# Patient Record
Sex: MALE | Race: WHITE | NOT HISPANIC OR LATINO | Employment: OTHER | URBAN - METROPOLITAN AREA
[De-identification: names, ages, dates, MRNs, and addresses within clinical notes are randomized per-mention and may not be internally consistent; named-entity substitution may affect disease eponyms.]

---

## 2017-01-08 ENCOUNTER — HOSPITAL ENCOUNTER (OUTPATIENT)
Dept: CARDIOLOGY | Facility: HOSPITAL | Age: 72
Discharge: HOME | End: 2017-01-08

## 2017-01-08 DIAGNOSIS — Z13.9 SCREENING: ICD-10-CM

## 2017-01-09 LAB
BH CV XLRA MEAS - PAD LEFT ARM: 145 MMHG
BH CV XLRA MEAS - PAD LEFT LEG DP: 1.12 MMHG
BH CV XLRA MEAS - PAD LEFT LEG PT: 163 MMHG
BH CV XLRA MEAS - PAD RIGHT ARM: 144 MMHG
BH CV XLRA MEAS - PAD RIGHT LEG DP: 1.13 MMHG
BH CV XLRA MEAS - PAD RIGHT LEG PT: 164 MMHG
BH CV XLRA MEAS - PROX AO DIAM: 2.7 CM
BH CV XLRA MEAS LEFT ICA/CCA RATIO: 1.2
BH CV XLRA MEAS LEFT MID CCA PSV: NORMAL CM/SEC
BH CV XLRA MEAS LEFT MID ICA PSV: NORMAL CM/SEC
BH CV XLRA MEAS LEFT PROX ECA PSV: 101 CM/SEC
BH CV XLRA MEAS RIGHT ICA/CCA RATIO: 0.7
BH CV XLRA MEAS RIGHT MID CCA PSV: NORMAL CM/SEC
BH CV XLRA MEAS RIGHT MID ICA PSV: NORMAL CM/SEC
BH CV XLRA MEAS RIGHT PROX ECA PSV: 111 CM/SEC

## 2017-01-10 ENCOUNTER — TELEPHONE (OUTPATIENT)
Dept: CARDIOLOGY | Facility: CLINIC | Age: 72
End: 2017-01-10

## 2017-01-10 DIAGNOSIS — M54.50 CHRONIC MIDLINE LOW BACK PAIN WITHOUT SCIATICA: ICD-10-CM

## 2017-01-10 DIAGNOSIS — I71.40 ABDOMINAL AORTIC ANEURYSM (AAA) WITHOUT RUPTURE (HCC): Primary | ICD-10-CM

## 2017-01-10 DIAGNOSIS — G89.29 CHRONIC MIDLINE LOW BACK PAIN WITHOUT SCIATICA: ICD-10-CM

## 2017-01-11 NOTE — TELEPHONE ENCOUNTER
I talked to patient regarding vascular screening results and findings abdominal aortic aneurysm.  He has had chronic back pain.  I doubt this is playing a significant role in his back pain.  However asked him to see a vascular surgery in regards to this.  A consult has been placed.      Cheryl, Please see if he can get in in the next one to 2 weeks. [email or web address]

## 2017-02-23 ENCOUNTER — APPOINTMENT (OUTPATIENT)
Dept: CT IMAGING | Facility: HOSPITAL | Age: 72
End: 2017-02-23

## 2017-02-23 ENCOUNTER — HOSPITAL ENCOUNTER (INPATIENT)
Facility: HOSPITAL | Age: 72
LOS: 3 days | Discharge: HOME | End: 2017-02-26
Attending: EMERGENCY MEDICINE | Admitting: UROLOGY

## 2017-02-23 DIAGNOSIS — N20.0 NEPHROLITHIASIS: ICD-10-CM

## 2017-02-23 DIAGNOSIS — N20.1 URETEROLITHIASIS: Primary | ICD-10-CM

## 2017-02-23 LAB
ALBUMIN SERPL-MCNC: 4.4 G/DL (ref 3.5–5.2)
ALBUMIN/GLOB SERPL: 1.6 G/DL
ALP SERPL-CCNC: 50 U/L (ref 39–117)
ALT SERPL W P-5'-P-CCNC: 17 U/L (ref 1–41)
ANION GAP SERPL CALCULATED.3IONS-SCNC: 15.7 MMOL/L
AST SERPL-CCNC: 22 U/L (ref 1–40)
BACTERIA UR QL AUTO: ABNORMAL /HPF
BASOPHILS # BLD AUTO: 0.01 10*3/MM3 (ref 0–0.2)
BASOPHILS NFR BLD AUTO: 0.1 % (ref 0–1.5)
BILIRUB SERPL-MCNC: 0.8 MG/DL (ref 0.1–1.2)
BILIRUB UR QL STRIP: NEGATIVE
BUN BLD-MCNC: 21 MG/DL (ref 8–23)
BUN/CREAT SERPL: 12.5 (ref 7–25)
CALCIUM SPEC-SCNC: 9.9 MG/DL (ref 8.6–10.5)
CHLORIDE SERPL-SCNC: 101 MMOL/L (ref 98–107)
CLARITY UR: ABNORMAL
CO2 SERPL-SCNC: 25.3 MMOL/L (ref 22–29)
COLOR UR: ABNORMAL
CREAT BLD-MCNC: 1.68 MG/DL (ref 0.76–1.27)
DEPRECATED RDW RBC AUTO: 43 FL (ref 37–54)
EOSINOPHIL # BLD AUTO: 0.09 10*3/MM3 (ref 0–0.7)
EOSINOPHIL NFR BLD AUTO: 0.7 % (ref 0.3–6.2)
ERYTHROCYTE [DISTWIDTH] IN BLOOD BY AUTOMATED COUNT: 12.5 % (ref 11.5–14.5)
GFR SERPL CREATININE-BSD FRML MDRD: 40 ML/MIN/1.73
GLOBULIN UR ELPH-MCNC: 2.7 GM/DL
GLUCOSE BLD-MCNC: 86 MG/DL (ref 65–99)
GLUCOSE UR STRIP-MCNC: NEGATIVE MG/DL
HCT VFR BLD AUTO: 40.8 % (ref 40.4–52.2)
HGB BLD-MCNC: 13.6 G/DL (ref 13.7–17.6)
HGB UR QL STRIP.AUTO: ABNORMAL
HOLD SPECIMEN: NORMAL
HYALINE CASTS UR QL AUTO: ABNORMAL /LPF
IMM GRANULOCYTES # BLD: 0.04 10*3/MM3 (ref 0–0.03)
IMM GRANULOCYTES NFR BLD: 0.3 % (ref 0–0.5)
KETONES UR QL STRIP: ABNORMAL
LEUKOCYTE ESTERASE UR QL STRIP.AUTO: ABNORMAL
LIPASE SERPL-CCNC: 14 U/L (ref 13–60)
LYMPHOCYTES # BLD AUTO: 1.04 10*3/MM3 (ref 0.9–4.8)
LYMPHOCYTES NFR BLD AUTO: 8.1 % (ref 19.6–45.3)
MCH RBC QN AUTO: 31.5 PG (ref 27–32.7)
MCHC RBC AUTO-ENTMCNC: 33.3 G/DL (ref 32.6–36.4)
MCV RBC AUTO: 94.4 FL (ref 79.8–96.2)
MONOCYTES # BLD AUTO: 0.96 10*3/MM3 (ref 0.2–1.2)
MONOCYTES NFR BLD AUTO: 7.5 % (ref 5–12)
NEUTROPHILS # BLD AUTO: 10.69 10*3/MM3 (ref 1.9–8.1)
NEUTROPHILS NFR BLD AUTO: 83.3 % (ref 42.7–76)
NITRITE UR QL STRIP: NEGATIVE
PH UR STRIP.AUTO: 6 [PH] (ref 5–8)
PLATELET # BLD AUTO: 225 10*3/MM3 (ref 140–500)
PMV BLD AUTO: 11 FL (ref 6–12)
POTASSIUM BLD-SCNC: 4.1 MMOL/L (ref 3.5–5.2)
PROT SERPL-MCNC: 7.1 G/DL (ref 6–8.5)
PROT UR QL STRIP: ABNORMAL
RBC # BLD AUTO: 4.32 10*6/MM3 (ref 4.6–6)
RBC # UR: ABNORMAL /HPF
REF LAB TEST METHOD: ABNORMAL
SODIUM BLD-SCNC: 142 MMOL/L (ref 136–145)
SP GR UR STRIP: 1.02 (ref 1–1.03)
SQUAMOUS #/AREA URNS HPF: ABNORMAL /HPF
UROBILINOGEN UR QL STRIP: ABNORMAL
WBC NRBC COR # BLD: 12.83 10*3/MM3 (ref 4.5–10.7)
WBC UR QL AUTO: ABNORMAL /HPF
WHOLE BLOOD HOLD SPECIMEN: NORMAL

## 2017-02-23 PROCEDURE — 36415 COLL VENOUS BLD VENIPUNCTURE: CPT | Performed by: EMERGENCY MEDICINE

## 2017-02-23 PROCEDURE — 83690 ASSAY OF LIPASE: CPT | Performed by: EMERGENCY MEDICINE

## 2017-02-23 PROCEDURE — 99284 EMERGENCY DEPT VISIT MOD MDM: CPT

## 2017-02-23 PROCEDURE — 25010000003 CEFTRIAXONE PER 250 MG: Performed by: EMERGENCY MEDICINE

## 2017-02-23 PROCEDURE — 25010000002 MORPHINE PER 10 MG: Performed by: EMERGENCY MEDICINE

## 2017-02-23 PROCEDURE — 74176 CT ABD & PELVIS W/O CONTRAST: CPT

## 2017-02-23 PROCEDURE — 25010000002 ONDANSETRON PER 1 MG: Performed by: EMERGENCY MEDICINE

## 2017-02-23 PROCEDURE — 81001 URINALYSIS AUTO W/SCOPE: CPT | Performed by: EMERGENCY MEDICINE

## 2017-02-23 PROCEDURE — 80053 COMPREHEN METABOLIC PANEL: CPT | Performed by: EMERGENCY MEDICINE

## 2017-02-23 PROCEDURE — 87086 URINE CULTURE/COLONY COUNT: CPT | Performed by: EMERGENCY MEDICINE

## 2017-02-23 PROCEDURE — 25010000002 HYDROMORPHONE PER 4 MG: Performed by: EMERGENCY MEDICINE

## 2017-02-23 PROCEDURE — 85025 COMPLETE CBC W/AUTO DIFF WBC: CPT | Performed by: EMERGENCY MEDICINE

## 2017-02-23 RX ORDER — ONDANSETRON 2 MG/ML
4 INJECTION INTRAMUSCULAR; INTRAVENOUS ONCE
Status: COMPLETED | OUTPATIENT
Start: 2017-02-23 | End: 2017-02-23

## 2017-02-23 RX ORDER — HYDROMORPHONE HYDROCHLORIDE 1 MG/ML
0.5 INJECTION, SOLUTION INTRAMUSCULAR; INTRAVENOUS; SUBCUTANEOUS ONCE
Status: COMPLETED | OUTPATIENT
Start: 2017-02-23 | End: 2017-02-23

## 2017-02-23 RX ORDER — SODIUM CHLORIDE 0.9 % (FLUSH) 0.9 %
10 SYRINGE (ML) INJECTION AS NEEDED
Status: DISCONTINUED | OUTPATIENT
Start: 2017-02-23 | End: 2017-02-24

## 2017-02-23 RX ORDER — MORPHINE SULFATE 2 MG/ML
2 INJECTION, SOLUTION INTRAMUSCULAR; INTRAVENOUS ONCE
Status: COMPLETED | OUTPATIENT
Start: 2017-02-23 | End: 2017-02-23

## 2017-02-23 RX ORDER — SODIUM CHLORIDE 9 MG/ML
125 INJECTION, SOLUTION INTRAVENOUS CONTINUOUS
Status: DISCONTINUED | OUTPATIENT
Start: 2017-02-23 | End: 2017-02-23

## 2017-02-23 RX ORDER — CEFTRIAXONE SODIUM 2 G/50ML
2 INJECTION, SOLUTION INTRAVENOUS ONCE
Status: COMPLETED | OUTPATIENT
Start: 2017-02-23 | End: 2017-02-24

## 2017-02-23 RX ORDER — SODIUM CHLORIDE 9 MG/ML
125 INJECTION, SOLUTION INTRAVENOUS CONTINUOUS
Status: DISCONTINUED | OUTPATIENT
Start: 2017-02-23 | End: 2017-02-24

## 2017-02-23 RX ADMIN — SODIUM CHLORIDE 1000 ML: 9 INJECTION, SOLUTION INTRAVENOUS at 20:50

## 2017-02-23 RX ADMIN — HYDROMORPHONE HYDROCHLORIDE 0.5 MG: 1 INJECTION, SOLUTION INTRAMUSCULAR; INTRAVENOUS; SUBCUTANEOUS at 23:27

## 2017-02-23 RX ADMIN — SODIUM CHLORIDE 125 ML/HR: 9 INJECTION, SOLUTION INTRAVENOUS at 23:43

## 2017-02-23 RX ADMIN — CEFTRIAXONE SODIUM 2 G: 2 INJECTION, SOLUTION INTRAVENOUS at 23:43

## 2017-02-23 RX ADMIN — SODIUM CHLORIDE 1000 ML: 9 INJECTION, SOLUTION INTRAVENOUS at 21:11

## 2017-02-23 RX ADMIN — MORPHINE SULFATE 2 MG: 2 INJECTION, SOLUTION INTRAMUSCULAR; INTRAVENOUS at 21:13

## 2017-02-23 RX ADMIN — HYDROMORPHONE HYDROCHLORIDE 0.5 MG: 1 INJECTION, SOLUTION INTRAMUSCULAR; INTRAVENOUS; SUBCUTANEOUS at 23:07

## 2017-02-23 RX ADMIN — ONDANSETRON 4 MG: 2 INJECTION INTRAMUSCULAR; INTRAVENOUS at 21:12

## 2017-02-24 ENCOUNTER — ANESTHESIA EVENT (OUTPATIENT)
Dept: PERIOP | Facility: HOSPITAL | Age: 72
End: 2017-02-24

## 2017-02-24 ENCOUNTER — ANESTHESIA (OUTPATIENT)
Dept: PERIOP | Facility: HOSPITAL | Age: 72
End: 2017-02-24

## 2017-02-24 PROCEDURE — 0T768DZ DILATION OF RIGHT URETER WITH INTRALUMINAL DEVICE, VIA NATURAL OR ARTIFICIAL OPENING ENDOSCOPIC: ICD-10-PCS | Performed by: UROLOGY

## 2017-02-24 PROCEDURE — C1769 GUIDE WIRE: HCPCS | Performed by: UROLOGY

## 2017-02-24 PROCEDURE — 25010000002 PROPOFOL 10 MG/ML EMULSION: Performed by: ANESTHESIOLOGY

## 2017-02-24 PROCEDURE — 0 IOTHALAMATE 60 % SOLUTION: Performed by: UROLOGY

## 2017-02-24 PROCEDURE — C1758 CATHETER, URETERAL: HCPCS | Performed by: UROLOGY

## 2017-02-24 PROCEDURE — BT1D1ZZ FLUOROSCOPY OF RIGHT KIDNEY, URETER AND BLADDER USING LOW OSMOLAR CONTRAST: ICD-10-PCS | Performed by: UROLOGY

## 2017-02-24 PROCEDURE — 25010000002 HYDROMORPHONE PER 4 MG: Performed by: UROLOGY

## 2017-02-24 PROCEDURE — C2617 STENT, NON-COR, TEM W/O DEL: HCPCS | Performed by: UROLOGY

## 2017-02-24 PROCEDURE — 25010000002 FENTANYL CITRATE (PF) 100 MCG/2ML SOLUTION: Performed by: ANESTHESIOLOGY

## 2017-02-24 PROCEDURE — 25010000002 DEXAMETHASONE PER 1 MG: Performed by: ANESTHESIOLOGY

## 2017-02-24 PROCEDURE — 25010000002 PROMETHAZINE PER 50 MG: Performed by: UROLOGY

## 2017-02-24 DEVICE — URETERAL STENT
Type: IMPLANTABLE DEVICE | Site: URETER | Status: FUNCTIONAL
Brand: CONTOUR™

## 2017-02-24 RX ORDER — FAMOTIDINE 10 MG/ML
20 INJECTION, SOLUTION INTRAVENOUS ONCE
Status: COMPLETED | OUTPATIENT
Start: 2017-02-24 | End: 2017-02-24

## 2017-02-24 RX ORDER — CIPROFLOXACIN 500 MG/1
500 TABLET, FILM COATED ORAL 2 TIMES DAILY
Qty: 14 TABLET | Refills: 0 | Status: SHIPPED | OUTPATIENT
Start: 2017-02-24 | End: 2017-02-26 | Stop reason: HOSPADM

## 2017-02-24 RX ORDER — LEVOTHYROXINE SODIUM 0.05 MG/1
50 TABLET ORAL
Status: DISCONTINUED | OUTPATIENT
Start: 2017-02-24 | End: 2017-02-25 | Stop reason: SDUPTHER

## 2017-02-24 RX ORDER — LIDOCAINE HYDROCHLORIDE 20 MG/ML
INJECTION, SOLUTION INFILTRATION; PERINEURAL AS NEEDED
Status: DISCONTINUED | OUTPATIENT
Start: 2017-02-24 | End: 2017-02-24 | Stop reason: SURG

## 2017-02-24 RX ORDER — PROMETHAZINE HYDROCHLORIDE 25 MG/ML
12.5 INJECTION, SOLUTION INTRAMUSCULAR; INTRAVENOUS EVERY 6 HOURS PRN
Status: DISCONTINUED | OUTPATIENT
Start: 2017-02-24 | End: 2017-02-24

## 2017-02-24 RX ORDER — ASPIRIN 325 MG
162 TABLET ORAL DAILY
Status: DISCONTINUED | OUTPATIENT
Start: 2017-02-24 | End: 2017-02-26 | Stop reason: HOSPADM

## 2017-02-24 RX ORDER — TAMSULOSIN HYDROCHLORIDE 0.4 MG/1
0.4 CAPSULE ORAL ONCE
Status: DISCONTINUED | OUTPATIENT
Start: 2017-02-24 | End: 2017-02-24

## 2017-02-24 RX ORDER — FENTANYL 100 UG/H
1 PATCH TRANSDERMAL
Status: DISCONTINUED | OUTPATIENT
Start: 2017-02-24 | End: 2017-02-26 | Stop reason: HOSPADM

## 2017-02-24 RX ORDER — PROMETHAZINE HYDROCHLORIDE 25 MG/ML
12.5 INJECTION, SOLUTION INTRAMUSCULAR; INTRAVENOUS
Status: DISCONTINUED | OUTPATIENT
Start: 2017-02-24 | End: 2017-02-24

## 2017-02-24 RX ORDER — MIDAZOLAM HYDROCHLORIDE 1 MG/ML
1 INJECTION INTRAMUSCULAR; INTRAVENOUS
Status: DISCONTINUED | OUTPATIENT
Start: 2017-02-24 | End: 2017-02-24 | Stop reason: HOSPADM

## 2017-02-24 RX ORDER — PROPOFOL 10 MG/ML
VIAL (ML) INTRAVENOUS AS NEEDED
Status: DISCONTINUED | OUTPATIENT
Start: 2017-02-24 | End: 2017-02-24 | Stop reason: SURG

## 2017-02-24 RX ORDER — SODIUM CHLORIDE 0.9 % (FLUSH) 0.9 %
1-10 SYRINGE (ML) INJECTION AS NEEDED
Status: DISCONTINUED | OUTPATIENT
Start: 2017-02-24 | End: 2017-02-24 | Stop reason: HOSPADM

## 2017-02-24 RX ORDER — HYDROCODONE BITARTRATE AND ACETAMINOPHEN 7.5; 325 MG/1; MG/1
1 TABLET ORAL EVERY 6 HOURS PRN
Qty: 30 TABLET | Refills: 0 | Status: SHIPPED | OUTPATIENT
Start: 2017-02-24 | End: 2017-04-27

## 2017-02-24 RX ORDER — MIDAZOLAM HYDROCHLORIDE 1 MG/ML
2 INJECTION INTRAMUSCULAR; INTRAVENOUS
Status: DISCONTINUED | OUTPATIENT
Start: 2017-02-24 | End: 2017-02-24 | Stop reason: HOSPADM

## 2017-02-24 RX ORDER — FENTANYL CITRATE 50 UG/ML
100 INJECTION, SOLUTION INTRAMUSCULAR; INTRAVENOUS
Status: DISCONTINUED | OUTPATIENT
Start: 2017-02-24 | End: 2017-02-24 | Stop reason: HOSPADM

## 2017-02-24 RX ORDER — FENTANYL CITRATE 50 UG/ML
INJECTION, SOLUTION INTRAMUSCULAR; INTRAVENOUS AS NEEDED
Status: DISCONTINUED | OUTPATIENT
Start: 2017-02-24 | End: 2017-02-24 | Stop reason: SURG

## 2017-02-24 RX ORDER — DEXAMETHASONE SODIUM PHOSPHATE 10 MG/ML
INJECTION INTRAMUSCULAR; INTRAVENOUS AS NEEDED
Status: DISCONTINUED | OUTPATIENT
Start: 2017-02-24 | End: 2017-02-24 | Stop reason: SURG

## 2017-02-24 RX ORDER — TAMSULOSIN HYDROCHLORIDE 0.4 MG/1
0.4 CAPSULE ORAL DAILY
Status: DISCONTINUED | OUTPATIENT
Start: 2017-02-24 | End: 2017-02-26 | Stop reason: HOSPADM

## 2017-02-24 RX ORDER — DEXTROSE, SODIUM CHLORIDE, AND POTASSIUM CHLORIDE 5; .45; .15 G/100ML; G/100ML; G/100ML
125 INJECTION INTRAVENOUS
Status: DISCONTINUED | OUTPATIENT
Start: 2017-02-24 | End: 2017-02-26 | Stop reason: HOSPADM

## 2017-02-24 RX ORDER — PROMETHAZINE HYDROCHLORIDE 25 MG/ML
12.5 INJECTION, SOLUTION INTRAMUSCULAR; INTRAVENOUS ONCE
Status: DISCONTINUED | OUTPATIENT
Start: 2017-02-24 | End: 2017-02-24

## 2017-02-24 RX ORDER — SODIUM CHLORIDE, SODIUM LACTATE, POTASSIUM CHLORIDE, CALCIUM CHLORIDE 600; 310; 30; 20 MG/100ML; MG/100ML; MG/100ML; MG/100ML
9 INJECTION, SOLUTION INTRAVENOUS CONTINUOUS
Status: DISCONTINUED | OUTPATIENT
Start: 2017-02-24 | End: 2017-02-24

## 2017-02-24 RX ORDER — LIDOCAINE HYDROCHLORIDE 10 MG/ML
5 INJECTION, SOLUTION EPIDURAL; INFILTRATION; INTRACAUDAL; PERINEURAL ONCE AS NEEDED
Status: DISCONTINUED | OUTPATIENT
Start: 2017-02-24 | End: 2017-02-24 | Stop reason: HOSPADM

## 2017-02-24 RX ORDER — DOCUSATE SODIUM 100 MG/1
100 CAPSULE, LIQUID FILLED ORAL 2 TIMES DAILY
Status: DISCONTINUED | OUTPATIENT
Start: 2017-02-24 | End: 2017-02-26 | Stop reason: HOSPADM

## 2017-02-24 RX ORDER — HYDROMORPHONE HYDROCHLORIDE 1 MG/ML
0.5 INJECTION, SOLUTION INTRAMUSCULAR; INTRAVENOUS; SUBCUTANEOUS
Status: DISCONTINUED | OUTPATIENT
Start: 2017-02-24 | End: 2017-02-26 | Stop reason: HOSPADM

## 2017-02-24 RX ADMIN — FENTANYL CITRATE 50 MCG: 50 INJECTION INTRAMUSCULAR; INTRAVENOUS at 15:22

## 2017-02-24 RX ADMIN — TAMSULOSIN HYDROCHLORIDE 0.4 MG: 0.4 CAPSULE ORAL at 08:03

## 2017-02-24 RX ADMIN — HYDROMORPHONE HYDROCHLORIDE 0.5 MG: 1 INJECTION, SOLUTION INTRAMUSCULAR; INTRAVENOUS; SUBCUTANEOUS at 12:50

## 2017-02-24 RX ADMIN — HYDROMORPHONE HYDROCHLORIDE 0.5 MG: 1 INJECTION, SOLUTION INTRAMUSCULAR; INTRAVENOUS; SUBCUTANEOUS at 09:22

## 2017-02-24 RX ADMIN — FENTANYL CITRATE 50 MCG: 50 INJECTION INTRAMUSCULAR; INTRAVENOUS at 15:28

## 2017-02-24 RX ADMIN — POTASSIUM CHLORIDE, DEXTROSE MONOHYDRATE AND SODIUM CHLORIDE 125 ML/HR: 150; 5; 450 INJECTION, SOLUTION INTRAVENOUS at 01:58

## 2017-02-24 RX ADMIN — HYDROMORPHONE HYDROCHLORIDE 0.5 MG: 1 INJECTION, SOLUTION INTRAMUSCULAR; INTRAVENOUS; SUBCUTANEOUS at 02:00

## 2017-02-24 RX ADMIN — FAMOTIDINE 20 MG: 10 INJECTION, SOLUTION INTRAVENOUS at 14:30

## 2017-02-24 RX ADMIN — SODIUM CHLORIDE, POTASSIUM CHLORIDE, SODIUM LACTATE AND CALCIUM CHLORIDE 9 ML/HR: 600; 310; 30; 20 INJECTION, SOLUTION INTRAVENOUS at 14:24

## 2017-02-24 RX ADMIN — SODIUM CHLORIDE 1000 ML: 9 INJECTION, SOLUTION INTRAVENOUS at 00:24

## 2017-02-24 RX ADMIN — PROMETHAZINE HYDROCHLORIDE 12.5 MG: 25 INJECTION INTRAMUSCULAR; INTRAVENOUS at 07:48

## 2017-02-24 RX ADMIN — ASPIRIN 162 MG: 325 TABLET ORAL at 18:43

## 2017-02-24 RX ADMIN — POTASSIUM CHLORIDE, DEXTROSE MONOHYDRATE AND SODIUM CHLORIDE 125 ML/HR: 150; 5; 450 INJECTION, SOLUTION INTRAVENOUS at 09:22

## 2017-02-24 RX ADMIN — PROMETHAZINE HYDROCHLORIDE 12.5 MG: 25 INJECTION INTRAMUSCULAR; INTRAVENOUS at 01:38

## 2017-02-24 RX ADMIN — SODIUM CHLORIDE, POTASSIUM CHLORIDE, SODIUM LACTATE AND CALCIUM CHLORIDE: 600; 310; 30; 20 INJECTION, SOLUTION INTRAVENOUS at 15:28

## 2017-02-24 RX ADMIN — PROPOFOL 150 MG: 10 INJECTION, EMULSION INTRAVENOUS at 15:22

## 2017-02-24 RX ADMIN — DEXAMETHASONE SODIUM PHOSPHATE 4 MG: 10 INJECTION INTRAMUSCULAR; INTRAVENOUS at 15:28

## 2017-02-24 RX ADMIN — LEVOTHYROXINE SODIUM 50 MCG: 50 TABLET ORAL at 18:44

## 2017-02-24 RX ADMIN — LIDOCAINE HYDROCHLORIDE 40 MG: 20 INJECTION, SOLUTION INFILTRATION; PERINEURAL at 15:28

## 2017-02-24 RX ADMIN — POTASSIUM CHLORIDE, DEXTROSE MONOHYDRATE AND SODIUM CHLORIDE 125 ML/HR: 150; 5; 450 INJECTION, SOLUTION INTRAVENOUS at 21:40

## 2017-02-24 RX ADMIN — DOCUSATE SODIUM 100 MG: 100 CAPSULE, LIQUID FILLED ORAL at 18:44

## 2017-02-24 NOTE — ANESTHESIA PREPROCEDURE EVALUATION
Anesthesia Evaluation     Patient summary reviewed and Nursing notes reviewed   NPO Status: > 8 hours   Airway   Mallampati: II  TM distance: >3 FB  Neck ROM: full  no difficulty expected  Dental - normal exam     Pulmonary - negative pulmonary ROS and normal exam   Cardiovascular - normal exam    (+) CAD,     ROS comment: No cp    Neuro/Psych- negative ROS  GI/Hepatic/Renal/Endo - negative ROS     Musculoskeletal (-) negative ROS    Abdominal  - normal exam   Substance History - negative use     OB/GYN negative ob/gyn ROS         Other                                    Anesthesia Plan    ASA 3     general     intravenous induction   Anesthetic plan and risks discussed with patient.    Plan discussed with CRNA.

## 2017-02-24 NOTE — ANESTHESIA PROCEDURE NOTES
Airway  Urgency: emergent    Date/Time: 2/24/2017 3:24 PM  End Time:2/24/2017 3:26 PM  Airway not difficult    General Information and Staff    Patient location during procedure: OR  Anesthesiologist: LYNDA PETTIT    Consent for Airway (if performed for an anesthetic, see related documentation for consents)  Patient identity confirmed: verbally with patient  Consent: Verbal consent obtained.  Consent given by: patient      Indications and Patient Condition  Indications for airway management: airway protection    Preoxygenated: yes  MILS maintained throughout  Mask difficulty assessment: 0 - not attempted    Final Airway Details  Final airway type: supraglottic airway      Successful airway: classic  Size 4    Number of attempts at approach: 1

## 2017-02-25 LAB
ANION GAP SERPL CALCULATED.3IONS-SCNC: 12.4 MMOL/L
BACTERIA SPEC AEROBE CULT: NO GROWTH
BASOPHILS # BLD AUTO: 0 10*3/MM3 (ref 0–0.2)
BASOPHILS NFR BLD AUTO: 0 % (ref 0–1.5)
BUN BLD-MCNC: 14 MG/DL (ref 8–23)
BUN/CREAT SERPL: 10.8 (ref 7–25)
CALCIUM SPEC-SCNC: 8.9 MG/DL (ref 8.6–10.5)
CHLORIDE SERPL-SCNC: 103 MMOL/L (ref 98–107)
CO2 SERPL-SCNC: 22.6 MMOL/L (ref 22–29)
CREAT BLD-MCNC: 1.3 MG/DL (ref 0.76–1.27)
DEPRECATED RDW RBC AUTO: 43 FL (ref 37–54)
EOSINOPHIL # BLD AUTO: 0 10*3/MM3 (ref 0–0.7)
EOSINOPHIL NFR BLD AUTO: 0 % (ref 0.3–6.2)
ERYTHROCYTE [DISTWIDTH] IN BLOOD BY AUTOMATED COUNT: 12.5 % (ref 11.5–14.5)
GFR SERPL CREATININE-BSD FRML MDRD: 54 ML/MIN/1.73
GLUCOSE BLD-MCNC: 182 MG/DL (ref 65–99)
HCT VFR BLD AUTO: 38 % (ref 40.4–52.2)
HGB BLD-MCNC: 12.8 G/DL (ref 13.7–17.6)
IMM GRANULOCYTES # BLD: 0.03 10*3/MM3 (ref 0–0.03)
IMM GRANULOCYTES NFR BLD: 0.3 % (ref 0–0.5)
LYMPHOCYTES # BLD AUTO: 0.74 10*3/MM3 (ref 0.9–4.8)
LYMPHOCYTES NFR BLD AUTO: 6.6 % (ref 19.6–45.3)
MCH RBC QN AUTO: 32.1 PG (ref 27–32.7)
MCHC RBC AUTO-ENTMCNC: 33.7 G/DL (ref 32.6–36.4)
MCV RBC AUTO: 95.2 FL (ref 79.8–96.2)
MONOCYTES # BLD AUTO: 0.48 10*3/MM3 (ref 0.2–1.2)
MONOCYTES NFR BLD AUTO: 4.3 % (ref 5–12)
NEUTROPHILS # BLD AUTO: 10.04 10*3/MM3 (ref 1.9–8.1)
NEUTROPHILS NFR BLD AUTO: 88.8 % (ref 42.7–76)
PLATELET # BLD AUTO: 207 10*3/MM3 (ref 140–500)
PMV BLD AUTO: 10.8 FL (ref 6–12)
POTASSIUM BLD-SCNC: 4.4 MMOL/L (ref 3.5–5.2)
RBC # BLD AUTO: 3.99 10*6/MM3 (ref 4.6–6)
SODIUM BLD-SCNC: 138 MMOL/L (ref 136–145)
WBC NRBC COR # BLD: 11.29 10*3/MM3 (ref 4.5–10.7)

## 2017-02-25 PROCEDURE — 25010000002 ONDANSETRON PER 1 MG: Performed by: UROLOGY

## 2017-02-25 PROCEDURE — 80048 BASIC METABOLIC PNL TOTAL CA: CPT

## 2017-02-25 PROCEDURE — 85025 COMPLETE CBC W/AUTO DIFF WBC: CPT

## 2017-02-25 PROCEDURE — 25010000002 HYDROMORPHONE PER 4 MG: Performed by: UROLOGY

## 2017-02-25 RX ORDER — LEVOTHYROXINE SODIUM 0.05 MG/1
50 TABLET ORAL NIGHTLY
Status: DISCONTINUED | OUTPATIENT
Start: 2017-02-25 | End: 2017-02-26 | Stop reason: HOSPADM

## 2017-02-25 RX ORDER — ONDANSETRON 2 MG/ML
4 INJECTION INTRAMUSCULAR; INTRAVENOUS EVERY 6 HOURS PRN
Status: DISCONTINUED | OUTPATIENT
Start: 2017-02-25 | End: 2017-02-26 | Stop reason: HOSPADM

## 2017-02-25 RX ADMIN — TAMSULOSIN HYDROCHLORIDE 0.4 MG: 0.4 CAPSULE ORAL at 08:48

## 2017-02-25 RX ADMIN — ASPIRIN 162 MG: 325 TABLET ORAL at 08:48

## 2017-02-25 RX ADMIN — POTASSIUM CHLORIDE, DEXTROSE MONOHYDRATE AND SODIUM CHLORIDE 125 ML/HR: 150; 5; 450 INJECTION, SOLUTION INTRAVENOUS at 22:31

## 2017-02-25 RX ADMIN — ONDANSETRON 4 MG: 2 INJECTION INTRAMUSCULAR; INTRAVENOUS at 12:37

## 2017-02-25 RX ADMIN — POTASSIUM CHLORIDE, DEXTROSE MONOHYDRATE AND SODIUM CHLORIDE 125 ML/HR: 150; 5; 450 INJECTION, SOLUTION INTRAVENOUS at 13:49

## 2017-02-25 RX ADMIN — DOCUSATE SODIUM 100 MG: 100 CAPSULE, LIQUID FILLED ORAL at 18:33

## 2017-02-25 RX ADMIN — POTASSIUM CHLORIDE, DEXTROSE MONOHYDRATE AND SODIUM CHLORIDE 125 ML/HR: 150; 5; 450 INJECTION, SOLUTION INTRAVENOUS at 05:46

## 2017-02-25 RX ADMIN — HYDROMORPHONE HYDROCHLORIDE 0.5 MG: 1 INJECTION, SOLUTION INTRAMUSCULAR; INTRAVENOUS; SUBCUTANEOUS at 00:08

## 2017-02-25 RX ADMIN — DOCUSATE SODIUM 100 MG: 100 CAPSULE, LIQUID FILLED ORAL at 08:48

## 2017-02-25 RX ADMIN — HYDROMORPHONE HYDROCHLORIDE 0.5 MG: 1 INJECTION, SOLUTION INTRAMUSCULAR; INTRAVENOUS; SUBCUTANEOUS at 16:01

## 2017-02-25 RX ADMIN — LEVOTHYROXINE SODIUM 50 MCG: 50 TABLET ORAL at 20:32

## 2017-02-26 VITALS
OXYGEN SATURATION: 96 % | TEMPERATURE: 97.7 F | DIASTOLIC BLOOD PRESSURE: 60 MMHG | BODY MASS INDEX: 20.6 KG/M2 | HEIGHT: 61 IN | HEART RATE: 83 BPM | WEIGHT: 109.13 LBS | RESPIRATION RATE: 16 BRPM | SYSTOLIC BLOOD PRESSURE: 109 MMHG

## 2017-02-26 LAB
ANION GAP SERPL CALCULATED.3IONS-SCNC: 10.6 MMOL/L
BUN BLD-MCNC: 13 MG/DL (ref 8–23)
BUN/CREAT SERPL: 9.6 (ref 7–25)
CALCIUM SPEC-SCNC: 8.9 MG/DL (ref 8.6–10.5)
CHLORIDE SERPL-SCNC: 104 MMOL/L (ref 98–107)
CO2 SERPL-SCNC: 25.4 MMOL/L (ref 22–29)
CREAT BLD-MCNC: 1.36 MG/DL (ref 0.76–1.27)
GFR SERPL CREATININE-BSD FRML MDRD: 52 ML/MIN/1.73
GLUCOSE BLD-MCNC: 103 MG/DL (ref 65–99)
POTASSIUM BLD-SCNC: 4.3 MMOL/L (ref 3.5–5.2)
SODIUM BLD-SCNC: 140 MMOL/L (ref 136–145)

## 2017-02-26 PROCEDURE — 80048 BASIC METABOLIC PNL TOTAL CA: CPT | Performed by: NURSE PRACTITIONER

## 2017-02-26 PROCEDURE — 25010000002 HYDROMORPHONE PER 4 MG: Performed by: UROLOGY

## 2017-02-26 RX ORDER — ONDANSETRON 4 MG/1
4 TABLET, FILM COATED ORAL EVERY 6 HOURS PRN
Refills: 0
Start: 2017-02-26 | End: 2017-04-27

## 2017-02-26 RX ORDER — PHENAZOPYRIDINE HYDROCHLORIDE 200 MG/1
200 TABLET, FILM COATED ORAL 3 TIMES DAILY PRN
Refills: 0
Start: 2017-02-26 | End: 2017-09-25

## 2017-02-26 RX ORDER — POLYETHYLENE GLYCOL 3350 17 G/17G
17 POWDER, FOR SOLUTION ORAL DAILY
Start: 2017-02-26 | End: 2017-09-25

## 2017-02-26 RX ORDER — SULFAMETHOXAZOLE AND TRIMETHOPRIM 800; 160 MG/1; MG/1
1 TABLET ORAL 2 TIMES DAILY
Refills: 0
Start: 2017-02-26 | End: 2017-04-27

## 2017-02-26 RX ADMIN — HYDROMORPHONE HYDROCHLORIDE 0.5 MG: 1 INJECTION, SOLUTION INTRAMUSCULAR; INTRAVENOUS; SUBCUTANEOUS at 07:07

## 2017-02-26 RX ADMIN — HYDROMORPHONE HYDROCHLORIDE 0.5 MG: 1 INJECTION, SOLUTION INTRAMUSCULAR; INTRAVENOUS; SUBCUTANEOUS at 14:36

## 2017-02-26 RX ADMIN — ASPIRIN 162 MG: 325 TABLET ORAL at 08:24

## 2017-02-26 RX ADMIN — TAMSULOSIN HYDROCHLORIDE 0.4 MG: 0.4 CAPSULE ORAL at 08:24

## 2017-02-26 RX ADMIN — DOCUSATE SODIUM 100 MG: 100 CAPSULE, LIQUID FILLED ORAL at 08:24

## 2017-02-26 RX ADMIN — POTASSIUM CHLORIDE, DEXTROSE MONOHYDRATE AND SODIUM CHLORIDE 125 ML/HR: 150; 5; 450 INJECTION, SOLUTION INTRAVENOUS at 07:07

## 2017-04-27 ENCOUNTER — TELEPHONE (OUTPATIENT)
Dept: CARDIOLOGY | Facility: CLINIC | Age: 72
End: 2017-04-27

## 2017-04-27 ENCOUNTER — OFFICE VISIT (OUTPATIENT)
Dept: CARDIOLOGY | Facility: CLINIC | Age: 72
End: 2017-04-27

## 2017-04-27 VITALS
HEART RATE: 70 BPM | SYSTOLIC BLOOD PRESSURE: 120 MMHG | BODY MASS INDEX: 21.52 KG/M2 | HEIGHT: 68 IN | DIASTOLIC BLOOD PRESSURE: 70 MMHG | RESPIRATION RATE: 16 BRPM | WEIGHT: 142 LBS

## 2017-04-27 DIAGNOSIS — G89.29 CHRONIC LEFT-SIDED LOW BACK PAIN WITH LEFT-SIDED SCIATICA: Primary | ICD-10-CM

## 2017-04-27 DIAGNOSIS — M54.42 CHRONIC LEFT-SIDED LOW BACK PAIN WITH LEFT-SIDED SCIATICA: Primary | ICD-10-CM

## 2017-04-27 DIAGNOSIS — G89.29 CHRONIC MIDLINE LOW BACK PAIN WITHOUT SCIATICA: ICD-10-CM

## 2017-04-27 DIAGNOSIS — I25.10 CORONARY ARTERY DISEASE DUE TO CALCIFIED CORONARY LESION: ICD-10-CM

## 2017-04-27 DIAGNOSIS — I25.84 CORONARY ARTERY DISEASE DUE TO CALCIFIED CORONARY LESION: ICD-10-CM

## 2017-04-27 DIAGNOSIS — M54.50 CHRONIC MIDLINE LOW BACK PAIN WITHOUT SCIATICA: ICD-10-CM

## 2017-04-27 PROCEDURE — 99214 OFFICE O/P EST MOD 30 MIN: CPT | Performed by: INTERNAL MEDICINE

## 2017-04-27 PROCEDURE — 93000 ELECTROCARDIOGRAM COMPLETE: CPT | Performed by: INTERNAL MEDICINE

## 2017-04-27 RX ORDER — ASPIRIN 81 MG/1
81 TABLET, CHEWABLE ORAL DAILY
COMMUNITY

## 2017-04-27 RX ORDER — CLOPIDOGREL BISULFATE 75 MG
75 TABLET ORAL DAILY
COMMUNITY
Start: 2017-04-26 | End: 2019-07-11 | Stop reason: SDUPTHER

## 2017-04-27 RX ORDER — HYDROMORPHONE HYDROCHLORIDE 8 MG/1
TABLET ORAL
Refills: 0 | COMMUNITY
Start: 2017-04-11 | End: 2017-09-25

## 2017-04-28 ENCOUNTER — TELEPHONE (OUTPATIENT)
Dept: CARDIOLOGY | Facility: CLINIC | Age: 72
End: 2017-04-28

## 2017-05-01 PROBLEM — M54.50 CHRONIC MIDLINE LOW BACK PAIN WITHOUT SCIATICA: Status: ACTIVE | Noted: 2017-05-01

## 2017-05-01 PROBLEM — M54.42 CHRONIC LEFT-SIDED LOW BACK PAIN WITH LEFT-SIDED SCIATICA: Status: ACTIVE | Noted: 2017-05-01

## 2017-05-01 PROBLEM — G89.29 CHRONIC MIDLINE LOW BACK PAIN WITHOUT SCIATICA: Status: ACTIVE | Noted: 2017-05-01

## 2017-05-01 PROBLEM — G89.29 CHRONIC LEFT-SIDED LOW BACK PAIN WITH LEFT-SIDED SCIATICA: Status: ACTIVE | Noted: 2017-05-01

## 2017-05-02 DIAGNOSIS — M54.40 ACUTE MIDLINE LOW BACK PAIN WITH SCIATICA, SCIATICA LATERALITY UNSPECIFIED: Primary | ICD-10-CM

## 2017-09-25 ENCOUNTER — OFFICE VISIT (OUTPATIENT)
Dept: NEUROSURGERY | Facility: CLINIC | Age: 72
End: 2017-09-25

## 2017-09-25 VITALS — HEIGHT: 68 IN | BODY MASS INDEX: 22.67 KG/M2 | WEIGHT: 149.6 LBS

## 2017-09-25 DIAGNOSIS — M51.36 DDD (DEGENERATIVE DISC DISEASE), LUMBAR: Primary | ICD-10-CM

## 2017-09-25 DIAGNOSIS — G89.29 CHRONIC MIDLINE LOW BACK PAIN WITHOUT SCIATICA: ICD-10-CM

## 2017-09-25 DIAGNOSIS — M54.50 CHRONIC MIDLINE LOW BACK PAIN WITHOUT SCIATICA: ICD-10-CM

## 2017-09-25 DIAGNOSIS — R29.898 LEFT LEG WEAKNESS: ICD-10-CM

## 2017-09-25 PROBLEM — M51.369 DDD (DEGENERATIVE DISC DISEASE), LUMBAR: Status: ACTIVE | Noted: 2017-09-25

## 2017-09-25 PROCEDURE — 99203 OFFICE O/P NEW LOW 30 MIN: CPT | Performed by: NEUROLOGICAL SURGERY

## 2017-09-25 RX ORDER — HYDROMORPHONE HYDROCHLORIDE 2 MG/1
TABLET ORAL
Refills: 0 | COMMUNITY
Start: 2017-08-25 | End: 2018-01-27 | Stop reason: HOSPADM

## 2017-09-25 NOTE — PROGRESS NOTES
Subjective   Patient ID: Mannie Fajardo is a 72 y.o. male is being seen for consultation today at the request of Marlene Giordano MD for back pain. He complains of weakness in the left leg and trouble when walking.    Back Pain   This is a chronic problem. The current episode started more than 1 year ago. The problem occurs constantly. The problem has been gradually worsening since onset. The pain is present in the lumbar spine. The pain is at a severity of 7/10. The pain is moderate. Associated symptoms include leg pain and weakness. Pertinent negatives include no bladder incontinence, bowel incontinence, numbness or tingling.       The following portions of the patient's history were reviewed and updated as appropriate: allergies, current medications, past family history, past medical history, past social history, past surgical history and problem list.    Review of Systems   Gastrointestinal: Negative for bowel incontinence.   Genitourinary: Negative for bladder incontinence.   Musculoskeletal: Positive for back pain.   Neurological: Positive for weakness. Negative for tingling and numbness.   All other systems reviewed and are negative.    The patient is with 2 friends, a retired  and a retired orthopedic surgeon.  He has had a 20 year history of low back pain that has become worse and he describes as excruciating in the last 5 years.  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  Sitting, standing and walking all make it worse.  Activity makes it worse.  He has had epidural blocks and facet blocks, neither of which have helped.  He is currently on Dilaudid orally from his primary care physician.  He had been on a fentanyl patch in the past.  His Cardiologist, Dr. Giordano, referred him here for further evaluation.  I reviewed his MRIs and myelograms.  While there is some  structural disease at L4-L5 it is really on the mild side and it is hard for me to attribute the pain that he has, particularly in the midline, and the character of the pain to his disks.  He does not complain of diffuse pain in other parts of his body, but it is possibly he has some underlying rheumatologic problem that has yet been disclosed.  I was honest with him and his friends.  There really is not much I have to offer him.  He certainly does not need surgery.  I would recommend some kind of physical activity, such as aquatic therapy, just to keep him as mobile as possible and we can refer him to an aquatic therapy center, but I suggest that he speak to his primary care physician, Dr. Patel, about a referral to a rheumatologist.  I gave him some names, but I would defer to Dr. Patel about who to refer him to in the first place.  We will keep it open-ended.          Objective   Physical Exam   Constitutional: He is oriented to person, place, and time. He appears well-developed and well-nourished.   HENT:   Head: Normocephalic and atraumatic.   Eyes: Conjunctivae and EOM are normal. Pupils are equal, round, and reactive to light.   Fundoscopic exam:       The right eye shows no papilledema. The right eye shows venous pulsations.        The left eye shows no papilledema. The left eye shows venous pulsations.   Neck: Carotid bruit is not present.   Neurological: He is oriented to person, place, and time. He has a normal Finger-Nose-Finger Test and a normal Heel to Shin Test. Gait normal.   Reflex Scores:       Tricep reflexes are 2+ on the right side and 2+ on the left side.       Bicep reflexes are 2+ on the right side and 2+ on the left side.       Brachioradialis reflexes are 2+ on the right side and 2+ on the left side.       Patellar reflexes are 2+ on the right side and 2+ on the left side.       Achilles reflexes are 2+ on the right side and 2+ on the left side.  Psychiatric: His speech is normal.      Neurologic Exam     Mental Status   Oriented to person, place, and time.   Registration of memory: Good recent and remote memory.   Attention: normal. Concentration: normal.   Speech: speech is normal   Level of consciousness: alert  Knowledge: consistent with education.     Cranial Nerves     CN II   Visual fields full to confrontation.   Visual acuity: normal    CN III, IV, VI   Pupils are equal, round, and reactive to light.  Extraocular motions are normal.     CN V   Facial sensation intact.   Right corneal reflex: normal  Left corneal reflex: normal    CN VII   Facial expression full, symmetric.   Right facial weakness: none  Left facial weakness: none    CN VIII   Hearing: intact    CN IX, X   Palate: symmetric    CN XI   Right sternocleidomastoid strength: normal  Left sternocleidomastoid strength: normal    CN XII   Tongue: not atrophic  Tongue deviation: none    Motor Exam   Muscle bulk: normal  Right arm tone: normal  Left arm tone: normal  Right leg tone: normal  Left leg tone: normal    Strength   Strength 5/5 except as noted.     Sensory Exam   Light touch normal.     Gait, Coordination, and Reflexes     Gait  Gait: normal    Coordination   Finger to nose coordination: normal  Heel to shin coordination: normal    Reflexes   Right brachioradialis: 2+  Left brachioradialis: 2+  Right biceps: 2+  Left biceps: 2+  Right triceps: 2+  Left triceps: 2+  Right patellar: 2+  Left patellar: 2+  Right achilles: 2+  Left achilles: 2+  Right : 2+  Left : 2+      Assessment/Plan   Independent Review of Radiographic Studies:    I have reviewed his myelogram done 12/09/2016 as well as a lumbar MRI done in 2016.  I looked at MRIs done in 2012 and 2013.  They all show the same thing, which is very mild degenerative disk disease and a disk bulge at L4-L5, but no significant nerve root compression.  No spinal stenosis.  No significant lateral recess stenosis.  I agree with the report.        Medical Decision  Making:    I was aroldo with the patient that I am not sure that the structural disease that we see in the spine can explain the severity of his symptoms. Plus, the location of his pain is right in the center of his spine, a very unusual place with degenerative disk disease. He certainly does not need any surgery. I suggested that he speak with his primary care physician about seeing a rheumatologist. That is probably the best suggestion that I can make at this time other than sending him to aquatic therapy which may help his overall mobility, which is quite poor at this time. We will make the referral to aquatic therapy and will keep it open-ended here as I have nothing significant to add at this point.      Mannie was seen today for back pain.    Diagnoses and all orders for this visit:    DDD (degenerative disc disease), lumbar  -     Ambulatory Referral to Physical Therapy Aquatics, Evaluate and treat    Chronic midline low back pain without sciatica  -     Ambulatory Referral to Physical Therapy Aquatics, Evaluate and treat    Left leg weakness  -     Ambulatory Referral to Physical Therapy Aquatics, Evaluate and treat    Return if symptoms worsen or fail to improve.

## 2017-11-14 ENCOUNTER — OFFICE VISIT (OUTPATIENT)
Dept: CARDIOLOGY | Facility: CLINIC | Age: 72
End: 2017-11-14

## 2017-11-14 VITALS
DIASTOLIC BLOOD PRESSURE: 73 MMHG | HEART RATE: 77 BPM | SYSTOLIC BLOOD PRESSURE: 118 MMHG | HEIGHT: 68 IN | BODY MASS INDEX: 22.43 KG/M2 | WEIGHT: 148 LBS

## 2017-11-14 DIAGNOSIS — I25.10 CORONARY ARTERY DISEASE DUE TO CALCIFIED CORONARY LESION: Primary | ICD-10-CM

## 2017-11-14 DIAGNOSIS — I71.40 ABDOMINAL AORTIC ANEURYSM (AAA) WITHOUT RUPTURE (HCC): ICD-10-CM

## 2017-11-14 DIAGNOSIS — M79.602 PAIN OF LEFT UPPER EXTREMITY: ICD-10-CM

## 2017-11-14 DIAGNOSIS — I25.84 CORONARY ARTERY DISEASE DUE TO CALCIFIED CORONARY LESION: Primary | ICD-10-CM

## 2017-11-14 PROCEDURE — 99213 OFFICE O/P EST LOW 20 MIN: CPT | Performed by: INTERNAL MEDICINE

## 2017-11-14 PROCEDURE — 93000 ELECTROCARDIOGRAM COMPLETE: CPT | Performed by: INTERNAL MEDICINE

## 2017-11-14 NOTE — PROGRESS NOTES
Date of Office Visit: 2017  Encounter Provider: Marlene Giordano MD  Place of Service: [facility] CARDIOLOGY  Patient Name: Mannie Fajardo  :1945    Chief complaint  Followup of CAD    History of Present Illness  The patient is a delightful, 72-year-old gentleman with history of hyperlipidemia, hyperhomocysteinemia, and coronary artery disease.  In , after an abnormal stress test, he underwent cardiac catheterization that revealed severe disease of the circumflex artery for which he received a drug-coated stent.  He has not had any intervening events since then, though he has maintained aspirin and Plavix therapy.  In 2016 with complaints of chest pain Lexiscan perfusion study was negative for ischemia.    Since last visit he has been troubled by back pain.  He denies any chest pain, shortness of breath, palpitations, syncope near-syncope.  He does complain of left arm pain when he first awakens in the morning that lasted 5 minutes resolves as he ambulates.  His blood pressures been as it is today on occasion systolic pressures of been in the 140s.  He is to get blood work drawn with his new primary care physician next month    Past Medical History:   Diagnosis Date   • Back pain    • Backache    • CAD (coronary atherosclerotic disease)    • Disease of thyroid gland    • Dyslipidemia    • Edema    • Hyperhomocystinemia    • Hyperlipidemia      Past Surgical History:   Procedure Laterality Date   • APPENDECTOMY     • CORONARY ANGIOPLASTY WITH STENT PLACEMENT     • EXTRACORPOREAL SHOCKWAVE LITHOTRIPSY (ESWL), STENT INSERTION/REMOVAL Right 2017    Procedure: CYSTO RETROGRADE WITH STENT PLACEMENT;  Surgeon: Janes López MD;  Location: [facility] OR [facility];  Service:      Outpatient Medications Prior to Visit   Medication Sig Dispense Refill   • aspirin 81 MG chewable tablet Chew 81 mg Daily.     • folic acid (FOLVITE) 400 MCG tablet Take 400 mcg by mouth daily.      • HYDROmorphone (DILAUDID) 2 MG tablet TAKE 2 TABS 3XDAILY X1WK, 2TABS 2XDAILY X2WK, 2TAB DAILY X2WK, 1TAB DAILY X1 WK THEN STOP  0   • levothyroxine (SYNTHROID, LEVOTHROID) 50 MCG tablet Take 50 mcg by mouth daily.     • LOVAZA 1 G capsule TAKE 1 CAPSULE TWICE DAILY 180 capsule 0   • PLAVIX 75 MG tablet Take 75 mg by mouth Daily.     • ZETIA 10 MG tablet TAKE 1 TABLET DAILY. 90 tablet 0     No facility-administered medications prior to visit.      Allergies as of 11/14/2017 - Art as Reviewed 11/14/2017   Allergen Reaction Noted   • Adhesive tape  04/14/2016   • Statins  04/14/2016     Social History     Social History   • Marital status: Single     Spouse name: N/A   • Number of children: 0   • Years of education: COLLEGE     Occupational History   • RETIRED      Social History Main Topics   • Smoking status: Never Smoker   • Smokeless tobacco: Never Used   • Alcohol use No      Comment: caffeine use   • Drug use: No   • Sexual activity: Defer     Other Topics Concern   • Not on file     Social History Narrative     Family History   Problem Relation Age of Onset   • Heart disease Mother    • Stroke Father      Review of Systems   Constitution: Positive for malaise/fatigue. Negative for fever, weight gain and weight loss.   HENT: Negative for ear pain, hearing loss, nosebleeds and sore throat.    Eyes: Negative for double vision, pain, vision loss in left eye and vision loss in right eye.   Cardiovascular:        See history of present illness.   Respiratory: Negative for cough, shortness of breath, sleep disturbances due to breathing, snoring and wheezing.    Endocrine: Negative for cold intolerance, heat intolerance and polyuria.   Skin: Negative for itching, poor wound healing and rash.   Musculoskeletal: Positive for joint pain. Negative for joint swelling and myalgias.   Gastrointestinal: Negative for abdominal pain, diarrhea, hematochezia, nausea and vomiting.   Genitourinary: Negative for hematuria and  "hesitancy.   Neurological: Negative for numbness, paresthesias and seizures.   Psychiatric/Behavioral: Negative for depression. The patient is not nervous/anxious.      Objective:     Vitals:    11/14/17 1226   BP: 118/73   Pulse: 77   Weight: 148 lb (67.1 kg)   Height: 68\" (172.7 cm)     Body mass index is 22.5 kg/(m^2).    Physical Exam   Constitutional: He is oriented to person, place, and time. He appears well-developed and well-nourished.   HENT:   Head: Normocephalic.   Nose: Nose normal.   Mouth/Throat: Oropharynx is clear and moist.   Eyes: Conjunctivae and EOM are normal. Pupils are equal, round, and reactive to light. Right eye exhibits no discharge. No scleral icterus.   Neck: Normal range of motion. Neck supple. No JVD present. No thyromegaly present.   Cardiovascular: Normal rate, regular rhythm, normal heart sounds and intact distal pulses.  Exam reveals no gallop and no friction rub.    No murmur heard.  Pulses:       Carotid pulses are 2+ on the right side, and 2+ on the left side.       Radial pulses are 2+ on the right side, and 2+ on the left side.        Femoral pulses are 2+ on the right side, and 2+ on the left side.       Popliteal pulses are 2+ on the right side, and 2+ on the left side.        Dorsalis pedis pulses are 2+ on the right side, and 2+ on the left side.        Posterior tibial pulses are 2+ on the right side, and 2+ on the left side.   Pulmonary/Chest: Effort normal and breath sounds normal. No respiratory distress. He has no wheezes. He has no rales.   Abdominal: Soft. Bowel sounds are normal. He exhibits no distension. There is no hepatosplenomegaly. There is no tenderness. There is no rebound.   Musculoskeletal: Normal range of motion. He exhibits no edema or tenderness.   Neurological: He is alert and oriented to person, place, and time.   Skin: Skin is warm and dry. No rash noted. No erythema.   Psychiatric: He has a normal mood and affect. His behavior is normal. Judgment " and thought content normal.   Vitals reviewed.    Lab Review:     ECG 12 Lead  Date/Time: 11/14/2017 10:33 PM  Performed by: DEREK JARRELL  Authorized by: DEREK JARRELL   Comparison: compared with previous ECG   Similar to previous ECG  Rhythm: sinus rhythm  Clinical impression: normal ECG          Assessment:       Diagnosis Plan   1. Coronary artery disease due to calcified coronary lesion     2. Abdominal aortic aneurysm (AAA) without rupture     3. Pain of left upper extremity       Plan:       1.  Coronary artery disease with history of prior drug-coated stent placement.  No anginal symptoms.  Continue lifestyle changes and follow-up and 6 months  2.  Back pain.  Unfortunately no treatment options at this time per neurosurgery or neurology or Dr. Olson had suggested rheumatology evaluation the patient has deferred   3.  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  History of renal insufficiency labs appear to be stable  5.  Abdominal aortic aneurysm followed by vascular surgery  6.  Hyperlipidemia.  Intolerant of statins    Coronary Artery Disease  Assessment  • The patient has no angina    Plan  • Lifestyle modifications discussed include adhering to a heart healthy diet, regular exercise and regular monitoring of cholesterol and blood pressure    Subjective - Objective  • There has been a previous stent procedure using MINH  • Current antiplatelet therapy includes aspirin 81 mg and clopidogrel 75 mg       Mannie Fajardo   [facility] Medication Instructions BLACK:    Printed on:11/18/17 8501   Medication Information                      aspirin 81 MG chewable tablet  Chew 81 mg Daily.             folic acid (FOLVITE) 400 MCG tablet  Take 400 mcg by mouth daily.             HYDROmorphone (DILAUDID) 2 MG tablet  TAKE 2 TABS 3XDAILY X1WK, 2TABS 2XDAILY X2WK, 2TAB DAILY X2WK, 1TAB DAILY X1 WK THEN STOP             levothyroxine (SYNTHROID, LEVOTHROID) 50 MCG tablet  Take 50 mcg by mouth daily.             LOVAZA 1 G  capsule  TAKE 1 CAPSULE TWICE DAILY             PLAVIX 75 MG tablet  Take 75 mg by mouth Daily.             ZETIA 10 MG tablet  TAKE 1 TABLET DAILY.               Dictated utilizing Dragon dictation

## 2018-01-25 ENCOUNTER — HOSPITAL ENCOUNTER (OUTPATIENT)
Facility: HOSPITAL | Age: 73
Setting detail: OBSERVATION
Discharge: HOME | End: 2018-01-27
Attending: EMERGENCY MEDICINE | Admitting: INTERNAL MEDICINE

## 2018-01-25 ENCOUNTER — APPOINTMENT (OUTPATIENT)
Dept: GENERAL RADIOLOGY | Facility: HOSPITAL | Age: 73
End: 2018-01-25

## 2018-01-25 ENCOUNTER — APPOINTMENT (OUTPATIENT)
Dept: CT IMAGING | Facility: HOSPITAL | Age: 73
End: 2018-01-25

## 2018-01-25 DIAGNOSIS — R07.9 CHEST PAIN, UNSPECIFIED TYPE: Primary | ICD-10-CM

## 2018-01-25 DIAGNOSIS — G89.29 CHRONIC LOW BACK PAIN, UNSPECIFIED BACK PAIN LATERALITY, WITH SCIATICA PRESENCE UNSPECIFIED: ICD-10-CM

## 2018-01-25 DIAGNOSIS — M54.5 CHRONIC LOW BACK PAIN, UNSPECIFIED BACK PAIN LATERALITY, WITH SCIATICA PRESENCE UNSPECIFIED: ICD-10-CM

## 2018-01-25 DIAGNOSIS — M62.81 MUSCLE WEAKNESS (GENERALIZED): ICD-10-CM

## 2018-01-25 LAB
ANION GAP SERPL CALCULATED.3IONS-SCNC: 10.7 MMOL/L
BACTERIA UR QL AUTO: ABNORMAL /HPF
BASOPHILS # BLD AUTO: 0.04 10*3/MM3 (ref 0–0.2)
BASOPHILS NFR BLD AUTO: 0.4 % (ref 0–1.5)
BILIRUB UR QL STRIP: NEGATIVE
BUN BLD-MCNC: 22 MG/DL (ref 8–23)
BUN/CREAT SERPL: 15.6 (ref 7–25)
CALCIUM SPEC-SCNC: 9.8 MG/DL (ref 8.6–10.5)
CHLORIDE SERPL-SCNC: 100 MMOL/L (ref 98–107)
CLARITY UR: CLEAR
CO2 SERPL-SCNC: 29.3 MMOL/L (ref 22–29)
COLOR UR: YELLOW
CREAT BLD-MCNC: 1.41 MG/DL (ref 0.76–1.27)
DEPRECATED RDW RBC AUTO: 44.8 FL (ref 37–54)
EOSINOPHIL # BLD AUTO: 0.19 10*3/MM3 (ref 0–0.7)
EOSINOPHIL NFR BLD AUTO: 2 % (ref 0.3–6.2)
ERYTHROCYTE [DISTWIDTH] IN BLOOD BY AUTOMATED COUNT: 12.5 % (ref 11.5–14.5)
GFR SERPL CREATININE-BSD FRML MDRD: 49 ML/MIN/1.73
GLUCOSE BLD-MCNC: 89 MG/DL (ref 65–99)
GLUCOSE UR STRIP-MCNC: NEGATIVE MG/DL
HCT VFR BLD AUTO: 42.8 % (ref 40.4–52.2)
HGB BLD-MCNC: 13.8 G/DL (ref 13.7–17.6)
HGB UR QL STRIP.AUTO: NEGATIVE
HYALINE CASTS UR QL AUTO: ABNORMAL /LPF
IMM GRANULOCYTES # BLD: 0.02 10*3/MM3 (ref 0–0.03)
IMM GRANULOCYTES NFR BLD: 0.2 % (ref 0–0.5)
KETONES UR QL STRIP: NEGATIVE
LEUKOCYTE ESTERASE UR QL STRIP.AUTO: ABNORMAL
LIPASE SERPL-CCNC: 13 U/L (ref 13–60)
LYMPHOCYTES # BLD AUTO: 1.59 10*3/MM3 (ref 0.9–4.8)
LYMPHOCYTES NFR BLD AUTO: 17.2 % (ref 19.6–45.3)
MAGNESIUM SERPL-MCNC: 2.2 MG/DL (ref 1.6–2.4)
MCH RBC QN AUTO: 31.6 PG (ref 27–32.7)
MCHC RBC AUTO-ENTMCNC: 32.2 G/DL (ref 32.6–36.4)
MCV RBC AUTO: 97.9 FL (ref 79.8–96.2)
MONOCYTES # BLD AUTO: 0.49 10*3/MM3 (ref 0.2–1.2)
MONOCYTES NFR BLD AUTO: 5.3 % (ref 5–12)
NEUTROPHILS # BLD AUTO: 6.94 10*3/MM3 (ref 1.9–8.1)
NEUTROPHILS NFR BLD AUTO: 74.9 % (ref 42.7–76)
NITRITE UR QL STRIP: NEGATIVE
NT-PROBNP SERPL-MCNC: 124.5 PG/ML (ref 5–900)
PH UR STRIP.AUTO: 6 [PH] (ref 5–8)
PLATELET # BLD AUTO: 191 10*3/MM3 (ref 140–500)
PMV BLD AUTO: 10.9 FL (ref 6–12)
POTASSIUM BLD-SCNC: 4.1 MMOL/L (ref 3.5–5.2)
PROT UR QL STRIP: NEGATIVE
RBC # BLD AUTO: 4.37 10*6/MM3 (ref 4.6–6)
RBC # UR: ABNORMAL /HPF
REF LAB TEST METHOD: ABNORMAL
SODIUM BLD-SCNC: 140 MMOL/L (ref 136–145)
SP GR UR STRIP: 1.02 (ref 1–1.03)
SQUAMOUS #/AREA URNS HPF: ABNORMAL /HPF
TROPONIN T SERPL-MCNC: <0.01 NG/ML (ref 0–0.03)
TROPONIN T SERPL-MCNC: <0.01 NG/ML (ref 0–0.03)
UROBILINOGEN UR QL STRIP: ABNORMAL
WBC NRBC COR # BLD: 9.27 10*3/MM3 (ref 4.5–10.7)
WBC UR QL AUTO: ABNORMAL /HPF

## 2018-01-25 PROCEDURE — 93010 ELECTROCARDIOGRAM REPORT: CPT | Performed by: INTERNAL MEDICINE

## 2018-01-25 PROCEDURE — 85025 COMPLETE CBC W/AUTO DIFF WBC: CPT | Performed by: EMERGENCY MEDICINE

## 2018-01-25 PROCEDURE — 83880 ASSAY OF NATRIURETIC PEPTIDE: CPT | Performed by: EMERGENCY MEDICINE

## 2018-01-25 PROCEDURE — 81001 URINALYSIS AUTO W/SCOPE: CPT | Performed by: EMERGENCY MEDICINE

## 2018-01-25 PROCEDURE — 84484 ASSAY OF TROPONIN QUANT: CPT | Performed by: EMERGENCY MEDICINE

## 2018-01-25 PROCEDURE — 74176 CT ABD & PELVIS W/O CONTRAST: CPT

## 2018-01-25 PROCEDURE — 74018 RADEX ABDOMEN 1 VIEW: CPT

## 2018-01-25 PROCEDURE — G0378 HOSPITAL OBSERVATION PER HR: HCPCS

## 2018-01-25 PROCEDURE — 96374 THER/PROPH/DIAG INJ IV PUSH: CPT

## 2018-01-25 PROCEDURE — 80048 BASIC METABOLIC PNL TOTAL CA: CPT | Performed by: EMERGENCY MEDICINE

## 2018-01-25 PROCEDURE — 99284 EMERGENCY DEPT VISIT MOD MDM: CPT

## 2018-01-25 PROCEDURE — 83735 ASSAY OF MAGNESIUM: CPT | Performed by: EMERGENCY MEDICINE

## 2018-01-25 PROCEDURE — 83690 ASSAY OF LIPASE: CPT | Performed by: EMERGENCY MEDICINE

## 2018-01-25 PROCEDURE — 93005 ELECTROCARDIOGRAM TRACING: CPT | Performed by: EMERGENCY MEDICINE

## 2018-01-25 PROCEDURE — 71045 X-RAY EXAM CHEST 1 VIEW: CPT

## 2018-01-25 RX ORDER — HYDROMORPHONE HYDROCHLORIDE 8 MG/1
8 TABLET ORAL 4 TIMES DAILY PRN
Refills: 0 | COMMUNITY
Start: 2017-10-20 | End: 2018-09-25

## 2018-01-25 RX ORDER — HYDROMORPHONE HYDROCHLORIDE 1 MG/ML
0.5 INJECTION, SOLUTION INTRAMUSCULAR; INTRAVENOUS; SUBCUTANEOUS ONCE
Status: COMPLETED | OUTPATIENT
Start: 2018-01-25 | End: 2018-01-25

## 2018-01-25 RX ORDER — NITROGLYCERIN 0.4 MG/1
0.4 TABLET SUBLINGUAL
Status: DISCONTINUED | OUTPATIENT
Start: 2018-01-25 | End: 2018-01-26

## 2018-01-25 RX ORDER — ASPIRIN 325 MG
325 TABLET ORAL ONCE
Status: COMPLETED | OUTPATIENT
Start: 2018-01-25 | End: 2018-01-25

## 2018-01-25 RX ADMIN — NITROGLYCERIN 0.4 MG: 0.4 TABLET SUBLINGUAL at 21:35

## 2018-01-25 RX ADMIN — NITROGLYCERIN 0.4 MG: 0.4 TABLET SUBLINGUAL at 20:48

## 2018-01-25 RX ADMIN — ASPIRIN 325 MG: 325 TABLET ORAL at 20:41

## 2018-01-25 RX ADMIN — HYDROMORPHONE HYDROCHLORIDE 0.5 MG: 10 INJECTION INTRAMUSCULAR; INTRAVENOUS; SUBCUTANEOUS at 22:38

## 2018-01-25 NOTE — ED PROVIDER NOTES
EMERGENCY DEPARTMENT ENCOUNTER    CHIEF COMPLAINT  Chief Complaint: Constipation  History given by: Pt  History limited by: Nothing  Room Number: 19/19  PMD: Hugo Egan MD      HPI:  Pt is a 72 y.o. male who presents complaining of constipation that was diagnosed today.  Pt states that he has been using Miralax, which initially produced a lot of bowel movements.   Pt saw his doctor today where he underwent a XR which revealed constipation.  PT states that his doctor sent him to the ED due to constipation. Pt states that he was unaware that he was constipated. Pt states that has been taking his dilaudid for chronic back pain.  Pt denies vomiting, changes in food or liquid intake.       Duration:  One day  Onset: Gradual  Timing: Episodic  Intensity/Severity: Moderate  Progression: Unchanged  Associated Symptoms: Chronic back pain  Aggravating Factors: None  Alleviating Factors: None  Previous Episodes: Pt was treated for constipation at the beginning of January.  Treatment before arrival: Miralax     PAST MEDICAL HISTORY  Active Ambulatory Problems     Diagnosis Date Noted   • AAA (abdominal aortic aneurysm) 04/18/2016   • Hyperlipidemia 04/18/2016   • Coronary artery disease due to calcified coronary lesion 11/22/2016   • Precordial pain 11/22/2016   • Ureterolithiasis 02/23/2017   • Chronic left-sided low back pain with left-sided sciatica 05/01/2017   • Chronic midline low back pain without sciatica 05/01/2017   • DDD (degenerative disc disease), lumbar 09/25/2017   • Left leg weakness 09/25/2017   • Pain of left upper extremity 11/14/2017     Resolved Ambulatory Problems     Diagnosis Date Noted   • No Resolved Ambulatory Problems     Past Medical History:   Diagnosis Date   • Back pain    • Backache    • CAD (coronary atherosclerotic disease)    • Disease of thyroid gland    • Dyslipidemia    • Edema    • Hyperhomocystinemia    • Hyperlipidemia        PAST SURGICAL HISTORY  Past Surgical History:    Procedure Laterality Date   • APPENDECTOMY     • CORONARY ANGIOPLASTY WITH STENT PLACEMENT     • EXTRACORPOREAL SHOCKWAVE LITHOTRIPSY (ESWL), STENT INSERTION/REMOVAL Right 2/24/2017    Procedure: CYSTO RETROGRADE WITH STENT PLACEMENT;  Surgeon: Janes López MD;  [place] [facility] [place] [facility] [place]  Service:        FAMILY HISTORY  Family History   Problem Relation Age of Onset   • Heart disease Mother    • Stroke Father        SOCIAL HISTORY  Social History     Social History   • Marital status: Single     Spouse name: N/A   • Number of children: 0   • Years of education: COLLEGE     Occupational History   • RETIRED      Social History Main Topics   • Smoking status: Never Smoker   • Smokeless tobacco: Never Used   • Alcohol use No      Comment: caffeine use   • Drug use: No   • Sexual activity: Defer     Other Topics Concern   • Not on file     Social History Narrative       ALLERGIES  Adhesive tape and Statins    REVIEW OF SYSTEMS  Review of Systems   Constitutional: Negative for activity change, appetite change and fever.   HENT: Negative for congestion and sore throat.    Eyes: Negative.    Respiratory: Negative for cough and shortness of breath.    Cardiovascular: Negative for chest pain and leg swelling.   Gastrointestinal: Positive for constipation. Negative for abdominal pain, diarrhea and vomiting.   Endocrine: Negative.    Genitourinary: Negative for decreased urine volume and dysuria.   Musculoskeletal: Positive for back pain (chronic back pain). Negative for neck pain.   Skin: Negative for rash and wound.   Allergic/Immunologic: Negative.    Neurological: Negative for weakness, numbness and headaches.   Hematological: Negative.    Psychiatric/Behavioral: Negative.    All other systems reviewed and are negative.      PHYSICAL EXAM  ED Triage Vitals   Temp Heart Rate Resp BP SpO2   01/25/18 1506 01/25/18 1506 01/25/18 1506 01/25/18 1512 01/25/18 1506   98.3 °F (36.8 °C) 80 18 139/82 98 %      Temp src  Heart Rate Source Patient Position BP Location FiO2 (%)   -- -- -- -- --              Physical Exam   Constitutional: He is oriented to person, place, and time and well-developed, well-nourished, and in no distress.   HENT:   Head: Normocephalic and atraumatic.   Eyes: EOM are normal. Pupils are equal, round, and reactive to light.   Neck: Normal range of motion. Neck supple.   Cardiovascular: Normal rate, regular rhythm and normal heart sounds.    Pulmonary/Chest: Effort normal and breath sounds normal. No respiratory distress.   Abdominal: Soft. There is no tenderness. There is no rebound and no guarding.   Genitourinary:   Genitourinary Comments: Normal saddle sensation.  Normal rectal tone.  No fecal impaction.  Brown stool.  No gross blood. Guaiac negative.     Musculoskeletal: Normal range of motion. He exhibits no edema.   Diffuse pain in lumbar spine.  More prominent in the L2 area.     Neurological: He is alert and oriented to person, place, and time. He has normal sensation and normal strength.   Skin: Skin is warm and dry.   Psychiatric: Mood and affect normal.   Nursing note and vitals reviewed.      LAB RESULTS  Lab Results (last 24 hours)     Procedure Component Value Units Date/Time    CBC & Differential [[id number]] Collected:  01/25/18 1840    Specimen:  Blood Updated:  01/25/18 1851    Narrative:       The following orders were created for panel order CBC & Differential.  Procedure                               Abnormality         Status                     ---------                               -----------         ------                     CBC Auto Differential[[id number]]        Abnormal            Final result                 Please view results for these tests on the individual orders.    Basic Metabolic Panel [[id number]]  (Abnormal) Collected:  01/25/18 1840    Specimen:  Blood Updated:  01/25/18 1909     Glucose 89 mg/dL      BUN 22 mg/dL      Creatinine 1.41 (H) mg/dL      Sodium 140 mmol/L       Potassium 4.1 mmol/L      Chloride 100 mmol/L      CO2 29.3 (H) mmol/L      Calcium 9.8 mg/dL      eGFR Non African Amer 49 (L) mL/min/1.73      BUN/Creatinine Ratio 15.6     Anion Gap 10.7 mmol/L     Narrative:       The MDRD GFR formula is only valid for adults with stable renal function between ages 18 and 70.    CBC Auto Differential [[id number]]  (Abnormal) Collected:  01/25/18 1840    Specimen:  Blood Updated:  01/25/18 1851     WBC 9.27 10*3/mm3      RBC 4.37 (L) 10*6/mm3      Hemoglobin 13.8 g/dL      Hematocrit 42.8 %      MCV 97.9 (H) fL      MCH 31.6 pg      MCHC 32.2 (L) g/dL      RDW 12.5 %      RDW-SD 44.8 fl      MPV 10.9 fL      Platelets 191 10*3/mm3      Neutrophil % 74.9 %      Lymphocyte % 17.2 (L) %      Monocyte % 5.3 %      Eosinophil % 2.0 %      Basophil % 0.4 %      Immature Grans % 0.2 %      Neutrophils, Absolute 6.94 10*3/mm3      Lymphocytes, Absolute 1.59 10*3/mm3      Monocytes, Absolute 0.49 10*3/mm3      Eosinophils, Absolute 0.19 10*3/mm3      Basophils, Absolute 0.04 10*3/mm3      Immature Grans, Absolute 0.02 10*3/mm3     BNP [[id number]]  (Normal) Collected:  01/25/18 1840    Specimen:  Blood Updated:  01/25/18 2054     proBNP 124.5 pg/mL     Narrative:       Among patients with dyspnea, NT-proBNP is highly sensitive for the detection of acute congestive heart failure. In addition NT-proBNP of <300 pg/ml effectively rules out acute congestive heart failure with 99% negative predictive value.    Troponin [[id number]]  (Normal) Collected:  01/25/18 1840    Specimen:  Blood Updated:  01/25/18 2054     Troponin T <0.010 ng/mL     Narrative:       Troponin T Reference Ranges:  Less than 0.03 ng/mL:    Negative for AMI  0.03 to 0.09 ng/mL:      Indeterminant for AMI  Greater than 0.09 ng/mL: Positive for AMI    Lipase [[id number]]  (Normal) Collected:  01/25/18 1840    Specimen:  Blood Updated:  01/25/18 2050     Lipase 13 U/L     Magnesium [[id number]]  (Normal) Collected:  01/25/18  1840    Specimen:  Blood Updated:  01/25/18 2147     Magnesium 2.2 mg/dL     Urinalysis With / Culture If Indicated - Urine, Clean Catch [[id number]]  (Abnormal) Collected:  01/25/18 1953    Specimen:  Urine from Urine, Clean Catch Updated:  01/25/18 2014     Color, UA Yellow     Appearance, UA Clear     pH, UA 6.0     Specific Gravity, UA 1.019     Glucose, UA Negative     Ketones, UA Negative     Bilirubin, UA Negative     Blood, UA Negative     Protein, UA Negative     Leuk Esterase, UA Trace (A)     Nitrite, UA Negative     Urobilinogen, UA 0.2 E.U./dL    Urinalysis, Microscopic Only - Urine, Clean Catch [[id number]]  (Abnormal) Collected:  01/25/18 1953    Specimen:  Urine from Urine, Clean Catch Updated:  01/25/18 2014     RBC, UA 0-2 /HPF      WBC, UA 3-5 (A) /HPF      Bacteria, UA None Seen /HPF      Squamous Epithelial Cells, UA 0-2 /HPF      Hyaline Casts, UA 0-2 /LPF      Methodology Automated Microscopy    Troponin [[id number]]  (Normal) Collected:  01/25/18 2234    Specimen:  Blood Updated:  01/25/18 2304     Troponin T <0.010 ng/mL     Narrative:       Troponin T Reference Ranges:  Less than 0.03 ng/mL:    Negative for AMI  0.03 to 0.09 ng/mL:      Indeterminant for AMI  Greater than 0.09 ng/mL: Positive for AMI          I ordered the above labs and reviewed the results    RADIOLOGY  XR Chest 1 View   Final Result   No acute findings.           This report was finalized on 1/25/2018 10:36 PM by Dr. Kishore Galindo MD.          XR Abdomen KUB   Final Result   There is some stool along the ascending and transverse colon   but the radiographic findings are not compelling for constipation nor is   there evidence of obstruction. No renal or ureteral calculus is   identified.       This report was finalized on 1/25/2018 9:19 PM by Dr. Mannie Faria MD.          CT Abdomen Pelvis Without Contrast   Final Result   Two very tiny nonobstructing upper pole left renal calculi   that are unchanged since the  "preceding CT dated 2/20/2017. No other   radiopaque calculi are present within either collecting system and there   is no hydronephrosis or hydroureter. There is mild sigmoid   diverticulosis without evidence of diverticulitis. Fusiform infrarenal   abdominal aortic aneurysm measuring 2.9 cm that appears intact.   Moderately enlarged prostate gland. Otherwise unremarkable CT scan of   the abdomen or pelvis.       This report was finalized on 1/25/2018 7:43 PM by Dr. Eladio Buenrostro MD.               I ordered the above noted radiological studies. Interpreted by radiologist.  Reviewed by me in PACS.       PROCEDURES  Procedures  EKG           EKG time: 2040  Rhythm/Rate: Sinus, 79  P waves and MN: Normal  QRS, axis: Normal   ST and T waves: Diffuse non specific ST and T wave changes      Interpreted Contemporaneously by me, independently viewed  similar compared to prior 11/14/17    EKG           EKG time: 2242  Rhythm/Rate: Sinus, 62  P waves and MN: Normal   QRS, axis: Normal   ST and T waves: Diffuse non-specific ST and T wave changes     Interpreted Contemporaneously by me, independently viewed  Unchanged compared to prior done at 2040      PROGRESS AND CONSULTS  ED Course   Value Comment By Time   Creatinine: (!) 1.41 Chronic renal insufficiency no significant change Art Gould MD 01/25 2026    Both EKGs and repeat troponins were negative.  I'm uncertain of the exact etiology of the symptoms.  It is possible that it is opiate withdrawal he had no change with the Dilaudid 0.5 mg.  Is extremely vague and just says \"I feel sick\".  He cannot describe any other symptoms.  He denies chest pain at this time but he says that he has his chronic low back pain. Art Gould MD 01/26 0057     Medications   nitroglycerin (NITROSTAT) SL tablet 0.4 mg (0.4 mg Sublingual Given 1/25/18 2135)   aspirin tablet 325 mg (325 mg Oral Given 1/25/18 2041)   HYDROmorphone (DILAUDID) injection 0.5 mg (0.5 mg Intravenous Given 1/25/18 " "2238)       1947  Spoke with Dr. Buenrostro, who who states that the CT showed average amount of stool in color.  There is no obstruction. No constipation.  There are stones that were there one year ago. There is a stable small aneurism. No disruption.      2027  Discussed pertinent lab and imaging results with Pt. Rechecked the patient and he is now reporting chest pain that feels like a pressure in the center of his chest that began one hour ago. Pt reports associated diaphoresis. PT denies SOB. He states \" I just feel sick\". He is very vague and doesn't provide me with any more symptoms. On repeating asking he states he just feels sick.   Discussed the plan to evaluate further with EKG.   Pt states that he had a stent placed in 2007 but denies MI.  Dr. Giordano is cardiologist.     2130  Rechecked Pt.  He was resting but states that the nitrostat did not relieve his chest discomfort.  Pt states that he \"just does not feel good\".    2228  Rechecked Pt who reports that the chest pain has subsided but that he is experiencing back pain.  Pt states that he still does not feel well and that he cannot describe it. Pt states that his last dilaudid dose was 1:30 and that he normally takes it 3-4 times per day.  I will order dilaudid. BP- 107/55 HR- 73 Temp- 97.8 °F (36.6 °C) O2 sat- 95%    2253  Spoke with Dr. Aguilar who will not admit Pt.       MEDICAL DECISION MAKING  Results were reviewed/discussed with the patient and they were also made aware of online access. Pt also made aware that some labs, such as cultures, will not be resulted during ER visit and follow up with PMD is necessary.     MDM  Number of Diagnoses or Management Options  Chest pain, unspecified type:   Chronic low back pain:      Amount and/or Complexity of Data Reviewed  Clinical lab tests: reviewed (Labs show nothing acute)  Tests in the radiology section of CPT®: reviewed and ordered (CT shows nothing acute.  XR show nothing acute. )  Tests in the " medicine section of CPT®: reviewed  Decide to obtain previous medical records or to obtain history from someone other than the patient: yes  Review and summarize past medical records: yes (Pt underwent a stress myocardial perfusion on 12/2016, which was unremarkable. 2/2017 Pt needed surgery for Kidney stone removal.  Pt also saw Dr. Olson in 9/2017 for chronic lumbar pain.  Pt has some leg pain with his chronic back pain.  Pt has a history of epidural blocks.  He is has been on dilaudid and fentanyl.     9/25/17  Nitin Olson MD   Neurosurgery  DDD (degenerative disc disease), lumbar +2 more   Dx Back Pain; Referred by Marlene Giordano MD   Subjective   Patient ID: Mannie Fajardo is a 72 y.o. male is being seen for consultation today at the request of Marlene Giordano MD for back pain. He complains of weakness in the left leg and trouble when walking.     Back Pain   This is a chronic problem. The current episode started more than 1 year ago. The problem occurs constantly. The problem has been gradually worsening since onset. The pain is present in the lumbar spine. The pain is at a severity of 7/10. The pain is moderate. Associated symptoms include leg pain and weakness. Pertinent negatives include no bladder incontinence, bowel incontinence, numbness or tingling.         The following portions of the patient's history were reviewed and updated as appropriate: allergies, current medications, past family history, past medical history, past social history, past surgical history and problem list.     Review of Systems   Gastrointestinal: Negative for bowel incontinence.   Genitourinary: Negative for bladder incontinence.   Musculoskeletal: Positive for back pain.   Neurological: Positive for weakness. Negative for tingling and numbness.   All other systems reviewed and are negative.     The patient is with 2 friends, a retired  and a retired orthopedic surgeon.  He has had a 20 year history of low back pain that  has become worse and he describes as excruciating in the last 5 years.  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  Sitting, standing and walking all make it worse.  Activity makes it worse.  He has had epidural blocks and facet blocks, neither of which have helped.  He is currently on Dilaudid orally from his primary care physician.  He had been on a fentanyl patch in the past.  His Cardiologist, Dr. Giordano, referred him here for further evaluation.  I reviewed his MRIs and myelograms.  While there is some structural disease at L4-L5 it is really on the mild side and it is hard for me to attribute the pain that he has, particularly in the midline, and the character of the pain to his disks.  He does not complain of diffuse pain in other parts of his body, but it is possibly he has some underlying rheumatologic problem that has yet been disclosed.  I was honest with him and his friends.  There really is not much I have to offer him.  He certainly does not need surgery.  I would recommend some kind of physical activity, such as aquatic therapy, just to keep him as mobile as possible and we can refer him to an aquatic therapy center, but I suggest that he speak to his primary care physician, Dr. Patel, about a referral to a rheumatologist.  I gave him some names, but I would defer to Dr. Patel about who to refer him to in the first place.  We will keep it open-ended.              Chronic midline low back pain without sciatica  -     Ambulatory Referral to Physical Therapy Aquatics, Evaluate and treat )    Patient Progress  Patient progress: stable         DIAGNOSIS  Final diagnoses:   Chest pain, unspecified type   Chronic low back pain       DISPOSITION  ADMISSION    Discussed treatment plan and reason for admission with pt/family and admitting physician.  Pt/family voiced  understanding of the plan for admission for further testing/treatment as needed.           Latest Documented Vital Signs:  As of 12:59 AM  BP- 110/66 HR- 61 Temp- 97.8 °F (36.6 °C) (Oral) O2 sat- 95%    --  Documentation assistance provided by caroline Vargas for Dr. Gould.  Information recorded by the scribe was done at my direction and has been verified and validated by me.       Lili Vargas  01/25/18 4538       Art Gould MD  01/26/18 7953

## 2018-01-25 NOTE — ED TRIAGE NOTES
Pt was sent in by md for lower back pain and constipation. Pt reports back pain with unknown etiology for several years that progressively becomes worse. Pt last bm today. Xray from md shows lots of stool in intestines.

## 2018-01-26 PROBLEM — N18.30 STAGE 3 CHRONIC KIDNEY DISEASE (HCC): Status: ACTIVE | Noted: 2018-01-26

## 2018-01-26 PROBLEM — R07.9 CHEST PAIN: Status: RESOLVED | Noted: 2018-01-25 | Resolved: 2018-01-26

## 2018-01-26 LAB
ANION GAP SERPL CALCULATED.3IONS-SCNC: 12.1 MMOL/L
BUN BLD-MCNC: 24 MG/DL (ref 8–23)
BUN/CREAT SERPL: 18.3 (ref 7–25)
CALCIUM SPEC-SCNC: 9.1 MG/DL (ref 8.6–10.5)
CHLORIDE SERPL-SCNC: 104 MMOL/L (ref 98–107)
CO2 SERPL-SCNC: 26.9 MMOL/L (ref 22–29)
CREAT BLD-MCNC: 1.31 MG/DL (ref 0.76–1.27)
DEPRECATED RDW RBC AUTO: 45.9 FL (ref 37–54)
ERYTHROCYTE [DISTWIDTH] IN BLOOD BY AUTOMATED COUNT: 12.7 % (ref 11.5–14.5)
GFR SERPL CREATININE-BSD FRML MDRD: 54 ML/MIN/1.73
GLUCOSE BLD-MCNC: 120 MG/DL (ref 65–99)
HCT VFR BLD AUTO: 38.6 % (ref 40.4–52.2)
HGB BLD-MCNC: 12.2 G/DL (ref 13.7–17.6)
MAGNESIUM SERPL-MCNC: 2.2 MG/DL (ref 1.6–2.4)
MCH RBC QN AUTO: 31.1 PG (ref 27–32.7)
MCHC RBC AUTO-ENTMCNC: 31.6 G/DL (ref 32.6–36.4)
MCV RBC AUTO: 98.5 FL (ref 79.8–96.2)
PHOSPHATE SERPL-MCNC: 3.8 MG/DL (ref 2.5–4.5)
PLATELET # BLD AUTO: 170 10*3/MM3 (ref 140–500)
PMV BLD AUTO: 10.8 FL (ref 6–12)
POTASSIUM BLD-SCNC: 4.5 MMOL/L (ref 3.5–5.2)
PSA SERPL-MCNC: 2.01 NG/ML (ref 0–4)
RBC # BLD AUTO: 3.92 10*6/MM3 (ref 4.6–6)
SODIUM BLD-SCNC: 143 MMOL/L (ref 136–145)
TROPONIN T SERPL-MCNC: <0.01 NG/ML (ref 0–0.03)
WBC NRBC COR # BLD: 7.68 10*3/MM3 (ref 4.5–10.7)

## 2018-01-26 PROCEDURE — 93010 ELECTROCARDIOGRAM REPORT: CPT | Performed by: INTERNAL MEDICINE

## 2018-01-26 PROCEDURE — 85027 COMPLETE CBC AUTOMATED: CPT | Performed by: HOSPITALIST

## 2018-01-26 PROCEDURE — 99214 OFFICE O/P EST MOD 30 MIN: CPT | Performed by: INTERNAL MEDICINE

## 2018-01-26 PROCEDURE — G0378 HOSPITAL OBSERVATION PER HR: HCPCS

## 2018-01-26 PROCEDURE — 93005 ELECTROCARDIOGRAM TRACING: CPT | Performed by: HOSPITALIST

## 2018-01-26 PROCEDURE — 80048 BASIC METABOLIC PNL TOTAL CA: CPT | Performed by: HOSPITALIST

## 2018-01-26 PROCEDURE — 84484 ASSAY OF TROPONIN QUANT: CPT | Performed by: HOSPITALIST

## 2018-01-26 PROCEDURE — 84153 ASSAY OF PSA TOTAL: CPT | Performed by: INTERNAL MEDICINE

## 2018-01-26 PROCEDURE — 84100 ASSAY OF PHOSPHORUS: CPT | Performed by: HOSPITALIST

## 2018-01-26 PROCEDURE — 83735 ASSAY OF MAGNESIUM: CPT | Performed by: HOSPITALIST

## 2018-01-26 PROCEDURE — 36415 COLL VENOUS BLD VENIPUNCTURE: CPT | Performed by: HOSPITALIST

## 2018-01-26 PROCEDURE — G8979 MOBILITY GOAL STATUS: HCPCS

## 2018-01-26 PROCEDURE — 97161 PT EVAL LOW COMPLEX 20 MIN: CPT

## 2018-01-26 PROCEDURE — G8978 MOBILITY CURRENT STATUS: HCPCS

## 2018-01-26 RX ORDER — ACETAMINOPHEN 325 MG/1
650 TABLET ORAL EVERY 4 HOURS PRN
Status: DISCONTINUED | OUTPATIENT
Start: 2018-01-26 | End: 2018-01-27 | Stop reason: HOSPADM

## 2018-01-26 RX ORDER — BISACODYL 5 MG/1
5 TABLET, DELAYED RELEASE ORAL DAILY PRN
Status: DISCONTINUED | OUTPATIENT
Start: 2018-01-26 | End: 2018-01-27 | Stop reason: HOSPADM

## 2018-01-26 RX ORDER — ONDANSETRON 4 MG/1
4 TABLET, ORALLY DISINTEGRATING ORAL EVERY 6 HOURS PRN
Status: DISCONTINUED | OUTPATIENT
Start: 2018-01-26 | End: 2018-01-27 | Stop reason: HOSPADM

## 2018-01-26 RX ORDER — NITROGLYCERIN 0.4 MG/1
0.4 TABLET SUBLINGUAL
Status: DISCONTINUED | OUTPATIENT
Start: 2018-01-26 | End: 2018-01-27 | Stop reason: HOSPADM

## 2018-01-26 RX ORDER — HYDROMORPHONE HYDROCHLORIDE 2 MG/1
8 TABLET ORAL 4 TIMES DAILY PRN
Status: DISCONTINUED | OUTPATIENT
Start: 2018-01-26 | End: 2018-01-27 | Stop reason: HOSPADM

## 2018-01-26 RX ORDER — SODIUM CHLORIDE 0.9 % (FLUSH) 0.9 %
1-10 SYRINGE (ML) INJECTION AS NEEDED
Status: DISCONTINUED | OUTPATIENT
Start: 2018-01-26 | End: 2018-01-27 | Stop reason: HOSPADM

## 2018-01-26 RX ORDER — SODIUM CHLORIDE 9 MG/ML
100 INJECTION, SOLUTION INTRAVENOUS CONTINUOUS
Status: ACTIVE | OUTPATIENT
Start: 2018-01-26 | End: 2018-01-26

## 2018-01-26 RX ORDER — ONDANSETRON 2 MG/ML
4 INJECTION INTRAMUSCULAR; INTRAVENOUS EVERY 6 HOURS PRN
Status: DISCONTINUED | OUTPATIENT
Start: 2018-01-26 | End: 2018-01-27 | Stop reason: HOSPADM

## 2018-01-26 RX ORDER — ONDANSETRON 4 MG/1
4 TABLET, FILM COATED ORAL EVERY 6 HOURS PRN
Status: DISCONTINUED | OUTPATIENT
Start: 2018-01-26 | End: 2018-01-27 | Stop reason: HOSPADM

## 2018-01-26 RX ORDER — BISACODYL 10 MG
10 SUPPOSITORY, RECTAL RECTAL DAILY PRN
Status: DISCONTINUED | OUTPATIENT
Start: 2018-01-26 | End: 2018-01-27 | Stop reason: HOSPADM

## 2018-01-26 RX ORDER — ASPIRIN 81 MG/1
81 TABLET, CHEWABLE ORAL DAILY
Status: DISCONTINUED | OUTPATIENT
Start: 2018-01-26 | End: 2018-01-27 | Stop reason: HOSPADM

## 2018-01-26 RX ORDER — LANOLIN ALCOHOL/MO/W.PET/CERES
400 CREAM (GRAM) TOPICAL DAILY
Status: DISCONTINUED | OUTPATIENT
Start: 2018-01-26 | End: 2018-01-27 | Stop reason: HOSPADM

## 2018-01-26 RX ORDER — LEVOTHYROXINE SODIUM 0.05 MG/1
50 TABLET ORAL
Status: DISCONTINUED | OUTPATIENT
Start: 2018-01-26 | End: 2018-01-27 | Stop reason: HOSPADM

## 2018-01-26 RX ORDER — CLOPIDOGREL BISULFATE 75 MG/1
75 TABLET ORAL DAILY
Status: DISCONTINUED | OUTPATIENT
Start: 2018-01-26 | End: 2018-01-27 | Stop reason: HOSPADM

## 2018-01-26 RX ADMIN — HYDROMORPHONE HYDROCHLORIDE 8 MG: 2 TABLET ORAL at 11:37

## 2018-01-26 RX ADMIN — HYDROMORPHONE HYDROCHLORIDE 6 MG: 2 TABLET ORAL at 02:09

## 2018-01-26 RX ADMIN — ACETAMINOPHEN 400 MCG: 400 TABLET ORAL at 09:31

## 2018-01-26 RX ADMIN — HYDROMORPHONE HYDROCHLORIDE 8 MG: 2 TABLET ORAL at 16:40

## 2018-01-26 RX ADMIN — LEVOTHYROXINE SODIUM 50 MCG: 50 TABLET ORAL at 09:34

## 2018-01-26 RX ADMIN — CLOPIDOGREL 75 MG: 75 TABLET, FILM COATED ORAL at 09:31

## 2018-01-26 RX ADMIN — ASPIRIN 81 MG: 81 TABLET, CHEWABLE ORAL at 09:31

## 2018-01-26 RX ADMIN — SODIUM CHLORIDE 100 ML/HR: 9 INJECTION, SOLUTION INTRAVENOUS at 06:36

## 2018-01-26 NOTE — PLAN OF CARE
Problem: Patient Care Overview (Adult)  Goal: Plan of Care Review  Outcome: Ongoing (interventions implemented as appropriate)   01/26/18 1522   Coping/Psychosocial Response Interventions   Plan Of Care Reviewed With patient   Patient Care Overview   Progress improving   Outcome Evaluation   Outcome Summary/Follow up Plan No chest pain, no abdominal pain. continues with chronic back pain. Await PT eval and treat. Continue to monitor.     Goal: Adult Individualization and Mutuality  Outcome: Ongoing (interventions implemented as appropriate)    Goal: Discharge Needs Assessment  Outcome: Ongoing (interventions implemented as appropriate)      Problem: Fall Risk (Adult)  Goal: Absence of Falls  Outcome: Ongoing (interventions implemented as appropriate)

## 2018-01-26 NOTE — THERAPY EVALUATION
Acute Care - Physical Therapy Initial Evaluation  [facility]     Patient Name: Mannie Fajardo  : 1945  MRN: [id number]  Today's Date: 2018   Onset of Illness/Injury or Date of Surgery Date: 18  Date of Referral to PT: 18  Referring Physician: Eladio Lugo      Admit Date: 2018     Visit Dx:    ICD-10-CM ICD-9-CM   1. Chest pain, unspecified type R07.9 786.50   2. Chronic low back pain M54.5 724.2    G89.29 338.29   3. Muscle weakness (generalized) M62.81 728.87     Patient Active Problem List   Diagnosis   • AAA (abdominal aortic aneurysm)   • Hyperlipidemia   • Coronary artery disease due to calcified coronary lesion   • Precordial pain   • Ureterolithiasis   • Chronic left-sided low back pain with left-sided sciatica   • Chronic midline low back pain without sciatica   • DDD (degenerative disc disease), lumbar   • Left leg weakness   • Pain of left upper extremity   • Stage 3 chronic kidney disease     Past Medical History:   Diagnosis Date   • Arthritis     back   • Back pain    • Backache    • CAD (coronary atherosclerotic disease)    • Disease of thyroid gland    • Dyslipidemia    • Edema    • History of transfusion    • Hyperhomocystinemia    • Hyperlipidemia      Past Surgical History:   Procedure Laterality Date   • APPENDECTOMY     • CARDIAC SURGERY     • CORONARY ANGIOPLASTY WITH STENT PLACEMENT     • EXTRACORPOREAL SHOCKWAVE LITHOTRIPSY (ESWL), STENT INSERTION/REMOVAL Right 2017    Procedure: CYSTO RETROGRADE WITH STENT PLACEMENT;  Surgeon: Janes López MD;  [place] [facility] [place] [facility] [place]:           PT ASSESSMENT (last 72 hours)      PT Evaluation       18 1642 18 1200    Rehab Evaluation    Document Type evaluation  -MS     Subjective Information agree to therapy;complains of;weakness;pain  -MS     Patient Effort, Rehab Treatment good  -MS     Symptoms Noted Comment Pt. reports increased pain in his Lower back with general mobility.  Pt. also reports NO numbness/tingling in his BLE's; Pt. reports he is frustrated with the MD's for not fixing his back pain.  -MS     General Information    Onset of Illness/Injury or Date of Surgery Date 01/25/18  -MS     Referring Physician Eladio Lugo  -MS     Pertinent History Of Current Problem Pt. admitted with constipation; Chronic Left Lower back pain  -MS     Precautions/Limitations fall precautions   Exit alarm  -MS     Prior Level of Function independent:  -MS     Equipment Currently Used at Home none  -MS none  -JE    Plans/Goals Discussed With patient  -MS     Risks Reviewed patient:  -MS     Benefits Reviewed patient:  -MS     Barriers to Rehab none identified  -MS     Living Environment    Lives With  alone  -JE    Living Arrangements  house  -    Home Accessibility  no concerns;bed and bath on same level   no concerns; stair to upstairs/basement   -JE    Stair Railings at Home  inside, present at both sides  -JE    Type of Financial/Environmental Concern  none  -JE    Transportation Available  car  -    Clinical Impression    Date of Referral to PT 01/26/18  -MS     Criteria for Skilled Therapeutic Interventions Met treatment indicated  -MS     Rehab Potential good, to achieve stated therapy goals  -MS     Pain Assessment    Pain Assessment 0-10  -MS     Pain Score --   Does not rate but reports the pain as extreme  -MS     Pain Location Back  -MS     Pain Orientation Lower;Mid  -MS     Pain Intervention(s) Repositioned;Rest  -MS     Cognitive Assessment/Intervention    Current Cognitive/Communication Assessment functional  -MS     Orientation Status oriented x 4  -MS     Follows Commands/Answers Questions 100% of the time  -MS     Personal Safety WNL/WFL  -MS     Personal Safety Interventions fall prevention program maintained;gait belt;nonskid shoes/slippers when out of bed;supervised activity  -MS     ROM (Range of Motion)    General ROM no range of motion deficits identified  -MS      MMT (Manual Muscle Testing)    General MMT Assessment --   BUE/RLE (4-/5); LLE (3+/5)  -MS     General MMT Assessment Detail --   Pt. reports h/o Left L.E. muscle atrophy  -MS     Bed Mobility, Assessment/Treatment    Bed Mobility, Assistive Device bed rails  -MS     Bed Mob, Supine to Sit, [place] contact guard assist  -MS     Bed Mob, Sit to Supine, [place] contact guard assist  -MS     Transfer Assessment/Treatment    Transfers, Sit-Stand [place] contact guard assist  -MS     Transfers, Stand-Sit [place] contact guard assist  -MS     Gait Assessment/Treatment    Gait, [place] Level contact guard assist  -MS     Gait, Distance (Feet) 50  -MS     Gait, Gait Deviations antalgic;alok decreased;forward flexed posture;narrow base  -MS     Gait, Safety Issues balance decreased during turns;step length decreased  -MS     Gait, Comment x 1 standing rest break. Pt. limited due to pain and fatigue in his back.  Unsteady at times but declines use of A.D.  -MS     Balance Skills Training    Sitting-Level of Assistance Independent  -MS     Sitting-Balance Support Feet supported;Right upper extremity supported;Left upper extremity supported  -MS     Standing-Level of Assistance Close supervision   Pt. stood to use urinal at bedside  -MS     Sensory Assessment/Intervention    Light Touch LLE;RLE  -MS     LLE Light Touch WNL  -MS     RLE Light Touch WNL  -MS     Positioning and Restraints    Pre-Treatment Position in bed  -MS     Post Treatment Position bed  -MS     In Bed notified nsg;side lying left;call light within reach;encouraged to call for assist;exit alarm on   All lines intact.V.S.S.Nurse notified of how pt. did with PT  -MS       01/26/18 1133 01/26/18 0139    Living Environment    Lives With alone  -JE alone  -KG    Living Arrangements house  -JE     Home Accessibility no concerns;stairs within home   steps to  basement and upstairs   -JE stairs within home  -KG    Stair Railings at Home  inside, present at both sides  -     Transportation Available  car  -KG      User Key  (r) = Recorded By, (t) = Taken By, (c) = Cosigned By    Initials Name Provider Type    KG Shalini Grant, RN Registered Nurse    MS Kt Frazier, PT Physical Therapist    RUTH Leggett, CSW           Physical Therapy Education     Title: PT OT SLP Therapies (Done)     Topic: Physical Therapy (Done)     Point: Mobility training (Done)    Learning Progress Summary    Learner Readiness Method Response Comment Documented by Status   Patient Acceptance E,D NR,VU  MS 01/26/18 1650 Done               Point: Home exercise program (Done)    Learning Progress Summary    Learner Readiness Method Response Comment Documented by Status   Patient Acceptance E,D NR,VU  MS 01/26/18 1650 Done               Point: Body mechanics (Done)    Learning Progress Summary    Learner Readiness Method Response Comment Documented by Status   Patient Acceptance E,D NR,VU  MS 01/26/18 1650 Done               Point: Precautions (Done)    Learning Progress Summary    Learner Readiness Method Response Comment Documented by Status   Patient Acceptance E,D NR,VU  MS 01/26/18 1650 Done                      User Key     Initials Effective Dates Name Provider Type Discipline    MS 12/01/15 -  Kt Frazier, PT Physical Therapist PT                PT Recommendation and Plan  Anticipated Discharge Disposition: home, home with outpatient services  Planned Therapy Interventions: balance training, bed mobility training, gait training, home exercise program, patient/family education, postural re-education, strengthening, transfer training  PT Frequency: daily  Plan of Care Review  Plan Of Care Reviewed With: patient  Outcome Summary/Follow up Plan: Pt. will benefit from skilled inpt. P.T. to address his functional deficits and to assist pt. in regaining his independence with functional mobility.  Pt. will likely require outpatient P.T. services if  he is discharged as he refuses HHPT. Nursing was notified of this (Marlyn).          IP PT Goals       01/26/18 1650          Bed Mobility PT LTG    Bed Mobility PT LTG, Date Established 01/26/18  -MS      Bed Mobility PT LTG, Time to Achieve 3 days  -MS      Bed Mobility PT LTG, Activity Type all bed mobility  -MS      Bed Mobility PT LTG, [place] Level independent  -MS      Transfer Training PT LTG    Transfer Training PT LTG, Date Established 01/26/18  -MS      Transfer Training PT LTG, Time to Achieve 3 days  -MS      Transfer Training PT LTG, Activity Type all transfers  -MS      Transfer Training PT LTG, [place] Level independent  -MS      Gait Training PT LTG    Gait Training Goal PT LTG, Date Established 01/26/18  -MS      Gait Training Goal PT LTG, Time to Achieve 3 days  -MS      Gait Training Goal PT LTG, [place] Level independent  -MS      Gait Training Goal PT LTG, Distance to Achieve 300 feet  -MS        User Key  (r) = Recorded By, (t) = Taken By, (c) = Cosigned By    Initials Name Provider Type    MS Kt Frazier, PT Physical Therapist                Outcome Measures       01/26/18 1600          How much help from another person do you currently need...    Turning from your back to your side while in flat bed without using bedrails? 4  -MS      Moving from lying on back to sitting on the side of a flat bed without bedrails? 3  -MS      Moving to and from a bed to a chair (including a wheelchair)? 3  -MS      Standing up from a chair using your arms (e.g., wheelchair, bedside chair)? 3  -MS      Climbing 3-5 steps with a railing? 3  -MS      To walk in hospital room? 3  -MS      AM-PAC 6 Clicks Score 19  -MS      Functional Assessment    Outcome Measure Options AM-PAC 6 Clicks Basic Mobility (PT)  -MS        User Key  (r) = Recorded By, (t) = Taken By, (c) = Cosigned By    Initials Name Provider Type    MS Kt Frazier, PT Physical Therapist           Time Calculation:         PT  Charges       01/26/18 1652          Time Calculation    Start Time 1551  -MS      Stop Time 1606  -MS      Time Calculation (min) 15 min  -MS      PT Received On 01/26/18  -MS      PT - Next Appointment 01/27/18  -MS      PT Goal Re-Cert Due Date 01/30/18  -MS        User Key  (r) = Recorded By, (t) = Taken By, (c) = Cosigned By    Initials Name Provider Type    MS Kt Frazier, PT Physical Therapist          Therapy Charges for Today     Code Description Service Date Service Provider Modifiers Qty    60047717914 HC PT MOBILITY CURRENT 1/26/2018 Kt Frazier, PT GP, CI 1    79537914428 HC PT MOBILITY PROJECTED 1/26/2018 Kt Frazier, PT GP, CH 1    77391338458 HC PT EVAL LOW COMPLEXITY 1 1/26/2018 Kt Frazier, PT GP 1          PT G-Codes  PT Professional Judgement Used?: Yes  Outcome Measure Options: AM-PAC 6 Clicks Basic Mobility (PT)  Functional Limitation: Mobility: Walking and moving around  Mobility: Walking and Moving Around Current Status (): At least 1 percent but less than 20 percent impaired, limited or restricted  Mobility: Walking and Moving Around Goal Status (): 0 percent impaired, limited or restricted      Kt Frazier, PT  1/26/2018

## 2018-01-26 NOTE — PLAN OF CARE
Problem: Patient Care Overview (Adult)  Goal: Plan of Care Review  Outcome: Ongoing (interventions implemented as appropriate)   01/26/18 0603   Coping/Psychosocial Response Interventions   Plan Of Care Reviewed With patient   Patient Care Overview   Progress no change   Outcome Evaluation   Outcome Summary/Follow up Plan PT CONTINUES TO HAVE BACK PAIN WHICH IS HIS NORMAL AT HOME, NO OTHER DISTRESS NOTED, VSS CONTINUE PLAN OF CARE     Goal: Adult Individualization and Mutuality  Outcome: Ongoing (interventions implemented as appropriate)    Goal: Discharge Needs Assessment  Outcome: Ongoing (interventions implemented as appropriate)      Problem: Pain, Acute (Adult)  Goal: Identify Related Risk Factors and Signs and Symptoms  Outcome: Outcome(s) achieved Date Met: 01/26/18    Goal: Acceptable Pain Control/Comfort Level  Outcome: Ongoing (interventions implemented as appropriate)      Problem: Fall Risk (Adult)  Goal: Identify Related Risk Factors and Signs and Symptoms  Outcome: Outcome(s) achieved Date Met: 01/26/18    Goal: Absence of Falls  Outcome: Ongoing (interventions implemented as appropriate)

## 2018-01-26 NOTE — H&P
Name: Mannie Fajardo ADMIT: 2018   : 1945  PCP: Hugo Egan MD    MRN: [id number] LOS: 0 days   AGE/SEX: 72 y.o. male  ROOM: 442/     Chief Complaint   Patient presents with   • Back Pain   • Constipation       Subjective     History of Present Illness    The patient is 72-year-old with history of chronic kidney disease stage III, coronary artery disease status post cardiac stent in the past who came to the hospital for chronic low back pain which has progressively worsened over the last 1-2 months.  His primary care provider's been uptitrating his oral Dilaudid and he reports having constipation.  He came to the emergency room for these reasons and had workup including KUB and CT of his abdomen which did not reveal any acute problems.  They're going to discharge him home in the knee became diaphoretic and stated he felt bad.  The ER physician did not feel comfortable with discharging him so he was admitted to our service.  He denies any chest pain at all.  Intermittently he has a feeling of pills getting stuck in his throat but denies any dysphagia to liquids or solids.  He denies any abdominal pain complaints at all.  His only complaint is that he wants his back pain to be completely gone.  He is taking 8 mg Dilaudid every 4 hours she's been prescribed by his primary care provider.  He's seen pain management in the past for epidurals but has not returned.  He is also seen Dr. Olson here most recently 2017 he has mild degenerative disc disease but no canal stenosis and no structural lesions that could explain his pain.  He has been referred to physical therapy aquatics and recommended to see a rheumatologist but does not appear that this has been done.  He wants his pain to be better controlled but at the same time wants us to decrease his pain medicine.  He states gabapentin and Lyrica have not worked and have missed with his mind.  He is already been seen by  cardiology.  And he is not having any cardiac complaints at all.  Past Medical History:   Diagnosis Date   • Arthritis     back   • Back pain    • Backache    • CAD (coronary atherosclerotic disease)    • Disease of thyroid gland    • Dyslipidemia    • Edema    • History of transfusion    • Hyperhomocystinemia    • Hyperlipidemia      Past Surgical History:   Procedure Laterality Date   • APPENDECTOMY     • CARDIAC SURGERY     • CORONARY ANGIOPLASTY WITH STENT PLACEMENT     • EXTRACORPOREAL SHOCKWAVE LITHOTRIPSY (ESWL), STENT INSERTION/REMOVAL Right 2/24/2017    Procedure: CYSTO RETROGRADE WITH STENT PLACEMENT;  Surgeon: Janes López MD;  Location: [facility] OR [facility];  Service:      Family History   Problem Relation Age of Onset   • Heart disease Mother    • Stroke Father      Social History   Substance Use Topics   • Smoking status: Never Smoker   • Smokeless tobacco: Never Used   • Alcohol use No      Comment: caffeine use     Prescriptions Prior to Admission   Medication Sig Dispense Refill Last Dose   • aspirin 81 MG chewable tablet Chew 81 mg Daily.   Past Week at Unknown time   • folic acid (FOLVITE) 400 MCG tablet Take 400 mcg by mouth daily.   Past Week at Unknown time   • HYDROmorphone (DILAUDID) 8 MG tablet Take 8 mg by mouth 4 (Four) Times a Day As Needed.  0 1/25/2018 at Unknown time   • levothyroxine (SYNTHROID, LEVOTHROID) 50 MCG tablet Take 50 mcg by mouth daily.   1/25/2018 at Unknown time   • LOVAZA 1 G capsule TAKE 1 CAPSULE TWICE DAILY 180 capsule 0 1/25/2018 at Unknown time   • PLAVIX 75 MG tablet Take 75 mg by mouth Daily.   Past Week at Unknown time   • ZETIA 10 MG tablet TAKE 1 TABLET DAILY. 90 tablet 0 Past Week at Unknown time   • HYDROmorphone (DILAUDID) 2 MG tablet TAKE 2 TABS 3XDAILY X1WK, 2TABS 2XDAILY X2WK, 2TAB DAILY X2WK, 1TAB DAILY X1 WK THEN STOP  0 Taking     Allergies:    Allergies   Allergen Reactions   • Adhesive Tape    • Statins        Review of Systems   Constitutional:  Negative for fatigue and fever.   HENT: Negative for nosebleeds, sore throat and trouble swallowing.    Eyes: Negative for pain and visual disturbance.   Respiratory: Negative for cough, chest tightness and shortness of breath.    Cardiovascular: Negative for chest pain, palpitations and leg swelling.   Gastrointestinal: Positive for constipation. Negative for abdominal pain, diarrhea, nausea and vomiting.   Endocrine:        Negative for Diabetes or thyroid disease   Genitourinary: Negative for hematuria.   Musculoskeletal: Positive for back pain and gait problem. Negative for neck pain and neck stiffness.   Skin: Negative for rash and wound.   Neurological: Positive for weakness (left leg). Negative for dizziness, syncope, light-headedness and headaches.   Hematological: Negative for adenopathy. Does not bruise/bleed easily.   Psychiatric/Behavioral: Negative for agitation, behavioral problems and confusion.        Objective    Vital Signs  Temp:  [97.7 °F (36.5 °C)-98.3 °F (36.8 °C)] 98 °F (36.7 °C)  Heart Rate:  [61-81] 63  Resp:  [16-22] 18  BP: (107-154)/() 115/76  SpO2:  [94 %-100 %] 95 %  on   ;   O2 Device: room air  Body mass index is 21.59 kg/(m^2).    Physical Exam   Constitutional: He is oriented to person, place, and time. He appears well-developed and well-nourished.   HENT:   Head: Normocephalic and atraumatic.   Mouth/Throat: Oropharynx is clear and moist. No oropharyngeal exudate.   Eyes: Conjunctivae and EOM are normal. Pupils are equal, round, and reactive to light. No scleral icterus.   Neck: Normal range of motion. Neck supple. No JVD present. No thyromegaly present.   Cardiovascular: Normal rate, regular rhythm and normal heart sounds.  Exam reveals no gallop and no friction rub.    No murmur heard.  Pulmonary/Chest: Effort normal and breath sounds normal. No stridor. No respiratory distress.   Abdominal: Soft. Bowel sounds are normal. He exhibits no distension. There is no tenderness.  There is no rebound and no guarding.   Musculoskeletal: He exhibits no edema or tenderness.   Atrophy of the musculature of left thigh compared to the right   Lymphadenopathy:     He has no cervical adenopathy.   Neurological: He is alert and oriented to person, place, and time. No cranial nerve deficit.   Decreased strength in the left lower extremity upon flexion but 5 out of 5 strength on extension   Skin: Skin is warm and dry.   Psychiatric: He has a normal mood and affect. His behavior is normal.       Results Review:   I reviewed the patient's new clinical results.    Lab Results (last 24 hours)     Procedure Component Value Units Date/Time    CBC & Differential [[id number]] Collected:  01/25/18 1840    Specimen:  Blood Updated:  01/25/18 1851    Narrative:       The following orders were created for panel order CBC & Differential.  Procedure                               Abnormality         Status                     ---------                               -----------         ------                     CBC Auto Differential[[id number]]        Abnormal            Final result                 Please view results for these tests on the individual orders.    Basic Metabolic Panel [[id number]]  (Abnormal) Collected:  01/25/18 1840    Specimen:  Blood Updated:  01/25/18 1909     Glucose 89 mg/dL      BUN 22 mg/dL      Creatinine 1.41 (H) mg/dL      Sodium 140 mmol/L      Potassium 4.1 mmol/L      Chloride 100 mmol/L      CO2 29.3 (H) mmol/L      Calcium 9.8 mg/dL      eGFR Non African Amer 49 (L) mL/min/1.73      BUN/Creatinine Ratio 15.6     Anion Gap 10.7 mmol/L     Narrative:       The MDRD GFR formula is only valid for adults with stable renal function between ages 18 and 70.    CBC Auto Differential [[id number]]  (Abnormal) Collected:  01/25/18 1840    Specimen:  Blood Updated:  01/25/18 1851     WBC 9.27 10*3/mm3      RBC 4.37 (L) 10*6/mm3      Hemoglobin 13.8 g/dL      Hematocrit 42.8 %      MCV 97.9 (H) fL       MCH 31.6 pg      MCHC 32.2 (L) g/dL      RDW 12.5 %      RDW-SD 44.8 fl      MPV 10.9 fL      Platelets 191 10*3/mm3      Neutrophil % 74.9 %      Lymphocyte % 17.2 (L) %      Monocyte % 5.3 %      Eosinophil % 2.0 %      Basophil % 0.4 %      Immature Grans % 0.2 %      Neutrophils, Absolute 6.94 10*3/mm3      Lymphocytes, Absolute 1.59 10*3/mm3      Monocytes, Absolute 0.49 10*3/mm3      Eosinophils, Absolute 0.19 10*3/mm3      Basophils, Absolute 0.04 10*3/mm3      Immature Grans, Absolute 0.02 10*3/mm3     BNP [[id number]]  (Normal) Collected:  01/25/18 1840    Specimen:  Blood Updated:  01/25/18 2054     proBNP 124.5 pg/mL     Narrative:       Among patients with dyspnea, NT-proBNP is highly sensitive for the detection of acute congestive heart failure. In addition NT-proBNP of <300 pg/ml effectively rules out acute congestive heart failure with 99% negative predictive value.    Troponin [[id number]]  (Normal) Collected:  01/25/18 1840    Specimen:  Blood Updated:  01/25/18 2054     Troponin T <0.010 ng/mL     Narrative:       Troponin T Reference Ranges:  Less than 0.03 ng/mL:    Negative for AMI  0.03 to 0.09 ng/mL:      Indeterminant for AMI  Greater than 0.09 ng/mL: Positive for AMI    Lipase [[id number]]  (Normal) Collected:  01/25/18 1840    Specimen:  Blood Updated:  01/25/18 2050     Lipase 13 U/L     Magnesium [[id number]]  (Normal) Collected:  01/25/18 1840    Specimen:  Blood Updated:  01/25/18 2147     Magnesium 2.2 mg/dL     Urinalysis With / Culture If Indicated - Urine, Clean Catch [[id number]]  (Abnormal) Collected:  01/25/18 1953    Specimen:  Urine from Urine, Clean Catch Updated:  01/25/18 2014     Color, UA Yellow     Appearance, UA Clear     pH, UA 6.0     Specific Gravity, UA 1.019     Glucose, UA Negative     Ketones, UA Negative     Bilirubin, UA Negative     Blood, UA Negative     Protein, UA Negative     Leuk Esterase, UA Trace (A)     Nitrite, UA Negative     Urobilinogen, UA 0.2 E.U./dL     Urinalysis, Microscopic Only - Urine, Clean Catch [[id number]]  (Abnormal) Collected:  01/25/18 1953    Specimen:  Urine from Urine, Clean Catch Updated:  01/25/18 2014     RBC, UA 0-2 /HPF      WBC, UA 3-5 (A) /HPF      Bacteria, UA None Seen /HPF      Squamous Epithelial Cells, UA 0-2 /HPF      Hyaline Casts, UA 0-2 /LPF      Methodology Automated Microscopy    Troponin [[id number]]  (Normal) Collected:  01/25/18 2234    Specimen:  Blood Updated:  01/25/18 2304     Troponin T <0.010 ng/mL     Narrative:       Troponin T Reference Ranges:  Less than 0.03 ng/mL:    Negative for AMI  0.03 to 0.09 ng/mL:      Indeterminant for AMI  Greater than 0.09 ng/mL: Positive for AMI    Troponin [[id number]]  (Normal) Collected:  01/26/18 0406    Specimen:  Blood Updated:  01/26/18 0514     Troponin T <0.010 ng/mL     Narrative:       Troponin T Reference Ranges:  Less than 0.03 ng/mL:    Negative for AMI  0.03 to 0.09 ng/mL:      Indeterminant for AMI  Greater than 0.09 ng/mL: Positive for AMI    Basic Metabolic Panel [[id number]]  (Abnormal) Collected:  01/26/18 0406    Specimen:  Blood Updated:  01/26/18 0514     Glucose 120 (H) mg/dL      BUN 24 (H) mg/dL      Creatinine 1.31 (H) mg/dL      Sodium 143 mmol/L      Potassium 4.5 mmol/L      Chloride 104 mmol/L      CO2 26.9 mmol/L      Calcium 9.1 mg/dL      eGFR Non African Amer 54 (L) mL/min/1.73      BUN/Creatinine Ratio 18.3     Anion Gap 12.1 mmol/L     Narrative:       The MDRD GFR formula is only valid for adults with stable renal function between ages 18 and 70.    CBC (No Diff) [[id number]]  (Abnormal) Collected:  01/26/18 0406    Specimen:  Blood Updated:  01/26/18 0450     WBC 7.68 10*3/mm3      RBC 3.92 (L) 10*6/mm3      Hemoglobin 12.2 (L) g/dL      Hematocrit 38.6 (L) %      MCV 98.5 (H) fL      MCH 31.1 pg      MCHC 31.6 (L) g/dL      RDW 12.7 %      RDW-SD 45.9 fl      MPV 10.8 fL      Platelets 170 10*3/mm3     Magnesium [[id number]]  (Normal) Collected:   01/26/18 0406    Specimen:  Blood Updated:  01/26/18 0513     Magnesium 2.2 mg/dL     Phosphorus [[id number]]  (Normal) Collected:  01/26/18 0406    Specimen:  Blood Updated:  01/26/18 0514     Phosphorus 3.8 mg/dL     PSA DIAGNOSTIC [[id number]]  (Normal) Collected:  01/26/18 0406    Specimen:  Blood Updated:  01/26/18 0831     PSA 2.010 ng/mL           XR Chest 1 View   Final Result   No acute findings.           This report was finalized on 1/25/2018 10:36 PM by Dr. Kishore Galindo MD.          XR Abdomen KUB   Final Result   There is some stool along the ascending and transverse colon   but the radiographic findings are not compelling for constipation nor is   there evidence of obstruction. No renal or ureteral calculus is   identified.       This report was finalized on 1/25/2018 9:19 PM by Dr. Mannie Faria MD.          CT Abdomen Pelvis Without Contrast   Final Result   Two very tiny nonobstructing upper pole left renal calculi   that are unchanged since the preceding CT dated 2/20/2017. No other   radiopaque calculi are present within either collecting system and there   is no hydronephrosis or hydroureter. There is mild sigmoid   diverticulosis without evidence of diverticulitis. Fusiform infrarenal   abdominal aortic aneurysm measuring 2.9 cm that appears intact.   Moderately enlarged prostate gland. Otherwise unremarkable CT scan of   the abdomen or pelvis.       This report was finalized on 1/25/2018 7:43 PM by Dr. Eladio Buenrostro MD.            Assessment/Plan   Active Hospital Problems (** Indicates Principal Problem)    Diagnosis Date Noted   • **Chronic left-sided low back pain with left-sided sciatica [M54.42, G89.29] 05/01/2017   • Left leg weakness [R29.898] 09/25/2017   • Coronary artery disease due to calcified coronary lesion [I25.10, I25.84] 11/22/2016      Resolved Hospital Problems    Diagnosis Date Noted Date Resolved   • Chest pain [R07.9] 01/25/2018 01/26/2018       · I am not sure how  "this pleasant gentleman got admitted to the hospital.  He denies any chest pain at all.  He has been cleared by cardiology.  He is going to be evaluated by GI for feeling of \"pills getting stuck in my throat\".  He denies any odynophagia, dysphagia, reflux abdominal pain.    · He came to the emergency room for chronic back pain and constipation.  He's had complete workup including KUB and CT scan in the emergency room which were negative for any acute disease.  Regards to his back pain he is very frustrated and has been evaluated previously by his primary care provider, pain management and Dr. Olson most recently September 2017.  He has mild degenerative disc L4 L5 disc bulge but no canal stenosis and has been referred to physical therapy aquatics.  Also been suggested he see a rheumatologist.  There is no change in his left leg weakness but do not believe repeating imaging is necessary at this time.  Also I do not believe consultation to Dr. Olson as necessary right now.  I did recommend him to see his primary care provider, pain management again and Dr. Olson.  He is difficult to understand as he wants his pain management regimen to be increased but at the same time to come off his opioid therapy.  I've asked him he's tried gabapentin and Lyrica and has tried both in the past and \"have missed with his mind\".  He does not want to take either one of these.     · I will consult physical therapy as he may need home health.  But at this point unless any procedures are considered by GI I think we can discharge the patient home today with or without home health depending on physical therapy recommendations.      I discussed the patients findings and my recommendations with patient and nursing staff.      Eladio Knapp MD  [facility]  01/26/18  9:26 AM    "

## 2018-01-26 NOTE — PROGRESS NOTES
Discharge Planning Assessment  [facility]     Patient Name: Mannie Fajardo  MRN: [id number]  Today's Date: 1/26/2018    Admit Date: 1/25/2018          Discharge Needs Assessment       01/26/18 1200    Living Environment    Lives With alone    Living Arrangements house    Home Accessibility no concerns;bed and bath on same level   no concerns; stair to upstairs/basement     Stair Railings at Home inside, present at both sides    Type of Financial/Environmental Concern none    Transportation Available car    Discharge Needs Assessment    Concerns To Be Addressed no discharge needs identified;denies needs/concerns at this time    Anticipated Changes Related to Illness none    Equipment Currently Used at Home none    Equipment Needed After Discharge none      01/26/18 1133    Living Environment    Lives With alone    Living Arrangements house    Home Accessibility no concerns;stairs within home   steps to  basement and upstairs     Stair Railings at Home inside, present at both sides            Discharge Plan       01/26/18 1200    Case Management/Social Work Plan    Plan Follow for PT evals     Patient/Family In Agreement With Plan yes    Additional Comments CCP met with patient at bedside. CCP role explained, discharge planning discussed and face sheet verified. Patient lives alone, in a house and has no safety concerns. Patient has steps to the upstairs and downstairs with handrails present. Patient's bedroom and bathroom are on the same level. Patient does not use any medical equipment and is independent with his ADLs. Patient uses [facility] on [address]; patient denies having trouble affording his medications and denies having trouble remembering to take his medications. Patient disclosed he is frustrated with his pain levels and wants to find the source of his pain; patient has been to pain management in the past at [facility]. CCP discussed working with PT to assist with mobility. CCP provided patient with outpatient  clinics for pain management and encouraged patient to follow up with his PCP. Per patient, his PCP prescribes his pain medication. Patient denies any history of depression and anxiety. Patient denies feeling depressed at present. CCP will follow for ANATOLY Leggett CSW         Discharge Placement     No information found                Demographic Summary       01/26/18 1158    Referral Information    Admission Type observation    Arrived From admitted as an inpatient    Referral Source admission list    Reason For Consult discharge planning    Record Reviewed history and physical;patient profile    Primary Care Physician Information    Name Hugo Egan      01/26/18 1132    Referral Information    Admission Type observation    Referral Source admission list    Reason For Consult discharge planning    Record Reviewed history and physical;patient profile    Primary Care Physician Information    Name Hugo Egan             Functional Status       01/26/18 1159    Functional Status Current    Ambulation 0-->independent    Transferring 0-->independent    Toileting 0-->independent    Bathing 0-->independent    Dressing 0-->independent    Eating 0-->independent    Communication 0-->understands/communicates without difficulty    Swallowing (if score 2 or more for any item, consult Rehab Services) 0-->swallows foods/liquids without difficulty    Functional Status Prior    Ambulation 0-->independent    Transferring 0-->independent    Toileting 0-->independent    Bathing 0-->independent    Dressing 0-->independent    Eating 0-->independent    Communication 0-->understands/communicates without difficulty    Swallowing 0-->swallows foods/liquids without difficulty    IADL    Medications independent    Meal Preparation independent    Housekeeping independent    Laundry independent    Shopping independent    Oral Care independent    Activity Tolerance    Current Activity Limitations none     Cognitive/Perceptual/Developmental    Current Mental Status/Cognitive Functioning no deficits noted    Recent Changes in Mental Status/Cognitive Functioning no changes    Employment/Financial    Financial Concerns none      01/26/18 1132    Functional Status Current    Ambulation 0-->independent    Transferring 0-->independent    Toileting 0-->independent    Bathing 0-->independent    Dressing 0-->independent    Eating 0-->independent    Communication 0-->understands/communicates without difficulty    Functional Status Prior    Ambulation 0-->independent    Transferring 0-->independent    Toileting 0-->independent    Bathing 0-->independent    Dressing 0-->independent    Eating 0-->independent    Communication 0-->understands/communicates without difficulty    IADL    Medications independent    Meal Preparation independent    Housekeeping independent    Laundry independent    Shopping independent    Oral Care independent    Activity Tolerance    Current Activity Limitations none    Cognitive/Perceptual/Developmental    Current Mental Status/Cognitive Functioning no deficits noted    Employment/Financial    Financial Concerns none            Psychosocial     None            Abuse/Neglect     None            Legal     None            Substance Abuse     None            Patient Forms     None          TRACEY Rivera

## 2018-01-26 NOTE — CONSULTS
[facility]  Consult Note                                                                              1/26/2018  Bo Aguilar MD    Patient Identification:  Mannie Fajardo:   72 y.o.  male  1945     Date of Admission:1/25/2018    CC: constipation    Consulted for: chest pain     History of Present Illness: Mr. Fajardo is a very intelligent nice 73 y/o patient of mine with a history of chronic back pain for which he takes dilaudid, small AAA 2.9 cm-followed by vascular, CAD s/p MINH to LCX in 2007 (on asa and plavix), hyperlipidemia, hypothyroidism, hyperhomocystinemia and hyperlipidemia.  In 2016 he was admitted due to chest pain and had a negative stress test. I last saw him in office on 11/14/17 for CAD. At that time he had no c/o chest pain/pressure, but did state that he has left arm pain when he first wakes each morning which resolves when he gets up and starts moving around. His EKG was normal. No medication changes were made.    He presented to the ED yesterday with c/o constipation. On arrival his bp was 139/82, HR 80-NSR with diffuse non specific ST and T wave changes. Labs showed a bun/creat of 22/1.4 (baseline), essentially normal CBC, proBNP 124.5, neg trop x2, lipase 13, and trace leukocytes in urine. CXR was negative. CT abd/pelvis showed mild sigmoid diverticulosis.    While in the ED he began having chest discomfort with diaphoresis. He states that for the past month he has had sharp mid sternal chest pain, but feels like it happens after taking this pills or eating like something is stuck there.  He has not been eating solids foods as his appetite has decreased and has mostly been consuming soups and other soft foods and liquids.  He currently has the same feeling. He denies any nausea, soa or diaphoresis with it. We have been asked to see for this reason. He was given 1 NTG without relief initially, but then it subsided on its own. He was given dilaudid for his back pain-which  he normally takes 3-4 times per day. This morning his 3rd troponin is negative. Repeat EKG is normal. Labs are stable.     Cardiac Testing:  Stress test 12/2016  · Compared to the prior study from 11/24/2014 the current study reveals no changes.  · Left ventricular ejection fraction is normal (Calculated EF = 64%).  · Myocardial perfusion imaging indicates a normal myocardial perfusion study with no evidence of ischemia.  · Impressions are consistent with a low risk study.    Past Medical History:  Past Medical History:   Diagnosis Date   • Arthritis     back   • Back pain    • Backache    • CAD (coronary atherosclerotic disease)    • Disease of thyroid gland    • Dyslipidemia    • Edema    • History of transfusion    • Hyperhomocystinemia    • Hyperlipidemia        Past Surgical History:  Past Surgical History:   Procedure Laterality Date   • APPENDECTOMY     • CARDIAC SURGERY     • CORONARY ANGIOPLASTY WITH STENT PLACEMENT     • EXTRACORPOREAL SHOCKWAVE LITHOTRIPSY (ESWL), STENT INSERTION/REMOVAL Right 2/24/2017    Procedure: CYSTO RETROGRADE WITH STENT PLACEMENT;  Surgeon: Janes López MD;  Location: [facility] OR [facility] [place];  Service:        Allergies:  Allergies   Allergen Reactions   • Adhesive Tape    • Statins        Home Meds:  Prescriptions Prior to Admission   Medication Sig Dispense Refill Last Dose   • aspirin 81 MG chewable tablet Chew 81 mg Daily.   Past Week at Unknown time   • folic acid (FOLVITE) 400 MCG tablet Take 400 mcg by mouth daily.   Past Week at Unknown time   • HYDROmorphone (DILAUDID) 8 MG tablet Take 8 mg by mouth 4 (Four) Times a Day As Needed.  0 1/25/2018 at Unknown time   • levothyroxine (SYNTHROID, LEVOTHROID) 50 MCG tablet Take 50 mcg by mouth daily.   1/25/2018 at Unknown time   • LOVAZA 1 G capsule TAKE 1 CAPSULE TWICE DAILY 180 capsule 0 1/25/2018 at Unknown time   • PLAVIX 75 MG tablet Take 75 mg by mouth Daily.   Past Week at Unknown time   • ZETIA 10 MG tablet TAKE 1 TABLET  DAILY. 90 tablet 0 Past Week at Unknown time   • HYDROmorphone (DILAUDID) 2 MG tablet TAKE 2 TABS 3XDAILY X1WK, 2TABS 2XDAILY X2WK, 2TAB DAILY X2WK, 1TAB DAILY X1 WK THEN STOP  0 Taking       Current Meds  Scheduled Meds:  aspirin 81 mg Oral Daily   clopidogrel 75 mg Oral Daily   folic acid 400 mcg Oral Daily   levothyroxine 50 mcg Oral Q AM     Continuous Infusions:  sodium chloride 100 mL/hr     Social History:   Social History     Social History   • Marital status: Single     Spouse name: N/A   • Number of children: 0   • Years of education: COLLEGE     Occupational History   • RETIRED      Social History Main Topics   • Smoking status: Never Smoker   • Smokeless tobacco: Never Used   • Alcohol use No      Comment: caffeine use   • Drug use: No   • Sexual activity: Defer     Other Topics Concern   • Not on file     Social History Narrative       Family History:  Family History   Problem Relation Age of Onset   • Heart disease Mother    • Stroke Father        REVIEW OF SYSTEMS:   CONSTITUTIONAL: No weight loss, fever, chills, weakness or fatigue.   HEENT: Eyes: No visual loss, blurred vision, double vision or yellow sclerae. Ears, Nose, Throat: No hearing loss, sneezing, congestion, runny nose or sore throat.   SKIN: No rash or itching.     RESPIRATORY: No hemoptysis, cough or sputum.   GASTROINTESTINAL: No anorexia, nausea, vomiting or diarrhea. No bright red blood per rectum or melena.  GENITOURINARY: No burning on urination, hematuria or increased frequency.  NEUROLOGICAL: No headache, dizziness, syncope, paralysis, ataxia, numbness or tingling in the extremities. No change in bowel or bladder control.   MUSCULOSKELETAL: No muscle pain  HEMATOLOGIC: No anemia, bleeding or bruising.   LYMPHATICS: No enlarged nodes. No history of splenectomy.   PSYCHIATRIC: No history of depression, anxiety, hallucinations.   ENDOCRINOLOGIC: No reports of sweating, cold or heat intolerance. No polyuria or polydipsia.     Physical  "Exam    /78 (BP Location: Right arm, Patient Position: Lying)  Pulse 69  Temp 97.7 °F (36.5 °C) (Oral)   Resp 18  Ht 172.7 cm (68\")  Wt 64.4 kg (142 lb)  SpO2 97%  BMI 21.59 kg/m2    General Appearance Well developed, cooperative and well nourished and no acute distress   Head Normocephalic, without abnormality, atraumatic   Ears Ears appear intact with no abnormalities noted   Throat No oral lesions, no thrush, oral mucosa moist   Neck No adenopathy, supple, trachea midline, no thyromegaly, no carotid bruit, no JVD   Back No skin lesions, erythema or scars, no tenderness to percussion or palptaion,range of motion is normal   Lungs Clear to auscultation,respirations regular, even and unlabored   Heart Regular rhythm and normal rate, normal S1 and S2, no murmur, no gallop, no rub, no click   Chest wall No abnormalities observed   Abdomen Normal bowel sounds, no masses, no hepatomegaly,    Extremities Moves all extremities well, no edema, no cyanosis, no redness   Pulses Pulses palpable and equal bilaterally. Normal radial, carotid, femoral, dorsalis pedis and posterior tibial pulses bilaterally. Normal abdominal aorta   Skin No bleeding, bruising or rash   Psyhiatric Alert and oriented x 3, normal mood and affect      Results from last 7 days  Lab Units 01/26/18  0406   SODIUM mmol/L 143   POTASSIUM mmol/L 4.5   CHLORIDE mmol/L 104   CO2 mmol/L 26.9   BUN mg/dL 24*   CREATININE mg/dL 1.31*   CALCIUM mg/dL 9.1   GLUCOSE mg/dL 120*       Results from last 7 days  Lab Units 01/26/18  0406 01/25/18  2234 01/25/18  1840   TROPONIN T ng/mL <0.010 <0.010 <0.010     )  Results from last 7 days  Lab Units 01/26/18  0406 01/25/18  1840   WBC 10*3/mm3 7.68 9.27   HEMOGLOBIN g/dL 12.2* 13.8   HEMATOCRIT % 38.6* 42.8   PLATELETS 10*3/mm3 170 191           Results from last 7 days  Lab Units 01/26/18  0406   MAGNESIUM mg/dL 2.2             I personally viewed and interpreted the patient's EKG/Telemetry " data      Assessment and Plan    1.  Chest discomfort.  This sounds more GI in nature and would pursue GI evaluation of probably EGD.  Does not sound cardiac at all.  Would not pursue coronary testing at this point  2.  Coronary artery disease, , as above negative stress test in 2016 with distant history of drug-coated stent placement.  3.  Chronic back pain.  He has undergone neurologic testing and is on opioids which he prefers to not be on and would like to get off.  Etiology of his pain has not been found with no clear severe stenosis on prior MRI..  With his enlarged prostate without a PSA.  Wonder if a bone scan would be of use?.  4.  Abdominal aortic aneurysm  5.  Hyper homocystinemia  6.  Hypothyroidism  7.  Hyperlipidemia  8.  Renal insufficiency  9.  Anemia    Mini Giordano  1/26/2018  6:22 AM    50min spent in reviewing records, discussion and examination of the patient and discussion with other members of the patient's medical team.     Dictated utilizing Dragon dictation

## 2018-01-26 NOTE — PLAN OF CARE
Problem: Patient Care Overview (Adult)  Goal: Plan of Care Review   01/26/18 1650   Coping/Psychosocial Response Interventions   Plan Of Care Reviewed With patient   Outcome Evaluation   Outcome Summary/Follow up Plan Pt. will benefit from skilled inpt. P.T. to address his functional deficits and to assist pt. in regaining his independence with functional mobility. Pt. will likely require outpatient P.T. services if he is discharged as he refuses HHPT. Nursing was notified of this (Marlyn).       Problem: Inpatient Physical Therapy  Goal: Bed Mobility Goal LTG- PT   01/26/18 1650   Bed Mobility PT LTG   Bed Mobility PT LTG, Date Established 01/26/18   Bed Mobility PT LTG, Time to Achieve 3 days   Bed Mobility PT LTG, Activity Type all bed mobility   Bed Mobility PT LTG, [place] Level independent     Goal: Transfer Training Goal 1 LTG- PT   01/26/18 1650   Transfer Training PT LTG   Transfer Training PT LTG, Date Established 01/26/18   Transfer Training PT LTG, Time to Achieve 3 days   Transfer Training PT LTG, Activity Type all transfers   Transfer Training PT LTG, [place] Level independent     Goal: Gait Training Goal LTG- PT   01/26/18 1650   Gait Training PT LTG   Gait Training Goal PT LTG, Date Established 01/26/18   Gait Training Goal PT LTG, Time to Achieve 3 days   Gait Training Goal PT LTG, [place] Level independent   Gait Training Goal PT LTG, Distance to Achieve 300 feet

## 2018-01-27 ENCOUNTER — APPOINTMENT (OUTPATIENT)
Dept: GENERAL RADIOLOGY | Facility: HOSPITAL | Age: 73
End: 2018-01-27

## 2018-01-27 VITALS
HEART RATE: 70 BPM | BODY MASS INDEX: 21.75 KG/M2 | RESPIRATION RATE: 16 BRPM | DIASTOLIC BLOOD PRESSURE: 79 MMHG | HEIGHT: 68 IN | TEMPERATURE: 97.7 F | SYSTOLIC BLOOD PRESSURE: 136 MMHG | OXYGEN SATURATION: 94 % | WEIGHT: 143.5 LBS

## 2018-01-27 PROCEDURE — 97110 THERAPEUTIC EXERCISES: CPT

## 2018-01-27 PROCEDURE — G0378 HOSPITAL OBSERVATION PER HR: HCPCS

## 2018-01-27 PROCEDURE — 72114 X-RAY EXAM L-S SPINE BENDING: CPT

## 2018-01-27 PROCEDURE — 99214 OFFICE O/P EST MOD 30 MIN: CPT | Performed by: NEUROLOGICAL SURGERY

## 2018-01-27 RX ORDER — LIDOCAINE 50 MG/G
1 PATCH TOPICAL EVERY 24 HOURS
Qty: 30 PATCH | Refills: 0 | Status: SHIPPED | OUTPATIENT
Start: 2018-01-27 | End: 2018-04-09 | Stop reason: ALTCHOICE

## 2018-01-27 RX ADMIN — HYDROMORPHONE HYDROCHLORIDE 8 MG: 2 TABLET ORAL at 09:12

## 2018-01-27 RX ADMIN — HYDROMORPHONE HYDROCHLORIDE 8 MG: 2 TABLET ORAL at 02:58

## 2018-01-27 RX ADMIN — HYDROMORPHONE HYDROCHLORIDE 8 MG: 2 TABLET ORAL at 16:21

## 2018-01-27 NOTE — CONSULTS
Referring Provider: [facility]  Reason for Consultation: back pain    Patient Care Team:  Hugo Egan MD as PCP - General (Family Medicine)  Lee Patel MD as PCP - Family Medicine    Chief complaint: chronic back pain.    Subjective .     History of present illness:  71 yo male previously evaluated by Dr. Olson in late 2017 fro chronic opiod depndant bacvk pain.  He denies b/b issues excepting constipation.  NO LE numbness. Chronic wasting of quadriceps (> 10 years).  He has has been on nacotics for years.  He is on plavix or cardiac issues.  He is anon smoker.  He lives alone.  He is from [place]  He is retired from sales.  No injury or trauma to back.  Has symptoms similar to his previous pain when he was imaged extensively in 2016.  Has some min or LLE complaints but these are not his issue for this admission.  These are controlled.    Review of Systems  Pertinent items are noted in HPI, all other systems reviewed and negative   14 point ROS is negative    History  Past Surgical History:   Procedure Laterality Date   • APPENDECTOMY     • CARDIAC SURGERY     • CORONARY ANGIOPLASTY WITH STENT PLACEMENT     • EXTRACORPOREAL SHOCKWAVE LITHOTRIPSY (ESWL), STENT INSERTION/REMOVAL Right 2/24/2017    Procedure: CYSTO RETROGRADE WITH STENT PLACEMENT;  Surgeon: Janes López MD;  [place] [facility] [place] [facility] [place]  Service:    , Family History   Problem Relation Age of Onset   • Heart disease Mother    • Stroke Father    , Social History   Substance Use Topics   • Smoking status: Never Smoker   • Smokeless tobacco: Never Used   • Alcohol use No      Comment: caffeine use   , Prescriptions Prior to Admission   Medication Sig Dispense Refill Last Dose   • aspirin 81 MG chewable tablet Chew 81 mg Daily.   Past Week at Unknown time   • folic acid (FOLVITE) 400 MCG tablet Take 400 mcg by mouth daily.   Past Week at Unknown time   • HYDROmorphone (DILAUDID) 8 MG tablet Take 8 mg by mouth 4 (Four)  Times a Day As Needed.  0 1/25/2018 at Unknown time   • levothyroxine (SYNTHROID, LEVOTHROID) 50 MCG tablet Take 50 mcg by mouth daily.   1/25/2018 at Unknown time   • LOVAZA 1 G capsule TAKE 1 CAPSULE TWICE DAILY 180 capsule 0 1/25/2018 at Unknown time   • PLAVIX 75 MG tablet Take 75 mg by mouth Daily.   Past Week at Unknown time   • ZETIA 10 MG tablet TAKE 1 TABLET DAILY. 90 tablet 0 Past Week at Unknown time   • HYDROmorphone (DILAUDID) 2 MG tablet TAKE 2 TABS 3XDAILY X1WK, 2TABS 2XDAILY X2WK, 2TAB DAILY X2WK, 1TAB DAILY X1 WK THEN STOP  0 Taking   , Scheduled Meds:    aspirin 81 mg Oral Daily   clopidogrel 75 mg Oral Daily   folic acid 400 mcg Oral Daily   levothyroxine 50 mcg Oral Q AM   , Continuous Infusions:   , PRN Meds:  •  acetaminophen  •  bisacodyl  •  bisacodyl  •  HYDROmorphone  •  nitroglycerin  •  ondansetron **OR** ondansetron ODT **OR** ondansetron  •  sodium chloride and Allergies:  Adhesive tape and Statins    Objective     Vital Signs   Temp:  [97.9 °F (36.6 °C)-98.4 °F (36.9 °C)] 97.9 °F (36.6 °C)  Heart Rate:  [60-71] 70  Resp:  [16-18] 16  BP: (121-128)/(62-94) 122/67    Physical Exam:     General Appearance:    Alert, cooperative, in no acute distress   Head:    Normocephalic, without obvious abnormality, atraumatic   Eyes:            Lids and lashes normal, conjunctivae and sclerae normal, no   icterus, no pallor, corneas clear, PERRLA   Ears:    Ears appear intact with no abnormalities noted   Throat:   No oral lesions, no thrush, oral mucosa moist   Neck:   No adenopathy, supple, trachea midline, no thyromegaly, no   carotid bruit, no JVD   Back:     No kyphosis present, no scoliosis present, no skin lesions,      erythema or scars, no tenderness to percussion or                   palpation,   non tender to palpation of bony midline, diminished painful ROM, point tenderness in Midline mid lumbar region   Lungs:     respirations regular, even and                  unlabored    Heart:     Regular rhythm and normal rate   Chest Wall:    No abnormalities observed   Abdomen:     No masses, no organomegaly, soft        non-tender, non-distended, no guarding, no rebound                tenderness   Rectal:     Deferred   Extremities:   Moves all extremities well, no edema, no cyanosis, no             redness   Pulses:   Pulses palpable and equal bilaterally   Skin:   No bleeding, bruising or rash   Lymph nodes:   No palpable adenopathy   Neurologic:   Mental status is awake and alert, oriented to person place and time    Follows 3 step commands  CN: pupils reactive, EOMI, VF full to confrontation, unable to visualize fundi, v1-3 intact, Face symmetric, Hearing intact to finger rub, Palate raises Sym, Gag deferred, Shrug intact, Tongue protrudes in midline  Str: 5/5 deltoid/biceps/triceps/wrist extensors/iliopsoas/ quadriceps/hamstrings/dorsiflexors/extensor hallucis longus/plantarflexors  Sensory: intact to light touch/pinprick/proprioception in all extremities  Gait: able to ambulatewith antalgic gait  Reflexes: 2+ throughout UE and LE, no bowie's or clonus           Results Review:   I reviewed the patient's new clinical results.  I reviewed the patient's new imaging results and agree with the interpretation.     CT yesterday reveals normal alignment and similar appearance  to lumbar spine as when extensively imaged in 2016  No large disc herniation     LABS:  Lab Results   Component Value Date    CALCIUM 9.1 01/26/2018    PHOS 3.8 01/26/2018     Results from last 7 days  Lab Units 01/26/18  0406 01/25/18  1840   MAGNESIUM mg/dL 2.2 2.2   SODIUM mmol/L 143 140   POTASSIUM mmol/L 4.5 4.1   CHLORIDE mmol/L 104 100   CO2 mmol/L 26.9 29.3*   BUN mg/dL 24* 22   CREATININE mg/dL 1.31* 1.41*   GLUCOSE mg/dL 120* 89   CALCIUM mg/dL 9.1 9.8   WBC 10*3/mm3 7.68 9.27   HEMOGLOBIN g/dL 12.2* 13.8   PLATELETS 10*3/mm3 170 191     Lab Results   Component Value Date    TROPONINT <0.010 01/26/2018     Estimated  Creatinine Clearance: 46.2 mL/min (by C-G formula based on Cr of 1.31).    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  He may follow up with Dr. Olson is any additional questions arise.  I do not think surgery is indicated  Out of an abundance of caution will obtain dynamic xrays to see if he has covert instability.      I discussed the patients findings and my recommendations with patient    Mannie Gilbert IV, MD  01/27/18  8:51 AM    Time: More than 50% of time spent in counseling and coordination of care:  Total face-to-face/floor time 40 min.  Time spent in counseling 25 min. Counseling included the following topics: imaging, dynamic xrays, lack of identified cause at this point.

## 2018-01-27 NOTE — THERAPY DISCHARGE NOTE
Acute Care - Physical Therapy Treatment Note/Discharge  [facility]     Patient Name: Mannie Fajardo  : 1945  MRN: [id number]  Today's Date: 2018  Onset of Illness/Injury or Date of Surgery Date: 18  Date of Referral to PT: 18  Referring Physician: Eladio Lugo    Admit Date: 2018    Visit Dx:    ICD-10-CM ICD-9-CM   1. Chest pain, unspecified type R07.9 786.50   2. Chronic low back pain M54.5 724.2    G89.29 338.29   3. Muscle weakness (generalized) M62.81 728.87     Patient Active Problem List   Diagnosis   • AAA (abdominal aortic aneurysm)   • Hyperlipidemia   • Coronary artery disease due to calcified coronary lesion   • Precordial pain   • Ureterolithiasis   • Chronic left-sided low back pain with left-sided sciatica   • Chronic midline low back pain without sciatica   • DDD (degenerative disc disease), lumbar   • Left leg weakness   • Pain of left upper extremity   • Stage 3 chronic kidney disease       Physical Therapy Education     Title: PT OT SLP Therapies (Resolved)     Topic: Physical Therapy (Resolved)     Point: Mobility training (Resolved)    Learning Progress Summary    Learner Readiness Method Response Comment Documented by Status   Patient Acceptance E [facility] 18 1449 Done    Acceptance E,D NR,  MS 18 1650 Done               Point: Home exercise program (Resolved)    Learning Progress Summary    Learner Readiness Method Response Comment Documented by Status   Patient Acceptance E [facility] 18 1449 Done    Acceptance E,D NR,  MS 18 1650 Done               Point: Body mechanics (Resolved)    Learning Progress Summary    Learner Readiness Method Response Comment Documented by Status   Patient Acceptance E   AA 18 1449 Done    Acceptance E,D NR,  MS 18 1650 Done               Point: Precautions (Resolved)    Learning Progress Summary    Learner Readiness Method Response Comment Documented by Status   Patient Acceptance E    AA 01/27/18 1449 Done    Acceptance E,D NR,VU  MS 01/26/18 1650 Done                      User Key     Initials Effective Dates Name Provider Type Discipline    MS 12/01/15 -  Kt KAYY Karin, PT Physical Therapist PT    AA 09/05/17 -  Yanique Linares, PT Physical Therapist PT                    IP PT Goals       01/26/18 1650          Bed Mobility PT LTG    Bed Mobility PT LTG, Date Established 01/26/18  -MS      Bed Mobility PT LTG, Time to Achieve 3 days  -MS      Bed Mobility PT LTG, Activity Type all bed mobility  -MS      Bed Mobility PT LTG, [place] Level independent  -MS      Transfer Training PT LTG    Transfer Training PT LTG, Date Established 01/26/18  -MS      Transfer Training PT LTG, Time to Achieve 3 days  -MS      Transfer Training PT LTG, Activity Type all transfers  -MS      Transfer Training PT LTG, [place] Level independent  -MS      Gait Training PT LTG    Gait Training Goal PT LTG, Date Established 01/26/18  -MS      Gait Training Goal PT LTG, Time to Achieve 3 days  -MS      Gait Training Goal PT LTG, [place] Level independent  -MS      Gait Training Goal PT LTG, Distance to Achieve 300 feet  -MS        User Key  (r) = Recorded By, (t) = Taken By, (c) = Cosigned By    Initials Name Provider Type    MS Kt Frazier, PT Physical Therapist              Adult Rehabilitation Note       01/27/18 1425          Rehab Assessment/Intervention    Discipline physical therapist  -AA      Document Type therapy note (daily note);discharge summary  -AA      Subjective Information agree to therapy;complains of;pain  -AA      Patient Effort, Rehab Treatment good  -AA      Symptoms Noted During/After Treatment increased pain  -AA      Symptoms Noted Comment lower back pain   -AA      Precautions/Limitations fall precautions  -AA      Specific Treatment Considerations Pt found moving around room indep - RN notified and aware of pt mobility independently in-room  -AA      Patient Response  to Treatment tolerated  -AA      Recorded by [AA] Yanique Linares PT      Pain Assessment    Pain Assessment 0-10  -AA      Pain Score 9  -AA      Pain Type Chronic pain  -AA      Pain Location Back  -AA      Pain Orientation Lower  -AA      Pain Intervention(s) Ambulation/increased activity  -AA      Response to Interventions tolerated   -AA      Recorded by [AA] Yanique Linares PT      Cognitive Assessment/Intervention    Current Cognitive/Communication Assessment functional  -AA      Orientation Status oriented x 4  -AA      Follows Commands/Answers Questions 100% of the time  -AA      Personal Safety WNL/WFL  -AA      Personal Safety Interventions fall prevention program maintained;gait belt;nonskid shoes/slippers when out of bed  -AA      Recorded by [AA] Yanique Linares PT      Bed Mobility, Assessment/Treatment    Bed Mob, Supine to Sit, [place] supervision required  -AA      Bed Mob, Sit to Supine, [place] supervision required  -AA      Recorded by [AA] Yanique Linares PT      Transfer Assessment/Treatment    Transfers, Sit-Stand [place] supervision required  -AA      Transfers, Stand-Sit [place] supervision required  -AA      Recorded by [AA] Yanique Linares PT      Gait Assessment/Treatment    Gait, [place] Level supervision required  -AA      Gait, Distance (Feet) 400  -AA      Gait, Gait Deviations antalgic   secondary to pain  -AA      Gait, Comment Pt ambulating with close SPV with no AD - no safety concerns noted other than pt report of high pain levels  -AA      Recorded by [AA] Yanique Linares PT      Positioning and Restraints    Pre-Treatment Position in bed  -AA      Post Treatment Position bed  -AA      In Bed supine;call light within reach;encouraged to call for assist;notified nsg   RN notified of PT sign off as pt SPV with mobility  -AA      Recorded by [AA] Yanique Linares PT        User Key  (r) = Recorded By, (t) = Taken By, (c) =  Cosigned By    Initials Name Effective Dates    AA Yanique Linares, PT 09/05/17 -           PT Recommendation and Plan  Anticipated Discharge Disposition: home  Planned Therapy Interventions: balance training, bed mobility training, gait training, home exercise program, patient/family education, postural re-education, strengthening, transfer training  PT Frequency: daily  Plan of Care Review  Plan Of Care Reviewed With: patient  Progress: improving  Outcome Summary/Follow up Plan: Pt able to ambulate 400+ft without AD with close SPV and no LOB or safety concerns noted. Pt able to perform all bed mobility and transfers independently with no safety cues required. PT to sign off on pt as pt SPV/indep with all mobility. PT recommends d/c home with outpatient services for back pain with sciatica.          Outcome Measures       01/27/18 1400 01/26/18 1600       How much help from another person do you currently need...    Turning from your back to your side while in flat bed without using bedrails? 4  -AA 4  -MS     Moving from lying on back to sitting on the side of a flat bed without bedrails? 4  -AA 3  -MS     Moving to and from a bed to a chair (including a wheelchair)? 4  -AA 3  -MS     Standing up from a chair using your arms (e.g., wheelchair, bedside chair)? 4  -AA 3  -MS     Climbing 3-5 steps with a railing? 4  -AA 3  -MS     To walk in hospital room? 4  -AA 3  -MS     AM-PAC 6 Clicks Score 24  -AA 19  -MS     Functional Assessment    Outcome Measure Options AM-PAC 6 Clicks Basic Mobility (PT)  -AA AM-PAC 6 Clicks Basic Mobility (PT)  -MS       User Key  (r) = Recorded By, (t) = Taken By, (c) = Cosigned By    Initials Name Provider Type    MS Kt Frazier, PT Physical Therapist    INDIRA Linares, PT Physical Therapist           Time Calculation:         PT Charges       01/27/18 1451          Time Calculation    Start Time 1425  -AA      Stop Time 1436  -AA      Time Calculation (min) 11 min  -AA       PT Received On 01/27/18  -INDIRA        User Key  (r) = Recorded By, (t) = Taken By, (c) = Cosigned By    Initials Name Provider Type    INDIRA Linares PT Physical Therapist          Therapy Charges for Today     Code Description Service Date Service Provider Modifiers Qty    59385910070 HC PT THER PROC EA 15 MIN 1/27/2018 Yanique Linares, PT GP 1    68577967276 HC PT THER SUPP EA 15 MIN 1/27/2018 Yanique Linares PT GP 1          PT G-Codes  PT Professional Judgement Used?: Yes  Outcome Measure Options: AM-PAC 6 Clicks Basic Mobility (PT)  Functional Limitation: Mobility: Walking and moving around  Mobility: Walking and Moving Around Current Status (): At least 1 percent but less than 20 percent impaired, limited or restricted  Mobility: Walking and Moving Around Goal Status (): 0 percent impaired, limited or restricted    PT Discharge Summary  Anticipated Discharge Disposition: home  Outcomes Achieved: Able to achieve all goals within established timeline  Discharge Destination: Home with outpatient services    Yanique Linares PT  1/27/2018

## 2018-01-27 NOTE — PLAN OF CARE
Problem: Patient Care Overview (Adult)  Goal: Plan of Care Review   01/27/18 1449   Coping/Psychosocial Response Interventions   Plan Of Care Reviewed With patient   Patient Care Overview   Progress improving   Outcome Evaluation   Outcome Summary/Follow up Plan Pt able to ambulate 400+ft without AD with close SPV and no LOB or safety concerns noted. Pt able to perform all bed mobility and transfers independently with no safety cues required. PT to sign off on pt as pt SPV/indep with all mobility. PT recommends d/c home with outpatient services for back pain with sciatica.

## 2018-01-27 NOTE — PLAN OF CARE
Problem: Patient Care Overview (Adult)  Goal: Plan of Care Review  Outcome: Ongoing (interventions implemented as appropriate)      Problem: Pain, Acute (Adult)  Goal: Acceptable Pain Control/Comfort Level  Outcome: Ongoing (interventions implemented as appropriate)      Problem: Fall Risk (Adult)  Goal: Absence of Falls  Outcome: Ongoing (interventions implemented as appropriate)

## 2018-01-27 NOTE — DISCHARGE SUMMARY
Name: Mannie Fajardo  Age: 72 y.o.  Sex: male  :  1945  MRN: [id number]         Primary Care Physician: Hugo Egan MD      Date of Admission:  2018  Date of Discharge:  2018      CHIEF COMPLAINT  Back Pain and Constipation      DISCHARGE DIAGNOSIS  Active Hospital Problems (** Indicates Principal Problem)    Diagnosis Date Noted   • **Chronic left-sided low back pain with left-sided sciatica [M54.42, G89.29] 2017   • Stage 3 chronic kidney disease [N18.3] 2018   • Left leg weakness [R29.898] 2017   • Coronary artery disease due to calcified coronary lesion [I25.10, I25.84] 2016      Resolved Hospital Problems    Diagnosis Date Noted Date Resolved   • Chest pain [R07.9] 2018       SECONDARY DIAGNOSES  Past Medical History:   Diagnosis Date   • Arthritis     back   • Back pain    • Backache    • CAD (coronary atherosclerotic disease)    • Disease of thyroid gland    • Dyslipidemia    • Edema    • History of transfusion    • Hyperhomocystinemia    • Hyperlipidemia        CONSULTS     Consult Orders (all)     Start     Ordered    18 0623  Inpatient Consult to Neurosurgery  Once     Specialty:  Neurosurgery  Provider:  Ty Gilbert MD    18 0622    18 0803  Inpatient Consult to Gastroenterology  Once,   Status:  Canceled     Specialty:  Gastroenterology  Provider:  Lady Roberts MD    18 0803    18 0142  Inpatient Consult to Cardiology  Once     Specialty:  Cardiology  Provider:  Marlene Giordano MD    18 0141    18 2232  [facility] (on-call MD unless specified)  Once     Specialty:  Internal Medicine  Provider:  (Not yet assigned)    18 2233          PROCEDURES PERFORMED    LUMBAR SPINE COMPLETE SERIES PLUS FLEXION AND EXTENSION LATERAL VIEWS  2018      HISTORY: Back pain.      FINDINGS: There is normal alignment. There is minimal L4-5 and L5-S1  disc space narrowing. No subluxation is seen on  the neutral lateral view  or on flexion and extension lateral views.      No fractures are seen. The facet joints appear well-maintained.      IMPRESSION:  1. Mild lower lumbar disc space narrowing.  2. No acute process identified.  CT ABDOMEN PELVIS WO CONTRAST-      CLINICAL HISTORY: Back pain. Constipation. Abdominal pain.      TECHNIQUE: Spiral CT images were acquired through the abdomen and pelvis  with no oral or IV contrast and were reconstructed in 3 mm thick slices.      Radiation dose reduction techniques were utilized, including automated  exposure control and exposure modulation based on body size.      COMPARISON: CT scan of the abdomen and pelvis dated 2/23/2017.      FINDINGS: The liver and spleen and pancreas and adrenal glands are  unremarkable. There are couple very tiny 1 mm diameter nonobstructing  upper pole left renal calculi. No other renal calculi are identified.  There is no hydronephrosis or hydroureter. No renal masses are  identified on these noncontrast images. The stomach and small and large  bowel are unremarkable except for a few diverticuli in the sigmoid  colon. There is no CT evidence of diverticulitis.. There is no bowel  distention. The prostate gland is moderately enlarged. It measures 5.9 x  4.5 cm. There are no hernias. There is an intact fusiform infrarenal  abdominal aortic aneurysm measuring 2.9 cm in diameter that is  unchanged.      IMPRESSION:  Two very tiny nonobstructing upper pole left renal calculi  that are unchanged since the preceding CT dated 2/20/2017. No other  radiopaque calculi are present within either collecting system and there  is no hydronephrosis or hydroureter. There is mild sigmoid  diverticulosis without evidence of diverticulitis. Fusiform infrarenal  abdominal aortic aneurysm measuring 2.9 cm that appears intact.  Moderately enlarged prostate gland. Otherwise unremarkable CT scan of  the abdomen or pelvis.    HOSPITAL COURSE    The patient is  "72-year-old male with a previous history for chronic low back pain, sciatica who comes to the emergency room for chronic low back pain and constipation.  He was admitted to our service for reported chest pain.  When I evaluated the patient after he had already gotten to the floor he denied any chest pain at all.  He had some dysphagia to pills which was intermittent.  He was already evaluated by cardiology and felt like symptoms were noncardiac in origin and may have had dysphagia.  GI was consulted.  I talked to them on the telephone and they recommend outpatient esophagram in further referral to GI if necessary after that.  The patient has chronic low back pain and has been evaluated previously by his primary care provider, pain management and has had epidurals in the past.  He is also seen Dr. Olson here and has mild degenerative disc disease and a mild disc bulge.  Given his symptoms I consulted physical therapy.  Per the patient's request I also consulted and neurosurgery.  He had plain films which were normal and he did not require any intervention.  Referral back to pain management for consideration of a pain pump or SCS trial is possible.  He may follow up with Dr. Olson if necessary.  The patient did quite well with physical therapy and did not need any assistance.  I did prescribe both lidocaine patch and Voltaren gel to see if these help the patient.  He has tried both Lyrica and gabapentin in the past which have \"messed with his mind\".  The patient as he is stable for discharge today and I've asked her to follow-up with his primary care provider in one week and with Dr. Olson as needed.  Again, he will need outpatient esophagram to evaluate for dysphagia to pills.    PHYSICAL EXAM  Temp:  [97.7 °F (36.5 °C)-98.4 °F (36.9 °C)] 97.7 °F (36.5 °C)  Heart Rate:  [60-70] 70  Resp:  [16] 16  BP: (121-136)/(62-94) 136/79  Body mass index is 21.82 kg/(m^2).  Physical Exam  Constitutional: He is " oriented to person, place, and time. He appears well-developed and well-nourished.   HENT:   Head: Normocephalic and atraumatic.   Mouth/Throat: Oropharynx is clear and moist. No oropharyngeal exudate.   Eyes: Conjunctivae and EOM are normal. Pupils are equal, round, and reactive to light. No scleral icterus.   Neck: Normal range of motion. Neck supple. No JVD present. No thyromegaly present.   Cardiovascular: Normal rate, regular rhythm and normal heart sounds.  Exam reveals no gallop and no friction rub.    No murmur heard.  Pulmonary/Chest: Effort normal and breath sounds normal. No stridor. No respiratory distress.   Abdominal: Soft. Bowel sounds are normal. He exhibits no distension. There is no tenderness. There is no rebound and no guarding.   Musculoskeletal: He exhibits no edema or tenderness.   Atrophy of the musculature of left thigh compared to the right   Lymphadenopathy:     He has no cervical adenopathy.   Neurological: He is alert and oriented to person, place, and time. No cranial nerve deficit.   Decreased strength in the left lower extremity upon flexion but 5 out of 5 strength on extension   Skin: Skin is warm and dry.   Psychiatric: He has a normal mood and affect. His behavior is normal.     CONDITION ON DISCHARGE  Stable.      DISCHARGE DISPOSITION   Home      ALLERGIES  Allergies   Allergen Reactions   • Adhesive Tape    • Statins          DISCHARGE MEDICATIONS     Your medication list      START taking these medications       Instructions Last Dose Given Next Dose Due    diclofenac 1 % gel gel   Commonly known as:  VOLTAREN        Apply 4 g topically 4 (Four) Times a Day As Needed (lumbar back pain).         lidocaine 5 %   Commonly known as:  LIDODERM        Place 1 patch on the skin Daily. Remove & Discard patch within 12 hours or as directed by MD           ASK your doctor about these medications       Instructions Last Dose Given Next Dose Due    aspirin 81 MG chewable tablet               folic acid 400 MCG tablet   Commonly known as:  FOLVITE              HYDROmorphone 2 MG tablet   Commonly known as:  DILAUDID              HYDROmorphone 8 MG tablet   Commonly known as:  DILAUDID              levothyroxine 50 MCG tablet   Commonly known as:  SYNTHROID, LEVOTHROID              LOVAZA 1 g capsule   Generic drug:  omega-3 acid ethyl esters        TAKE 1 CAPSULE TWICE DAILY         PLAVIX 75 MG tablet   Generic drug:  clopidogrel              ZETIA 10 MG tablet   Generic drug:  ezetimibe        TAKE 1 TABLET DAILY.              Where to Get Your Medications      These medications were sent to [facility]/pharmacy #[id number] - [place], [place] - [address] [place] [address] RD[address] AT [facility] - [phone]  - [phone]   [address] [place] [address] [address] [place] [address]     Phone:  [phone]    • diclofenac 1 % gel gel   • lidocaine 5 %            Future Appointments  Date Time Provider Department Center   4/9/2018 11:40 AM Marlene Giordano MD [facility] None     Follow-up Information     Follow up with Hugo Egan MD Follow up in 1 week(s).    Specialty:  Family Medicine    Contact information:    [address] [facility] [address]  [place] [address]  [phone]          Follow up with Lee Patel MD .    Specialty:  Family Medicine    Contact information:    [address] [facility]  [address]  [place] [address]  [phone]          Follow up with Nitin Olson MD .    Specialty:  Neurosurgery    Why:  worsening pain    Contact information:    [address] [facility] [address]  [place] [address]  [phone]            TEST  RESULTS PENDING AT DISCHARGE  None     CODE STATUS  Full Code        Eladio Knapp MD  [facility]  01/27/18  2:56 PM      Time: greater than 30 minutes.

## 2018-03-02 ENCOUNTER — TELEPHONE (OUTPATIENT)
Dept: CARDIOLOGY | Facility: CLINIC | Age: 73
End: 2018-03-02

## 2018-03-02 NOTE — TELEPHONE ENCOUNTER
Pt stopped by the office and filled out a patient question form stating that he needed a PA for Plavix through [employer]    Compeleted PA form through cover my meds and sent to [employer]

## 2018-03-28 ENCOUNTER — TRANSCRIBE ORDERS (OUTPATIENT)
Dept: ADMINISTRATIVE | Facility: HOSPITAL | Age: 73
End: 2018-03-28

## 2018-03-28 DIAGNOSIS — M51.36 DEGENERATION OF LUMBAR INTERVERTEBRAL DISC: Primary | ICD-10-CM

## 2018-03-28 DIAGNOSIS — E03.9 HYPOTHYROIDISM (ACQUIRED): Primary | ICD-10-CM

## 2018-04-05 ENCOUNTER — HOSPITAL ENCOUNTER (OUTPATIENT)
Dept: MRI IMAGING | Facility: HOSPITAL | Age: 73
Discharge: HOME | End: 2018-04-05
Admitting: FAMILY MEDICINE

## 2018-04-05 ENCOUNTER — HOSPITAL ENCOUNTER (OUTPATIENT)
Dept: BONE DENSITY | Facility: HOSPITAL | Age: 73
End: 2018-04-05

## 2018-04-05 DIAGNOSIS — M51.36 DEGENERATION OF LUMBAR INTERVERTEBRAL DISC: ICD-10-CM

## 2018-04-05 LAB — CREAT BLDA-MCNC: 1.2 MG/DL (ref 0.6–1.3)

## 2018-04-05 PROCEDURE — 0 GADOBENATE DIMEGLUMINE 529 MG/ML SOLUTION: Performed by: FAMILY MEDICINE

## 2018-04-05 PROCEDURE — A9577 INJ MULTIHANCE: HCPCS | Performed by: FAMILY MEDICINE

## 2018-04-05 PROCEDURE — 82565 ASSAY OF CREATININE: CPT

## 2018-04-05 PROCEDURE — 72158 MRI LUMBAR SPINE W/O & W/DYE: CPT

## 2018-04-05 RX ADMIN — GADOBENATE DIMEGLUMINE 13 ML: 529 INJECTION, SOLUTION INTRAVENOUS at 14:54

## 2018-04-09 ENCOUNTER — OFFICE VISIT (OUTPATIENT)
Dept: CARDIOLOGY | Facility: CLINIC | Age: 73
End: 2018-04-09

## 2018-04-09 VITALS
BODY MASS INDEX: 22.28 KG/M2 | HEART RATE: 63 BPM | HEIGHT: 68 IN | DIASTOLIC BLOOD PRESSURE: 68 MMHG | WEIGHT: 147 LBS | SYSTOLIC BLOOD PRESSURE: 110 MMHG

## 2018-04-09 DIAGNOSIS — I25.10 CORONARY ARTERY DISEASE INVOLVING NATIVE CORONARY ARTERY OF NATIVE HEART WITHOUT ANGINA PECTORIS: Primary | ICD-10-CM

## 2018-04-09 DIAGNOSIS — I25.10 CORONARY ARTERY DISEASE DUE TO CALCIFIED CORONARY LESION: ICD-10-CM

## 2018-04-09 DIAGNOSIS — I25.84 CORONARY ARTERY DISEASE DUE TO CALCIFIED CORONARY LESION: ICD-10-CM

## 2018-04-09 PROCEDURE — 93000 ELECTROCARDIOGRAM COMPLETE: CPT | Performed by: INTERNAL MEDICINE

## 2018-04-09 PROCEDURE — 99213 OFFICE O/P EST LOW 20 MIN: CPT | Performed by: INTERNAL MEDICINE

## 2018-04-09 NOTE — PROGRESS NOTES
Date of Office Visit: 2018  Encounter Provider: Marlene Giordano MD  Place of Service: [facility] CARDIOLOGY  Patient Name: Mannie Fajardo  :1945    Chief complaint  Followup of CAD    History of Present Illness  The patient is a delightful, 72-year-old gentleman with history of hyperlipidemia, hyperhomocysteinemia, and coronary artery disease.  In , after an abnormal stress test, he underwent cardiac catheterization that revealed severe disease of the circumflex artery for which he received a drug-coated stent.  He has not had any intervening events since then, though he has maintained aspirin and Plavix therapy.  In 2016 with complaints of chest pain Lexiscan perfusion study was negative for ischemia.    Last visit he was recently admitted to [facility] with constipation and back pain.  He had no significant cardiac complaints or findings.  He denies any chest pain palpitations syncope near syncope.  He plans on admission to the chemical dependence unit for rehabilitation.    Past Medical History:   Diagnosis Date   • Arthritis     back   • Back pain    • Backache    • CAD (coronary atherosclerotic disease)    • Disease of thyroid gland    • Dyslipidemia    • Edema    • History of transfusion    • Hyperhomocystinemia    • Hyperlipidemia      Past Surgical History:   Procedure Laterality Date   • APPENDECTOMY     • CARDIAC SURGERY     • CORONARY ANGIOPLASTY WITH STENT PLACEMENT     • EXTRACORPOREAL SHOCKWAVE LITHOTRIPSY (ESWL), STENT INSERTION/REMOVAL Right 2017    Procedure: CYSTO RETROGRADE WITH STENT PLACEMENT;  Surgeon: Janes López MD;  [place] [facility] [place] [facility] [place]  Service:        Current Outpatient Prescriptions:   •  aspirin 81 MG chewable tablet, Chew 81 mg Daily., Disp: , Rfl:   •  folic acid (FOLVITE) 400 MCG tablet, Take 400 mcg by mouth daily., Disp: , Rfl:   •  HYDROmorphone (DILAUDID) 8 MG tablet, Take 8 mg by mouth 4 (Four) Times a Day  "As Needed., Disp: , Rfl: 0  •  levothyroxine (SYNTHROID, LEVOTHROID) 50 MCG tablet, Take 50 mcg by mouth daily., Disp: , Rfl:   •  LOVAZA 1 G capsule, TAKE 1 CAPSULE TWICE DAILY, Disp: 180 capsule, Rfl: 0  •  PLAVIX 75 MG tablet, Take 75 mg by mouth Daily., Disp: , Rfl:   •  ZETIA 10 MG tablet, TAKE 1 TABLET DAILY., Disp: 90 tablet, Rfl: 0      Allergies as of 04/09/2018 - Reviewed 04/09/2018   Allergen Reaction Noted   • Adhesive tape  04/14/2016   • Statins  04/14/2016     Social History     Social History   • Marital status: Single     Spouse name: N/A   • Number of children: 0   • Years of education: COLLEGE     Occupational History   • RETIRED      Social History Main Topics   • Smoking status: Never Smoker   • Smokeless tobacco: Never Used   • Alcohol use No      Comment: caffeine use   • Drug use: No   • Sexual activity: Defer     Other Topics Concern   • Not on file     Social History Narrative   • No narrative on file     Family History   Problem Relation Age of Onset   • Heart disease Mother    • Stroke Father      ROS     Objective:     Vitals:    04/09/18 1032   BP: 110/68   Pulse: 63   Weight: 66.7 kg (147 lb)   Height: 172.7 cm (68\")     Body mass index is 22.35 kg/m².    Physical Exam   Constitutional: He is oriented to person, place, and time. He appears well-developed and well-nourished.   HENT:   Head: Normocephalic.   Nose: Nose normal.   Mouth/Throat: Oropharynx is clear and moist.   Eyes: Conjunctivae and EOM are normal. Pupils are equal, round, and reactive to light. Right eye exhibits no discharge. No scleral icterus.   Neck: Normal range of motion. Neck supple. No JVD present. No thyromegaly present.   Cardiovascular: Normal rate, regular rhythm, normal heart sounds and intact distal pulses.  Exam reveals no gallop and no friction rub.    No murmur heard.  Pulses:       Carotid pulses are 2+ on the right side, and 2+ on the left side.       Radial pulses are 2+ on the right side, and 2+ on " the left side.        Femoral pulses are 2+ on the right side, and 2+ on the left side.       Popliteal pulses are 2+ on the right side, and 2+ on the left side.        Dorsalis pedis pulses are 2+ on the right side, and 2+ on the left side.        Posterior tibial pulses are 2+ on the right side, and 2+ on the left side.   Pulmonary/Chest: Effort normal and breath sounds normal. No respiratory distress. He has no wheezes. He has no rales.   Abdominal: Soft. Bowel sounds are normal. He exhibits no distension. There is no hepatosplenomegaly. There is no tenderness. There is no rebound.   Musculoskeletal: Normal range of motion. He exhibits no edema or tenderness.   Neurological: He is alert and oriented to person, place, and time.   Skin: Skin is warm and dry. No rash noted. No erythema.   Psychiatric: He has a normal mood and affect. His behavior is normal. Judgment and thought content normal.   Vitals reviewed.    Lab Review:     ECG 12 Lead  Date/Time: 4/9/2018 10:40 AM  Performed by: DEREK JARRELL  Authorized by: DEREK JARRELL   Comparison: compared with previous ECG   Similar to previous ECG  Rhythm: sinus rhythm  Clinical impression: normal ECG          Assessment:       Diagnosis Plan   1. Coronary artery disease involving native coronary artery of native heart without angina pectoris  ECG 12 Lead   2. Coronary artery disease due to calcified coronary lesion       Plan:       1.  Coronary artery disease with history of prior drug-coated stent placement.  Clinically doing quite well.  Will return for follow-up in one year.  Earlier if he has worsening chest pain  2.  Back pain.  Unfortunately no treatment options at this time per neurosurgery or neurology.  As noted above he is working with pain management and drug rehabilitation narcotic dependence.  3.  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  History of renal insufficiency labs appear to be stable  5.  Abdominal aortic aneurysm followed by vascular  surgery  6.  Hyperlipidemia.  Intolerant of statins    Coronary Artery Disease  Assessment  • The patient has no angina    Plan  • Lifestyle modifications discussed include adhering to a heart healthy diet, regular exercise and regular monitoring of cholesterol and blood pressure    Subjective - Objective  • There has been a previous stent procedure using MINH  • Current antiplatelet therapy includes aspirin 81 mg and clopidogrel 75 mg           Mannie Fajardo   [place] Medication Instructions BLACK:    Printed on:04/12/18 1112   Medication Information                      aspirin 81 MG chewable tablet  Chew 81 mg Daily.             folic acid (FOLVITE) 400 MCG tablet  Take 400 mcg by mouth daily.             HYDROmorphone (DILAUDID) 8 MG tablet  Take 8 mg by mouth 4 (Four) Times a Day As Needed.             levothyroxine (SYNTHROID, LEVOTHROID) 50 MCG tablet  Take 50 mcg by mouth daily.             LOVAZA 1 G capsule  TAKE 1 CAPSULE TWICE DAILY             PLAVIX 75 MG tablet  Take 75 mg by mouth Daily.             ZETIA 10 MG tablet  TAKE 1 TABLET DAILY.                 Dictated utilizing Dragon dictation

## 2018-04-13 RX ORDER — OMEGA-3-ACID ETHYL ESTERS 1 G/1
1 CAPSULE, LIQUID FILLED ORAL 2 TIMES DAILY
Qty: 180 CAPSULE | Refills: 1 | Status: SHIPPED | OUTPATIENT
Start: 2018-04-13 | End: 2018-05-07 | Stop reason: SDUPTHER

## 2018-04-20 ENCOUNTER — APPOINTMENT (OUTPATIENT)
Dept: BONE DENSITY | Facility: HOSPITAL | Age: 73
End: 2018-04-20

## 2018-05-01 ENCOUNTER — HOSPITAL ENCOUNTER (OUTPATIENT)
Dept: BONE DENSITY | Facility: HOSPITAL | Age: 73
Discharge: HOME | End: 2018-05-01

## 2018-05-07 RX ORDER — OMEGA-3 ACID ETHYL ESTERS 1 G
1 CAPSULE ORAL 2 TIMES DAILY
Qty: 180 CAPSULE | Refills: 1 | Status: SHIPPED | OUTPATIENT
Start: 2018-05-07 | End: 2019-03-19 | Stop reason: SDUPTHER

## 2018-05-09 ENCOUNTER — TRANSCRIBE ORDERS (OUTPATIENT)
Dept: PHYSICAL THERAPY | Facility: HOSPITAL | Age: 73
End: 2018-05-09

## 2018-05-09 ENCOUNTER — TELEPHONE (OUTPATIENT)
Dept: CARDIOLOGY | Facility: CLINIC | Age: 73
End: 2018-05-09

## 2018-05-09 DIAGNOSIS — R29.898 BILATERAL LEG WEAKNESS: ICD-10-CM

## 2018-05-09 DIAGNOSIS — M25.552 LEFT HIP PAIN: Primary | ICD-10-CM

## 2018-05-09 NOTE — TELEPHONE ENCOUNTER
Pt called re: having a hard time getting Lovaza (brand name only).  Called [facility] and this is now requiring a PA.  Kev said he had sent over a PA requested.  Are you working on this in [employer]?  I did let the pt know we were working on this.

## 2018-05-10 NOTE — TELEPHONE ENCOUNTER
PA for name brand Lovaza has been approved until 12/31/18.    Letter faxed to pharmacy and sent to scanning

## 2018-05-18 ENCOUNTER — HOSPITAL ENCOUNTER (OUTPATIENT)
Dept: PHYSICAL THERAPY | Facility: HOSPITAL | Age: 73
Setting detail: THERAPIES SERIES
Discharge: HOME | End: 2018-05-18

## 2018-05-18 DIAGNOSIS — R29.898 LEFT LEG WEAKNESS: ICD-10-CM

## 2018-05-18 DIAGNOSIS — M51.37 DDD (DEGENERATIVE DISC DISEASE), LUMBOSACRAL: Primary | ICD-10-CM

## 2018-05-18 DIAGNOSIS — R26.2 DIFFICULTY WALKING: ICD-10-CM

## 2018-05-18 PROCEDURE — 97162 PT EVAL MOD COMPLEX 30 MIN: CPT | Performed by: PHYSICAL THERAPIST

## 2018-05-18 PROCEDURE — G8979 MOBILITY GOAL STATUS: HCPCS | Performed by: PHYSICAL THERAPIST

## 2018-05-18 PROCEDURE — G8978 MOBILITY CURRENT STATUS: HCPCS | Performed by: PHYSICAL THERAPIST

## 2018-05-18 NOTE — THERAPY EVALUATION
Outpatient Physical Therapy Ortho Initial Evaluation   [facility]     Patient Name: Mannie Fajardo  : 1945  MRN: [id number]  Today's Date: 2018      Visit Date: 2018    Patient Active Problem List   Diagnosis   • AAA (abdominal aortic aneurysm)   • Hyperlipidemia   • Coronary artery disease due to calcified coronary lesion   • Ureterolithiasis   • Chronic left-sided low back pain with left-sided sciatica   • Chronic midline low back pain without sciatica   • DDD (degenerative disc disease), lumbar   • Left leg weakness   • Pain of left upper extremity   • Stage 3 chronic kidney disease        Past Medical History:   Diagnosis Date   • Arthritis     back   • Back pain    • Backache    • CAD (coronary atherosclerotic disease)    • Disease of thyroid gland    • Dyslipidemia    • Edema    • History of transfusion    • Hyperhomocystinemia    • Hyperlipidemia         Past Surgical History:   Procedure Laterality Date   • APPENDECTOMY     • CARDIAC SURGERY     • CORONARY ANGIOPLASTY WITH STENT PLACEMENT     • EXTRACORPOREAL SHOCKWAVE LITHOTRIPSY (ESWL), STENT INSERTION/REMOVAL Right 2017    Procedure: CYSTO RETROGRADE WITH STENT PLACEMENT;  Surgeon: Janes López MD;  [place] [facility] [place] [facility] [place]  Service:        Visit Dx:     ICD-10-CM ICD-9-CM   1. DDD (degenerative disc disease), lumbosacral M51.37 722.52   2. Left leg weakness R29.898 729.89   3. Difficulty walking R26.2 719.7             Patient History     Row Name 18 1700             History    Chief Complaint Difficulty Walking;Difficulty with daily activities;Fatigue/poor endurance;Muscle tenderness;Muscle weakness;Pain  -ZK      Type of Pain Back pain  -ZK      Date Current Problem(s) Began 12  -ZK      Brief Description of Current Complaint 72 year old with chronic back pain presents for eval with his female cousin who helps with his care and contributes to recent medical history and events. Cousin states pt can be  less than forthcoming about intensity of pain and debility but it was interesting that key information regarding his recent hospital stay and opiod withdraw and treatment was not revealed until further looking in [facility] about stay at [facility] just a month ago. 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 Pt stated around the age of 49 his pain intensified and he stopped working and lived on his savings. Able to bike and play tennis and really didn't seek out rx until insurance improved when he went on SS at age 64. Exam by Dr. Olson in August of 2017 reveals no change in MRI from 2012 and myelogram did not indicate the need for surgery. Pt tried 2 epidurals with no success. Pt reports gradual increase in pain meds and opiods per GP vs. pain medicine specialist. Apparently in April decided to go thru medical supervised detox which took 10 days and cousin stated he was immobile in IP with no PT thus status declined. Pt was AMA for placement in drug OP rehab clinic after dc from [place].  Pt states progressive left thigh weakness affecting his ability to raise leg to clear steps. Tends to throw his leg but has not fallen and does not want to bother with a cane. States he is supposed to have a follow up EMG/NCV soon and also would like to have the bone scan that he missed recently. No prior PT in this hx of back pain although he was referred to aquatic PT by Dr. DEVINE last Fall and pt stated he's not sure why that referral never came about?  Now here for his first try at any type of PT.   -EDINK      Patient/Caregiver Goals Improve mobility;Improve strength;Know what to do to help the symptoms  -ZK      Patient's Rating of General Health Fair   being followed for small AAA the past few years.   -ZK      Occupation/sports/leisure activities no activity besides home bound TV  -TOD      What clinical tests have you had for this problem?  MRI;X-ray   x-ray from Jan 2018 no issue  -ZK      Results of Clinical Tests --   see reports with mentions of small annular tear L L3-4  -ZK         Pain     Pain Location Back   left foot pain related to hammer toe and metatarsalgia  -ZK      Pain at Present 8  -ZK      Pain at Best 8  -ZK      Pain at Worst 9  -ZK      Pain Frequency Constant/continuous  -ZK      Pain Description Aching  -ZK      What Performance Factors Make the Current Problem(s) WORSE? standing. see SARA  -ZK      What Performance Factors Make the Current Problem(s) BETTER? moving, reclining  -ZK      Pain Comments pt okay that he is off opiods but now without relief  -ZK         Fall Risk Assessment    Any falls in the past year: No  -ZK      Does patient have a fear of falling No  -ZK         Daily Activities    Primary Language English  -ZK      Barriers to learning None   may need more time for teach back  -ZK      Pt Participated in POC and Goals Yes  -ZK         Safety    Are you being hurt, hit, or frightened by anyone at home or in your life? No  -ZK      Are you being neglected by a caregiver No  -ZK        User Key  (r) = Recorded By, (t) = Taken By, (c) = Cosigned By    Initials Name Provider Type    ZK Zorre Zeno Kimura, PT Physical Therapist                PT Ortho     Row Name 05/18/18 1800       Subjective Comments    Subjective Comments willing to try water therapy but not a brace  -ZK       Posture/Observations    Alignment Options Lumbar lordosis  -ZK    Lumbar lordosis Moderate;Decreased  -ZK    Observations Muscle spasm   right paraspinal tension vs. left sided atrophy?  -ZK       Myotomal Screen- Lower Quarter Clearing    Hip flexion (L2) Left:;2+ (Poor +)  -ZK    Knee extension (L3) Left:;4- (Good -)  -ZK    Ankle DF (L4) Left:;4+ (Good +)  -ZK    Great toe extension (L5) Left:;4+ (Good +)  -ZK    Ankle PF (S1) Left:;5 (Normal)  -ZK    Knee flexion (S2) Left:;3+ (Fair +)  -ZK       Special Tests/Palpation    Special  Tests/Palpation Lumbar/SI   mod TTP at L3, L4 spinous process  -ZK       General ROM    Head/Neck/Trunk --  -ZK       Head/Neck/Trunk    Trunk Extension AROM 10 WFL no pain  -ZK    Trunk Flexion AROM 70 WFL no pain  -ZK    Trunk Lt Lateral Flexion AROM 30 WFL no pain  -ZK    Trunk Rt Lateral Flexion AROM 30 WFL no pain  -ZK       Sensation    Sensation WNL? WFL  -ZK       Flexibility    Flexibility Tested? --   hamstring tightness bilat  -ZK       Balance Skills Training    SLS can't SLS left > 2 seconds while R WNL  -ZK       Gait/Stairs Assessment/Training    Comment (Gait/Stairs) no device used with poor control hip rotation causing L LE to lack flexion at hip, normal swing thru. This appears as the heel crosses behind the right LE assymetrically. This was veiwed on video in slow motion with consistent pattern.   -ZK      User Key  (r) = Recorded By, (t) = Taken By, (c) = Cosigned By    Initials Name Provider Type     Zorre Zeno Kimura, PT Physical Therapist                                  PT OP Goals     Row Name 05/18/18 1800          PT Short Term Goals    STG Date to Achieve 06/18/18  -ZK     STG 1 pt to state 6 or more hours of pain relief after aquatic therapy  -ZK     STG 1 Progress New  -ZK     STG 2 ADLs to improve with SARA score progressing from 76 to < 70% disability  -ZK     STG 2 Progress New  -ZK     STG 3 pt to follow up with rheumatologist and neurologist per primary and neurosurgical recommendation.   -ZK     STG 3 Progress New  -        Long Term Goals    LTG Date to Achieve 07/18/18  -ZK     LTG 1 pt to show indep with mod level strength work in the pool and progress to easy land based ex program for indep HEP  -ZK     LTG 2 pt to note mild increase in L hip flexion strength from 2+ to 3-   -ZK     LTG 3 pt to note change in pain from constant to intermittent and improving vs. declining.   -ZK     LTG 4 pt to state decrease in average pain from 8-9 to 5-6 by dc  -ZK     LTG 5 pt to obtain  proper diagnostic tests and feel comfortable with rx plan per medical team.   -TOD     LTG 6 SARA to be < 60% by dc  -TOD        Time Calculation    PT Goal Re-Cert Due Date 08/18/18  -TOD       User Key  (r) = Recorded By, (t) = Taken By, (c) = Cosigned By    Initials Name Provider Type    ZK Zorre Zeno Kimura, PT Physical Therapist                PT Assessment/Plan     Row Name 05/18/18 1988          PT Assessment    Functional Limitations Impaired locomotion;Impaired gait;Limitation in home management;Limitations in community activities;Limitations in functional capacity and performance;Performance in leisure activities  -TOD     Impairments Muscle strength;Impaired muscle endurance;Locomotion;Pain  -EDINK     Assessment Comments 72 year old with long term pain and recent change in his approach via opiod medical detox. Prior lumbar MRI showing L3 L annular tear but this does not seem to correlate with the L2-3 motor weakness causing his circumduction gait pattern. Weakness prevents his from supporting his weight on his left leg but at this time does not appear to need a cane or stabilizing brace. Would be good for pt to continue to follow medical plan for EMG and other tests to assess nerve/muscle etiology at hip L LE. Not sure how pt will comply with aquatic plan or other PT based on recent AMA from rehab clinic? Cousin is anxious about a more aggressive approach to his source of long standing pain. At this time x-rays and MRIs appear to not explain constant 8-9 pain thus I'm cautiously proceeding with aquatic PT while hoping to understand the source of his hip flexor weakness. Condition is somewhat stable but prognosis not clear at this time.   -TOD     Please refer to paper survey for additional self-reported information Yes  -EDINK     Rehab Potential Fair  -EDINK     Patient/caregiver participated in establishment of treatment plan and goals Yes  -EDINK     Patient would benefit from skilled therapy intervention Yes  -TOD         PT Plan    PT Frequency 2x/week  -ZK     Predicted Duration of Therapy Intervention (OT Eval) 4-8 weeks  -ZK     Planned CPT's? PT EVAL MOD COMPLELITY: 32929;PT AQUATIC THERAPY EA 15 MIN: 95750  -ZK       User Key  (r) = Recorded By, (t) = Taken By, (c) = Cosigned By    Initials Name Provider Type    ZK Zorre Zeno Kimura, PT Physical Therapist                  Exercises     Row Name 05/18/18 1800             Subjective Comments    Subjective Comments willing to try water therapy but not a brace  -TOD        User Key  (r) = Recorded By, (t) = Taken By, (c) = Cosigned By    Initials Name Provider Type    ZK Zorre Zeno Kimura, PT Physical Therapist                        Outcome Measure Options: Modifed Owestry  Modified Oswestry  Modified Oswestry Score/Comments: 76      Time Calculation:   Start Time: 1615  Stop Time: 1715  Time Calculation (min): 60 min  Total Timed Code Minutes- PT: 60 minute(s)     Therapy Charges for Today     Code Description Service Date Service Provider Modifiers Qty    09880739080 HC PT MOBILITY CURRENT 5/18/2018 Zorre Zeno Kimura, PT GP, CL 1    96248289405 HC PT MOBILITY PROJECTED 5/18/2018 Zorre Zeno Kimura, PT GP, CK 1    13885382936 HC PT AQUA EVAL MOD COMPLEXITY 4 5/18/2018 Zorre Zeno Kimura, PT GP 1          PT G-Codes  Outcome Measure Options: Modifed Owestry  Score: 76%  Functional Limitation: Mobility: Walking and moving around  Mobility: Walking and Moving Around Current Status (): At least 60 percent but less than 80 percent impaired, limited or restricted  Mobility: Walking and Moving Around Goal Status (): At least 40 percent but less than 60 percent impaired, limited or restricted         Zorre Zeno Kimura, PT  5/18/2018

## 2018-05-22 ENCOUNTER — HOSPITAL ENCOUNTER (OUTPATIENT)
Dept: PHYSICAL THERAPY | Facility: HOSPITAL | Age: 73
Setting detail: THERAPIES SERIES
Discharge: HOME | End: 2018-05-22

## 2018-05-22 DIAGNOSIS — R29.898 LEFT LEG WEAKNESS: ICD-10-CM

## 2018-05-22 DIAGNOSIS — R26.2 DIFFICULTY WALKING: ICD-10-CM

## 2018-05-22 DIAGNOSIS — M51.37 DDD (DEGENERATIVE DISC DISEASE), LUMBOSACRAL: Primary | ICD-10-CM

## 2018-05-22 PROCEDURE — 97113 AQUATIC THERAPY/EXERCISES: CPT | Performed by: PHYSICAL THERAPIST

## 2018-05-22 NOTE — THERAPY TREATMENT NOTE
Outpatient Physical Therapy Ortho Treatment Note   [facility]     Patient Name: Mannie Fajardo  : 1945  MRN: [id number]  Today's Date: 2018      Visit Date: 2018    Visit Dx:    ICD-10-CM ICD-9-CM   1. DDD (degenerative disc disease), lumbosacral M51.37 722.52   2. Left leg weakness R29.898 729.89   3. Difficulty walking R26.2 719.7       Patient Active Problem List   Diagnosis   • AAA (abdominal aortic aneurysm)   • Hyperlipidemia   • Coronary artery disease due to calcified coronary lesion   • Ureterolithiasis   • Chronic left-sided low back pain with left-sided sciatica   • Chronic midline low back pain without sciatica   • DDD (degenerative disc disease), lumbar   • Left leg weakness   • Pain of left upper extremity   • Stage 3 chronic kidney disease        Past Medical History:   Diagnosis Date   • Arthritis     back   • Back pain    • Backache    • CAD (coronary atherosclerotic disease)    • Disease of thyroid gland    • Dyslipidemia    • Edema    • History of transfusion    • Hyperhomocystinemia    • Hyperlipidemia         Past Surgical History:   Procedure Laterality Date   • APPENDECTOMY     • CARDIAC SURGERY     • CORONARY ANGIOPLASTY WITH STENT PLACEMENT     • EXTRACORPOREAL SHOCKWAVE LITHOTRIPSY (ESWL), STENT INSERTION/REMOVAL Right 2017    Procedure: CYSTO RETROGRADE WITH STENT PLACEMENT;  Surgeon: Janes López MD;  Location: [facility] [place] [facility] [place];  Service:                              PT Assessment/Plan     Row Name 18 1823          PT Assessment    Assessment Comments pt with Ext rotation at L hip with prior use of a bungee cord to help with flexor weakness. this indicates he might benefit from a derotation strap from belt to shoe and will look into this for him. Pt saw primary MD today and working to get his EMG set up asap. Do not feel pt has LBP due to neoplasm based on exam, but EMG would really help figure out motor weakness that seems to be progressing.  "More medical follow up needed.   -ZK       User Key  (r) = Recorded By, (t) = Taken By, (c) = Cosigned By    Initials Name Provider Type    ZK Zorre Zeno Kimura, PT Physical Therapist                    Exercises     Row Name 05/22/18 1800             Subjective Comments    Subjective Comments stating more chronic nature of left LE weakness vs. eval hx. Feels like his leg got weaker about 20 years ago but still able to play tennis. 5+ years ago is when more profound issue developed affecting his gait.   -ZK         Subjective Pain    Able to rate subjective pain? yes  -ZK      Pre-Treatment Pain Level 3  -ZK      Post-Treatment Pain Level 4  -ZK      Subjective Pain Comment some back discomfort and has talked to his MD about pain at night affecting his sleep. Denies wt loss or night pain more intense vs. day, or night sweats.   -ZK         Aquatics    Aquatics performed? Yes  -ZK         Aquatics LE    Water Walk forward;side;backward   2 laps each holding onto LN to help with march gait  -ZK      Stretch 1 HS LN 1'  -ZK      Stretch 2 stomp LN 1'  -ZK      Stretch 3 hip swivel clam SN 1'  -ZK      Vertical Traction LN in front and back for 2' decompression prn  -ZK      Hip Flex/Ext 20 each  -ZK      March in Place 20  -ZK      Step Ups 8\" step taps then up and overs with hands on A 3' total  -ZK      Bicycle bench 2'  -ZK      Flutter/Scissor bench 2'  -ZK        User Key  (r) = Recorded By, (t) = Taken By, (c) = Cosigned By    Initials Name Provider Type    ZK Zorre Zeno Kimura, PT Physical Therapist                                            Time Calculation:   Start Time: 1645  Stop Time: 1730  Time Calculation (min): 45 min  Total Timed Code Minutes- PT: 45 minute(s)    Therapy Charges for Today     Code Description Service Date Service Provider Modifiers Qty    [id number]  PT AQUATIC THERAPY EA 15 MIN 5/22/2018 Zorre Zeno Kimura, PT GP 3                    Zorre Zeno Kimura, PT  5/22/2018     "

## 2018-05-31 ENCOUNTER — TRANSCRIBE ORDERS (OUTPATIENT)
Dept: LAB | Facility: HOSPITAL | Age: 73
End: 2018-05-31

## 2018-05-31 ENCOUNTER — LAB (OUTPATIENT)
Dept: LAB | Facility: HOSPITAL | Age: 73
End: 2018-05-31
Attending: INTERNAL MEDICINE

## 2018-05-31 ENCOUNTER — HOSPITAL ENCOUNTER (OUTPATIENT)
Dept: BONE DENSITY | Facility: HOSPITAL | Age: 73
Discharge: HOME | End: 2018-05-31
Admitting: FAMILY MEDICINE

## 2018-05-31 DIAGNOSIS — N18.2 CHRONIC KIDNEY DISEASE, STAGE II (MILD): ICD-10-CM

## 2018-05-31 DIAGNOSIS — E03.9 HYPOTHYROIDISM (ACQUIRED): ICD-10-CM

## 2018-05-31 DIAGNOSIS — N18.2 CHRONIC KIDNEY DISEASE, STAGE II (MILD): Primary | ICD-10-CM

## 2018-05-31 LAB
ANION GAP SERPL CALCULATED.3IONS-SCNC: 13.4 MMOL/L
BACTERIA UR QL AUTO: ABNORMAL /HPF
BILIRUB UR QL STRIP: NEGATIVE
BUN BLD-MCNC: 22 MG/DL (ref 8–23)
BUN/CREAT SERPL: 15.2 (ref 7–25)
CALCIUM SPEC-SCNC: 9.7 MG/DL (ref 8.6–10.5)
CHLORIDE SERPL-SCNC: 101 MMOL/L (ref 98–107)
CLARITY UR: CLEAR
CO2 SERPL-SCNC: 27.6 MMOL/L (ref 22–29)
COLOR UR: YELLOW
CREAT BLD-MCNC: 1.45 MG/DL (ref 0.76–1.27)
CREAT UR-MCNC: 150.2 MG/DL
DEPRECATED RDW RBC AUTO: 50.4 FL (ref 37–54)
ERYTHROCYTE [DISTWIDTH] IN BLOOD BY AUTOMATED COUNT: 14 % (ref 11.5–14.5)
GFR SERPL CREATININE-BSD FRML MDRD: 48 ML/MIN/1.73
GLUCOSE BLD-MCNC: 95 MG/DL (ref 65–99)
GLUCOSE UR STRIP-MCNC: NEGATIVE MG/DL
HCT VFR BLD AUTO: 44 % (ref 40.4–52.2)
HGB BLD-MCNC: 14.4 G/DL (ref 13.7–17.6)
HGB UR QL STRIP.AUTO: NEGATIVE
HYALINE CASTS UR QL AUTO: ABNORMAL /LPF
KETONES UR QL STRIP: NEGATIVE
LEUKOCYTE ESTERASE UR QL STRIP.AUTO: ABNORMAL
MCH RBC QN AUTO: 31.7 PG (ref 27–32.7)
MCHC RBC AUTO-ENTMCNC: 32.7 G/DL (ref 32.6–36.4)
MCV RBC AUTO: 96.9 FL (ref 79.8–96.2)
NITRITE UR QL STRIP: NEGATIVE
PH UR STRIP.AUTO: 7 [PH] (ref 5–8)
PLATELET # BLD AUTO: 265 10*3/MM3 (ref 140–500)
PMV BLD AUTO: 10.4 FL (ref 6–12)
POTASSIUM BLD-SCNC: 4.8 MMOL/L (ref 3.5–5.2)
PROT UR QL STRIP: NEGATIVE
PROT UR-MCNC: 10 MG/DL
RBC # BLD AUTO: 4.54 10*6/MM3 (ref 4.6–6)
RBC # UR: ABNORMAL /HPF
REF LAB TEST METHOD: ABNORMAL
SODIUM BLD-SCNC: 142 MMOL/L (ref 136–145)
SP GR UR STRIP: 1.02 (ref 1–1.03)
SQUAMOUS #/AREA URNS HPF: ABNORMAL /HPF
UROBILINOGEN UR QL STRIP: ABNORMAL
WBC NRBC COR # BLD: 9.89 10*3/MM3 (ref 4.5–10.7)
WBC UR QL AUTO: ABNORMAL /HPF

## 2018-05-31 PROCEDURE — 36415 COLL VENOUS BLD VENIPUNCTURE: CPT

## 2018-05-31 PROCEDURE — 81001 URINALYSIS AUTO W/SCOPE: CPT

## 2018-05-31 PROCEDURE — 84156 ASSAY OF PROTEIN URINE: CPT

## 2018-05-31 PROCEDURE — 82570 ASSAY OF URINE CREATININE: CPT

## 2018-05-31 PROCEDURE — 80048 BASIC METABOLIC PNL TOTAL CA: CPT

## 2018-05-31 PROCEDURE — 77080 DXA BONE DENSITY AXIAL: CPT

## 2018-05-31 PROCEDURE — 85027 COMPLETE CBC AUTOMATED: CPT

## 2018-06-05 ENCOUNTER — APPOINTMENT (OUTPATIENT)
Dept: PHYSICAL THERAPY | Facility: HOSPITAL | Age: 73
End: 2018-06-05

## 2018-06-07 ENCOUNTER — APPOINTMENT (OUTPATIENT)
Dept: PHYSICAL THERAPY | Facility: HOSPITAL | Age: 73
End: 2018-06-07

## 2018-06-12 ENCOUNTER — APPOINTMENT (OUTPATIENT)
Dept: PHYSICAL THERAPY | Facility: HOSPITAL | Age: 73
End: 2018-06-12

## 2018-06-14 ENCOUNTER — APPOINTMENT (OUTPATIENT)
Dept: PHYSICAL THERAPY | Facility: HOSPITAL | Age: 73
End: 2018-06-14

## 2018-06-19 ENCOUNTER — APPOINTMENT (OUTPATIENT)
Dept: PHYSICAL THERAPY | Facility: HOSPITAL | Age: 73
End: 2018-06-19

## 2018-06-21 ENCOUNTER — APPOINTMENT (OUTPATIENT)
Dept: PHYSICAL THERAPY | Facility: HOSPITAL | Age: 73
End: 2018-06-21

## 2018-06-22 ENCOUNTER — HOSPITAL ENCOUNTER (OUTPATIENT)
Dept: PHYSICAL THERAPY | Facility: HOSPITAL | Age: 73
Setting detail: THERAPIES SERIES
Discharge: HOME | End: 2018-06-22

## 2018-06-22 DIAGNOSIS — R29.898 LEFT LEG WEAKNESS: ICD-10-CM

## 2018-06-22 DIAGNOSIS — R26.2 DIFFICULTY WALKING: ICD-10-CM

## 2018-06-22 DIAGNOSIS — M51.37 DDD (DEGENERATIVE DISC DISEASE), LUMBOSACRAL: Primary | ICD-10-CM

## 2018-06-22 PROCEDURE — 97140 MANUAL THERAPY 1/> REGIONS: CPT | Performed by: PHYSICAL THERAPIST

## 2018-06-22 NOTE — THERAPY PROGRESS REPORT/RE-CERT
Outpatient Physical Therapy Ortho Progress Note/re-cert  [facility]     Patient Name: Mannie Fajardo  : 1945  MRN: [id number]  Today's Date: 2018      Visit Date: 2018    Patient Active Problem List   Diagnosis   • AAA (abdominal aortic aneurysm)   • Hyperlipidemia   • Coronary artery disease due to calcified coronary lesion   • Ureterolithiasis   • Chronic left-sided low back pain with left-sided sciatica   • Chronic midline low back pain without sciatica   • DDD (degenerative disc disease), lumbar   • Left leg weakness   • Pain of left upper extremity   • Stage 3 chronic kidney disease        Past Medical History:   Diagnosis Date   • Arthritis     back   • Back pain    • Backache    • CAD (coronary atherosclerotic disease)    • Disease of thyroid gland    • Dyslipidemia    • Edema    • History of transfusion    • Hyperhomocystinemia    • Hyperlipidemia         Past Surgical History:   Procedure Laterality Date   • APPENDECTOMY     • CARDIAC SURGERY     • CORONARY ANGIOPLASTY WITH STENT PLACEMENT     • EXTRACORPOREAL SHOCKWAVE LITHOTRIPSY (ESWL), STENT INSERTION/REMOVAL Right 2017    Procedure: CYSTO RETROGRADE WITH STENT PLACEMENT;  Surgeon: Janes López MD;  [place] [facility] [place] [facility] [place]  Service:        Visit Dx:     ICD-10-CM ICD-9-CM   1. DDD (degenerative disc disease), lumbosacral M51.37 722.52   2. Left leg weakness R29.898 729.89   3. Difficulty walking R26.2 719.7                                       PT OP Goals     Row Name 18 1632 18 1631       PT Short Term Goals    STG Date to Achieve 18  -ZK 18  -ZK    STG 1 pt to state 6 or more hours of pain relief after aquatic therapy  -ZK pt to state 6 or more hours of pain relief after aquatic therapy  -ZK    STG 1 Progress Not Met;Goal Revised  -ZK Not Met;Goal Revised  -ZK    STG 1 Progress Comments refused this plan after one rx  -ZK refused to cont with aquatic plan  -ZK    STG 2 ADLs to improve  with SARA score progressing from 76 to < 70% disability  -ZK ADLs to improve with SARA score progressing from 76 to < 70% disability  -ZK    STG 2 Progress Ongoing;Not Met  -ZK Not Met;Ongoing  -ZK    STG 3 pt to follow up with rheumatologist and neurologist per primary and neurosurgical recommendation.   -ZK pt to follow up with rheumatologist and neurologist per primary and neurosurgical recommendation.   -ZK    STG 3 Progress Not Met;Ongoing  -ZK Ongoing  -ZK    STG 3 Progress Comments still trying to get EMG rescheduled  -ZK still trying to get EMG rescheduled  -ZK       Long Term Goals    LTG Date to Achieve 07/18/18  -ZK  --    LTG 1 pt to show indep with mod level strength work in the pool and progress to easy land based ex program for indep HEP  -ZK  --    LTG 1 Progress Not Met;Goal Revised  -ZK  --    LTG 2 pt to note mild increase in L hip flexion strength from 2+ to 3-   -ZK  --    LTG 2 Progress Ongoing;Not Met  -ZK  --    LTG 3 pt to note change in pain from constant to intermittent and improving vs. declining.   -ZK  --    LTG 3 Progress Not Met  -ZK  --    LTG 4 pt to state decrease in average pain from 8-9 to 5-6 by dc  -ZK  --    LTG 4 Progress Not Met;Ongoing  -ZK  --    LTG 5 pt to obtain proper diagnostic tests and feel comfortable with rx plan per medical team.   -ZK  --    LTG 5 Progress Ongoing  -ZK  --    LTG 5 Progress Comments will see how block works next week  -ZK  --    LTG 6 SARA to be < 60% by dc  -ZK  --    LTG 6 Progress Ongoing  -ZK  --      User Key  (r) = Recorded By, (t) = Taken By, (c) = Cosigned By    Initials Name Provider Type    ZK Zorre Zeno Kimura, PT Physical Therapist                PT Assessment/Plan     Row Name 06/22/18 4150          PT Assessment    Assessment Comments moderate quad tone and to lesser extent hamstring tone noted in left thigh today. no real reproducible pain to palpation but some increase in pain with distraction from proximal femur vs. long lever  distraction from foot. This indicates potential benefit of home traction device and pt given info to read. Also may benefit from a simple back brace called the Serola which targets a thin area like L3-5 and/or SI area pain. Held off on dry needling today due to lack of local pain and next week's plan of some type of medial branch block he thinks. Still hoping that pt can obtain the EMG to help dx the tone issue in his left LE. 2 more sessions scheduled on Fridays for this ongoing case which lacks clarity at this time.   -ZK       User Key  (r) = Recorded By, (t) = Taken By, (c) = Cosigned By    Initials Name Provider Type    ZK Zorre Zeno Kimura, PT Physical Therapist                  Exercises     Row Name 06/22/18 1730 06/22/18 1500          Subjective Comments    Subjective Comments  -- extreme pain the past 2 weeks without reason. not taking any meds besides OTC lidocaine patch. off his blood thinners plavix as he is scheduled for a block next wednesday via pain clinic.   -ZK        Subjective Pain    Able to rate subjective pain?  -- yes  -ZK     Pre-Treatment Pain Level  -- 10  -ZK     Post-Treatment Pain Level  -- 8  -ZK     Subjective Pain Comment  -- states a deep tissue massage last week made him worse but admits he allowed the deeper technique since he feels his pain is so deep  -ZK        Aquatics    Aquatics performed?  -- No  -ZK        Total Minutes    52958 - PT Manual Therapy Minutes 45  -ZK 45  -ZK       User Key  (r) = Recorded By, (t) = Taken By, (c) = Cosigned By    Initials Name Provider Type    ZK Zorre Zeno Kimura, PT Physical Therapist           Manual Rx (last 36 hours)      Manual Treatments     Row Name 06/22/18 1500             Total Minutes    24045 - PT Manual Therapy Minutes 45  -ZK         Manual Rx 1    Manual Rx 1 Location lumbar spine  -ZK      Manual Rx 1 Type STM along L L2-5  -ZK      Manual Rx 1 Grade mod level  -ZK      Manual Rx 1 Duration 15   -ZK         Manual Rx 2     Manual Rx 2 Location LS spine via femur  -ZK      Manual Rx 2 Type manual distraction  -ZK      Manual Rx 2 Grade light  -ZK      Manual Rx 2 Duration 15  -ZK         Manual Rx 3    Manual Rx 3 Location  L LE quad and ham  -ZK      Manual Rx 3 Type PROM  -ZK      Manual Rx 3 Grade light thru tone  -ZK      Manual Rx 3 Duration 10  -ZK        User Key  (r) = Recorded By, (t) = Taken By, (c) = Cosigned By    Initials Name Provider Type    ZK Zorre Zeno Kimura, PT Physical Therapist                                Time Calculation:     Therapy Suggested Charges     Code   Minutes Charges    06014 (CPT®) Hc Pt Neuromusc Re Education Ea 15 Min      07598 (CPT®) Hc Pt Ther Proc Ea 15 Min      33547 (CPT®) Hc Gait Training Ea 15 Min      70948 (CPT®) Hc Pt Therapeutic Act Ea 15 Min      84347 (CPT®) Hc Pt Manual Therapy Ea 15 Min 45 3    90395 (CPT®) Hc Pt Ther Massage- Per 15 Min      33806 (CPT®) Hc Pt Iontophoresis Ea 15 Min      09887 (CPT®) Hc Pt Elec Stim Ea-Per 15 Min      25039 (CPT®) Hc Pt Ultrasound Ea 15 Min      38272 (CPT®) Hc Pt Self Care/Mgmt/Train Ea 15 Min      Total  45 3          Start Time: 1530  Stop Time: 1615  Time Calculation (min): 45 min  Total Timed Code Minutes- PT: 45 minute(s)     Therapy Charges for Today     Code Description Service Date Service Provider Modifiers Qty    85527635319 HC PT MANUAL THERAPY EA 15 MIN 6/22/2018 Zorre Zeno Kimura, PT GP 3                    Zorre Zeno Kimura, PT  6/22/2018

## 2018-06-29 ENCOUNTER — APPOINTMENT (OUTPATIENT)
Dept: PHYSICAL THERAPY | Facility: HOSPITAL | Age: 73
End: 2018-06-29

## 2018-07-06 ENCOUNTER — HOSPITAL ENCOUNTER (OUTPATIENT)
Dept: PHYSICAL THERAPY | Facility: HOSPITAL | Age: 73
Setting detail: THERAPIES SERIES
Discharge: HOME | End: 2018-07-06

## 2018-07-06 DIAGNOSIS — R26.2 DIFFICULTY WALKING: ICD-10-CM

## 2018-07-06 DIAGNOSIS — R29.898 LEFT LEG WEAKNESS: ICD-10-CM

## 2018-07-06 DIAGNOSIS — M51.37 DDD (DEGENERATIVE DISC DISEASE), LUMBOSACRAL: Primary | ICD-10-CM

## 2018-07-06 PROCEDURE — 97140 MANUAL THERAPY 1/> REGIONS: CPT | Performed by: PHYSICAL THERAPIST

## 2018-07-06 NOTE — THERAPY TREATMENT NOTE
Outpatient Physical Therapy Ortho Treatment Note  [facility]     Patient Name: Mannie Fajardo  : 1945  MRN: [id number]  Today's Date: 2018      Visit Date: 2018    Visit Dx:    ICD-10-CM ICD-9-CM   1. DDD (degenerative disc disease), lumbosacral M51.37 722.52   2. Left leg weakness R29.898 729.89   3. Difficulty walking R26.2 719.7       Patient Active Problem List   Diagnosis   • AAA (abdominal aortic aneurysm) (CMS/HCC)   • Hyperlipidemia   • Coronary artery disease due to calcified coronary lesion   • Ureterolithiasis   • Chronic left-sided low back pain with left-sided sciatica   • Chronic midline low back pain without sciatica   • DDD (degenerative disc disease), lumbar   • Left leg weakness   • Pain of left upper extremity   • Stage 3 chronic kidney disease        Past Medical History:   Diagnosis Date   • Arthritis     back   • Back pain    • Backache    • CAD (coronary atherosclerotic disease)    • Disease of thyroid gland    • Dyslipidemia    • Edema    • History of transfusion    • Hyperhomocystinemia (CMS/HCC)    • Hyperlipidemia         Past Surgical History:   Procedure Laterality Date   • APPENDECTOMY     • CARDIAC SURGERY     • CORONARY ANGIOPLASTY WITH STENT PLACEMENT     • EXTRACORPOREAL SHOCKWAVE LITHOTRIPSY (ESWL), STENT INSERTION/REMOVAL Right 2017    Procedure: CYSTO RETROGRADE WITH STENT PLACEMENT;  Surgeon: Janes López MD;  Location: [facility] OR [facility] [place];  Service:                              PT Assessment/Plan     Row Name 18 4117          PT Assessment    Assessment Comments time spent explaining benefits and risks of needling. 3 smaller needles paraspinal L4 to S2 with estim at L4. larger needles with multiple LTRs noted in left lat glut and piri today. Added estim to that with strong contractions. Still think pt needs Serola belt or some other rigid belt to help with managing life. Hoping to extend rx at least to EMG then adjust pending those  results.   -ZK       User Key  (r) = Recorded By, (t) = Taken By, (c) = Cosigned By    Initials Name Provider Type    ZK Zorre Zeno Kimura, PT Physical Therapist                    Exercises     Row Name 07/06/18 1600 07/06/18 1500          Subjective Comments    Subjective Comments no change in pain after RBOERT caudal block 9 days ago so dissappointed and not sure about follow up injections. did consent to TDN today. Also will have an EMG in August and friends a rallying to try and get him to [facility] by the end of the year.   -ZK  --        Subjective Pain    Able to rate subjective pain? yes  -ZK  --     Pre-Treatment Pain Level 7  -ZK  --     Post-Treatment Pain Level 5  -ZK  --     Subjective Pain Comment noted pretty good drop in pain after needling today  -ZK  --        Aquatics    Aquatics performed? No  -ZK  --        Total Minutes    11859 - PT Manual Therapy Minutes  -- 45  -ZK       User Key  (r) = Recorded By, (t) = Taken By, (c) = Cosigned By    Initials Name Provider Type    ZK Zorre Zeno Kimura, PT Physical Therapist                        Manual Rx (last 36 hours)      Manual Treatments     Row Name 07/06/18 1500             Total Minutes    77565 - PT Manual Therapy Minutes 45  -ZK         Manual Rx 1    Manual Rx 1 Location lumbar spine and Left glut  -ZK      Manual Rx 1 Type dry needling with estim added   -ZK      Manual Rx 1 Grade 30 to spine. 60 to glut  -ZK      Manual Rx 1 Duration 35 TDN. 5' estim  -ZK         Manual Rx 2    Manual Rx 2 Location lumbar spine  -ZK      Manual Rx 2 Type STM  -ZK      Manual Rx 2 Grade light post needling  -ZK      Manual Rx 2 Duration 5  -ZK        User Key  (r) = Recorded By, (t) = Taken By, (c) = Cosigned By    Initials Name Provider Type    ZK Zorre Zeno Kimura, PT Physical Therapist                             Time Calculation:   Start Time: 1445  Stop Time: 1530  Time Calculation (min): 45 min  Total Timed Code Minutes- PT: 45 minute(s)  Therapy  Suggested Charges     Code   Minutes Charges    35098 (CPT®) Hc Pt Neuromusc Re Education Ea 15 Min      69919 (CPT®) Hc Pt Ther Proc Ea 15 Min      83924 (CPT®) Hc Gait Training Ea 15 Min      08425 (CPT®) Hc Pt Therapeutic Act Ea 15 Min      64589 (CPT®) Hc Pt Manual Therapy Ea 15 Min 45 3    28398 (CPT®) Hc Pt Ther Massage- Per 15 Min      52009 (CPT®) Hc Pt Iontophoresis Ea 15 Min      06629 (CPT®) Hc Pt Elec Stim Ea-Per 15 Min      14660 (CPT®) Hc Pt Ultrasound Ea 15 Min      94920 (CPT®) Hc Pt Self Care/Mgmt/Train Ea 15 Min      Total  45 3        Therapy Charges for Today     Code Description Service Date Service Provider Modifiers Qty    61582982955 HC PT MANUAL THERAPY EA 15 MIN 7/6/2018 Zorre Zeno Kimura, PT GP 3                    Zorre Zeno Kimura, PT  7/6/2018

## 2018-07-13 ENCOUNTER — HOSPITAL ENCOUNTER (OUTPATIENT)
Dept: PHYSICAL THERAPY | Facility: HOSPITAL | Age: 73
Setting detail: THERAPIES SERIES
Discharge: HOME | End: 2018-07-13

## 2018-07-13 DIAGNOSIS — R29.898 LEFT LEG WEAKNESS: ICD-10-CM

## 2018-07-13 DIAGNOSIS — R26.2 DIFFICULTY WALKING: ICD-10-CM

## 2018-07-13 DIAGNOSIS — M51.37 DDD (DEGENERATIVE DISC DISEASE), LUMBOSACRAL: Primary | ICD-10-CM

## 2018-07-13 PROCEDURE — 97140 MANUAL THERAPY 1/> REGIONS: CPT | Performed by: PHYSICAL THERAPIST

## 2018-07-13 PROCEDURE — 97035 APP MDLTY 1+ULTRASOUND EA 15: CPT | Performed by: PHYSICAL THERAPIST

## 2018-07-13 NOTE — THERAPY TREATMENT NOTE
Outpatient Physical Therapy Ortho Treatment Note  [facility]     Patient Name: Mannie Fajardo  : 1945  MRN: [id number]  Today's Date: 2018      Visit Date: 2018    Visit Dx:    ICD-10-CM ICD-9-CM   1. DDD (degenerative disc disease), lumbosacral M51.37 722.52   2. Left leg weakness R29.898 729.89   3. Difficulty walking R26.2 719.7       Patient Active Problem List   Diagnosis   • AAA (abdominal aortic aneurysm) (CMS/HCC)   • Hyperlipidemia   • Coronary artery disease due to calcified coronary lesion   • Ureterolithiasis   • Chronic left-sided low back pain with left-sided sciatica   • Chronic midline low back pain without sciatica   • DDD (degenerative disc disease), lumbar   • Left leg weakness   • Pain of left upper extremity   • Stage 3 chronic kidney disease        Past Medical History:   Diagnosis Date   • Arthritis     back   • Back pain    • Backache    • CAD (coronary atherosclerotic disease)    • Disease of thyroid gland    • Dyslipidemia    • Edema    • History of transfusion    • Hyperhomocystinemia (CMS/HCC)    • Hyperlipidemia         Past Surgical History:   Procedure Laterality Date   • APPENDECTOMY     • CARDIAC SURGERY     • CORONARY ANGIOPLASTY WITH STENT PLACEMENT     • EXTRACORPOREAL SHOCKWAVE LITHOTRIPSY (ESWL), STENT INSERTION/REMOVAL Right 2017    Procedure: CYSTO RETROGRADE WITH STENT PLACEMENT;  Surgeon: Janes López MD;  Location: [facility] OR [facility] [place];  Service:                              PT Assessment/Plan     Row Name 18 1613          PT Assessment    Assessment Comments pt has not been wearing a back belt not sure if this would help. Told by pain clinic his issue is more depression vs. real which has him frustrated. Less LTRs today in glut which is good but pt very uncomfortable prone today hindering needling of lumbar spine. No estim at L lumbar needles today so followed with direct US rx and hopefully this will provide a few hours of relief.  Pt stuggling at this time to improve and agree that more answers from his upcoming visit to another surgeon, and/or [facility] will give him more directions. Not having pain meds on board or any significant post opiod counseling has created a vicious cycle of pain and pt with very little optimism at this time. P: see in 2 weeks and assess course of PT.   -ZK       User Key  (r) = Recorded By, (t) = Taken By, (c) = Cosigned By    Initials Name Provider Type    ZK Zorre Zeno Kimura, PT Physical Therapist                Modalities     Row Name 07/13/18 1500             Ice    Ice Applied Yes  -ZK      Location lumbar post TDN and US  -ZK      Rx Minutes 10 mins  -ZK      Ice S/P Rx Yes  -ZK         Ultrasound 40186    Location L LS paraspinal  -ZK      Duty Cycle 100  -ZK      Frequency 1.0 MHz  -ZK      Intensity - Wts/cm 1.5  -ZK      36453 - PT Ultrasound Minutes 8  -ZK        User Key  (r) = Recorded By, (t) = Taken By, (c) = Cosigned By    Initials Name Provider Type    ZK Zorre Zeno Kimura, PT Physical Therapist                Exercises     Row Name 07/13/18 1500             Subjective Comments    Subjective Comments 12 hours relief with dry needling. returned to pain clinic after caudal block seemed to make him worse, thus no plans to try ESIs. now with more pain driving now so doesn't want to proceed with something that he doesn't want to risk. still hoping to go to [facility] for workup soon. ok with dry needling again today.  -ZK         Subjective Pain    Able to rate subjective pain? yes  -ZK      Pre-Treatment Pain Level 9  -ZK      Post-Treatment Pain Level 7  -ZK      Subjective Pain Comment left sided paraspinal pain local to L3-5  -ZK         Aquatics    Aquatics performed? No  -ZK         Total Minutes    35701 - PT Manual Therapy Minutes 30  -ZK        User Key  (r) = Recorded By, (t) = Taken By, (c) = Cosigned By    Initials Name Provider Type    ZK Zorre Zeno Kimura, PT Physical Therapist                         Manual Rx (last 36 hours)      Manual Treatments     Row Name 07/13/18 1500             Total Minutes    19911 - PT Manual Therapy Minutes 30  -ZK         Manual Rx 1    Manual Rx 1 Location lumbar spine and Left glut  -ZK      Manual Rx 1 Type dry needling with estim added   -ZK      Manual Rx 1 Grade 30 to spine. 60 to glut  -ZK      Manual Rx 1 Duration 25 TDN needles left in place. 5' estim to glut  -ZK        User Key  (r) = Recorded By, (t) = Taken By, (c) = Cosigned By    Initials Name Provider Type    ZK Zorre Zeno Kimura, PT Physical Therapist                             Time Calculation:   Start Time: 1530  Stop Time: 1615  Time Calculation (min): 45 min  Total Timed Code Minutes- PT: 45 minute(s)  Therapy Suggested Charges     Code   Minutes Charges    13031 (CPT®) Hc Pt Neuromusc Re Education Ea 15 Min      25332 (CPT®) Hc Pt Ther Proc Ea 15 Min      10714 (CPT®) Hc Gait Training Ea 15 Min      37418 (CPT®) Hc Pt Therapeutic Act Ea 15 Min      80741 (CPT®) Hc Pt Manual Therapy Ea 15 Min 30 2    96478 (CPT®) Hc Pt Ther Massage- Per 15 Min      69483 (CPT®) Hc Pt Iontophoresis Ea 15 Min      13306 (CPT®) Hc Pt Elec Stim Ea-Per 15 Min      93550 (CPT®) Hc Pt Ultrasound Ea 15 Min 8 1    46242 (CPT®) Hc Pt Self Care/Mgmt/Train Ea 15 Min      Total  38 3        Therapy Charges for Today     Code Description Service Date Service Provider Modifiers Qty    39141392562 HC PT MANUAL THERAPY EA 15 MIN 7/13/2018 Zorre Zeno Kimura, PT GP 2    74542322586 HC PT ULTRASOUND EA 15 MIN 7/13/2018 Zorre Zeno Kimura, PT GP 1                    Zorre Zeno Kimura, PT  7/13/2018

## 2018-07-27 ENCOUNTER — HOSPITAL ENCOUNTER (OUTPATIENT)
Dept: PHYSICAL THERAPY | Facility: HOSPITAL | Age: 73
Setting detail: THERAPIES SERIES
Discharge: HOME | End: 2018-07-27

## 2018-07-27 DIAGNOSIS — R29.898 LEFT LEG WEAKNESS: ICD-10-CM

## 2018-07-27 DIAGNOSIS — R26.2 DIFFICULTY WALKING: ICD-10-CM

## 2018-07-27 DIAGNOSIS — M51.37 DDD (DEGENERATIVE DISC DISEASE), LUMBOSACRAL: Primary | ICD-10-CM

## 2018-07-27 PROCEDURE — 97035 APP MDLTY 1+ULTRASOUND EA 15: CPT | Performed by: PHYSICAL THERAPIST

## 2018-07-27 PROCEDURE — 97140 MANUAL THERAPY 1/> REGIONS: CPT | Performed by: PHYSICAL THERAPIST

## 2018-07-27 NOTE — THERAPY PROGRESS REPORT/RE-CERT
Outpatient Physical Therapy Ortho Progress Note/recert  [facility]     Patient Name: Mannie Fajardo  : 1945  MRN: [id number]  Today's Date: 2018      Visit Date: 2018    Patient Active Problem List   Diagnosis   • AAA (abdominal aortic aneurysm) (CMS/HCC)   • Hyperlipidemia   • Coronary artery disease due to calcified coronary lesion   • Ureterolithiasis   • Chronic left-sided low back pain with left-sided sciatica   • Chronic midline low back pain without sciatica   • DDD (degenerative disc disease), lumbar   • Left leg weakness   • Pain of left upper extremity   • Stage 3 chronic kidney disease        Past Medical History:   Diagnosis Date   • Arthritis     back   • Back pain    • Backache    • CAD (coronary atherosclerotic disease)    • Disease of thyroid gland    • Dyslipidemia    • Edema    • History of transfusion    • Hyperhomocystinemia (CMS/HCC)    • Hyperlipidemia         Past Surgical History:   Procedure Laterality Date   • APPENDECTOMY     • CARDIAC SURGERY     • CORONARY ANGIOPLASTY WITH STENT PLACEMENT     • EXTRACORPOREAL SHOCKWAVE LITHOTRIPSY (ESWL), STENT INSERTION/REMOVAL Right 2017    Procedure: CYSTO RETROGRADE WITH STENT PLACEMENT;  Surgeon: Janes López MD;  Location: [facility] OR [facility] [place];  Service:        Visit Dx:     ICD-10-CM ICD-9-CM   1. DDD (degenerative disc disease), lumbosacral M51.37 722.52   2. Left leg weakness R29.898 729.89   3. Difficulty walking R26.2 719.7                                       PT OP Goals     Row Name 18 1613          PT Short Term Goals    STG Date to Achieve 18  -ZK     STG 1 pt to state 6 or more hours of pain relief after aquatic therapy  -ZK     STG 1 Progress Not Met;Goal Revised  -ZK     STG 2 ADLs to improve with SARA score progressing from 76 to < 70% disability  -ZK     STG 2 Progress Ongoing;Not Met  -ZK     STG 3 pt to follow up with rheumatologist and neurologist per primary and neurosurgical  "recommendation.   -ZK     STG 3 Progress Not Met;Ongoing  -ZK     STG 3 Progress Comments coming up this August  -ZK        Long Term Goals    LTG Date to Achieve 07/18/18  -ZK     LTG 1 pt to show indep with mod level strength work in the pool and progress to easy land based ex program for indep HEP  -ZK     LTG 1 Progress Not Met;Goal Revised  -ZK     LTG 2 pt to note mild increase in L hip flexion strength from 2+ to 3-   -ZK     LTG 2 Progress Ongoing;Not Met  -ZK     LTG 3 pt to note change in pain from constant to intermittent and improving vs. declining.   -ZK     LTG 3 Progress Not Met  -ZK     LTG 4 pt to state decrease in average pain from 8-9 to 5-6 by dc  -ZK     LTG 4 Progress Not Met;Ongoing  -ZK     LTG 5 pt to obtain proper diagnostic tests and feel comfortable with rx plan per medical team.   -ZK     LTG 5 Progress Ongoing  -ZK     LTG 6 SARA to be < 60% by dc  -ZK     LTG 6 Progress Ongoing  -       User Key  (r) = Recorded By, (t) = Taken By, (c) = Cosigned By    Initials Name Provider Type    ZK Zorre Zeno Kimura, PT Physical Therapist                PT Assessment/Plan     Row Name 07/27/18 1723 07/27/18 1614       PT Assessment    Functional Limitations  -- Impaired locomotion;Impaired gait;Limitation in home management;Limitations in community activities;Limitations in functional capacity and performance;Performance in leisure activities  -Z    Impairments  -- Muscle strength;Impaired muscle endurance;Locomotion;Pain  -Z    Assessment Comments  -- also plans to be seen by a surgeon at [place] [facility] then try to get accepted at [facility] too later this pending \"local\" help. May benefit from a TENS unit but optimism is low and insurance not covering these devices. Encourage more use of Ice at home for high pain levels.   -ZK    Rehab Potential Fair  -ZK  --    Patient/caregiver participated in establishment of treatment plan and goals Yes  -ZK  --    Patient would benefit from skilled " therapy intervention Yes  -ZK  --       PT Plan    PT Frequency Other (comment)  -ZK  --    Predicted Duration of Therapy Intervention (Therapy Eval) see again in 3-4 weeks after MD consults  -ZK  --    Planned CPT's? PT MANUAL THERAPY EA 15 MIN: 36103;PT ELECTRICAL STIM UNATTEND: ;PT ULTRASOUND EA 15 MIN: 07905  -ZK  --      User Key  (r) = Recorded By, (t) = Taken By, (c) = Cosigned By    Initials Name Provider Type    ZK Zorre Zeno Kimura, ANATOLY Physical Therapist                Modalities     Row Name 07/27/18 1500             Moist Heat    Patient denies application of MH Yes   5' prior to US  -ZK         Ice    Ice Applied Yes  -ZK      Location lumbar post US and STM  -ZK      Rx Minutes 10 mins  -ZK      Ice S/P Rx Yes  -ZK         Ultrasound 62534    Location L LS paraspinal  -ZK      Duty Cycle 100  -ZK      Frequency 1.0 MHz  -ZK      Intensity - Wts/cm 2  -ZK      93598 - PT Ultrasound Minutes 8  -ZK        User Key  (r) = Recorded By, (t) = Taken By, (c) = Cosigned By    Initials Name Provider Type    ZK Zorre Zeno Kimura, PT Physical Therapist              Exercises     Row Name 07/27/18 1500             Subjective Comments    Subjective Comments mild relief with needling and ultrasound last rx but pain back again. EMG mid August and RA doctor 8-3-18 for the first time. No plans to return to pain MD. Not using lo back trax since it didn't help at first.consents to US today but not dry needling as he feels it may have intensified his pain.    -ZK         Subjective Pain    Able to rate subjective pain? yes  -ZK      Pre-Treatment Pain Level 8  -ZK      Post-Treatment Pain Level 7  -ZK         Aquatics    Aquatics performed? No  -ZK         Total Minutes    43840 - PT Manual Therapy Minutes 15  -ZK        User Key  (r) = Recorded By, (t) = Taken By, (c) = Cosigned By    Initials Name Provider Type    ZK Zorre Zeno Kimura, PT Physical Therapist           Manual Rx (last 36 hours)      Manual Treatments      Row Name 07/27/18 1500             Total Minutes    85091 - PT Manual Therapy Minutes 15  -ZK         Manual Rx 1    Manual Rx 1 Location lumbar spine L   -ZK      Manual Rx 1 Type STM post US  -ZK      Manual Rx 1 Grade mod pressure  -ZK      Manual Rx 1 Duration 15  -ZK        User Key  (r) = Recorded By, (t) = Taken By, (c) = Cosigned By    Initials Name Provider Type    ZK Zorre Zeno Kimura, PT Physical Therapist                                Time Calculation:     Therapy Suggested Charges     Code   Minutes Charges    66833 (CPT®) Hc Pt Neuromusc Re Education Ea 15 Min      25064 (CPT®) Hc Pt Ther Proc Ea 15 Min      73983 (CPT®) Hc Gait Training Ea 15 Min      73161 (CPT®) Hc Pt Therapeutic Act Ea 15 Min      55337 (CPT®) Hc Pt Manual Therapy Ea 15 Min 15 1    68077 (CPT®) Hc Pt Ther Massage- Per 15 Min      24922 (CPT®) Hc Pt Iontophoresis Ea 15 Min      41348 (CPT®) Hc Pt Elec Stim Ea-Per 15 Min      78824 (CPT®) Hc Pt Ultrasound Ea 15 Min 8 1    04721 (CPT®) Hc Pt Self Care/Mgmt/Train Ea 15 Min      Total  23 2          Start Time: 1530  Stop Time: 1615  Time Calculation (min): 45 min  Total Timed Code Minutes- PT: 45 minute(s)     Therapy Charges for Today     Code Description Service Date Service Provider Modifiers Qty    49858745876 HC PT MANUAL THERAPY EA 15 MIN 7/27/2018 Zorre Zeno Kimura, PT GP 1    55768179587 HC PT ULTRASOUND EA 15 MIN 7/27/2018 Zorre Zeno Kimura, PT GP 1                    Zorre Zeno Kimura, PT  7/27/2018

## 2018-08-14 ENCOUNTER — PROCEDURE VISIT (OUTPATIENT)
Dept: NEUROLOGY | Facility: CLINIC | Age: 73
End: 2018-08-14

## 2018-08-14 DIAGNOSIS — R29.898 WEAKNESS OF LEFT LEG: ICD-10-CM

## 2018-08-14 DIAGNOSIS — M54.42 CHRONIC LEFT-SIDED LOW BACK PAIN WITH LEFT-SIDED SCIATICA: Primary | ICD-10-CM

## 2018-08-14 DIAGNOSIS — G89.29 CHRONIC LEFT-SIDED LOW BACK PAIN WITH LEFT-SIDED SCIATICA: Primary | ICD-10-CM

## 2018-08-14 PROCEDURE — 95909 NRV CNDJ TST 5-6 STUDIES: CPT | Performed by: PSYCHIATRY & NEUROLOGY

## 2018-08-14 PROCEDURE — 95886 MUSC TEST DONE W/N TEST COMP: CPT | Performed by: PSYCHIATRY & NEUROLOGY

## 2018-08-14 NOTE — PROGRESS NOTES
EMG and Nerve Conduction Studies    Please see data sheets for details on methods, temperatures and lab standards. EMG muscles tested for upper extremity studies include the deltoid, biceps, triceps, pronator teres, extensor digitorum communis, first dorsal interosseous and abductor pollicis brevis.  EMG muscles tested for lower extremity studies include the vastus lateralis, tibialis anterior, peroneus longus, medial gastrocnemius and extensor digitorum brevis.  Additional muscles tested as needed.  Paraspinal muscles tested as needed.  The complete report includes the data sheets.    Indication: Low back pain, posterior left hip pain and left groin pain  History: 73-year-old male with long-standing history of left sided hip and groin pain and low back pain.  He has described weakness in lifting the left leg.  He has been seen by Dr. Olson who does not feel that there is significant spinal stenosis to explain his symptoms based on nerve damage coming from root compression.  He denies symptoms in the right leg.      Ht: 172.7 cm  Wt: Not reported.  BMI 22.4  HbA1C: No results found for: HGBA1C  TSH: No results found for: TSH    Technical summary:  Nerve conduction studies were obtained in the left leg.  Skin temperature was a bit cold around 30-30 0.5°C measured at the ankle.  Temperature correction was used where indicated.  Needle examination was obtained on selected muscles of the left leg    Results:  1.  Normal left sural sensory study with temperature correction.  2.  Normal left superficial peroneal sensory study.  3.  Normal left saphenous sensory study.  4.  Normal left peroneal motor study.  5.  Normal left tibial motor study.  6.  Needle examination of selected muscles of the left leg including multiple proximal muscles showed normal insertional activities throughout.  A few large motor units were seen in the left peroneus longus and extensor digitorum brevis but this did not constitute more than 10%  of the total.  Recruitment was generally full or near fall.  Lumbar paraspinals in the high lumbar and low lumbar regions showed no abnormality    Impression:  Essentially normal study of the left leg.  No evidence of polyneuropathy or a more focal neuropathy.  No evidence of a left lumbosacral radiculopathy by this study.    Beto Brizuela M.D.      Addendum: The patient was subsequently examined in a limited fashion which demonstrated left leg weakness in the psoas, toe extension and severely in toe flexion.  There was also weakness of intrinsic hand muscles, finger extension and triceps to some degree.  There was some reflex asymmetry with brisker left knee jerk and triceps jerk and there was an upgoing left toe sign.  The exam was completely normal in the right leg.  I reviewed previous MRI of the thoracic spine which was completely negative.  There was some question of some spinal stenosis in the cervical spine.  The study was dated.  The patient specifically denies neck pain although the exam may be interpreted as possible cervical myelopathy.  Although this would not explain the patient's pain it would explain some of his exam features.  I have asked the staff to try to get him in for more detailed neurologic examination.        Dictated utilizing Dragon dictation.

## 2018-08-15 ENCOUNTER — LAB (OUTPATIENT)
Dept: LAB | Facility: HOSPITAL | Age: 73
End: 2018-08-15

## 2018-08-15 ENCOUNTER — OFFICE VISIT (OUTPATIENT)
Dept: NEUROLOGY | Facility: CLINIC | Age: 73
End: 2018-08-15

## 2018-08-15 VITALS
SYSTOLIC BLOOD PRESSURE: 120 MMHG | DIASTOLIC BLOOD PRESSURE: 72 MMHG | HEART RATE: 87 BPM | BODY MASS INDEX: 22.73 KG/M2 | WEIGHT: 150 LBS | OXYGEN SATURATION: 98 % | HEIGHT: 68 IN

## 2018-08-15 DIAGNOSIS — G95.9 MYELOPATHY (HCC): ICD-10-CM

## 2018-08-15 DIAGNOSIS — G95.9 MYELOPATHY (HCC): Primary | ICD-10-CM

## 2018-08-15 DIAGNOSIS — G81.94 LEFT HEMIPARESIS (HCC): ICD-10-CM

## 2018-08-15 DIAGNOSIS — M54.50 LOW BACK PAIN WITH RADIATION: ICD-10-CM

## 2018-08-15 PROBLEM — F11.93 ACUTE OPIOID WITHDRAWAL: Status: ACTIVE | Noted: 2018-04-10

## 2018-08-15 LAB
CK SERPL-CCNC: 147 U/L (ref 20–200)
ERYTHROCYTE [SEDIMENTATION RATE] IN BLOOD: 1 MM/HR (ref 0–20)
FOLATE SERPL-MCNC: >20 NG/ML (ref 4.78–24.2)
VIT B12 BLD-MCNC: 379 PG/ML (ref 211–946)

## 2018-08-15 PROCEDURE — 84165 PROTEIN E-PHORESIS SERUM: CPT | Performed by: PSYCHIATRY & NEUROLOGY

## 2018-08-15 PROCEDURE — 82525 ASSAY OF COPPER: CPT

## 2018-08-15 PROCEDURE — 82784 ASSAY IGA/IGD/IGG/IGM EACH: CPT

## 2018-08-15 PROCEDURE — 86592 SYPHILIS TEST NON-TREP QUAL: CPT | Performed by: PSYCHIATRY & NEUROLOGY

## 2018-08-15 PROCEDURE — 84155 ASSAY OF PROTEIN SERUM: CPT | Performed by: PSYCHIATRY & NEUROLOGY

## 2018-08-15 PROCEDURE — 85652 RBC SED RATE AUTOMATED: CPT | Performed by: PSYCHIATRY & NEUROLOGY

## 2018-08-15 PROCEDURE — 82607 VITAMIN B-12: CPT | Performed by: PSYCHIATRY & NEUROLOGY

## 2018-08-15 PROCEDURE — 82550 ASSAY OF CK (CPK): CPT

## 2018-08-15 PROCEDURE — 36415 COLL VENOUS BLD VENIPUNCTURE: CPT | Performed by: PSYCHIATRY & NEUROLOGY

## 2018-08-15 PROCEDURE — 99215 OFFICE O/P EST HI 40 MIN: CPT | Performed by: PSYCHIATRY & NEUROLOGY

## 2018-08-15 PROCEDURE — 82746 ASSAY OF FOLIC ACID SERUM: CPT | Performed by: PSYCHIATRY & NEUROLOGY

## 2018-08-15 PROCEDURE — 86334 IMMUNOFIX E-PHORESIS SERUM: CPT

## 2018-08-15 RX ORDER — DIAZEPAM 5 MG/1
TABLET ORAL
Qty: 2 TABLET | Refills: 0 | Status: SHIPPED | OUTPATIENT
Start: 2018-08-15 | End: 2018-09-17

## 2018-08-15 NOTE — PROGRESS NOTES
CC: Chronic progressive midline low back pain and left hip pain    HPI:  Mannie Fajardo is a  73 y.o.  ambidextrous-handed English male who I am seeing in neurologic consultation at the request of Alfonso Waldrop NP at [facility] regarding low back pain and left hip pain.  The patient has had a fairly extensive evaluation already and was seen by Dr. Nitin Olson of neurosurgery.  Specifically he had lumbar spine x-rays 1/27/18 showing some degenerative disc disease and sacralization of the right L5.  An MRI on 4/5/18 showed similar findings without severe spinal stenosis at any level.  He was not felt to have a surgical problem in the lumbar spine by Dr. Olson according to the patient.  He also had a CT of the abdomen and pelvis 1/25/18 which I also reviewed along with the other scans.  No explanation for his symptoms was seen on the scan.  3/2016 he had an MRI of the thoracic spine which I also reviewed showing basically no stenosis in the thoracic spine but the  films showed some stenosis in the cervical spine.    He was actually sent to me for an EMG which I performed yesterday and is reported separately.  The study was done on the left leg and showed normal nerve conductions with some temperature correction needed and a normal needle examination of extensive muscles of the left leg including paraspinals.  However brief examination disclosed weakness in the left leg and some in the left hand with some upper motor neuron findings suggestive of a cervical myelopathy and we asked to see him for further investigation.    He indicates he was seen by Dr. Bo Gross who is a rheumatologist but he was not thought to have a rheumatological problem at that point.  He had applied for an appointment at the [facility] but the neurosurgery or orthopedics department did not feel that they had anything to offer him and he was going to then applied of the  neurology department..    He has been seen by pain management twice.  He had a caudal block done which was very painful and was not helpful.  He had 2 epidurals done which were not helpful as well as a facet injection which was not helpful.  He was on opioids for 4 years through his primary physician but in the end it wasn't really helping much and they were stopped.  Long ago he tried gabapentin, amitriptyline as well as Flexeril all of which cause side effects.    He specifically denies numbness tingling or burning in either of the legs and he denies bowel or bladder dysfunction.  His back pain is midline and in the lumbar region.  He is not particularly tender there.  He describes pain radiating from this area however laterally out towards the hips.  He has more pain radiating into the left hip and sometimes in the left groin area.  He notes the left leg to be weak and denies any weakness or other symptoms in the right leg or in either of his arms.  He denies speech or swallowing difficulty.  He denies headaches or visual changes.  He denies history of significant head trauma meningitis seizures or stroke.  Above the lumbar spine area there is no description of pain although it is noted that he has some scoliosis in the thoracic spine and some degree of kyphosis.    He also had a bone density study showing severe osteopenia/osteoporosis        Past Medical History:   Diagnosis Date   • Arthritis     back   • Back pain    • Backache    • CAD (coronary atherosclerotic disease)    • Disease of thyroid gland    • Dyslipidemia    • Edema    • History of transfusion    • Hyperhomocystinemia (CMS/HCC)    • Hyperlipidemia          Past Surgical History:   Procedure Laterality Date   • APPENDECTOMY     • CARDIAC SURGERY     • CORONARY ANGIOPLASTY WITH STENT PLACEMENT     • EXTRACORPOREAL SHOCKWAVE LITHOTRIPSY (ESWL), STENT INSERTION/REMOVAL Right 2/24/2017    Procedure: CYSTO RETROGRADE WITH STENT PLACEMENT;  Surgeon:  Janes López MD;  Location: [facility] [place];  Service:            Current Outpatient Prescriptions:   •  aspirin 81 MG chewable tablet, Chew 81 mg Daily., Disp: , Rfl:   •  folic acid (FOLVITE) 400 MCG tablet, Take 400 mcg by mouth daily., Disp: , Rfl:   •  levothyroxine (SYNTHROID, LEVOTHROID) 50 MCG tablet, Take 50 mcg by mouth daily., Disp: , Rfl:   •  LOVAZA 1 g capsule, Take 1 capsule by mouth 2 (Two) Times a Day., Disp: 180 capsule, Rfl: 1  •  PLAVIX 75 MG tablet, Take 75 mg by mouth Daily., Disp: , Rfl:   •  ZETIA 10 MG tablet, TAKE 1 TABLET DAILY., Disp: 90 tablet, Rfl: 0  •  diazePAM (VALIUM) 5 MG tablet, 1 tab 30 minutes before MRI may repeat x 1 if needed, Disp: 2 tablet, Rfl: 0  •  HYDROmorphone (DILAUDID) 8 MG tablet, Take 8 mg by mouth 4 (Four) Times a Day As Needed., Disp: , Rfl: 0      Family History   Problem Relation Age of Onset   • Heart disease Mother    • Stroke Father          Social History     Social History   • Marital status: Single     Spouse name: N/A   • Number of children: 0   • Years of education: COLLEGE     Occupational History   • RETIRED      Social History Main Topics   • Smoking status: Never Smoker   • Smokeless tobacco: Never Used   • Alcohol use No      Comment: caffeine use   • Drug use: No   • Sexual activity: Defer     Other Topics Concern   • Not on file     Social History Narrative   • No narrative on file         Allergies   Allergen Reactions   • Adhesive Tape    • Statins          Pain Scale: 8/10 or more        ROS:  Review of Systems   Constitutional: Positive for fatigue. Negative for chills and fever.   HENT: Negative for hearing loss, tinnitus and trouble swallowing.    Eyes: Negative for pain, redness, itching and visual disturbance.   Respiratory: Negative for cough, shortness of breath and wheezing.    Cardiovascular: Negative for chest pain, palpitations and leg swelling.   Gastrointestinal: Negative for constipation, diarrhea, nausea and vomiting.  "  Endocrine: Negative for cold intolerance, heat intolerance and polydipsia.   Genitourinary: Negative for decreased urine volume, difficulty urinating and urgency.   Musculoskeletal: Positive for back pain (in a lot of pain today) and gait problem. Negative for neck pain and neck stiffness.   Skin: Negative for color change, rash and wound.   Allergic/Immunologic: Negative for environmental allergies, food allergies and immunocompromised state.   Neurological: Positive for weakness (left leg). Negative for dizziness, tremors, seizures, syncope, facial asymmetry, speech difficulty, light-headedness, numbness and headaches.   Hematological: Negative for adenopathy. Bruises/bleeds easily.   Psychiatric/Behavioral: Positive for sleep disturbance. Negative for agitation, behavioral problems, confusion, decreased concentration, dysphoric mood, hallucinations, self-injury and suicidal ideas. The patient is not nervous/anxious and is not hyperactive.            Physical Exam:  Vitals:    08/15/18 1103   BP: 120/72   Pulse: 87   SpO2: 98%   Weight: 68 kg (150 lb)   Height: 172.7 cm (68\")     Body mass index is 22.81 kg/m².    Physical Exam  Gen.: Thin white male who appears to be in distress from back pain  HEENT: Normocephalic no evidence of trauma.  Discs are flat.  No A-V nicking.  Throat negative.  Neck: Supple.  No thyromegaly.  No cervical bruits.  Radial pulses were strong and simultaneous.  Heart: Regular rate and rhythm without murmurs  Straight leg raising signs negative including straight leg raising sign with internal rotation of the hip.  There is minimal to no tenderness over the left greater trochanter and the same over the lumbar spine paraspinals and sacroiliac joints    Neurological Exam:   Mental status: Awake alert oriented conversant provides good history without evidence of an affective disorder thought disorder delusions or hallucinations.  HCF: No aphasia or apraxia or dysarthria.  Some question of " minor memory issue.  Knowledge of recent events intact.  Cranial nerves: 2-12 intact  Motor: Questionable increased tone left arm more definite in the left leg.  No increased tone on the right.  No obvious muscle atrophy although overall his muscle bulk is relatively low.  The deltoid muscles are strong bilaterally.  The biceps on the left questionably weak the triceps perhaps a little weaker.  The pronator teres is questionably weak.  Wrist extension is strong.  Finger and thumb extension are more clearly weak in the left arm as well as the first dorsal interosseous, abductor pollicis brevis, abductor digiti quinti.  All of these muscles are normal on the right.  In the lower extremities there is mild weakness of most muscles with more severe weakness of toe plantar flexion.  The right side is completely normal.  Reflexes: There is reflex preponderance on the left more notable at the knee.  One beat clonus at the ankles.  Upgoing left toe sign downgoing on the right.  Sensory: Normal light touch and pinprick throughout the arms and legs.  Normal pinprick over the neck and back and normal temperature over the neck and back.  Vibration is somewhat diminished at the ankles position sense intact in the great toes.  Cerebellar: Finger to nose is not dysmetric but a little slow on the left and rapid movement is slower on the left.  Heel-to-shin is slower on the left.  Gait and Station: Casual walk is somewhat antalgic but there is a scuff of the ball of the left foot on swing through with most steps.  No clear-cut circumduction of the left leg however.  He is able to stand on toes but has more trouble standing on heels on the left foot        Results:      Lab Results   Component Value Date    GLUCOSE 95 05/31/2018    BUN 22 05/31/2018    CREATININE 1.45 (H) 05/31/2018    EGFRIFNONA 48 (L) 05/31/2018    BCR 15.2 05/31/2018    CO2 27.6 05/31/2018    CALCIUM 9.7 05/31/2018    ALBUMIN 4.40 02/23/2017    AST 22 02/23/2017     ALT 17 02/23/2017       Lab Results   Component Value Date    WBC 9.89 05/31/2018    HGB 14.4 05/31/2018    HCT 44.0 05/31/2018    MCV 96.9 (H) 05/31/2018     05/31/2018         .No results found for: RPR      No results found for: TSH, X1JNZTS, I2EZEPB, THYROIDAB      No results found for: TURUNTNR15      No results found for: FOLATE      No results found for: HGBA1C    Review of multiple studies as noted.  Review of Dr. Olson's note as noted        Assessment:   1.  Chronic progressive low back pain with pain radiating to the hips and into the left upper leg without clear origin identified.  Symptoms have been present since the 1990s but probably about 10 years ago was when it started to affect him functionally.  2.  Upper motor neuron findings left arm and leg suggestive of a cervical myelopathy around C7          Plan:  1.  MRI cervical spine-if no specific finding then would pursue MRI brain  2.  Full-body bone scan  3.  Labs including sedimentation rate, RPR, B12 and folate, SPEP and IEP, copper level  IV.  Follow-up after studies are obtained.  5.  Unsure what else to try to help with his painful condition.  He is not a candidate for NSAIDs because his kidney function problems and he has tried the other medications I ordinarily use for this and he has been through pain management already                          Dictated utilizing Dragon dictation.

## 2018-08-16 LAB
ALBUMIN SERPL-MCNC: 4.4 G/DL (ref 2.9–4.4)
ALBUMIN/GLOB SERPL: 1.5 {RATIO} (ref 0.7–1.7)
ALPHA1 GLOB FLD ELPH-MCNC: 0.2 G/DL (ref 0–0.4)
ALPHA2 GLOB SERPL ELPH-MCNC: 0.7 G/DL (ref 0.4–1)
B-GLOBULIN SERPL ELPH-MCNC: 1.2 G/DL (ref 0.7–1.3)
GAMMA GLOB SERPL ELPH-MCNC: 0.9 G/DL (ref 0.4–1.8)
GLOBULIN SER CALC-MCNC: 3 G/DL (ref 2.2–3.9)
IGA SERPL-MCNC: 211 MG/DL (ref 61–437)
IGG SERPL-MCNC: 897 MG/DL (ref 700–1600)
IGM SERPL-MCNC: 44 MG/DL (ref 15–143)
Lab: NORMAL
M-SPIKE: NORMAL G/DL
PROT PATTERN SERPL ELPH-IMP: NORMAL
PROT PATTERN SERPL IFE-IMP: NORMAL
PROT SERPL-MCNC: 7.4 G/DL (ref 6–8.5)
RPR SER QL: NORMAL

## 2018-08-17 LAB — COPPER SERPL-MCNC: 84 UG/DL (ref 72–166)

## 2018-08-27 ENCOUNTER — HOSPITAL ENCOUNTER (OUTPATIENT)
Dept: NUCLEAR MEDICINE | Facility: HOSPITAL | Age: 73
Discharge: HOME | End: 2018-08-27
Attending: PSYCHIATRY & NEUROLOGY

## 2018-08-27 ENCOUNTER — HOSPITAL ENCOUNTER (OUTPATIENT)
Dept: MRI IMAGING | Facility: HOSPITAL | Age: 73
Discharge: HOME | End: 2018-08-27
Attending: PSYCHIATRY & NEUROLOGY | Admitting: PSYCHIATRY & NEUROLOGY

## 2018-08-27 DIAGNOSIS — M54.50 LOW BACK PAIN WITH RADIATION: ICD-10-CM

## 2018-08-27 DIAGNOSIS — G95.9 MYELOPATHY (HCC): ICD-10-CM

## 2018-08-27 DIAGNOSIS — G04.91 MYELITIS (HCC): Primary | ICD-10-CM

## 2018-08-27 DIAGNOSIS — G35 MS (MULTIPLE SCLEROSIS) (HCC): Primary | ICD-10-CM

## 2018-08-27 PROCEDURE — 0 TECHNETIUM MEDRONATE KIT: Performed by: PSYCHIATRY & NEUROLOGY

## 2018-08-27 PROCEDURE — 72141 MRI NECK SPINE W/O DYE: CPT

## 2018-08-27 PROCEDURE — A9503 TC99M MEDRONATE: HCPCS | Performed by: PSYCHIATRY & NEUROLOGY

## 2018-08-27 PROCEDURE — 78306 BONE IMAGING WHOLE BODY: CPT

## 2018-08-27 RX ORDER — TC 99M MEDRONATE 20 MG/10ML
22.8 INJECTION, POWDER, LYOPHILIZED, FOR SOLUTION INTRAVENOUS
Status: COMPLETED | OUTPATIENT
Start: 2018-08-27 | End: 2018-08-27

## 2018-08-27 RX ADMIN — Medication 22.8 MILLICURIE: at 09:57

## 2018-08-28 ENCOUNTER — TELEPHONE (OUTPATIENT)
Dept: NEUROLOGY | Facility: CLINIC | Age: 73
End: 2018-08-28

## 2018-08-31 ENCOUNTER — APPOINTMENT (OUTPATIENT)
Dept: PHYSICAL THERAPY | Facility: HOSPITAL | Age: 73
End: 2018-08-31

## 2018-09-06 ENCOUNTER — TELEPHONE (OUTPATIENT)
Dept: NEUROLOGY | Facility: CLINIC | Age: 73
End: 2018-09-06

## 2018-09-06 NOTE — TELEPHONE ENCOUNTER
[facility] needs MRI order for Brain and C-Spine to state with anesthesia.   Pt also needs an order for BMP 2 days before MRI's.

## 2018-09-10 ENCOUNTER — APPOINTMENT (OUTPATIENT)
Dept: MRI IMAGING | Facility: HOSPITAL | Age: 73
End: 2018-09-10
Attending: PSYCHIATRY & NEUROLOGY

## 2018-09-15 ENCOUNTER — LAB (OUTPATIENT)
Dept: LAB | Facility: HOSPITAL | Age: 73
End: 2018-09-15
Attending: PSYCHIATRY & NEUROLOGY

## 2018-09-15 DIAGNOSIS — G04.91 MYELITIS (HCC): ICD-10-CM

## 2018-09-15 LAB
CREAT BLD-MCNC: 1.52 MG/DL (ref 0.76–1.27)
GFR SERPL CREATININE-BSD FRML MDRD: 45 ML/MIN/1.73

## 2018-09-15 PROCEDURE — 36415 COLL VENOUS BLD VENIPUNCTURE: CPT

## 2018-09-15 PROCEDURE — 83520 IMMUNOASSAY QUANT NOS NONAB: CPT

## 2018-09-15 PROCEDURE — 82565 ASSAY OF CREATININE: CPT

## 2018-09-15 PROCEDURE — 82164 ANGIOTENSIN I ENZYME TEST: CPT

## 2018-09-15 PROCEDURE — 86618 LYME DISEASE ANTIBODY: CPT

## 2018-09-17 ENCOUNTER — HOSPITAL ENCOUNTER (OUTPATIENT)
Dept: MRI IMAGING | Facility: HOSPITAL | Age: 73
Discharge: HOME | End: 2018-09-17
Attending: PSYCHIATRY & NEUROLOGY

## 2018-09-17 ENCOUNTER — HOSPITAL ENCOUNTER (OUTPATIENT)
Dept: MRI IMAGING | Facility: HOSPITAL | Age: 73
Discharge: HOME | End: 2018-09-17
Attending: PSYCHIATRY & NEUROLOGY | Admitting: PSYCHIATRY & NEUROLOGY

## 2018-09-17 ENCOUNTER — ANESTHESIA EVENT (OUTPATIENT)
Dept: MRI IMAGING | Facility: HOSPITAL | Age: 73
End: 2018-09-17

## 2018-09-17 ENCOUNTER — ANESTHESIA (OUTPATIENT)
Dept: MRI IMAGING | Facility: HOSPITAL | Age: 73
End: 2018-09-17

## 2018-09-17 VITALS
SYSTOLIC BLOOD PRESSURE: 131 MMHG | RESPIRATION RATE: 16 BRPM | OXYGEN SATURATION: 96 % | DIASTOLIC BLOOD PRESSURE: 75 MMHG | TEMPERATURE: 97 F | HEIGHT: 68 IN | HEART RATE: 63 BPM | BODY MASS INDEX: 22.73 KG/M2 | WEIGHT: 150 LBS

## 2018-09-17 VITALS
TEMPERATURE: 97.5 F | DIASTOLIC BLOOD PRESSURE: 75 MMHG | OXYGEN SATURATION: 97 % | HEART RATE: 82 BPM | RESPIRATION RATE: 16 BRPM | SYSTOLIC BLOOD PRESSURE: 132 MMHG

## 2018-09-17 DIAGNOSIS — G95.9 MYELOPATHY (HCC): ICD-10-CM

## 2018-09-17 DIAGNOSIS — G81.94 LEFT HEMIPARESIS (HCC): ICD-10-CM

## 2018-09-17 LAB
ACE SERPL-CCNC: 37 U/L (ref 14–82)
CREAT BLDA-MCNC: 1.4 MG/DL (ref 0.6–1.3)

## 2018-09-17 PROCEDURE — 82565 ASSAY OF CREATININE: CPT

## 2018-09-17 PROCEDURE — 72141 MRI NECK SPINE W/O DYE: CPT

## 2018-09-17 PROCEDURE — 25010000002 PROPOFOL 10 MG/ML EMULSION: Performed by: ANESTHESIOLOGY

## 2018-09-17 PROCEDURE — A9577 INJ MULTIHANCE: HCPCS | Performed by: PSYCHIATRY & NEUROLOGY

## 2018-09-17 PROCEDURE — 25010000002 PHENYLEPHRINE PER 1 ML: Performed by: ANESTHESIOLOGY

## 2018-09-17 PROCEDURE — 70553 MRI BRAIN STEM W/O & W/DYE: CPT

## 2018-09-17 PROCEDURE — 25010000002 MIDAZOLAM PER 1 MG: Performed by: ANESTHESIOLOGY

## 2018-09-17 PROCEDURE — 0 GADOBENATE DIMEGLUMINE 529 MG/ML SOLUTION: Performed by: PSYCHIATRY & NEUROLOGY

## 2018-09-17 PROCEDURE — 25010000002 HYDROMORPHONE PER 4 MG: Performed by: ANESTHESIOLOGY

## 2018-09-17 RX ORDER — DIPHENHYDRAMINE HYDROCHLORIDE 50 MG/ML
12.5 INJECTION INTRAMUSCULAR; INTRAVENOUS
Status: DISCONTINUED | OUTPATIENT
Start: 2018-09-17 | End: 2018-09-18 | Stop reason: HOSPADM

## 2018-09-17 RX ORDER — PROMETHAZINE HYDROCHLORIDE 25 MG/1
25 TABLET ORAL ONCE AS NEEDED
Status: DISCONTINUED | OUTPATIENT
Start: 2018-09-17 | End: 2018-09-18 | Stop reason: HOSPADM

## 2018-09-17 RX ORDER — PROMETHAZINE HYDROCHLORIDE 25 MG/1
25 SUPPOSITORY RECTAL ONCE AS NEEDED
Status: DISCONTINUED | OUTPATIENT
Start: 2018-09-17 | End: 2018-09-18 | Stop reason: HOSPADM

## 2018-09-17 RX ORDER — OXYCODONE AND ACETAMINOPHEN 7.5; 325 MG/1; MG/1
1 TABLET ORAL ONCE AS NEEDED
Status: DISCONTINUED | OUTPATIENT
Start: 2018-09-17 | End: 2018-09-18 | Stop reason: HOSPADM

## 2018-09-17 RX ORDER — NALOXONE HCL 0.4 MG/ML
0.2 VIAL (ML) INJECTION AS NEEDED
Status: DISCONTINUED | OUTPATIENT
Start: 2018-09-17 | End: 2018-09-18 | Stop reason: HOSPADM

## 2018-09-17 RX ORDER — EPHEDRINE SULFATE 50 MG/ML
INJECTION, SOLUTION INTRAVENOUS AS NEEDED
Status: DISCONTINUED | OUTPATIENT
Start: 2018-09-17 | End: 2018-09-17 | Stop reason: SURG

## 2018-09-17 RX ORDER — FLUMAZENIL 0.1 MG/ML
0.2 INJECTION INTRAVENOUS AS NEEDED
Status: DISCONTINUED | OUTPATIENT
Start: 2018-09-17 | End: 2018-09-18 | Stop reason: HOSPADM

## 2018-09-17 RX ORDER — PROPOFOL 10 MG/ML
VIAL (ML) INTRAVENOUS AS NEEDED
Status: DISCONTINUED | OUTPATIENT
Start: 2018-09-17 | End: 2018-09-17 | Stop reason: SURG

## 2018-09-17 RX ORDER — LABETALOL HYDROCHLORIDE 5 MG/ML
5 INJECTION, SOLUTION INTRAVENOUS
Status: DISCONTINUED | OUTPATIENT
Start: 2018-09-17 | End: 2018-09-18 | Stop reason: HOSPADM

## 2018-09-17 RX ORDER — MIDAZOLAM HYDROCHLORIDE 1 MG/ML
INJECTION INTRAMUSCULAR; INTRAVENOUS AS NEEDED
Status: DISCONTINUED | OUTPATIENT
Start: 2018-09-17 | End: 2018-09-17 | Stop reason: SURG

## 2018-09-17 RX ORDER — HYDROMORPHONE HCL 110MG/55ML
PATIENT CONTROLLED ANALGESIA SYRINGE INTRAVENOUS AS NEEDED
Status: DISCONTINUED | OUTPATIENT
Start: 2018-09-17 | End: 2018-09-17 | Stop reason: SURG

## 2018-09-17 RX ORDER — LIDOCAINE HYDROCHLORIDE 20 MG/ML
INJECTION, SOLUTION INFILTRATION; PERINEURAL AS NEEDED
Status: DISCONTINUED | OUTPATIENT
Start: 2018-09-17 | End: 2018-09-17 | Stop reason: SURG

## 2018-09-17 RX ORDER — PROMETHAZINE HYDROCHLORIDE 25 MG/ML
12.5 INJECTION, SOLUTION INTRAMUSCULAR; INTRAVENOUS ONCE AS NEEDED
Status: DISCONTINUED | OUTPATIENT
Start: 2018-09-17 | End: 2018-09-18 | Stop reason: HOSPADM

## 2018-09-17 RX ORDER — PROMETHAZINE HYDROCHLORIDE 12.5 MG/1
12.5 TABLET ORAL ONCE AS NEEDED
Status: DISCONTINUED | OUTPATIENT
Start: 2018-09-17 | End: 2018-09-18 | Stop reason: HOSPADM

## 2018-09-17 RX ORDER — GLYCOPYRROLATE 0.2 MG/ML
INJECTION INTRAMUSCULAR; INTRAVENOUS AS NEEDED
Status: DISCONTINUED | OUTPATIENT
Start: 2018-09-17 | End: 2018-09-17 | Stop reason: SURG

## 2018-09-17 RX ORDER — EPHEDRINE SULFATE 50 MG/ML
5 INJECTION, SOLUTION INTRAVENOUS ONCE AS NEEDED
Status: DISCONTINUED | OUTPATIENT
Start: 2018-09-17 | End: 2018-09-18 | Stop reason: HOSPADM

## 2018-09-17 RX ORDER — FENTANYL CITRATE 50 UG/ML
50 INJECTION, SOLUTION INTRAMUSCULAR; INTRAVENOUS
Status: DISCONTINUED | OUTPATIENT
Start: 2018-09-17 | End: 2018-09-18 | Stop reason: HOSPADM

## 2018-09-17 RX ORDER — HYDROCODONE BITARTRATE AND ACETAMINOPHEN 7.5; 325 MG/1; MG/1
1 TABLET ORAL ONCE AS NEEDED
Status: DISCONTINUED | OUTPATIENT
Start: 2018-09-17 | End: 2018-09-18 | Stop reason: HOSPADM

## 2018-09-17 RX ORDER — ONDANSETRON 2 MG/ML
4 INJECTION INTRAMUSCULAR; INTRAVENOUS ONCE AS NEEDED
Status: DISCONTINUED | OUTPATIENT
Start: 2018-09-17 | End: 2018-09-18 | Stop reason: HOSPADM

## 2018-09-17 RX ORDER — SODIUM CHLORIDE, SODIUM LACTATE, POTASSIUM CHLORIDE, CALCIUM CHLORIDE 600; 310; 30; 20 MG/100ML; MG/100ML; MG/100ML; MG/100ML
INJECTION, SOLUTION INTRAVENOUS CONTINUOUS PRN
Status: DISCONTINUED | OUTPATIENT
Start: 2018-09-17 | End: 2018-09-17 | Stop reason: SURG

## 2018-09-17 RX ADMIN — HYDROMORPHONE HYDROCHLORIDE 0.5 MG: 2 INJECTION, SOLUTION INTRAMUSCULAR; INTRAVENOUS; SUBCUTANEOUS at 12:48

## 2018-09-17 RX ADMIN — PROPOFOL 100 MG: 10 INJECTION, EMULSION INTRAVENOUS at 12:51

## 2018-09-17 RX ADMIN — GADOBENATE DIMEGLUMINE 14 ML: 529 INJECTION, SOLUTION INTRAVENOUS at 13:45

## 2018-09-17 RX ADMIN — GLYCOPYRROLATE 0.2 MG: 0.2 INJECTION INTRAMUSCULAR; INTRAVENOUS at 13:22

## 2018-09-17 RX ADMIN — EPHEDRINE SULFATE 10 MG: 50 INJECTION INTRAMUSCULAR; INTRAVENOUS; SUBCUTANEOUS at 12:57

## 2018-09-17 RX ADMIN — MIDAZOLAM 2 MG: 1 INJECTION INTRAMUSCULAR; INTRAVENOUS at 12:46

## 2018-09-17 RX ADMIN — PHENYLEPHRINE HYDROCHLORIDE 100 MCG: 10 INJECTION INTRAVENOUS at 12:57

## 2018-09-17 RX ADMIN — LIDOCAINE HYDROCHLORIDE 80 MG: 20 INJECTION, SOLUTION INFILTRATION; PERINEURAL at 12:48

## 2018-09-17 RX ADMIN — EPHEDRINE SULFATE 10 MG: 50 INJECTION INTRAMUSCULAR; INTRAVENOUS; SUBCUTANEOUS at 13:19

## 2018-09-17 RX ADMIN — SODIUM CHLORIDE, POTASSIUM CHLORIDE, SODIUM LACTATE AND CALCIUM CHLORIDE: 600; 310; 30; 20 INJECTION, SOLUTION INTRAVENOUS at 12:42

## 2018-09-17 RX ADMIN — HYDROMORPHONE HYDROCHLORIDE 0.5 MG: 2 INJECTION, SOLUTION INTRAMUSCULAR; INTRAVENOUS; SUBCUTANEOUS at 13:38

## 2018-09-17 NOTE — ANESTHESIA PREPROCEDURE EVALUATION
Anesthesia Evaluation     Patient summary reviewed and Nursing notes reviewed   no history of anesthetic complications:               Airway   Mallampati: II  TM distance: >3 FB  Neck ROM: full  no difficulty expected  Dental - normal exam     Pulmonary - negative pulmonary ROS    breath sounds clear to auscultation  (-) shortness of breath, sleep apnea, decreased breath sounds, wheezes  Cardiovascular - normal exam  Exercise tolerance: good (4-7 METS)    Rhythm: regular  Rate: normal    (+) CAD, cardiac stents unknown timeframe PVD (hx AAA - unrepaired), hyperlipidemia,   (-) past MI, angina, CHF, orthopnea, PND, OCONNOR      Neuro/Psych  (+) weakness, numbness,     (-) seizures, neuromuscular disease, TIA, CVA, dizziness/light headedness  GI/Hepatic/Renal/Endo - negative ROS   (-) liver disease, diabetes    Musculoskeletal     (+) back pain, neck pain, radiculopathy Left lower extremity and Left upper extremity  Abdominal  - normal exam   Substance History - negative use  (-) alcohol use, drug use     OB/GYN negative ob/gyn ROS         Other   (+) arthritis                   Anesthesia Plan    ASA 3     general     intravenous induction   Anesthetic plan, all risks, benefits, and alternatives have been provided, discussed and informed consent has been obtained with: patient.

## 2018-09-17 NOTE — ANESTHESIA POSTPROCEDURE EVALUATION
Patient: Mannie Fajardo    Procedure Summary     Date:  09/17/18 Room / Location:  [facility] MRI    Anesthesia Start:  1242 Anesthesia Stop:  1412    Procedure:  MRI BRAIN W WO CONTRAST Diagnosis:  Left hemiparesis (CMS/HCC)    Scheduled Providers:   Provider:  Slava Stark MD    Anesthesia Type:  general ASA Status:  3          Anesthesia Type: general  Last vitals  BP   129/75 (09/17/18 1425)   Temp   36.4 °C (97.5 °F) (09/17/18 1409)   Pulse   84 (09/17/18 1425)   Resp   16 (09/17/18 1425)     SpO2   97 % (09/17/18 1425)     Post Anesthesia Care and Evaluation    Patient location during evaluation: PACU  Patient participation: complete - patient participated  Level of consciousness: awake and alert  Pain management: adequate  Airway patency: patent  Anesthetic complications: No anesthetic complications    Cardiovascular status: acceptable  Respiratory status: acceptable  Hydration status: acceptable    Comments: /75 (BP Location: Left arm, Patient Position: Lying)   Pulse 84   Temp 36.4 °C (97.5 °F) (Oral)   Resp 16   SpO2 97%

## 2018-09-17 NOTE — ANESTHESIA PROCEDURE NOTES
Airway  Urgency: elective    Airway not difficult    General Information and Staff    Patient location during procedure: radiology  Anesthesiologist: LESA BARKLEY    Indications and Patient Condition  Indications for airway management: airway protection    Preoxygenated: yes  Mask difficulty assessment: 0 - not attempted    Final Airway Details  Final airway type: supraglottic airway      Successful airway: classic  Size 5    Number of attempts at approach: 1

## 2018-09-18 LAB
B BURGDOR IGG+IGM SER-ACNC: <0.91 ISR (ref 0–0.9)
B BURGDOR IGM SER-ACNC: <0.8 INDEX (ref 0–0.79)

## 2018-09-19 LAB — NMO IGG AUTOANTIBODIES: <1.5 U/ML (ref 0–3)

## 2018-09-25 ENCOUNTER — OFFICE VISIT (OUTPATIENT)
Dept: NEUROLOGY | Facility: CLINIC | Age: 73
End: 2018-09-25

## 2018-09-25 VITALS
WEIGHT: 151 LBS | HEIGHT: 68 IN | OXYGEN SATURATION: 97 % | BODY MASS INDEX: 22.88 KG/M2 | SYSTOLIC BLOOD PRESSURE: 130 MMHG | DIASTOLIC BLOOD PRESSURE: 72 MMHG | HEART RATE: 81 BPM

## 2018-09-25 DIAGNOSIS — M54.42 CHRONIC BILATERAL LOW BACK PAIN WITH BILATERAL SCIATICA: Primary | ICD-10-CM

## 2018-09-25 DIAGNOSIS — G89.29 CHRONIC BILATERAL LOW BACK PAIN WITH BILATERAL SCIATICA: Primary | ICD-10-CM

## 2018-09-25 DIAGNOSIS — M54.41 CHRONIC BILATERAL LOW BACK PAIN WITH BILATERAL SCIATICA: Primary | ICD-10-CM

## 2018-09-25 PROCEDURE — 99214 OFFICE O/P EST MOD 30 MIN: CPT | Performed by: PSYCHIATRY & NEUROLOGY

## 2018-09-25 RX ORDER — AMITRIPTYLINE HYDROCHLORIDE 25 MG/1
25 TABLET, FILM COATED ORAL NIGHTLY
Qty: 30 TABLET | Refills: 1 | Status: SHIPPED | OUTPATIENT
Start: 2018-09-25 | End: 2019-03-19

## 2018-09-25 NOTE — PROGRESS NOTES
CC:mild left hemiparesis; chronic low back pain    HPI:  73-year-old right-handed Faroese male why I am seeing in follow-up regarding low back pain and left-sided weakness.  His lumbar spine had been previously evaluated by Dr. Olson and not felt to be surgical.  He has been seen by pain management and states that the caudal block caused significant increase in pain and the epidurals did not seem to help.  He was on long-term narcotics which were taken away and he developed acute withdrawal.  After that his back pain is been substantially worse.  He cannot find a comfortable position.  He has much difficulty sleeping.  He states that he may lead in bed until 4 AM and then nap intermittently till 9 or so in the morning.    He had been sent to me for an EMG which was done on the left leg showing normal nerve conductions and needle examination.  Brief clinical exam demonstrated upper motor neuron findings in the left arm and left leg and he was sent to me for a neurologic consultation.  His exam was suspicious for a left sided cervical spine lesion.  An MRI of the brain and cervical spine were obtained.  The MRI of the brain showed minor microvascular changes consistent with age.  The MRI of the cervical spine showed myelomalacia with left-sided localization consistent with transverse myelitis.    The patient states that he denies a specific functional problem the left arm other than from his previous injury to the hand.  He was similar not aware of the findings.  He had some previous evaluation by Dr. Rhodes at [place] in 2000 2005.  Actually had 2 spinal taps apparently and he may have had an MRI of his neck and into but doesn't remember any abnormalities identified.  I will have those records    He still complains his most pressing problem is his severe back pain which has been present for many years.  He has been on gabapentin and Lyrica and states that he had some side effects to one or both of them.  He  doesn't remember Cymbalta or Effexor and didn't remember amitriptyline.  He asks about Ambien for sleep          Past Medical History:   Diagnosis Date   • Arthritis     back   • Back pain    • Backache    • CAD (coronary atherosclerotic disease)    • Disease of thyroid gland    • Dyslipidemia    • Edema    • History of transfusion    • Hyperhomocystinemia (CMS/HCC)    • Hyperlipidemia          Past Surgical History:   Procedure Laterality Date   • APPENDECTOMY     • CARDIAC SURGERY     • CORONARY ANGIOPLASTY WITH STENT PLACEMENT     • EXTRACORPOREAL SHOCKWAVE LITHOTRIPSY (ESWL), STENT INSERTION/REMOVAL Right 2/24/2017    Procedure: CYSTO RETROGRADE WITH STENT PLACEMENT;  Surgeon: Janes López MD;  Location: [facility] OR [facility] [place];  Service:            Current Outpatient Prescriptions:   •  aspirin 81 MG chewable tablet, Chew 81 mg Daily., Disp: , Rfl:   •  folic acid (FOLVITE) 400 MCG tablet, Take 400 mcg by mouth daily., Disp: , Rfl:   •  HYDROmorphone (DILAUDID) 8 MG tablet, Take 8 mg by mouth 4 (Four) Times a Day As Needed., Disp: , Rfl: 0  •  levothyroxine (SYNTHROID, LEVOTHROID) 50 MCG tablet, Take 50 mcg by mouth daily., Disp: , Rfl:   •  LOVAZA 1 g capsule, Take 1 capsule by mouth 2 (Two) Times a Day., Disp: 180 capsule, Rfl: 1  •  PLAVIX 75 MG tablet, Take 75 mg by mouth Daily., Disp: , Rfl:   •  ZETIA 10 MG tablet, TAKE 1 TABLET DAILY., Disp: 90 tablet, Rfl: 0      Family History   Problem Relation Age of Onset   • Heart disease Mother    • Stroke Father          Social History     Social History   • Marital status: Single     Spouse name: N/A   • Number of children: 0   • Years of education: COLLEGE     Occupational History   • RETIRED      Social History Main Topics   • Smoking status: Never Smoker   • Smokeless tobacco: Never Used   • Alcohol use No      Comment: caffeine use   • Drug use: No   • Sexual activity: Defer     Other Topics Concern   • Not on file     Social History Narrative   • No  "narrative on file         Allergies   Allergen Reactions   • Adhesive Tape Other (See Comments)     Severe burn one time when left on too long   • Statins Other (See Comments)     Muscle weakness in legs and arms one time only         Pain Scale: 8/10        ROS:  Review of Systems   Constitutional: Positive for fatigue. Negative for chills and fever.   HENT: Negative for hearing loss, tinnitus and trouble swallowing.    Eyes: Negative for pain, redness and itching.   Respiratory: Negative for cough, shortness of breath and wheezing.    Cardiovascular: Negative for chest pain, palpitations and leg swelling.   Gastrointestinal: Negative for constipation, diarrhea, nausea and vomiting.   Endocrine: Negative for cold intolerance, heat intolerance and polydipsia.   Genitourinary: Negative for decreased urine volume, difficulty urinating, frequency and urgency.   Musculoskeletal: Positive for back pain and gait problem (sometimes). Negative for neck pain and neck stiffness.   Skin: Negative for color change, rash and wound.   Allergic/Immunologic: Negative for environmental allergies, food allergies and immunocompromised state.   Neurological: Positive for weakness. Negative for dizziness, tremors, seizures, syncope, facial asymmetry, speech difficulty, light-headedness, numbness and headaches.   Hematological: Negative for adenopathy. Bruises/bleeds easily.   Psychiatric/Behavioral: Positive for sleep disturbance. Negative for agitation, behavioral problems, confusion, decreased concentration, dysphoric mood, hallucinations, self-injury and suicidal ideas. The patient is not nervous/anxious and is not hyperactive.            Physical Exam:  Vitals:    09/25/18 1026   BP: 130/72   Pulse: 81   SpO2: 97%   Weight: 68.5 kg (151 lb)   Height: 172.7 cm (68\")     Body mass index is 22.96 kg/m².    Physical Exam  Gen.: Thin white male who appears to be in some degree of distress from back pain  HEENT: Normocephalic no evidence " of trauma.  Neck: Supple.  Heart: Regular rate and rhythm.  No pedal edema      Neurological Exam:   Mental status: Awake alert oriented conversant provides good history without evidence of an affective disorder thought disorder delusions or hallucinations.  HCF: No aphasia or apraxia or dysarthria.  Recent  memory intact.  Remote memory questionably impaired.  Knowledge of recent events intact.  Cranial nerves: 2-12 intact  Motor there is mild increased tone on the left with mild left arm and leg weakness.  Reflexes: Reflex asymmetry brisker in the left arm and left leg.  Cerebellar: Finger to nose rapid movements intact  Gait and Station: Antalgic        Results:      Lab Results   Component Value Date    GLUCOSE 95 05/31/2018    BUN 22 05/31/2018    CREATININE 1.40 (H) 09/17/2018    EGFRIFNONA 45 (L) 09/15/2018    BCR 15.2 05/31/2018    CO2 27.6 05/31/2018    CALCIUM 9.7 05/31/2018    PROTENTOTREF 7.4 08/15/2018    ALBUMIN 4.4 08/15/2018    LABIL2 1.5 08/15/2018    AST 22 02/23/2017    ALT 17 02/23/2017       Lab Results   Component Value Date    WBC 9.89 05/31/2018    HGB 14.4 05/31/2018    HCT 44.0 05/31/2018    MCV 96.9 (H) 05/31/2018     05/31/2018         .  Lab Results   Component Value Date    RPR Non-Reactive 08/15/2018         No results found for: TSH, N6FOSPW, C5YQAWP, THYROIDAB      Lab Results   Component Value Date    [id number] 379 08/15/2018         Lab Results   Component Value Date    FOLATE >20.00 08/15/2018         No results found for: HGBA1C            Assessment:   1.  Transverse myelitis cervical spine timing unclear  2.  Chronic low back pain          Plan:  1.  I suspect the findings on MRI are chronic.  This is most likely idiopathic there are no appropriate lesions in the brain to suggest multiple sclerosis.  2.  Trial of amitriptyline 25 mg at night which may be increased to 50 mg at night in a week or 2 depending on potential side effects.  3.  I do not recommend Ambien  because of its abuse potential  4.  Follow-up with a nurse practitioner regarding potential up titration of amitriptyline or if side effects prevail try him on a different drugs such as Cymbalta or venlafaxine or gabapentin again.  5.  Follow-up with nurse practitioner                          Dictated utilizing Dragon dictation.

## 2018-10-17 ENCOUNTER — OFFICE VISIT (OUTPATIENT)
Dept: NEUROLOGY | Facility: CLINIC | Age: 73
End: 2018-10-17

## 2018-10-17 VITALS
HEIGHT: 68 IN | BODY MASS INDEX: 23.19 KG/M2 | SYSTOLIC BLOOD PRESSURE: 140 MMHG | DIASTOLIC BLOOD PRESSURE: 96 MMHG | WEIGHT: 153 LBS | OXYGEN SATURATION: 98 % | HEART RATE: 78 BPM

## 2018-10-17 DIAGNOSIS — G89.29 CHRONIC LEFT-SIDED LOW BACK PAIN WITH LEFT-SIDED SCIATICA: Primary | ICD-10-CM

## 2018-10-17 DIAGNOSIS — M54.42 CHRONIC LEFT-SIDED LOW BACK PAIN WITH LEFT-SIDED SCIATICA: Primary | ICD-10-CM

## 2018-10-17 DIAGNOSIS — R29.898 LEFT LEG WEAKNESS: ICD-10-CM

## 2018-10-17 PROCEDURE — 99214 OFFICE O/P EST MOD 30 MIN: CPT | Performed by: NURSE PRACTITIONER

## 2018-10-17 RX ORDER — GABAPENTIN 300 MG/1
300 CAPSULE ORAL
Qty: 60 CAPSULE | Refills: 1 | Status: SHIPPED | OUTPATIENT
Start: 2018-10-17 | End: 2019-03-19

## 2018-10-17 NOTE — PROGRESS NOTES
"DOS: 10/17/2018  NAME: Mannie Fajardo   : 1945  PCP: Alfonso Waldrop MD    Chief Complaint   Patient presents with   • Medication Issues      SUBJECTIVE  Neurological Problem:  73 y.o.  male RH [place]oc[place] male with HLD, CAD (s/p stent ), AAA, hyperhomocysteinemia and h/o chronic back pain and progressive left leg weakness who presents today to follow-up of chronic weakness. He is accompanied by friend.     Interval History:   Mr. Fajardo was initially seen by Dr. Brizuela in 2018 for EMG/NCS for reported left hip and left groin pain that was an essentially normal study of the left leg; however, due to findings of weakness in the left leg and the left hand with upper motor neuron findings, he was further evaluated in the office for possible cervical myelopathy.      He has an extensive history dating back more than 20 years prior when he first started having anterior left upper thigh dysesthesias that he describes as \"gremlins\" but not painful but has progressed to significant left leg weakness. He also began having central, lower back pain that started many years ago that has also progressively gotten worse and spread lateral to each hip. He has undergone extensive evaluations with other providers over the years including Dr. Rhodes at , Dr. Wu and most recently evaluted by Dr. Vieira (spine surgery [facility]) bu patient not felt to be a surgical candidate. Please see Dr. Brizuela's note from 8/15/18 further historical details.     He had a whole body bone scan, MRI of the C-spine and MRI brain per Dr. Brizuela showed minor microvascular changes consistent with age; and MRI C-spine showing myelomalacia with left side localization consistent with transverse myelitis uncertain timing. The patient is somewhat frustrated by the lack of definitive diagnosis and continued progressive weakness and clearly remains in significant pain.  He has been seen by pain management in the past without " "significant improvement.    He presents today after having tried elavil for 10 days but then discontinued due to severe constipation and no relief of his pain.  He continues to complain of severe lower back pain and left leg weakness, with loss of muscle mass.  He is reportedly tried Neurontin, and Cymbalta in the past, but discontinued due to side effects of \"brain fog\".    Review of Systems:Review of Systems   Constitutional: Negative for activity change, appetite change, chills, diaphoresis, fatigue, fever and unexpected weight change.   HENT: Negative for drooling, hearing loss, tinnitus, trouble swallowing and voice change.    Eyes: Negative for photophobia, pain and visual disturbance.   Respiratory: Negative for chest tightness and shortness of breath.    Cardiovascular: Negative for chest pain, palpitations and leg swelling.   Gastrointestinal: Negative for blood in stool, nausea and vomiting.   Endocrine: Negative for cold intolerance and heat intolerance.   Genitourinary: Negative for difficulty urinating.   Musculoskeletal: Positive for back pain (lower back pain and in buttocks ) and gait problem. Negative for neck pain and neck stiffness.   Skin: Negative for rash.   Neurological: Positive for weakness (left leg feels like it is not a part of him) and numbness (slight numb and tinging in left leg). Negative for dizziness, tremors, seizures, syncope, facial asymmetry, speech difficulty, light-headedness and headaches.   Hematological: Does not bruise/bleed easily.   Psychiatric/Behavioral: Positive for sleep disturbance. Negative for agitation, behavioral problems, confusion, hallucinations and suicidal ideas. The patient is not nervous/anxious.        Current Medications:   Current Outpatient Prescriptions:   •  aspirin 81 MG chewable tablet, Chew 81 mg Daily., Disp: , Rfl:   •  folic acid (FOLVITE) 400 MCG tablet, Take 400 mcg by mouth daily., Disp: , Rfl:   •  levothyroxine (SYNTHROID, LEVOTHROID) 50 " MCG tablet, Take 50 mcg by mouth daily., Disp: , Rfl:   •  LOVAZA 1 g capsule, Take 1 capsule by mouth 2 (Two) Times a Day., Disp: 180 capsule, Rfl: 1  •  PLAVIX 75 MG tablet, Take 75 mg by mouth Daily., Disp: , Rfl:   •  ZETIA 10 MG tablet, TAKE 1 TABLET DAILY., Disp: 90 tablet, Rfl: 0  •  amitriptyline (ELAVIL) 25 MG tablet, Take 1 tablet by mouth Every Night., Disp: 30 tablet, Rfl: 1  •  gabapentin (NEURONTIN) 300 MG capsule, Take 1 capsule by mouth every night at bedtime., Disp: 60 capsule, Rfl: 1    The following portions of the patient's history were reviewed and updated as appropriate: allergies, current medications, past family history, past medical history, past social history, past surgical history and problem list.    OBJECTIVE  Diagnostics:  MRI lumbar spine 4/5/18: Multilevel degenerative disease involving the lumbar spine  as described in detail above with no evidence of herniation. There is  moderate loss of disc height and disc desiccation at L4-L5 which has  increased slightly as compared to the previous examination. 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 Careful correlation  prior to any intervention is required given the presence of transitional  anatomy.  Whole Body Bone Scan 8/27/18:There is mild increased uptake within the cervical spine to  the left and right of midline consistent with facet arthritis. There is  mild increased uptake within the anterior aspect of the lower thoracic  spine that is most likely degenerative and correlates with endplate  spurring and degenerative changes demonstrated on CT at T10-T11 level.  There is degenerative uptake overlying the sacroiliac joints. There is  also mild increased uptake within the posterior mid sacrum that could  represent a fracture in the proper clinical setting. Increased uptake is  present at the right 1st CMC joint. There is increased uptake to the  left ankle that is most likely arthritic/degenerative. Both  kidneys and  the urinary bladder are visualized   MRI C-spine 9/17/18: There is abnormal cord signal within the left lateral aspect of the  cervical spinal cord at the C3-4 level and within the more central left  aspect of the cervical spinal cord at the C2 level. The findings suggest  an age-indeterminate demyelinating process. Please correlate with  clinical and laboratory data.  There is some loss of the usual lordotic curvature of the cervical  spine. Additionally, there is degenerative grade 1 anterior  spondylolisthesis of C3 on C4 and degenerative grade 1 retrolisthesis of  C4 on C5 and C5 on C6.  Mild to mild-to-moderate degrees of central canal stenosis are  identified as discussed in detail above. Additionally, multilevel  foraminal stenosis is seen including a moderate degree of right C3-4, a  moderate to severe degree of right C4-5, and a moderate degree of  bilateral C5-6 foraminal narrowing.  MRI brain 9/17/18: No acute findings, chronic small vessel disease.     Laboratory Results:         Lab Results   Component Value Date    WBC 9.89 05/31/2018    HGB 14.4 05/31/2018    HCT 44.0 05/31/2018    MCV 96.9 (H) 05/31/2018     05/31/2018     Lab Results   Component Value Date    GLUCOSE 95 05/31/2018    BUN 22 05/31/2018    CREATININE 1.40 (H) 09/17/2018    EGFRIFNONA 45 (L) 09/15/2018    BCR 15.2 05/31/2018    K 4.8 05/31/2018    CO2 27.6 05/31/2018    CALCIUM 9.7 05/31/2018    PROTENTOTREF 7.4 08/15/2018    ALBUMIN 4.4 08/15/2018    LABIL2 1.5 08/15/2018    AST 22 02/23/2017    ALT 17 02/23/2017     Lab Results   Component Value Date    RPR Non-Reactive 08/15/2018     No results found for: TSH  Lab Results   Component Value Date    [id number] 379 08/15/2018     Lab Results   Component Value Date    FOLATE >20.00 08/15/2018     Lab Results   Component Value Date    SEDRATE 1 08/15/2018     Lab Results   Component Value Date    CKTOTAL 147 08/15/2018    TROPONINT <0.010 01/26/2018     NMO IgG  Autoantibodies 0.0 - 3.0 U/mL <1.5    Comment:                              Negative:      0.0 - 3.0      Angiotensin Converting Enzyme 37 (14-82)  Lyme Ab IgG/IgM Negative  Lyme Disease Ab, Quant, IgM 0.8 Equivocal (range 0.00-0.79)  Cu 84, SPE no MGUS, IEP no MGUS,     Physical Examination:   General Appearance:   Well developed, thin, alert, and cooperative. In obvious discomfort.   HEENT: Normocephalic.  Stooped posture, mildly kyphotic.  Cardiac: Regular rate and rhythm.   Peripheral Vasculature: No signs of distal embolization.  Extremities:    No edema or deformities.   Skin:    No rashes or birth marks.    Neurological examination:  Higher Integrative  Function: Oriented to time, place and person. Normal attention span and concentration. Normal language including comprehension. No neglect.  Normal fund of knowledge and higher integrative function.  CN II: Normal visual acuity and visual fields.    CN III IV VI: Extraocular movements are full without nystagmus.   CN V: Normal facial sensation and strength of muscles of mastication.  CN VII: Facial movements are symmetric. No weakness.  CN VIII:   Auditory acuity is normal.  CN IX & X:   Symmetric palatal movement.  CN XI: Sternocleidomastoid and trapezius are normal.  No weakness.  CN XII:   The tongue is midline.  No atrophy or fasciculations.  Motor: Decreased bulk overall. Normal muscle strength and tone in upper and lower extremities on the right. Decreased strength in lower extremity on the left with hip flexor at 3/5, knee flexion 2-3/5, extension 3/5. Good ankle dorsiflexion and plantar flexion on the left, unable to flex big toe, remains in extensor position. Left hand with mildly decreased intrinsic hand muscle strength.   Sensation: Normal to light touch, pinprick, vibration, temperature, and proprioception in arms and legs.  Station and Gait: Stooped, antalgic gait with mild circumduction on the left.     Coordination: Coordination testing slow on  the left but no dysmetria or ataxia.      Assessment:  1.  Transverse myelitis cervical spine timing unclear  2.  Chronic low back pain -- refractory to medical management  3.  Progressive LLE weakness -- ?etiology   Labs (sedimentation rate, RPR, B12 and folate, SPEP and IEP, copper level) unrevealing    Plan:     Discontinue Elavil --> not efficacious  Start neurontin at night 300 mg qhs and titrate upward  Discuss with Dr. Brizuela plan/treatment ?possible steroids  Follow-up pending above.       I spent 45 minutes face to face with patient, with  > 50% spent counseling patient regarding diagnosis, review of diagnostics, medication treatment options, purpose, risks and benefits of medications    Mannie was seen today for medication issues.    Diagnoses and all orders for this visit:    Chronic left-sided low back pain with left-sided sciatica  -     gabapentin (NEURONTIN) 300 MG capsule; Take 1 capsule by mouth every night at bedtime.    Left leg weakness        Coding

## 2018-10-19 ENCOUNTER — TELEPHONE (OUTPATIENT)
Dept: NEUROLOGY | Facility: CLINIC | Age: 73
End: 2018-10-19

## 2018-10-19 NOTE — TELEPHONE ENCOUNTER
Patient is only taking one at bedtime. He will give this more time to see if he adjust & will call back in a few weeks

## 2018-10-19 NOTE — TELEPHONE ENCOUNTER
"----- Message from Mihir wSeeney sent at 10/19/2018  2:55 PM EDT -----  Contact: PT  Pt was in to see Chichi ANDERS Wednesday 10/17, she prescribed him gabapentin 300 mg to take one or two caps at bedtime. Pt says pharmacy only gave him #30 saying that's what the script was written for, but it says dispense #60, pt also says the medicine makes him feel \"whoozy\" so he doesn't know if he should take it or not.  Please call him at [phone]  "

## 2018-10-19 NOTE — TELEPHONE ENCOUNTER
"He should take these at bedtime (as prescribed) and the \"woozy-ness\" will be more tolerable. If they are helping his symptoms I can refill the script in a couple of weeks. Thanks   "

## 2018-10-22 RX ORDER — OMEGA-3 ACID ETHYL ESTERS 1 G
CAPSULE ORAL
Qty: 180 CAPSULE | Refills: 1 | Status: SHIPPED | OUTPATIENT
Start: 2018-10-22 | End: 2019-07-11 | Stop reason: SDUPTHER

## 2018-10-25 ENCOUNTER — TELEPHONE (OUTPATIENT)
Dept: NEUROLOGY | Facility: CLINIC | Age: 73
End: 2018-10-25

## 2018-10-25 NOTE — TELEPHONE ENCOUNTER
Sveta s/w pt and they went over the questions that patient wrote down on paper and patient understood. Patient requested the paper with answers written down to be mailed to him along with Dr. Dontae Rhodes's information at [facility], which has been mailed today as requested. Patient will contact our office if he would like to be referred to Dr. Rhodes.

## 2018-10-29 DIAGNOSIS — R29.898 LEFT LEG WEAKNESS: Primary | ICD-10-CM

## 2018-11-14 ENCOUNTER — DOCUMENTATION (OUTPATIENT)
Dept: PHYSICAL THERAPY | Facility: HOSPITAL | Age: 73
End: 2018-11-14

## 2018-11-14 NOTE — THERAPY DISCHARGE NOTE
Outpatient Physical Therapy Discharge Summary         Patient Name: Mannie Fajardo  : 1945  MRN: [id number]    Today's Date: 2018    Visit Dx:  No diagnosis found.    PT OP Goals     Row Name 18 1400          PT Short Term Goals    STG Date to Achieve  18  -ZK     STG 1  pt to state 6 or more hours of pain relief after aquatic therapy  -ZK     STG 1 Progress  Not Met  -ZK     STG 2  ADLs to improve with SARA score progressing from 76 to < 70% disability  -ZK     STG 2 Progress  Not Met  -ZK     STG 3  pt to follow up with rheumatologist and neurologist per primary and neurosurgical recommendation.   -ZK     STG 3 Progress  Met  -ZK        Long Term Goals    LTG Date to Achieve  18  -ZK     LTG 1  pt to show indep with mod level strength work in the pool and progress to easy land based ex program for indep HEP  -ZK     LTG 1 Progress  Not Met;Goal Revised  -ZK     LTG 2  pt to note mild increase in L hip flexion strength from 2+ to 3-   -ZK     LTG 2 Progress  Not Met  -ZK     LTG 3  pt to note change in pain from constant to intermittent and improving vs. declining.   -ZK     LTG 3 Progress  Not Met  -ZK     LTG 4  pt to state decrease in average pain from 8-9 to 5-6 by dc  -ZK     LTG 4 Progress  Not Met  -ZK     LTG 5  pt to obtain proper diagnostic tests and feel comfortable with rx plan per medical team.   -ZK     LTG 5 Progress  Ongoing  -ZK     LTG 6  SARA to be < 60% by dc  -ZK     LTG 6 Progress  Not Met  -ZK       User Key  (r) = Recorded By, (t) = Taken By, (c) = Cosigned By    Initials Name Provider Type    ZK Kimura, Zorre Zeno, PT Physical Therapist          OP PT Discharge Summary  Date of Discharge: 18  Reason for Discharge: Patient/Caregiver request  Outcomes Achieved: Unable to make functional progress toward goals at this time  Discharge Destination: Home without follow-up  Discharge Instructions/Additional Comments: pt seen for one session in the pool and 5  rx on land including US and even dry needling but to no avail. Encouraged to follow up with neurologist due to significant L LE weakness and review of recent events in [facility] indicate he has done so. 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 Not sure of plans for neurosurgical intervention at this time and hoping pt is staying on top of his plan as he tends to drift towards hopelessness at times. Will encourage ongoing medical care if we see him at our wellness center.       Time Calculation:        Therapy Suggested Charges     Code   Minutes Charges    None                       Zorre Zeno Kimura, PT  11/14/2018

## 2019-01-17 ENCOUNTER — TELEPHONE (OUTPATIENT)
Dept: CARDIOLOGY | Facility: CLINIC | Age: 74
End: 2019-01-17

## 2019-01-17 NOTE — TELEPHONE ENCOUNTER
Received a note from [facility] @ [phone] re: they are wanting to perform epidurals to help treat his back pain.  Nothing is scheduled currently but they are wanting to hold Plavix when that time comes.  They want to hold for at least 7 days.  Do you object?  Pt is due to come back in April.  Are you wanting to see him first?  Pt said he would call and let me know when he gets scheduled.

## 2019-01-18 NOTE — TELEPHONE ENCOUNTER
Spoke with pt.  He is NOT scheduled for injections.  He does have an appt in Feb.  He will call at that time and let us know what they are planning.  (done)

## 2019-03-18 NOTE — PROGRESS NOTES
Date of Office Visit: 2019  Encounter Provider: CONSUELO Zamudio  Place of Service: [facility] CARDIOLOGY  Patient Name: Mannie Fajardo  :1945      Subjective:     Chief Complaint:  Referral for clearance for epidural injection    History of Present Illness:  Mannie Fajardo is a pleasant 73 y.o. male who is new to me.  Outside records have been obtained and reviewed by me.     This is a patient of Dr. Giordano who with a past medical history significant for hyperlipidemia, hyperhomocystinemia and coronary artery disease.  Patient had an abnormal stress test in  and underwent cardiac catheterization that revealed severe disease of the circumflex artery with drug-eluting stent placement.  There is been no intervention since then and he has been maintained on aspirin and Plavix.  In 2016 he had complaints of chest pain and a Lexiscan perfusion study was performed and was negative for ischemia.    At his last visit on 18 with Dr. Giordano he had recently been admitted to [facility] with constipation and back pain.  He had no significant cardiac complaints or findings.  He denied any chest pain, palpitations, syncope or near syncope.  He was planning on admission to the chemical dependency unit for rehab Dr. Giordano felt he was doing quite well from a cardiac standpoint and wanted him to return for follow-up in 1 year or earlier if he had worsening chest pain.  In regards to his back pain there were unfortunately no treatment options at that time per neurosurgery or neurology.  Is reported that he is followed by vascular surgery for abdominal aortic aneurysm.  Is also noted that he is intolerant to statins.    Last labs I see were 2018.  His creatinine was 1.40 and remaining relatively stable with his history of renal insufficiency.      He was placed on my schedule to discuss holding his Plavix for 7 days prior to seating epidural injections for chronic low back  pain.  He denies any other complaints aside from back pain.  He had no chest pain, shortness of breath, dizziness, lightheadedness, syncope, near syncope, fatigue, palpitations, swelling of the legs, PND or orthopnea.  His blood pressure is stable today at 140/76.  He takes folic acid 800 mcg every other day as he is not been able to find the 400 mcg tablets at this time.  I discussed with Dr. Giordano and she is okay with this.  His stent was in 2007 and he has held his Plavix off and on numerous times over the last few years for various injections.  He held his Plavix last August for 5-7 days for an injection and had no issues.  He has upcoming yearly appointment with vascular.    Past Medical History:   Diagnosis Date   • Arthritis     back   • Back pain    • Backache    • CAD (coronary atherosclerotic disease)    • Disease of thyroid gland    • Dyslipidemia    • Edema    • History of transfusion    • Hyperhomocystinemia (CMS/HCC)    • Hyperlipidemia      Past Surgical History:   Procedure Laterality Date   • APPENDECTOMY     • CARDIAC SURGERY     • CORONARY ANGIOPLASTY WITH STENT PLACEMENT     • EXTRACORPOREAL SHOCKWAVE LITHOTRIPSY (ESWL), STENT INSERTION/REMOVAL Right 2/24/2017    Procedure: CYSTO RETROGRADE WITH STENT PLACEMENT;  Surgeon: Janes López MD;  Location: [facility] OR [facility] [place];  Service:      Outpatient Medications Prior to Visit   Medication Sig Dispense Refill   • aspirin 81 MG chewable tablet Chew 81 mg Daily.     • folic acid (RA FOLIC ACID) 800 MCG tablet Take 800 mcg by mouth Daily.     • LOVAZA 1 g capsule TAKE 1 CAPSULE BY MOUTH TWO TIMES A DAY. 180 capsule 1   • PLAVIX 75 MG tablet Take 75 mg by mouth Daily.     • ZETIA 10 MG tablet TAKE 1 TABLET DAILY. 90 tablet 0   • amitriptyline (ELAVIL) 25 MG tablet Take 1 tablet by mouth Every Night. 30 tablet 1   • folic acid (FOLVITE) 400 MCG tablet Take 400 mcg by mouth daily.     • gabapentin (NEURONTIN) 300 MG capsule Take 1 capsule by mouth every  night at bedtime. 60 capsule 1   • levothyroxine (SYNTHROID, LEVOTHROID) 50 MCG tablet Take 50 mcg by mouth daily.     • LOVAZA 1 g capsule Take 1 capsule by mouth 2 (Two) Times a Day. 180 capsule 1     No facility-administered medications prior to visit.        Allergies as of 03/19/2019 - Reviewed 03/19/2019   Allergen Reaction Noted   • Adhesive tape Other (See Comments) 04/14/2016   • Statins Other (See Comments) 04/14/2016     Social History     Socioeconomic History   • Marital status: Single     Spouse name: Not on file   • Number of children: 0   • Years of education: COLLEGE   • Highest education level: Not on file   Social Needs   • Financial resource strain: Not on file   • Food insecurity - worry: Not on file   • Food insecurity - inability: Not on file   • Transportation needs - medical: Not on file   • Transportation needs - non-medical: Not on file   Occupational History   • Occupation: RETIRED   Tobacco Use   • Smoking status: Never Smoker   • Smokeless tobacco: Never Used   Substance and Sexual Activity   • Alcohol use: No     Comment: caffeine use   • Drug use: No   • Sexual activity: Defer   Other Topics Concern   • Not on file   Social History Narrative   • Not on file     Family History   Problem Relation Age of Onset   • Heart disease Mother    • Stroke Father      Review of Systems   Constitution: Negative for chills, fever and malaise/fatigue.   HENT: Negative for ear pain, hearing loss, nosebleeds and sore throat.    Eyes: Negative for double vision, pain, vision loss in left eye and vision loss in right eye.   Cardiovascular: Negative for chest pain, claudication, dyspnea on exertion, irregular heartbeat, leg swelling, near-syncope, orthopnea, palpitations, paroxysmal nocturnal dyspnea and syncope.   Respiratory: Negative for cough, shortness of breath, snoring and wheezing.    Endocrine: Negative for cold intolerance and heat intolerance.   Hematologic/Lymphatic: Negative for bleeding  "problem.   Skin: Negative for color change, itching, rash and unusual hair distribution.   Musculoskeletal: Positive for back pain. Negative for joint pain and joint swelling.   Gastrointestinal: Negative for abdominal pain, diarrhea, hematochezia, melena, nausea and vomiting.   Genitourinary: Negative for decreased libido, frequency, hematuria, hesitancy and incomplete emptying.   Neurological: Negative for excessive daytime sleepiness, dizziness, headaches, light-headedness, loss of balance, numbness, paresthesias and seizures.   Psychiatric/Behavioral: Negative for depression.          Objective:     Vitals:    03/19/19 1444   BP: 140/76   Pulse: 79   Resp: 16   Weight: 68 kg (150 lb)   Height: 172.7 cm (68\")     Body mass index is 22.81 kg/m².    PHYSICAL EXAM:  Physical Exam   Constitutional: He is oriented to person, place, and time. He appears well-developed and well-nourished. No distress.   HENT:   Head: Normocephalic and atraumatic.   Eyes: Conjunctivae and EOM are normal.   Wearing glasses   Neck: No JVD present. Carotid bruit is not present.   Cardiovascular: Normal rate, regular rhythm, normal heart sounds and intact distal pulses.   No murmur heard.  Pulses:       Radial pulses are 2+ on the right side, and 2+ on the left side.        Dorsalis pedis pulses are 2+ on the right side, and 2+ on the left side.        Posterior tibial pulses are 2+ on the right side, and 2+ on the left side.   Pulmonary/Chest: Effort normal and breath sounds normal. No accessory muscle usage. No tachypnea. No respiratory distress. He has no decreased breath sounds. He has no wheezes. He has no rhonchi. He has no rales. He exhibits no tenderness.   Abdominal: Soft. Bowel sounds are normal. He exhibits no distension. There is no tenderness. There is no rebound.   Musculoskeletal: He exhibits no edema.   Neurological: He is alert and oriented to person, place, and time.   Skin: Skin is warm, dry and intact. He is not " diaphoretic. No erythema.   Psychiatric: He has a normal mood and affect. His speech is normal and behavior is normal. Judgment and thought content normal. Cognition and memory are normal.   Nursing note and vitals reviewed.      Procedures       Previous Testin17 Vascular Screening:    · Carotid Artery Disease and Stroke Screening: The right vessel is normal. The left vessel is normal. Abdominal Aortic Aneurysm (AAA) Screening: The artery has a small aortic aneurysm. Peripheral Artery Disease (P.A.D.) Screening: The right has normal arterial pressures. The left has normal arterial pressures.  · Recommend CT due to AAA; although not large it appears very discrete    16 Stress Test:    · Compared to the prior study from 2014 the current study reveals no changes.  · Left ventricular ejection fraction is normal (Calculated EF = 64%).  · Myocardial perfusion imaging indicates a normal myocardial perfusion study with no evidence of ischemia.  · Impressions are consistent with a low risk study.     16 Abdominal U/S:  IMPRESSION:  1. 3.1 cm distal infrarenal, aortic aneurysm.  2. Moderate atheromatous calcification throughout the abdominal aorta.  3. 5 mm nonobstructive left renal calyceal calculus.    Assessment:       Diagnosis Plan   1. Coronary artery disease due to calcified coronary lesion     2. Hyperlipidemia, unspecified hyperlipidemia type     3. Abdominal aortic aneurysm (AAA) without rupture (CMS/HCC)     4. Chronic left-sided low back pain with left-sided sciatica     5. Stage 3 chronic kidney disease (CMS/HCC)         Plan:         1.    Coronary Artery Disease  Plan  • Lifestyle modifications discussed include adhering to a heart healthy diet, avoidance of tobacco products, maintenance of a healthy weight, medication compliance, regular exercise and regular monitoring of cholesterol and blood pressure    Subjective - Objective  • There has been a previous stent procedure using [facility]  2007  • Current antiplatelet therapy includes aspirin 81 mg and clopidogrel 75 mg    [facility] of severely diseased circumflex artery in 2007. Lexiscan Dec. 2016 negative for ischemia. EF 64%.     2.  Back pain: Chronic. In the past worked with pain management and drug rehabilitation narcotic dependence. Planning for epidural injections at [facility]   3.  Hyperhomocystinemia : On folic acid supplementation 800 mcg every other day as he was unable to find the folic acid 400 mcg tablets which he was taking daily.   4.  History of renal insufficiency:  Sept. 2018 labs appear stable.  5.  Abdominal aortic aneurysm:  Followed by vascular surgery  6.  Hyperlipidemia :   Intolerant of statins. On Lovaza and Zetia.  Due for repeat fasting lipid panel as he has not following regularly with PCP.  Would like to wait and discuss with Dr. Giordano at his next visit         From a cardiac standpoint he is cleared to hold his Plavix for 7 days prior to his epidural injection and resume post procedure.  I will fax the appropriate documentation to [facility] pain management.  He will keep his follow up with Dr. Giordano on 4/23/19, unless otherwise needed sooner.  I advised the patient to contact our office with any questions or concerns.         Your medication list           Accurate as of 3/19/19  3:10 PM. If you have any questions, ask your nurse or doctor.               CHANGE how you take these medications      Instructions Last Dose Given Next Dose Due   RA FOLIC ACID 800 MCG tablet  Generic drug:  folic acid  What changed:  Another medication with the same name was removed. Continue taking this medication, and follow the directions you see here.  Changed by:  CONSUELO Zamudio      Take 800 mcg by mouth Daily.          CONTINUE taking these medications      Instructions Last Dose Given Next Dose Due   aspirin 81 MG chewable tablet      Chew 81 mg Daily.       LOVAZA 1 g capsule  Generic drug:  omega-3 acid ethyl esters      TAKE 1 CAPSULE BY  MOUTH TWO TIMES A DAY.       PLAVIX 75 MG tablet  Generic drug:  clopidogrel      Take 75 mg by mouth Daily.       ZETIA 10 MG tablet  Generic drug:  ezetimibe      TAKE 1 TABLET DAILY.              The above medication changes may not have been made by this provider.  Medication list was updated to reflect medications patient is currently taking including medication changes and discontinuations made by other healthcare providers.       I have reviewed this case with Dr. Giordano. It has been a pleasure to participate in this patient's care. Please feel free to contact me with any questions or concerns.     Shelly Han, APRN  03/19/2019       Dictated utilizing Dragon Dictation System.

## 2019-03-19 ENCOUNTER — OFFICE VISIT (OUTPATIENT)
Dept: CARDIOLOGY | Facility: CLINIC | Age: 74
End: 2019-03-19

## 2019-03-19 VITALS
WEIGHT: 150 LBS | DIASTOLIC BLOOD PRESSURE: 76 MMHG | RESPIRATION RATE: 16 BRPM | BODY MASS INDEX: 22.73 KG/M2 | HEIGHT: 68 IN | HEART RATE: 79 BPM | SYSTOLIC BLOOD PRESSURE: 140 MMHG

## 2019-03-19 DIAGNOSIS — I25.10 CORONARY ARTERY DISEASE DUE TO CALCIFIED CORONARY LESION: Primary | ICD-10-CM

## 2019-03-19 DIAGNOSIS — E78.5 HYPERLIPIDEMIA, UNSPECIFIED HYPERLIPIDEMIA TYPE: ICD-10-CM

## 2019-03-19 DIAGNOSIS — G89.29 CHRONIC LEFT-SIDED LOW BACK PAIN WITH LEFT-SIDED SCIATICA: ICD-10-CM

## 2019-03-19 DIAGNOSIS — I71.40 ABDOMINAL AORTIC ANEURYSM (AAA) WITHOUT RUPTURE (HCC): ICD-10-CM

## 2019-03-19 DIAGNOSIS — N18.30 STAGE 3 CHRONIC KIDNEY DISEASE (HCC): ICD-10-CM

## 2019-03-19 DIAGNOSIS — I25.84 CORONARY ARTERY DISEASE DUE TO CALCIFIED CORONARY LESION: Primary | ICD-10-CM

## 2019-03-19 DIAGNOSIS — M54.42 CHRONIC LEFT-SIDED LOW BACK PAIN WITH LEFT-SIDED SCIATICA: ICD-10-CM

## 2019-03-19 PROCEDURE — 99213 OFFICE O/P EST LOW 20 MIN: CPT | Performed by: NURSE PRACTITIONER

## 2019-03-19 RX ORDER — LANOLIN ALCOHOL/MO/W.PET/CERES
400 CREAM (GRAM) TOPICAL DAILY
COMMUNITY

## 2019-04-03 ENCOUNTER — TELEPHONE (OUTPATIENT)
Dept: CARDIOLOGY | Facility: CLINIC | Age: 74
End: 2019-04-03

## 2019-04-03 NOTE — TELEPHONE ENCOUNTER
JUST RUBEN: [facility] called requesting note for clearance to stop Plavix.  I faxed it to them at [phone].  Asked them after this epidural to make sure they call us and not assume the pt can go off every time.  Pt has appt with Dr Giordano on 4/23 as a followup to appt with the NP 3/19/2019.  Pt needs to keep this appt.  Pt is aware.  (done)

## 2019-06-04 ENCOUNTER — OFFICE VISIT (OUTPATIENT)
Dept: CARDIOLOGY | Facility: CLINIC | Age: 74
End: 2019-06-04

## 2019-06-04 VITALS
HEIGHT: 68 IN | BODY MASS INDEX: 22.46 KG/M2 | DIASTOLIC BLOOD PRESSURE: 70 MMHG | SYSTOLIC BLOOD PRESSURE: 110 MMHG | HEART RATE: 74 BPM | WEIGHT: 148.2 LBS

## 2019-06-04 DIAGNOSIS — I71.40 ABDOMINAL AORTIC ANEURYSM (AAA) WITHOUT RUPTURE (HCC): ICD-10-CM

## 2019-06-04 DIAGNOSIS — N18.30 STAGE 3 CHRONIC KIDNEY DISEASE (HCC): ICD-10-CM

## 2019-06-04 DIAGNOSIS — I25.10 CORONARY ARTERY DISEASE DUE TO CALCIFIED CORONARY LESION: ICD-10-CM

## 2019-06-04 DIAGNOSIS — I25.84 CORONARY ARTERY DISEASE DUE TO CALCIFIED CORONARY LESION: ICD-10-CM

## 2019-06-04 DIAGNOSIS — I25.10 CORONARY ARTERY DISEASE INVOLVING NATIVE CORONARY ARTERY OF NATIVE HEART WITHOUT ANGINA PECTORIS: Primary | ICD-10-CM

## 2019-06-04 PROCEDURE — 99214 OFFICE O/P EST MOD 30 MIN: CPT | Performed by: INTERNAL MEDICINE

## 2019-06-04 PROCEDURE — 93000 ELECTROCARDIOGRAM COMPLETE: CPT | Performed by: INTERNAL MEDICINE

## 2019-06-04 RX ORDER — TRAMADOL HYDROCHLORIDE 50 MG/1
TABLET ORAL AS NEEDED
Refills: 5 | COMMUNITY
Start: 2019-04-24 | End: 2019-10-08

## 2019-06-04 RX ORDER — TRIAMCINOLONE ACETONIDE 1 MG/G
CREAM TOPICAL AS NEEDED
Refills: 5 | COMMUNITY
Start: 2019-04-10 | End: 2019-10-08

## 2019-06-04 NOTE — PROGRESS NOTES
Date of Office Visit: 2019  Encounter Provider: Marlene Giordano MD  Place of Service: [facility] CARDIOLOGY  Patient Name: Mannie Fajardo  :1945    Chief complaint  Followup of CAD    History of Present Illness  The patient is a delightful, 72-year-old gentleman with history of hyperlipidemia, hyperhomocysteinemia, and coronary artery disease.  In , after an abnormal stress test, he underwent cardiac catheterization that revealed severe disease of the circumflex artery for which he received a drug-coated stent.  He has not had any intervening events since then, though he has maintained aspirin and Plavix therapy.  In 2016 with complaints of chest pain Lexiscan perfusion study was negative for ischemia.  Stress screening study in 2017 showed a small abdominal aortic aneurysm for his vascular surgeons felt to be stable.    Since last visit he is not exercising.  He did receive some relief of his back pain with the back injection and is trying to get additional work done through the [facility].  Denies any chest pain, shortness of breath, palpitations, syncope near syncope.    Past Medical History:   Diagnosis Date   • AAA (abdominal aortic aneurysm) without rupture (CMS/HCC)    • Arthritis     back   • Back pain    • Backache    • CAD (coronary atherosclerotic disease)    • Disease of thyroid gland    • Dyslipidemia    • Edema    • History of transfusion    • Hyperhomocystinemia (CMS/HCC)    • Hyperlipidemia    • Stage 3 chronic kidney disease (CMS/HCC)      Past Surgical History:   Procedure Laterality Date   • APPENDECTOMY     • CARDIAC SURGERY     • CORONARY ANGIOPLASTY WITH STENT PLACEMENT     • EXTRACORPOREAL SHOCKWAVE LITHOTRIPSY (ESWL), STENT INSERTION/REMOVAL Right 2017    Procedure: CYSTO RETROGRADE WITH STENT PLACEMENT;  Surgeon: Janes López MD;  Location: [facility] OR [facility];  Service:      Outpatient Medications Prior to Visit   Medication Sig  Dispense Refill   • aspirin 81 MG chewable tablet Chew 81 mg Daily.     • folic acid (RA FOLIC ACID) 400 MCG tablet Take 400 mcg by mouth Daily.     • LOVAZA 1 g capsule TAKE 1 CAPSULE BY MOUTH TWO TIMES A DAY. 180 capsule 1   • PLAVIX 75 MG tablet Take 75 mg by mouth Daily.     • traMADol (ULTRAM) 50 MG tablet As Needed.  5   • triamcinolone (KENALOG) 0.1 % cream As Needed.  5   • ZETIA 10 MG tablet TAKE 1 TABLET DAILY. 90 tablet 0     No facility-administered medications prior to visit.        Allergies as of 06/04/2019 - Reviewed 06/04/2019   Allergen Reaction Noted   • Adhesive tape Other (See Comments) 04/14/2016   • Statins Other (See Comments) 04/14/2016     Social History     Socioeconomic History   • Marital status: Single     Spouse name: Not on file   • Number of children: 0   • Years of education: COLLEGE   • Highest education level: Not on file   Occupational History   • Occupation: RETIRED   Tobacco Use   • Smoking status: Never Smoker   • Smokeless tobacco: Never Used   Substance and Sexual Activity   • Alcohol use: No     Comment: Daily caffeine use   • Drug use: No   • Sexual activity: Defer     Family History   Problem Relation Age of Onset   • Heart disease Mother    • Stroke Father      Review of Systems   Constitution: Negative for fever, malaise/fatigue, weight gain and weight loss.   HENT: Negative for ear pain, hearing loss, nosebleeds and sore throat.    Eyes: Negative for double vision, pain, vision loss in left eye and vision loss in right eye.   Cardiovascular:        See history of present illness.   Respiratory: Negative for cough, shortness of breath, sleep disturbances due to breathing, snoring and wheezing.    Endocrine: Negative for cold intolerance, heat intolerance and polyuria.   Skin: Negative for itching, poor wound healing and rash.   Musculoskeletal: Negative for joint pain, joint swelling and myalgias.   Gastrointestinal: Negative for abdominal pain, diarrhea, hematochezia,  "nausea and vomiting.   Genitourinary: Negative for hematuria and hesitancy.   Neurological: Negative for numbness, paresthesias and seizures.   Psychiatric/Behavioral: Negative for depression. The patient is not nervous/anxious.         Objective:     Vitals:    06/04/19 1248   BP: 110/70   Pulse: 74   Weight: 67.2 kg (148 lb 3.2 oz)   Height: 172.7 cm (68\")     Body mass index is 22.53 kg/m².    Physical Exam   Constitutional: He is oriented to person, place, and time. He appears well-developed and well-nourished.   HENT:   Head: Normocephalic.   Nose: Nose normal.   Mouth/Throat: Oropharynx is clear and moist.   Eyes: Conjunctivae and EOM are normal. Pupils are equal, round, and reactive to light. Right eye exhibits no discharge. No scleral icterus.   Neck: Normal range of motion. Neck supple. No JVD present. No thyromegaly present.   Cardiovascular: Normal rate, regular rhythm, normal heart sounds and intact distal pulses. Exam reveals no gallop and no friction rub.   No murmur heard.  Pulses:       Carotid pulses are 2+ on the right side, and 2+ on the left side.       Radial pulses are 2+ on the right side, and 2+ on the left side.        Femoral pulses are 2+ on the right side, and 2+ on the left side.       Popliteal pulses are 2+ on the right side, and 2+ on the left side.        Dorsalis pedis pulses are 2+ on the right side, and 2+ on the left side.        Posterior tibial pulses are 2+ on the right side, and 2+ on the left side.   Pulmonary/Chest: Effort normal and breath sounds normal. No respiratory distress. He has no wheezes. He has no rales.   Abdominal: Soft. Bowel sounds are normal. He exhibits no distension. There is no hepatosplenomegaly. There is no tenderness. There is no rebound.   Musculoskeletal: Normal range of motion. He exhibits no edema or tenderness.   Neurological: He is alert and oriented to person, place, and time.   Skin: Skin is warm and dry. No rash noted. No erythema. "   Psychiatric: He has a normal mood and affect. His behavior is normal. Judgment and thought content normal.   Vitals reviewed.    Lab Review:     ECG 12 Lead  Date/Time: 6/4/2019 12:50 PM  Performed by: Marlene Giordano MD  Authorized by: Marlene Giordano MD   Comparison: compared with previous ECG   Similar to previous ECG  Rhythm: sinus rhythm    Clinical impression: normal ECG          Assessment:       Diagnosis Plan   1. Coronary artery disease involving native coronary artery of native heart without angina pectoris  ECG 12 Lead   2. Abdominal aortic aneurysm (AAA) without rupture (CMS/[facility])     3. Coronary artery disease due to calcified coronary lesion     4. Stage 3 chronic kidney disease (CMS/[facility])       Plan:       1.  Coronary artery disease with history of prior drug-coated stent placement.  Medically doing well.  We discussed this risks and benefits of ongoing and dual antiplatelet therapy and will decrease aspirin to 81 mg every other day and if after month he has done well will discontinue this completely.  2.  Back pain.  Still struggling with this.  However did receive a shot with 1 day relief of his pain.  He is hoping he will be considered for an ablation.  3.  Hyperhomocystinemia on folic acid supplement which he will increase to 800 mg a day as it is difficult for him to get the 400 mg supplement/tablet  4.  History of renal insufficiency  5.  Abdominal aortic aneurysm followed by vascular surgery  6.  Hyperlipidemia.  Intolerant of statins.  I recommended Repatha however he does not wish to pursue this as it is injectable at this time.    Coronary Artery Disease  Assessment  • The patient has no angina    Plan  • Lifestyle modifications discussed include adhering to a heart healthy diet, regular exercise and regular monitoring of cholesterol and blood pressure    Subjective - Objective  • There has been a previous stent procedure using MINH  • Current antiplatelet therapy includes aspirin 81 mg and  clopidogrel 75 mg         Your medication list           Accurate as of 6/4/19 11:59 PM. If you have any questions, ask your nurse or doctor.               CONTINUE taking these medications      Instructions Last Dose Given Next Dose Due   aspirin 81 MG chewable tablet      Chew 81 mg Daily.       LOVAZA 1 g capsule  Generic drug:  omega-3 acid ethyl esters      TAKE 1 CAPSULE BY MOUTH TWO TIMES A DAY.       PLAVIX 75 MG tablet  Generic drug:  clopidogrel      Take 75 mg by mouth Daily.       RA FOLIC ACID 400 MCG tablet  Generic drug:  folic acid      Take 400 mcg by mouth Daily.       traMADol 50 MG tablet  Commonly known as:  ULTRAM      As Needed.       triamcinolone 0.1 % cream  Commonly known as:  KENALOG      As Needed.       ZETIA 10 MG tablet  Generic drug:  ezetimibe      TAKE 1 TABLET DAILY.              Dictated utilizing Dragon dictation

## 2019-07-11 RX ORDER — CLOPIDOGREL BISULFATE 75 MG
75 TABLET ORAL DAILY
Qty: 90 TABLET | Refills: 0 | Status: SHIPPED | OUTPATIENT
Start: 2019-07-11 | End: 2019-10-08 | Stop reason: SDUPTHER

## 2019-07-11 RX ORDER — OMEGA-3 ACID ETHYL ESTERS 1 G
1 CAPSULE ORAL 2 TIMES DAILY
Qty: 180 CAPSULE | Refills: 0 | Status: SHIPPED | OUTPATIENT
Start: 2019-07-11 | End: 2019-10-08 | Stop reason: SDUPTHER

## 2019-07-24 ENCOUNTER — LAB (OUTPATIENT)
Dept: LAB | Facility: HOSPITAL | Age: 74
End: 2019-07-24

## 2019-07-24 ENCOUNTER — TRANSCRIBE ORDERS (OUTPATIENT)
Dept: ADMINISTRATIVE | Facility: HOSPITAL | Age: 74
End: 2019-07-24

## 2019-07-24 DIAGNOSIS — N18.2 CHRONIC KIDNEY DISEASE, STAGE II (MILD): Primary | ICD-10-CM

## 2019-07-24 DIAGNOSIS — N18.2 CHRONIC KIDNEY DISEASE, STAGE II (MILD): ICD-10-CM

## 2019-07-24 LAB
ANION GAP SERPL CALCULATED.3IONS-SCNC: 14.9 MMOL/L (ref 5–15)
BILIRUB UR QL STRIP: NEGATIVE
BUN BLD-MCNC: 23 MG/DL (ref 8–23)
BUN/CREAT SERPL: 17.3 (ref 7–25)
CALCIUM SPEC-SCNC: 9.1 MG/DL (ref 8.6–10.5)
CHLORIDE SERPL-SCNC: 102 MMOL/L (ref 98–107)
CLARITY UR: CLEAR
CO2 SERPL-SCNC: 25.1 MMOL/L (ref 22–29)
COLOR UR: YELLOW
CREAT BLD-MCNC: 1.33 MG/DL (ref 0.76–1.27)
DEPRECATED RDW RBC AUTO: 47.1 FL (ref 37–54)
ERYTHROCYTE [DISTWIDTH] IN BLOOD BY AUTOMATED COUNT: 12.9 % (ref 12.3–15.4)
GFR SERPL CREATININE-BSD FRML MDRD: 52 ML/MIN/1.73
GLUCOSE BLD-MCNC: 124 MG/DL (ref 65–99)
GLUCOSE UR STRIP-MCNC: NEGATIVE MG/DL
HCT VFR BLD AUTO: 49.7 % (ref 37.5–51)
HGB BLD-MCNC: 16 G/DL (ref 13–17.7)
HGB UR QL STRIP.AUTO: NEGATIVE
KETONES UR QL STRIP: NEGATIVE
LEUKOCYTE ESTERASE UR QL STRIP.AUTO: NEGATIVE
MCH RBC QN AUTO: 31.7 PG (ref 26.6–33)
MCHC RBC AUTO-ENTMCNC: 32.2 G/DL (ref 31.5–35.7)
MCV RBC AUTO: 98.6 FL (ref 79–97)
NITRITE UR QL STRIP: NEGATIVE
PH UR STRIP.AUTO: 6 [PH] (ref 5–8)
PLATELET # BLD AUTO: 242 10*3/MM3 (ref 140–450)
PMV BLD AUTO: 10.4 FL (ref 6–12)
POTASSIUM BLD-SCNC: 4 MMOL/L (ref 3.5–5.2)
PROT UR QL STRIP: NEGATIVE
RBC # BLD AUTO: 5.04 10*6/MM3 (ref 4.14–5.8)
SODIUM BLD-SCNC: 142 MMOL/L (ref 136–145)
SP GR UR STRIP: 1.03 (ref 1–1.03)
UROBILINOGEN UR QL STRIP: NORMAL
WBC NRBC COR # BLD: 14.37 10*3/MM3 (ref 3.4–10.8)

## 2019-07-24 PROCEDURE — 36415 COLL VENOUS BLD VENIPUNCTURE: CPT

## 2019-07-24 PROCEDURE — 85027 COMPLETE CBC AUTOMATED: CPT

## 2019-07-24 PROCEDURE — 81003 URINALYSIS AUTO W/O SCOPE: CPT

## 2019-07-24 PROCEDURE — 80048 BASIC METABOLIC PNL TOTAL CA: CPT

## 2019-10-08 ENCOUNTER — APPOINTMENT (OUTPATIENT)
Dept: LAB | Facility: HOSPITAL | Age: 74
End: 2019-10-08

## 2019-10-08 ENCOUNTER — OFFICE VISIT (OUTPATIENT)
Dept: CARDIOLOGY | Facility: CLINIC | Age: 74
End: 2019-10-08

## 2019-10-08 VITALS
SYSTOLIC BLOOD PRESSURE: 112 MMHG | WEIGHT: 150 LBS | HEIGHT: 68 IN | DIASTOLIC BLOOD PRESSURE: 78 MMHG | HEART RATE: 69 BPM | BODY MASS INDEX: 22.73 KG/M2

## 2019-10-08 DIAGNOSIS — I71.40 ABDOMINAL AORTIC ANEURYSM (AAA) WITHOUT RUPTURE (HCC): ICD-10-CM

## 2019-10-08 DIAGNOSIS — I25.10 CORONARY ARTERY DISEASE INVOLVING NATIVE CORONARY ARTERY OF NATIVE HEART WITHOUT ANGINA PECTORIS: Primary | ICD-10-CM

## 2019-10-08 DIAGNOSIS — N18.30 STAGE 3 CHRONIC KIDNEY DISEASE (HCC): ICD-10-CM

## 2019-10-08 DIAGNOSIS — I25.10 CORONARY ARTERY DISEASE DUE TO CALCIFIED CORONARY LESION: ICD-10-CM

## 2019-10-08 DIAGNOSIS — E78.5 HYPERLIPIDEMIA, UNSPECIFIED HYPERLIPIDEMIA TYPE: ICD-10-CM

## 2019-10-08 DIAGNOSIS — I25.84 CORONARY ARTERY DISEASE DUE TO CALCIFIED CORONARY LESION: ICD-10-CM

## 2019-10-08 LAB
ALBUMIN SERPL-MCNC: 4.7 G/DL (ref 3.5–5.2)
ALBUMIN/GLOB SERPL: 1.7 G/DL
ALP SERPL-CCNC: 56 U/L (ref 39–117)
ALT SERPL W P-5'-P-CCNC: 11 U/L (ref 1–41)
ANION GAP SERPL CALCULATED.3IONS-SCNC: 11.7 MMOL/L (ref 5–15)
AST SERPL-CCNC: 13 U/L (ref 1–40)
BASOPHILS # BLD AUTO: 0.06 10*3/MM3 (ref 0–0.2)
BASOPHILS NFR BLD AUTO: 0.7 % (ref 0–1.5)
BILIRUB SERPL-MCNC: 0.4 MG/DL (ref 0.2–1.2)
BUN BLD-MCNC: 20 MG/DL (ref 8–23)
BUN/CREAT SERPL: 13 (ref 7–25)
CALCIUM SPEC-SCNC: 9.6 MG/DL (ref 8.6–10.5)
CHLORIDE SERPL-SCNC: 106 MMOL/L (ref 98–107)
CHOLEST SERPL-MCNC: 279 MG/DL (ref 0–200)
CO2 SERPL-SCNC: 24.3 MMOL/L (ref 22–29)
CREAT BLD-MCNC: 1.54 MG/DL (ref 0.76–1.27)
DEPRECATED RDW RBC AUTO: 44.3 FL (ref 37–54)
EOSINOPHIL # BLD AUTO: 0.2 10*3/MM3 (ref 0–0.4)
EOSINOPHIL NFR BLD AUTO: 2.2 % (ref 0.3–6.2)
ERYTHROCYTE [DISTWIDTH] IN BLOOD BY AUTOMATED COUNT: 12.4 % (ref 12.3–15.4)
GFR SERPL CREATININE-BSD FRML MDRD: 44 ML/MIN/1.73
GLOBULIN UR ELPH-MCNC: 2.8 GM/DL
GLUCOSE BLD-MCNC: 112 MG/DL (ref 65–99)
HCT VFR BLD AUTO: 46.5 % (ref 37.5–51)
HDLC SERPL-MCNC: 44 MG/DL (ref 40–60)
HGB BLD-MCNC: 15.6 G/DL (ref 13–17.7)
IMM GRANULOCYTES # BLD AUTO: 0.04 10*3/MM3 (ref 0–0.05)
IMM GRANULOCYTES NFR BLD AUTO: 0.4 % (ref 0–0.5)
LDLC SERPL CALC-MCNC: 206 MG/DL (ref 0–100)
LDLC/HDLC SERPL: 4.69 {RATIO}
LYMPHOCYTES # BLD AUTO: 1.57 10*3/MM3 (ref 0.7–3.1)
LYMPHOCYTES NFR BLD AUTO: 17.4 % (ref 19.6–45.3)
MCH RBC QN AUTO: 32.2 PG (ref 26.6–33)
MCHC RBC AUTO-ENTMCNC: 33.5 G/DL (ref 31.5–35.7)
MCV RBC AUTO: 96.1 FL (ref 79–97)
MONOCYTES # BLD AUTO: 0.72 10*3/MM3 (ref 0.1–0.9)
MONOCYTES NFR BLD AUTO: 8 % (ref 5–12)
NEUTROPHILS # BLD AUTO: 6.41 10*3/MM3 (ref 1.7–7)
NEUTROPHILS NFR BLD AUTO: 71.3 % (ref 42.7–76)
NRBC BLD AUTO-RTO: 0.1 /100 WBC (ref 0–0.2)
PLATELET # BLD AUTO: 238 10*3/MM3 (ref 140–450)
PMV BLD AUTO: 10.1 FL (ref 6–12)
POTASSIUM BLD-SCNC: 4.5 MMOL/L (ref 3.5–5.2)
PROT SERPL-MCNC: 7.5 G/DL (ref 6–8.5)
RBC # BLD AUTO: 4.84 10*6/MM3 (ref 4.14–5.8)
SODIUM BLD-SCNC: 142 MMOL/L (ref 136–145)
TRIGL SERPL-MCNC: 143 MG/DL (ref 0–150)
VLDLC SERPL-MCNC: 28.6 MG/DL (ref 5–40)
WBC NRBC COR # BLD: 9 10*3/MM3 (ref 3.4–10.8)

## 2019-10-08 PROCEDURE — 99213 OFFICE O/P EST LOW 20 MIN: CPT | Performed by: INTERNAL MEDICINE

## 2019-10-08 PROCEDURE — 80053 COMPREHEN METABOLIC PANEL: CPT | Performed by: INTERNAL MEDICINE

## 2019-10-08 PROCEDURE — 93000 ELECTROCARDIOGRAM COMPLETE: CPT | Performed by: INTERNAL MEDICINE

## 2019-10-08 PROCEDURE — 80061 LIPID PANEL: CPT | Performed by: INTERNAL MEDICINE

## 2019-10-08 PROCEDURE — 36415 COLL VENOUS BLD VENIPUNCTURE: CPT | Performed by: INTERNAL MEDICINE

## 2019-10-08 PROCEDURE — 85025 COMPLETE CBC W/AUTO DIFF WBC: CPT | Performed by: INTERNAL MEDICINE

## 2019-10-08 RX ORDER — EZETIMIBE 10 MG/1
10 TABLET ORAL DAILY
Qty: 90 TABLET | Refills: 1 | Status: SHIPPED | OUTPATIENT
Start: 2019-10-08 | End: 2020-04-08 | Stop reason: SDUPTHER

## 2019-10-08 RX ORDER — OMEGA-3 ACID ETHYL ESTERS 1 G
1 CAPSULE ORAL 2 TIMES DAILY
Qty: 180 CAPSULE | Refills: 1 | Status: SHIPPED | OUTPATIENT
Start: 2019-10-08 | End: 2020-04-08 | Stop reason: SDUPTHER

## 2019-10-08 RX ORDER — CLOPIDOGREL BISULFATE 75 MG
75 TABLET ORAL DAILY
Qty: 90 TABLET | Refills: 3 | Status: SHIPPED | OUTPATIENT
Start: 2019-10-08 | End: 2020-04-08 | Stop reason: SDUPTHER

## 2019-10-09 PROBLEM — I25.10 CORONARY ARTERY DISEASE INVOLVING NATIVE CORONARY ARTERY OF NATIVE HEART WITHOUT ANGINA PECTORIS: Status: ACTIVE | Noted: 2019-10-09

## 2019-10-10 ENCOUNTER — TELEPHONE (OUTPATIENT)
Dept: CARDIOLOGY | Facility: CLINIC | Age: 74
End: 2019-10-10

## 2019-10-11 NOTE — TELEPHONE ENCOUNTER
Is let patient know LDL remains quite high and to try to pursue low-cholesterol diet.  In addition let him know that his kidney tests are worse and to see his PCP regarding additional assessment/treatment of renal function.. karthik

## 2020-04-08 ENCOUNTER — OFFICE VISIT (OUTPATIENT)
Dept: CARDIOLOGY | Facility: CLINIC | Age: 75
End: 2020-04-08

## 2020-04-08 VITALS — HEIGHT: 68 IN | WEIGHT: 145 LBS | BODY MASS INDEX: 21.98 KG/M2

## 2020-04-08 DIAGNOSIS — E78.5 HYPERLIPIDEMIA, UNSPECIFIED HYPERLIPIDEMIA TYPE: ICD-10-CM

## 2020-04-08 DIAGNOSIS — I25.10 CORONARY ARTERY DISEASE INVOLVING NATIVE CORONARY ARTERY OF NATIVE HEART WITHOUT ANGINA PECTORIS: Primary | ICD-10-CM

## 2020-04-08 PROCEDURE — G2012 BRIEF CHECK IN BY MD/QHP: HCPCS | Performed by: NURSE PRACTITIONER

## 2020-04-08 RX ORDER — EZETIMIBE 10 MG/1
10 TABLET ORAL DAILY
Qty: 90 TABLET | Refills: 1 | Status: SHIPPED | OUTPATIENT
Start: 2020-04-08 | End: 2020-07-16

## 2020-04-08 RX ORDER — OMEGA-3 ACID ETHYL ESTERS 1 G
1 CAPSULE ORAL 2 TIMES DAILY
Qty: 180 CAPSULE | Refills: 1 | Status: SHIPPED | OUTPATIENT
Start: 2020-04-08 | End: 2020-10-05

## 2020-04-08 RX ORDER — CLOPIDOGREL BISULFATE 75 MG
75 TABLET ORAL DAILY
Qty: 90 TABLET | Refills: 3 | Status: SHIPPED | OUTPATIENT
Start: 2020-04-08 | End: 2020-10-19 | Stop reason: SDUPTHER

## 2020-04-08 NOTE — PROGRESS NOTES
"Date of Office Visit: 2020  Encounter Provider: Shayy Sellers, LUIS ALBERTO, APRN  Place of Service: [facility] CARDIOLOGY  Patient Name: Mannie Fajardo  :1945        Subjective:     Chief Complaint:  Follow-up, coronary artery disease, dyslipidemia.       History of Present Illness:  Manine Fajardo is a 74 y.o. male patient of Dr. Giordano.  I am doing a telehealth visit with patient and have reviewed his records.     Patient has a history of coronary artery disease, hyperlipidemia, hyperhomocysteinemia.    PER PREVIOUS OFFICE NOTE: In , after an abnormal stress test, he underwent cardiac catheterization that revealed severe disease of the circumflex artery for which he received a drug-coated stent.  He has not had any intervening events since then, though he has maintained aspirin and Plavix therapy.  In 2016 with complaints of chest pain Lexiscan perfusion study was negative for ischemia.  Stress screening study in 2017 showed a small abdominal aortic aneurysm for his vascular surgeons felt to be stable.    THE BELOW IS MY CONTRIBUTION TO NOTE:      Patient has called into the office today for a telehealth phone follow-up appointment.  Patient reports he has been feeling ok since last visit, about the same.  Unfortunately he is still dealing with chronic back pain.  He is not checking BP or HR.  Not doing much exercise-- chronic back pain limits this, per patient.  Staying fairly active around house.  Does not cook, does not sound like he is eating healthy, reports he eats a lot of \"easy meals.\"    Discussed low cholesterol healthy heart diet.  Discussed trying to increase activity levels.  Discussed recommendations for follow-up with PCP and follow-up labs to recheck cholesterol.          Past Medical History:   Diagnosis Date   • AAA (abdominal aortic aneurysm) without rupture (CMS/HCC)    • Arthritis     back   • Back pain    • Backache    • CAD (coronary " atherosclerotic disease)    • Disease of thyroid gland    • Dyslipidemia    • Edema    • History of transfusion    • Hyperhomocystinemia (CMS/HCC)    • Hyperlipidemia    • Stage 3 chronic kidney disease (CMS/HCC)      Past Surgical History:   Procedure Laterality Date   • APPENDECTOMY     • CARDIAC SURGERY     • CORONARY ANGIOPLASTY WITH STENT PLACEMENT     • EXTRACORPOREAL SHOCKWAVE LITHOTRIPSY (ESWL), STENT INSERTION/REMOVAL Right 2/24/2017    Procedure: CYSTO RETROGRADE WITH STENT PLACEMENT;  Surgeon: Janes López MD;  Location: [facility] OR [facility];  Service:      Outpatient Medications Prior to Visit   Medication Sig Dispense Refill   • aspirin 81 MG chewable tablet Chew 81 mg Daily.     • folic acid (RA FOLIC ACID) 400 MCG tablet Take 400 mcg by mouth Daily.     • ezetimibe (ZETIA) 10 MG tablet Take 1 tablet by mouth Daily. Do NOT substitute 90 tablet 1   • LOVAZA 1 g capsule Take 1 capsule by mouth 2 (Two) Times a Day. Do NOT substitute 180 capsule 1   • PLAVIX 75 MG tablet Take 1 tablet by mouth Daily. Only give the Brand name Plavix. DO NOT SUBSTITUTE 90 tablet 3     No facility-administered medications prior to visit.        Allergies as of 04/08/2020 - Reviewed 04/08/2020   Allergen Reaction Noted   • Adhesive tape Other (See Comments) 04/14/2016   • Statins Other (See Comments) 04/14/2016     Social History     Socioeconomic History   • Marital status: Single     Spouse name: Not on file   • Number of children: 0   • Years of education: COLLEGE   • Highest education level: Not on file   Occupational History   • Occupation: RETIRED   Tobacco Use   • Smoking status: Never Smoker   • Smokeless tobacco: Never Used   Substance and Sexual Activity   • Alcohol use: No     Comment: Daily caffeine use   • Drug use: No   • Sexual activity: Defer     Family History   Problem Relation Age of Onset   • Heart disease Mother    • Stroke Father        Review of Systems   Constitution: Positive for malaise/fatigue  "(chronic fatigue, attributes to chronic pain, same as last visit, not worsening). Negative for chills and fever.   Cardiovascular: Negative for chest pain, dyspnea on exertion, leg swelling, palpitations and syncope.   Respiratory: Negative for cough and shortness of breath.    Hematologic/Lymphatic: Negative for bleeding problem.   Musculoskeletal: Positive for back pain. Negative for falls.   Gastrointestinal: Negative for melena.   Genitourinary: Negative for hematuria.   Neurological: Negative for dizziness.          Objective:     Vitals:    04/08/20 1136   Weight: 65.8 kg (145 lb)   Height: 172.7 cm (68\")     Body mass index is 22.05 kg/m².        Assessment:       Diagnosis Plan   1. Coronary artery disease involving native coronary artery of native heart without angina pectoris  PLAVIX 75 MG tablet    LOVAZA 1 g capsule    ezetimibe (Zetia) 10 MG tablet   2. Hyperlipidemia, unspecified hyperlipidemia type  LOVAZA 1 g capsule    ezetimibe (Zetia) 10 MG tablet         Plan:     1. Coronary artery disease: with history of stent placement.  Denies anginal symptoms.  Remains on DAPT.    2. Hyperlipidemia: intolerant of statins.  On Lovaza and Zetia.  Recommended repeat cholesterol level in a month but patient declines until next visit.  Discussed need to improve diet to help lower cholesterol.  Recommended follow-up with PCP as well.   3. Renal insufficiency: followed by nephrology  4. AAA: followed by vascular   5. Hyperhomocystinemia: on folic acid.     Patient to follow-up with Dr. Giordano in 6 months or sooner if needed for any new, recurrent, or worsening symptoms or other problems/ concerns.     This patient has consented to a telehealth visit via phone. The visit was scheduled as a telehealth phone visit to comply with patient safety concerns in accordance with CDC recommendations.  All vitals recorded within this visit are reported by the patient.  I spent  10 minutes in direct conversation with this patient. "            Your medication list           Accurate as of April 8, 2020 11:56 AM. If you have any questions, ask your nurse or doctor.               CONTINUE taking these medications      Instructions Last Dose Given Next Dose Due   aspirin 81 MG chewable tablet      Chew 81 mg Daily.       ezetimibe 10 MG tablet  Commonly known as:  Zetia      Take 1 tablet by mouth Daily. Do NOT substitute       Lovaza 1 g capsule  Generic drug:  omega-3 acid ethyl esters      Take 1 capsule by mouth 2 (Two) Times a Day. Do NOT substitute       Plavix 75 MG tablet  Generic drug:  clopidogrel      Take 1 tablet by mouth Daily. Only give the Brand name Plavix. DO NOT SUBSTITUTE       RA Folic Acid 400 MCG tablet  Generic drug:  folic acid      Take 400 mcg by mouth Daily.             Where to Get Your Medications      These medications were sent to [facility]/pharmacy #[id number] - [place], [place] - [address] [place] [address] RD. AT [facility] - [phone] [facility] [phone]   [address] [place] [address] [address] [place] [address]    Phone:  [phone]   · Lovaza 1 g capsule  · Plavix 75 MG tablet     Information about where to get these medications is not yet available    Ask your nurse or doctor about these medications  · ezetimibe 10 MG tablet         I did not stop or change the above medications.  Patient's medication list was updated to reflect medications they are currently taking including medication changes made by other providers.          Thanks,    Shayy Sellers, LUIS ALBERTO, APRN  04/08/2020           Dictated utilizing Dragon dictation

## 2020-04-09 ENCOUNTER — TELEPHONE (OUTPATIENT)
Dept: CARDIOLOGY | Facility: CLINIC | Age: 75
End: 2020-04-09

## 2020-05-28 ENCOUNTER — TRANSCRIBE ORDERS (OUTPATIENT)
Dept: ADMINISTRATIVE | Facility: HOSPITAL | Age: 75
End: 2020-05-28

## 2020-05-28 ENCOUNTER — LAB (OUTPATIENT)
Dept: LAB | Facility: HOSPITAL | Age: 75
End: 2020-05-28

## 2020-05-28 DIAGNOSIS — E55.9 VITAMIN D DEFICIENCY: ICD-10-CM

## 2020-05-28 DIAGNOSIS — N18.30 CHRONIC KIDNEY DISEASE, STAGE III (MODERATE) (HCC): Primary | ICD-10-CM

## 2020-05-28 DIAGNOSIS — I12.9 HYPERTENSIVE NEPHROPATHY: ICD-10-CM

## 2020-05-28 DIAGNOSIS — N18.30 CHRONIC KIDNEY DISEASE, STAGE III (MODERATE) (HCC): ICD-10-CM

## 2020-05-28 LAB
25(OH)D3 SERPL-MCNC: 23.1 NG/ML (ref 30–100)
ANION GAP SERPL CALCULATED.3IONS-SCNC: 11.1 MMOL/L (ref 5–15)
BACTERIA UR QL AUTO: ABNORMAL /HPF
BILIRUB UR QL STRIP: NEGATIVE
BUN BLD-MCNC: 20 MG/DL (ref 8–23)
BUN/CREAT SERPL: 13.9 (ref 7–25)
CALCIUM SPEC-SCNC: 9.5 MG/DL (ref 8.6–10.5)
CHLORIDE SERPL-SCNC: 105 MMOL/L (ref 98–107)
CLARITY UR: CLEAR
CO2 SERPL-SCNC: 24.9 MMOL/L (ref 22–29)
COLOR UR: ABNORMAL
CREAT BLD-MCNC: 1.44 MG/DL (ref 0.76–1.27)
DEPRECATED RDW RBC AUTO: 45.5 FL (ref 37–54)
ERYTHROCYTE [DISTWIDTH] IN BLOOD BY AUTOMATED COUNT: 12.9 % (ref 12.3–15.4)
GFR SERPL CREATININE-BSD FRML MDRD: 48 ML/MIN/1.73
GLUCOSE BLD-MCNC: 118 MG/DL (ref 65–99)
GLUCOSE UR STRIP-MCNC: NEGATIVE MG/DL
HCT VFR BLD AUTO: 46 % (ref 37.5–51)
HGB BLD-MCNC: 15.6 G/DL (ref 13–17.7)
HGB UR QL STRIP.AUTO: NEGATIVE
HYALINE CASTS UR QL AUTO: ABNORMAL /LPF
KETONES UR QL STRIP: ABNORMAL
LEUKOCYTE ESTERASE UR QL STRIP.AUTO: ABNORMAL
MCH RBC QN AUTO: 32.2 PG (ref 26.6–33)
MCHC RBC AUTO-ENTMCNC: 33.9 G/DL (ref 31.5–35.7)
MCV RBC AUTO: 95 FL (ref 79–97)
NITRITE UR QL STRIP: NEGATIVE
PH UR STRIP.AUTO: <=5 [PH] (ref 5–8)
PLATELET # BLD AUTO: 245 10*3/MM3 (ref 140–450)
PMV BLD AUTO: 10.4 FL (ref 6–12)
POTASSIUM BLD-SCNC: 4.2 MMOL/L (ref 3.5–5.2)
PROT UR QL STRIP: ABNORMAL
RBC # BLD AUTO: 4.84 10*6/MM3 (ref 4.14–5.8)
RBC # UR: ABNORMAL /HPF
REF LAB TEST METHOD: ABNORMAL
SODIUM BLD-SCNC: 141 MMOL/L (ref 136–145)
SP GR UR STRIP: 1.02 (ref 1–1.03)
SQUAMOUS #/AREA URNS HPF: ABNORMAL /HPF
UROBILINOGEN UR QL STRIP: ABNORMAL
WBC NRBC COR # BLD: 9.04 10*3/MM3 (ref 3.4–10.8)
WBC UR QL AUTO: ABNORMAL /HPF

## 2020-05-28 PROCEDURE — 80048 BASIC METABOLIC PNL TOTAL CA: CPT

## 2020-05-28 PROCEDURE — 85027 COMPLETE CBC AUTOMATED: CPT

## 2020-05-28 PROCEDURE — 81001 URINALYSIS AUTO W/SCOPE: CPT

## 2020-05-28 PROCEDURE — 36415 COLL VENOUS BLD VENIPUNCTURE: CPT

## 2020-05-28 PROCEDURE — 82306 VITAMIN D 25 HYDROXY: CPT

## 2020-07-15 DIAGNOSIS — E78.5 HYPERLIPIDEMIA, UNSPECIFIED HYPERLIPIDEMIA TYPE: ICD-10-CM

## 2020-07-15 DIAGNOSIS — I25.10 CORONARY ARTERY DISEASE INVOLVING NATIVE CORONARY ARTERY OF NATIVE HEART WITHOUT ANGINA PECTORIS: ICD-10-CM

## 2020-07-16 RX ORDER — EZETIMIBE 10 MG/1
TABLET ORAL
Qty: 90 TABLET | Refills: 1 | Status: SHIPPED | OUTPATIENT
Start: 2020-07-16 | End: 2020-10-19 | Stop reason: SDUPTHER

## 2020-10-05 DIAGNOSIS — E78.5 HYPERLIPIDEMIA, UNSPECIFIED HYPERLIPIDEMIA TYPE: ICD-10-CM

## 2020-10-05 DIAGNOSIS — I25.10 CORONARY ARTERY DISEASE INVOLVING NATIVE CORONARY ARTERY OF NATIVE HEART WITHOUT ANGINA PECTORIS: ICD-10-CM

## 2020-10-05 DIAGNOSIS — E78.5 HYPERLIPIDEMIA, UNSPECIFIED HYPERLIPIDEMIA TYPE: Primary | ICD-10-CM

## 2020-10-05 RX ORDER — OMEGA-3 ACID ETHYL ESTERS 1 G
1 CAPSULE ORAL 2 TIMES DAILY
Qty: 60 CAPSULE | Refills: 0 | Status: SHIPPED | OUTPATIENT
Start: 2020-10-05 | End: 2020-10-19 | Stop reason: SDUPTHER

## 2020-10-08 ENCOUNTER — LAB (OUTPATIENT)
Dept: LAB | Facility: HOSPITAL | Age: 75
End: 2020-10-08

## 2020-10-08 DIAGNOSIS — E78.5 HYPERLIPIDEMIA, UNSPECIFIED HYPERLIPIDEMIA TYPE: ICD-10-CM

## 2020-10-08 LAB
ALBUMIN SERPL-MCNC: 4.4 G/DL (ref 3.5–5.2)
ALBUMIN/GLOB SERPL: 1.8 G/DL
ALP SERPL-CCNC: 67 U/L (ref 39–117)
ALT SERPL W P-5'-P-CCNC: 15 U/L (ref 1–41)
ANION GAP SERPL CALCULATED.3IONS-SCNC: 8 MMOL/L (ref 5–15)
AST SERPL-CCNC: 21 U/L (ref 1–40)
BILIRUB SERPL-MCNC: 0.4 MG/DL (ref 0–1.2)
BUN SERPL-MCNC: 18 MG/DL (ref 8–23)
BUN/CREAT SERPL: 13.5 (ref 7–25)
CALCIUM SPEC-SCNC: 9.1 MG/DL (ref 8.6–10.5)
CHLORIDE SERPL-SCNC: 107 MMOL/L (ref 98–107)
CHOLEST SERPL-MCNC: 233 MG/DL (ref 0–200)
CO2 SERPL-SCNC: 24 MMOL/L (ref 22–29)
CREAT SERPL-MCNC: 1.33 MG/DL (ref 0.76–1.27)
GFR SERPL CREATININE-BSD FRML MDRD: 52 ML/MIN/1.73
GLOBULIN UR ELPH-MCNC: 2.4 GM/DL
GLUCOSE SERPL-MCNC: 108 MG/DL (ref 65–99)
HDLC SERPL-MCNC: 40 MG/DL (ref 40–60)
LDLC SERPL CALC-MCNC: 156 MG/DL (ref 0–100)
LDLC/HDLC SERPL: 3.81 {RATIO}
POTASSIUM SERPL-SCNC: 4.5 MMOL/L (ref 3.5–5.2)
PROT SERPL-MCNC: 6.8 G/DL (ref 6–8.5)
SODIUM SERPL-SCNC: 139 MMOL/L (ref 136–145)
TRIGL SERPL-MCNC: 204 MG/DL (ref 0–150)
VLDLC SERPL-MCNC: 37 MG/DL (ref 5–40)

## 2020-10-08 PROCEDURE — 80061 LIPID PANEL: CPT

## 2020-10-08 PROCEDURE — 80053 COMPREHEN METABOLIC PANEL: CPT

## 2020-10-08 PROCEDURE — 36415 COLL VENOUS BLD VENIPUNCTURE: CPT

## 2020-10-09 ENCOUNTER — TELEPHONE (OUTPATIENT)
Dept: CARDIOLOGY | Facility: CLINIC | Age: 75
End: 2020-10-09

## 2020-10-09 NOTE — PROGRESS NOTES
Called patient regarding his labs.  Cholesterol levels still not controlled.  He is on Lovaza 1 g twice daily and Zetia.  He does not tolerate statins.  He needs to work on diet and exercise.  He will discuss increasing low vase had upcoming appointment with Dr. Giordano 10/19/2020.  Renal function abnormal but overall is stable.  Creatinine minimally elevated 1.33 with a GFR of 52 normal electrolytes glucose mildly elevated 108 and he should see his PCP about that.  He sees nephrology Dr. Alexis Reed.  I believe the refills he needed were taken care of but he will call our office or have his pharmacy fax request if they are not.  He believes he has enough medicine regardless till his appointment with Dr. Giordano 10/19/2020

## 2020-10-09 NOTE — TELEPHONE ENCOUNTER
Called patient regarding his labs.  Cholesterol levels still not controlled.  He is on Lovaza 1 g twice daily and Zetia.  He does not tolerate statins.  He needs to work on diet and exercise.  He will discuss increasing low vase had upcoming appointment with Dr. Giordano 10/19/2020.  Renal function abnormal but overall is stable.  Creatinine minimally elevated 1.33 with a GFR of 52 normal electrolytes glucose mildly elevated 108 and he should see his PCP about that.  He sees nephrology Dr. Alexis Reed.  I believe the refills he needed were taken care of but he will call our office or have his pharmacy fax request if they are not.  He believes he has enough medicine regardless till his appointment with Dr. Giordano 10/19/2020    Brooke- please fax copy of his labs to his nephrologist so they have been further records Dr. Alexis Reed. thank you..

## 2020-10-19 ENCOUNTER — OFFICE VISIT (OUTPATIENT)
Dept: CARDIOLOGY | Facility: CLINIC | Age: 75
End: 2020-10-19

## 2020-10-19 VITALS
HEIGHT: 68 IN | BODY MASS INDEX: 23.58 KG/M2 | SYSTOLIC BLOOD PRESSURE: 126 MMHG | WEIGHT: 155.6 LBS | HEART RATE: 77 BPM | DIASTOLIC BLOOD PRESSURE: 78 MMHG

## 2020-10-19 DIAGNOSIS — I25.10 CORONARY ARTERY DISEASE INVOLVING NATIVE CORONARY ARTERY OF NATIVE HEART WITHOUT ANGINA PECTORIS: ICD-10-CM

## 2020-10-19 DIAGNOSIS — E78.5 HYPERLIPIDEMIA, UNSPECIFIED HYPERLIPIDEMIA TYPE: ICD-10-CM

## 2020-10-19 DIAGNOSIS — R09.89 OTHER SPECIFIED SYMPTOMS AND SIGNS INVOLVING THE CIRCULATORY AND RESPIRATORY SYSTEMS: ICD-10-CM

## 2020-10-19 DIAGNOSIS — N28.9 RENAL INSUFFICIENCY: ICD-10-CM

## 2020-10-19 DIAGNOSIS — M79.605 PAIN OF LEFT LOWER EXTREMITY: Primary | ICD-10-CM

## 2020-10-19 PROCEDURE — 93000 ELECTROCARDIOGRAM COMPLETE: CPT | Performed by: INTERNAL MEDICINE

## 2020-10-19 PROCEDURE — 99213 OFFICE O/P EST LOW 20 MIN: CPT | Performed by: INTERNAL MEDICINE

## 2020-10-19 RX ORDER — CLOPIDOGREL BISULFATE 75 MG
75 TABLET ORAL DAILY
Qty: 90 TABLET | Refills: 3 | Status: SHIPPED | OUTPATIENT
Start: 2020-10-19 | End: 2021-10-07 | Stop reason: SDUPTHER

## 2020-10-19 RX ORDER — OMEGA-3 ACID ETHYL ESTERS 1 G
2 CAPSULE ORAL 2 TIMES DAILY
Qty: 360 CAPSULE | Refills: 3 | Status: SHIPPED | OUTPATIENT
Start: 2020-10-19 | End: 2021-03-01

## 2020-10-19 RX ORDER — OMEGA-3 ACID ETHYL ESTERS 1 G
2 CAPSULE ORAL 2 TIMES DAILY
Qty: 180 CAPSULE | Refills: 3 | Status: SHIPPED | OUTPATIENT
Start: 2020-10-19 | End: 2020-10-19

## 2020-10-19 RX ORDER — EZETIMIBE 10 MG/1
10 TABLET ORAL DAILY
Qty: 90 TABLET | Refills: 3 | Status: SHIPPED | OUTPATIENT
Start: 2020-10-19 | End: 2021-10-07

## 2020-10-19 RX ORDER — CLOPIDOGREL BISULFATE 75 MG
75 TABLET ORAL DAILY
Qty: 90 TABLET | Refills: 3 | Status: SHIPPED | OUTPATIENT
Start: 2020-10-19 | End: 2020-10-19

## 2020-10-19 RX ORDER — EZETIMIBE 10 MG/1
10 TABLET ORAL DAILY
Qty: 90 TABLET | Refills: 3 | Status: SHIPPED | OUTPATIENT
Start: 2020-10-19 | End: 2020-10-19

## 2020-10-19 NOTE — PROGRESS NOTES
Date of Office Visit: 10/19/2020  Encounter Provider: Marlene Giordano MD  Place of Service: [facility] CARDIOLOGY  Patient Name: Mannie Fajardo  :1945    Chief complaint  Coronary artery disease.    History of Present Illness  The patient is a delightful, 75-year-old gentleman with history of hyperlipidemia, hyperhomocysteinemia, and coronary artery disease.  In , after an abnormal stress test, he underwent cardiac catheterization that revealed severe disease of the circumflex artery for which he received a drug-coated stent.  He has not had any intervening events since then, though he has maintained aspirin and Plavix therapy.  In 2016 with complaints of chest pain Lexiscan perfusion study was negative for ischemia.  Stress screening study in  showed a small abdominal aortic aneurysm for his vascular surgeons felt to be stable.    From first visit he said no chest pain shortness of breath syncope near syncope but he has chronic left lower extremity edema that is mild and dependent.  He continues to complain of leg left pain both with exertion and at rest.  Uncertain if this is more neuropathic..  Lipids checked in October showed an LDL of 156, HDL of 40, triglycerides 204, creatinine 1.33, GFR 52, liver function tests are normal    Past Medical History:   Diagnosis Date   • AAA (abdominal aortic aneurysm) without rupture (CMS/HCC)    • Arthritis     back   • Back pain    • Backache    • CAD (coronary atherosclerotic disease)    • Disease of thyroid gland    • Dyslipidemia    • Edema    • History of transfusion    • Hyperhomocystinemia (CMS/HCC)    • Hyperlipidemia    • Stage 3 chronic kidney disease      Past Surgical History:   Procedure Laterality Date   • APPENDECTOMY     • CARDIAC SURGERY     • CORONARY ANGIOPLASTY WITH STENT PLACEMENT     • EXTRACORPOREAL SHOCKWAVE LITHOTRIPSY (ESWL), STENT INSERTION/REMOVAL Right 2017    Procedure: CYSTO RETROGRADE WITH  STENT PLACEMENT;  Surgeon: Janes López MD;  [place] [facility] [place] [facility] [place]  Service:      Outpatient Medications Prior to Visit   Medication Sig Dispense Refill   • aspirin 81 MG chewable tablet Chew 81 mg Daily.     • folic acid (RA FOLIC ACID) 400 MCG tablet Take 400 mcg by mouth Daily.     • Lovaza 1 g capsule TAKE 1 CAPSULE BY MOUTH 2 (TWO) TIMES A DAY. DO NOT SUBSTITUTE 60 capsule 0   • PLAVIX 75 MG tablet Take 1 tablet by mouth Daily. Only give the Brand name Plavix. DO NOT SUBSTITUTE 90 tablet 3   • ZETIA 10 MG tablet TAKE 1 TABLET BY MOUTH EVERY DAY 90 tablet 1     No facility-administered medications prior to visit.        Allergies as of 10/19/2020 - Reviewed 10/19/2020   Allergen Reaction Noted   • Adhesive tape Other (See Comments) 04/14/2016   • Other Other (See Comments) 04/14/2016   • Statins Other (See Comments) 04/14/2016     Social History     Socioeconomic History   • Marital status: Single     Spouse name: Not on file   • Number of children: 0   • Years of education: COLLEGE   • Highest education level: Not on file   Occupational History   • Occupation: RETIRED   Tobacco Use   • Smoking status: Never Smoker   • Smokeless tobacco: Never Used   Substance and Sexual Activity   • Alcohol use: No     Comment: occ caffeine use   • Drug use: No   • Sexual activity: Defer     Family History   Problem Relation Age of Onset   • Heart disease Mother    • Stroke Father      Review of Systems   Constitution: Negative for fever, malaise/fatigue, weight gain and weight loss.   HENT: Negative for ear pain, hearing loss, nosebleeds and sore throat.    Eyes: Negative for double vision, pain, vision loss in left eye and vision loss in right eye.   Cardiovascular: Positive for claudication (Left leg pain).        See history of present illness.   Respiratory: Negative for cough, shortness of breath, sleep disturbances due to breathing, snoring and wheezing.    Endocrine: Negative for cold intolerance, heat  "intolerance and polyuria.   Skin: Negative for itching, poor wound healing and rash.   Musculoskeletal: Positive for joint pain. Negative for joint swelling and myalgias.   Gastrointestinal: Negative for abdominal pain, diarrhea, hematochezia, nausea and vomiting.   Genitourinary: Negative for hematuria and hesitancy.   Neurological: Negative for numbness, paresthesias and seizures.   Psychiatric/Behavioral: Negative for depression. The patient is not nervous/anxious.         Objective:     Vitals:    10/19/20 1249   BP: 126/78   Pulse: 77   Weight: 70.6 kg (155 lb 9.6 oz)   Height: 172.7 cm (68\")     Body mass index is 23.66 kg/m².    Vitals signs reviewed.   Constitutional:       Appearance: Well-developed.   Eyes:      General: No scleral icterus.        Right eye: No discharge.      Conjunctiva/sclera: Conjunctivae normal.      Pupils: Pupils are equal, round, and reactive to light.   HENT:      Head: Normocephalic.      Nose: Nose normal.   Neck:      Musculoskeletal: Normal range of motion and neck supple.      Thyroid: No thyromegaly.      Vascular: No JVD. JVD normal.   Pulmonary:      Effort: Pulmonary effort is normal. No respiratory distress.      Breath sounds: Normal breath sounds. No wheezing. No rales.   Cardiovascular:      Normal rate. Regular rhythm.      Murmurs: There is no murmur.      No gallop.   Pulses:     Posterior tibial: 1+ on the right side.  Edema:     Peripheral edema absent.   Abdominal:      General: Bowel sounds are normal. There is no distension.      Palpations: Abdomen is soft.      Tenderness: There is no abdominal tenderness. There is no rebound.   Musculoskeletal: Normal range of motion.         General: No tenderness.   Skin:     General: Skin is warm and dry.      Findings: No erythema or rash.   Neurological:      Mental Status: Alert and oriented to person, place, and time.   Psychiatric:         Behavior: Behavior normal.         Thought Content: Thought content normal.   "       Judgment: Judgment normal.       Lab Review:     ECG 12 Lead    Date/Time: 10/19/2020 12:50 PM  Performed by: Marlene Giordano MD  Authorized by: Marlene Giordano MD   Comparison: compared with previous ECG   Similar to previous ECG  Rhythm: sinus rhythm    Clinical impression: normal ECG          Assessment:       Diagnosis Plan   1. Pain of left lower extremity  Duplex Lower Extremity Art / Grafts - Bilateral CAR   2. Coronary artery disease involving native coronary artery of native heart without angina pectoris  ECG 12 Lead    Plavix 75 MG tablet    Zetia 10 MG tablet    Lovaza 1 g capsule   3. Hyperlipidemia, unspecified hyperlipidemia type  Zetia 10 MG tablet    Lovaza 1 g capsule   4. Other specified symptoms and signs involving the circulatory and respiratory systems   Duplex Lower Extremity Art / Grafts - Bilateral CAR   5. Renal insufficiency       Plan:       1.  Coronary artery disease with history of prior drug-coated stent placement.  Last stress test December 2016, negative for ischemia.  Return for follow-up in 6 months plan on stress test in 1 year  2.  Chronic back pain.  This is chronic and unfortunately without much relief on epidurals.  3.  Hyperhomocystinemia on folic acid supplement   4.  History of renal insufficiency  5.  Abdominal aortic aneurysm followed by vascular surgery. Stable at visit in 7/2020  6.  Hyperlipidemia.  Intolerant of statins.  Currently on lovassa  and Zetia  7.  Leg pain.  Pulses slightly diminished on the left.  Will check arterial Doppler study.  8.  Carotid artery plaque.  Followed by vascular surgery felt to be stable for the patient       Your medication list          Accurate as of October 19, 2020 11:59 PM. If you have any questions, ask your nurse or doctor.            CHANGE how you take these medications      Instructions Last Dose Given Next Dose Due   Lovaza 1 g capsule  Generic drug: omega-3 acid ethyl esters  What changed: how much to take  Changed by: Mini  MCKENNA Giordano MD      Take 2 capsules by mouth 2 (Two) Times a Day. Do NOT substitute       Zetia 10 MG tablet  Generic drug: ezetimibe  What changed: how much to take  Changed by: Marlene Giordano MD      Take 1 tablet by mouth Daily.          CONTINUE taking these medications      Instructions Last Dose Given Next Dose Due   aspirin 81 MG chewable tablet      Chew 81 mg Daily.       Plavix 75 MG tablet  Generic drug: clopidogrel      Take 1 tablet by mouth Daily. Only give the Brand name Plavix. DO NOT SUBSTITUTE       RA Folic Acid 400 MCG tablet  Generic drug: folic acid      Take 400 mcg by mouth Daily.             Where to Get Your Medications      These medications were sent to [facility]/pharmacy #[id number] - [place], [place] - [address] [place] [address] RD. AT [facility] - [phone]  - [phone] [address] [place] [id number]    Phone: [phone]   · Lovaza 1 g capsule  · Plavix 75 MG tablet  · Zetia 10 MG tablet         Patient is no longer taking -.  I corrected the med list to reflect this.  I did not stop these medications.    Dictated utilizing Dragon dictation

## 2020-10-26 PROBLEM — R09.89 OTHER SPECIFIED SYMPTOMS AND SIGNS INVOLVING THE CIRCULATORY AND RESPIRATORY SYSTEMS: Status: ACTIVE | Noted: 2020-10-26

## 2020-10-26 PROBLEM — M79.605 PAIN OF LEFT LOWER EXTREMITY: Status: ACTIVE | Noted: 2020-10-26

## 2020-10-26 PROBLEM — N28.9 RENAL INSUFFICIENCY: Status: ACTIVE | Noted: 2020-10-26

## 2020-10-28 ENCOUNTER — TELEPHONE (OUTPATIENT)
Dept: CARDIOLOGY | Facility: CLINIC | Age: 75
End: 2020-10-28

## 2020-10-28 ENCOUNTER — HOSPITAL ENCOUNTER (OUTPATIENT)
Dept: CARDIOLOGY | Facility: HOSPITAL | Age: 75
Discharge: HOME | End: 2020-10-28
Admitting: INTERNAL MEDICINE

## 2020-10-28 DIAGNOSIS — M79.605 PAIN OF LEFT LOWER EXTREMITY: ICD-10-CM

## 2020-10-28 DIAGNOSIS — R09.89 OTHER SPECIFIED SYMPTOMS AND SIGNS INVOLVING THE CIRCULATORY AND RESPIRATORY SYSTEMS: ICD-10-CM

## 2020-10-28 LAB
BH CV LOWER ARTERIAL LEFT ABI RATIO: 1.2
BH CV LOWER ARTERIAL LEFT DORSALIS PEDIS SYS MAX: 144 MMHG
BH CV LOWER ARTERIAL LEFT GREAT TOE SYS MAX: 115 MMHG
BH CV LOWER ARTERIAL LEFT POST TIBIAL SYS MAX: 145 MMHG
BH CV LOWER ARTERIAL LEFT TBI RATIO: 0.92
BH CV LOWER ARTERIAL RIGHT ABI RATIO: 1.3
BH CV LOWER ARTERIAL RIGHT DORSALIS PEDIS SYS MAX: 150 MMHG
BH CV LOWER ARTERIAL RIGHT GREAT TOE SYS MAX: 120 MMHG
BH CV LOWER ARTERIAL RIGHT POST TIBIAL SYS MAX: 161 MMHG
BH CV LOWER ARTERIAL RIGHT TBI RATIO: 0.96
UPPER ARTERIAL LEFT ARM BRACHIAL SYS MAX: 125 MMHG
UPPER ARTERIAL RIGHT ARM BRACHIAL SYS MAX: 125 MMHG

## 2020-10-28 PROCEDURE — 93922 UPR/L XTREMITY ART 2 LEVELS: CPT

## 2020-10-28 NOTE — TELEPHONE ENCOUNTER
Dr Giordano  Pt needs a new PCP and I gave him the new pt liaison, but he would rather have a suggestion form you.     He is seeing Dr Beto montilla neurology and will discuss further testing with him  Thanks  Chichi Sierra RN  Triage nurse

## 2020-10-28 NOTE — PROGRESS NOTES
Triage- Please let patient know his ABIs were normal showing normal pressures which suggest that his leg symptoms likely are not vascular.  He can discuss his symptoms with his vascular surgeon as he follows with vascular.  He can also discuss other testing possible neurologic testing with his PCP.  If he has any further questions or concerns please let me know.

## 2021-02-27 DIAGNOSIS — E78.5 HYPERLIPIDEMIA, UNSPECIFIED HYPERLIPIDEMIA TYPE: ICD-10-CM

## 2021-02-27 DIAGNOSIS — I25.10 CORONARY ARTERY DISEASE INVOLVING NATIVE CORONARY ARTERY OF NATIVE HEART WITHOUT ANGINA PECTORIS: ICD-10-CM

## 2021-03-01 RX ORDER — OMEGA-3 ACID ETHYL ESTERS 1 G
2 CAPSULE ORAL 2 TIMES DAILY
Qty: 360 CAPSULE | Refills: 1 | Status: SHIPPED | OUTPATIENT
Start: 2021-03-01 | End: 2021-09-01

## 2021-03-29 ENCOUNTER — TELEPHONE (OUTPATIENT)
Dept: CARDIOLOGY | Facility: CLINIC | Age: 76
End: 2021-03-29

## 2021-05-27 ENCOUNTER — TRANSCRIBE ORDERS (OUTPATIENT)
Dept: ADMINISTRATIVE | Facility: HOSPITAL | Age: 76
End: 2021-05-27

## 2021-05-27 ENCOUNTER — LAB (OUTPATIENT)
Dept: LAB | Facility: HOSPITAL | Age: 76
End: 2021-05-27

## 2021-05-27 DIAGNOSIS — N18.30 MALIGNANT HYPERTENSIVE HEART AND CHRONIC KIDNEY DISEASE STAGE III (HCC): ICD-10-CM

## 2021-05-27 DIAGNOSIS — I12.9 HYPERTENSIVE NEPHROPATHY: ICD-10-CM

## 2021-05-27 DIAGNOSIS — E55.9 VITAMIN D DEFICIENCY: Primary | ICD-10-CM

## 2021-05-27 DIAGNOSIS — I13.10 MALIGNANT HYPERTENSIVE HEART AND CHRONIC KIDNEY DISEASE STAGE III (HCC): ICD-10-CM

## 2021-05-27 DIAGNOSIS — E55.9 VITAMIN D DEFICIENCY: ICD-10-CM

## 2021-05-27 LAB
25(OH)D3 SERPL-MCNC: 19.2 NG/ML (ref 30–100)
ALBUMIN SERPL-MCNC: 4.7 G/DL (ref 3.5–5.2)
ANION GAP SERPL CALCULATED.3IONS-SCNC: 10.5 MMOL/L (ref 5–15)
BACTERIA UR QL AUTO: ABNORMAL /HPF
BILIRUB UR QL STRIP: NEGATIVE
BUN SERPL-MCNC: 20 MG/DL (ref 8–23)
BUN/CREAT SERPL: 13.2 (ref 7–25)
CALCIUM SPEC-SCNC: 10.1 MG/DL (ref 8.6–10.5)
CHLORIDE SERPL-SCNC: 105 MMOL/L (ref 98–107)
CLARITY UR: CLEAR
CO2 SERPL-SCNC: 26.5 MMOL/L (ref 22–29)
COLOR UR: YELLOW
CREAT SERPL-MCNC: 1.52 MG/DL (ref 0.76–1.27)
GFR SERPL CREATININE-BSD FRML MDRD: 45 ML/MIN/1.73
GLUCOSE SERPL-MCNC: 111 MG/DL (ref 65–99)
GLUCOSE UR STRIP-MCNC: NEGATIVE MG/DL
HGB UR QL STRIP.AUTO: NEGATIVE
HYALINE CASTS UR QL AUTO: ABNORMAL /LPF
KETONES UR QL STRIP: ABNORMAL
LEUKOCYTE ESTERASE UR QL STRIP.AUTO: ABNORMAL
NITRITE UR QL STRIP: NEGATIVE
PH UR STRIP.AUTO: <=5 [PH] (ref 5–8)
PHOSPHATE SERPL-MCNC: 3.5 MG/DL (ref 2.5–4.5)
POTASSIUM SERPL-SCNC: 4.4 MMOL/L (ref 3.5–5.2)
PROT UR QL STRIP: NEGATIVE
RBC # UR: ABNORMAL /HPF
REF LAB TEST METHOD: ABNORMAL
SODIUM SERPL-SCNC: 142 MMOL/L (ref 136–145)
SP GR UR STRIP: 1.02 (ref 1–1.03)
SQUAMOUS #/AREA URNS HPF: ABNORMAL /HPF
UROBILINOGEN UR QL STRIP: ABNORMAL
WBC UR QL AUTO: ABNORMAL /HPF

## 2021-05-27 PROCEDURE — 81001 URINALYSIS AUTO W/SCOPE: CPT

## 2021-05-27 PROCEDURE — 80069 RENAL FUNCTION PANEL: CPT | Performed by: INTERNAL MEDICINE

## 2021-05-27 PROCEDURE — 36415 COLL VENOUS BLD VENIPUNCTURE: CPT | Performed by: INTERNAL MEDICINE

## 2021-05-27 PROCEDURE — 82306 VITAMIN D 25 HYDROXY: CPT

## 2021-06-09 ENCOUNTER — OFFICE VISIT (OUTPATIENT)
Dept: CARDIOLOGY | Facility: CLINIC | Age: 76
End: 2021-06-09

## 2021-06-09 VITALS
WEIGHT: 163 LBS | HEIGHT: 68 IN | DIASTOLIC BLOOD PRESSURE: 68 MMHG | HEART RATE: 72 BPM | BODY MASS INDEX: 24.71 KG/M2 | SYSTOLIC BLOOD PRESSURE: 112 MMHG

## 2021-06-09 DIAGNOSIS — N28.9 RENAL INSUFFICIENCY: ICD-10-CM

## 2021-06-09 DIAGNOSIS — I25.10 CORONARY ARTERY DISEASE INVOLVING NATIVE CORONARY ARTERY OF NATIVE HEART WITHOUT ANGINA PECTORIS: Primary | ICD-10-CM

## 2021-06-09 DIAGNOSIS — R60.0 EDEMA LEG: ICD-10-CM

## 2021-06-09 PROCEDURE — 93000 ELECTROCARDIOGRAM COMPLETE: CPT | Performed by: INTERNAL MEDICINE

## 2021-06-09 PROCEDURE — 99214 OFFICE O/P EST MOD 30 MIN: CPT | Performed by: INTERNAL MEDICINE

## 2021-06-09 RX ORDER — ERGOCALCIFEROL 1.25 MG/1
50000 CAPSULE ORAL WEEKLY
COMMUNITY
Start: 2021-06-03 | End: 2022-05-18

## 2021-06-09 NOTE — PROGRESS NOTES
Date of Office Visit: 2021  Encounter Provider: Marlene Giordano MD  Place of Service: [facility] CARDIOLOGY  Patient Name: Mannie Fajardo  :1945    Chief complaint  Coronary artery disease    History of Present Illness  The patient is a delightful, 76-year-old gentleman with history of hyperlipidemia, hyperhomocysteinemia, and coronary artery disease.  In , after an abnormal stress test, he underwent cardiac catheterization that revealed severe disease of the circumflex artery for which he received a drug-coated stent.  He has not had any intervening events since then, though he has maintained aspirin and Plavix therapy.  In 2016 with complaints of chest pain Lexiscan perfusion study was negative for ischemia.  Stress screening study in  showed a small abdominal aortic aneurysm for his vascular surgeons felt to be stable.    Since last visit he has had no palpitation shortness of breath dizziness chest pain.  He states his had progressive edema over the past several months noted shortly after the last office visit with me.  About 1 week ago tried to improve prior to his appointment with Dr. Miranda.  At that visit he was given instructions to take Lasix twice daily but he took 1 dose 1 had significant polyuria.  Therefore he has not taken any further.  His edema is almost completely resolved.  Blood work from 2021 showed creatinine 1.52, GFR 45, potassium 4.4    Past Medical History:   Diagnosis Date   • AAA (abdominal aortic aneurysm) without rupture (CMS/HCC)    • Arthritis     back   • Back pain    • Backache    • CAD (coronary atherosclerotic disease)    • Disease of thyroid gland    • Dyslipidemia    • Edema    • History of transfusion    • Hyperhomocystinemia (CMS/HCC)    • Hyperlipidemia    • Stage 3 chronic kidney disease (CMS/HCC)      Past Surgical History:   Procedure Laterality Date   • APPENDECTOMY     • CARDIAC SURGERY     • CORONARY  ANGIOPLASTY WITH STENT PLACEMENT     • EXTRACORPOREAL SHOCKWAVE LITHOTRIPSY (ESWL), STENT INSERTION/REMOVAL Right 2/24/2017    Procedure: CYSTO RETROGRADE WITH STENT PLACEMENT;  Surgeon: Janes López MD;  [place] [facility] [place] [facility] [place]  Service:      Outpatient Medications Prior to Visit   Medication Sig Dispense Refill   • aspirin 81 MG chewable tablet Chew 81 mg Daily.     • ergocalciferol (ERGOCALCIFEROL) 1.25 MG (20967 UT) capsule Take 50,000 Units by mouth 1 (One) Time Per Week.     • folic acid (RA FOLIC ACID) 400 MCG tablet Take 400 mcg by mouth Daily.     • Lovaza 1 g capsule TAKE 2 CAPSULES BY MOUTH 2 (TWO) TIMES A DAY. DO NOT SUBSTITUTE 360 capsule 1   • Plavix 75 MG tablet Take 1 tablet by mouth Daily. Only give the Brand name Plavix. DO NOT SUBSTITUTE 90 tablet 3   • Zetia 10 MG tablet Take 1 tablet by mouth Daily. 90 tablet 3     No facility-administered medications prior to visit.       Allergies as of 06/09/2021 - Reviewed 06/09/2021   Allergen Reaction Noted   • Adhesive tape Other (See Comments) 04/14/2016   • Other Other (See Comments) 04/14/2016   • Statins Other (See Comments) 04/14/2016     Social History     Socioeconomic History   • Marital status: Single     Spouse name: Not on file   • Number of children: 0   • Years of education: COLLEGE   • Highest education level: Not on file   Tobacco Use   • Smoking status: Never Smoker   • Smokeless tobacco: Never Used   Substance and Sexual Activity   • Alcohol use: No     Comment: occ caffeine use   • Drug use: No   • Sexual activity: Defer     Family History   Problem Relation Age of Onset   • Heart disease Mother    • Stroke Father      Review of Systems   Constitutional: Positive for weight gain. Negative for chills, fever and weight loss.   Cardiovascular: Positive for leg swelling.   Respiratory: Negative for cough, snoring and wheezing.    Hematologic/Lymphatic: Negative for bleeding problem. Does not bruise/bleed easily.   Skin: Negative  "for color change.   Musculoskeletal: Positive for joint pain. Negative for falls and myalgias.   Gastrointestinal: Negative for melena.   Genitourinary: Negative for hematuria.   Neurological: Negative for excessive daytime sleepiness.   Psychiatric/Behavioral: Negative for depression. The patient is not nervous/anxious.         Objective:     Vitals:    06/09/21 1128   BP: 112/68   Pulse: 72   Weight: 73.9 kg (163 lb)   Height: 172.7 cm (68\")     Body mass index is 24.78 kg/m².    Vitals reviewed.   Constitutional:       Appearance: Well-developed.   Eyes:      General: No scleral icterus.        Right eye: No discharge.      Conjunctiva/sclera: Conjunctivae normal.      Pupils: Pupils are equal, round, and reactive to light.   HENT:      Head: Normocephalic.      Nose: Nose normal.   Neck:      Thyroid: No thyromegaly.      Vascular: No JVD.   Pulmonary:      Effort: Pulmonary effort is normal. No respiratory distress.      Breath sounds: Normal breath sounds. No wheezing. No rales.   Cardiovascular:      Normal rate. Regular rhythm. Normal S1. Normal S2.      Murmurs: There is no murmur.      No gallop.   Pulses:     Intact distal pulses.   Edema:     Peripheral edema present.     Feet: trace edema of the left foot.  Abdominal:      General: Bowel sounds are normal. There is no distension.      Palpations: Abdomen is soft.      Tenderness: There is no abdominal tenderness. There is no rebound.   Musculoskeletal: Normal range of motion.         General: No tenderness.      Cervical back: Normal range of motion and neck supple. Skin:     General: Skin is warm and dry.      Findings: No erythema or rash.   Neurological:      Mental Status: Alert and oriented to person, place, and time.   Psychiatric:         Behavior: Behavior normal.         Thought Content: Thought content normal.         Judgment: Judgment normal.       Lab Review:     ECG 12 Lead    Date/Time: 6/9/2021 11:40 AM  Performed by: Marlene Giordano, " MD  Authorized by: Marlene Giordano MD   Comparison: compared with previous ECG   Similar to previous ECG  Rhythm: sinus rhythm    Clinical impression: normal ECG          Assessment:       Diagnosis Plan   1. Coronary artery disease involving native coronary artery of native heart without angina pectoris  ECG 12 Lead    Adult Transthoracic Echo Complete W/ Cont if Necessary Per Protocol   2. Edema leg  Adult Transthoracic Echo Complete W/ Cont if Necessary Per Protocol   3. Renal insufficiency       Plan:       1.  Coronary artery disease with history of prior drug-coated stent placement.  Last stress test December 2016, negative for ischemia.  No anginal symptoms whatsoever at this point and care complicated by renal insufficiency.  With this in mind we will defer further coronary evaluation unless he has new findings of LV dysfunction wall motion normalities on upcoming  2.  Edema.  We will check an echocardiogram to assess LV function.  3.  Chronic back pain.  This is chronic and unfortunately without much relief on epidurals.  He is to see Dr. Arron rust in the near future  4.  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  History of renal insufficiency, Dr. Miranda  6.  Abdominal aortic aneurysm followed by vascular surgery.  Is to follow-up next year  6.  Hyperlipidemia.  Intolerant of statins.  Currently on lovassa  and Zetia  7.  Leg pain.  Pulses slightly diminished on the left.  Will check arterial Doppler study.  8.  Carotid artery plaque.  Followed by vascular surgery felt to be stable for the patient      Time Spent: I spent 30 minutes caring for Mannie on this date of service. This time includes time spent by me in the following activities: preparing for the visit, reviewing tests, obtaining and/or reviewing a separately obtained history, performing a medically appropriate examination and/or evaluation, ordering medications, tests, or procedures, referring and communicating with other health care  professionals and documenting information in the medical record.   I spent 1 minutes on the separately reported service of ECG. This time is not included in the time used to support the E/M service also reported today.        Your medication list          Accurate as of June 9, 2021 11:59 PM. If you have any questions, ask your nurse or doctor.            CONTINUE taking these medications      Instructions Last Dose Given Next Dose Due   aspirin 81 MG chewable tablet      Chew 81 mg Daily.       ergocalciferol 1.25 MG (06513 UT) capsule  Commonly known as: ERGOCALCIFEROL      Take 50,000 Units by mouth 1 (One) Time Per Week.       Lovaza 1 g capsule  Generic drug: omega-3 acid ethyl esters      TAKE 2 CAPSULES BY MOUTH 2 (TWO) TIMES A DAY. DO NOT SUBSTITUTE       Plavix 75 MG tablet  Generic drug: clopidogrel      Take 1 tablet by mouth Daily. Only give the Brand name Plavix. DO NOT SUBSTITUTE       RA Folic Acid 400 MCG tablet  Generic drug: folic acid      Take 400 mcg by mouth Daily.       Zetia 10 MG tablet  Generic drug: ezetimibe      Take 1 tablet by mouth Daily.              Patient is no longer taking -.  I corrected the med list to reflect this.  I did not stop these medications.      Dictated utilizing Dragon dictation

## 2021-06-21 PROBLEM — R60.0 EDEMA LEG: Status: ACTIVE | Noted: 2021-06-21

## 2021-06-23 ENCOUNTER — HOSPITAL ENCOUNTER (OUTPATIENT)
Dept: CARDIOLOGY | Facility: HOSPITAL | Age: 76
Discharge: HOME | End: 2021-06-23
Admitting: INTERNAL MEDICINE

## 2021-06-23 VITALS
WEIGHT: 163 LBS | DIASTOLIC BLOOD PRESSURE: 82 MMHG | BODY MASS INDEX: 24.71 KG/M2 | SYSTOLIC BLOOD PRESSURE: 143 MMHG | HEIGHT: 68 IN

## 2021-06-23 DIAGNOSIS — I25.10 CORONARY ARTERY DISEASE INVOLVING NATIVE CORONARY ARTERY OF NATIVE HEART WITHOUT ANGINA PECTORIS: ICD-10-CM

## 2021-06-23 DIAGNOSIS — R60.0 EDEMA LEG: ICD-10-CM

## 2021-06-23 PROCEDURE — 93306 TTE W/DOPPLER COMPLETE: CPT

## 2021-06-23 PROCEDURE — 93306 TTE W/DOPPLER COMPLETE: CPT | Performed by: INTERNAL MEDICINE

## 2021-06-24 LAB
BH CV ECHO MEAS - ACS: 2 CM
BH CV ECHO MEAS - AO MAX PG (FULL): 2.6 MMHG
BH CV ECHO MEAS - AO MAX PG: 6.6 MMHG
BH CV ECHO MEAS - AO MEAN PG (FULL): 2 MMHG
BH CV ECHO MEAS - AO MEAN PG: 4 MMHG
BH CV ECHO MEAS - AO ROOT AREA (BSA CORRECTED): 1.7
BH CV ECHO MEAS - AO ROOT AREA: 8 CM^2
BH CV ECHO MEAS - AO ROOT DIAM: 3.2 CM
BH CV ECHO MEAS - AO V2 MAX: 128 CM/SEC
BH CV ECHO MEAS - AO V2 MEAN: 90.9 CM/SEC
BH CV ECHO MEAS - AO V2 VTI: 30 CM
BH CV ECHO MEAS - AVA(I,A): 2.7 CM^2
BH CV ECHO MEAS - AVA(I,D): 2.7 CM^2
BH CV ECHO MEAS - AVA(V,A): 2.7 CM^2
BH CV ECHO MEAS - AVA(V,D): 2.7 CM^2
BH CV ECHO MEAS - BSA(HAYCOCK): 1.9 M^2
BH CV ECHO MEAS - BSA: 1.9 M^2
BH CV ECHO MEAS - BZI_BMI: 24.8 KILOGRAMS/M^2
BH CV ECHO MEAS - BZI_METRIC_HEIGHT: 172.7 CM
BH CV ECHO MEAS - BZI_METRIC_WEIGHT: 73.9 KG
BH CV ECHO MEAS - EDV(MOD-SP2): 55 ML
BH CV ECHO MEAS - EDV(MOD-SP4): 67 ML
BH CV ECHO MEAS - EDV(TEICH): 41 ML
BH CV ECHO MEAS - EF(CUBED): 79.1 %
BH CV ECHO MEAS - EF(MOD-BP): 62.4 %
BH CV ECHO MEAS - EF(MOD-SP2): 58.2 %
BH CV ECHO MEAS - EF(MOD-SP4): 67.2 %
BH CV ECHO MEAS - EF(TEICH): 72.7 %
BH CV ECHO MEAS - ESV(MOD-SP2): 23 ML
BH CV ECHO MEAS - ESV(MOD-SP4): 22 ML
BH CV ECHO MEAS - ESV(TEICH): 11.2 ML
BH CV ECHO MEAS - FS: 40.6 %
BH CV ECHO MEAS - IVS/LVPW: 0.92
BH CV ECHO MEAS - IVSD: 1.2 CM
BH CV ECHO MEAS - LAT PEAK E' VEL: 7.8 CM/SEC
BH CV ECHO MEAS - LV DIASTOLIC VOL/BSA (35-75): 35.8 ML/M^2
BH CV ECHO MEAS - LV MASS(C)D: 127.4 GRAMS
BH CV ECHO MEAS - LV MASS(C)DI: 68 GRAMS/M^2
BH CV ECHO MEAS - LV MAX PG: 3.9 MMHG
BH CV ECHO MEAS - LV MEAN PG: 2 MMHG
BH CV ECHO MEAS - LV SYSTOLIC VOL/BSA (12-30): 11.7 ML/M^2
BH CV ECHO MEAS - LV V1 MAX: 98.9 CM/SEC
BH CV ECHO MEAS - LV V1 MEAN: 68.5 CM/SEC
BH CV ECHO MEAS - LV V1 VTI: 23.1 CM
BH CV ECHO MEAS - LVIDD: 3.2 CM
BH CV ECHO MEAS - LVIDS: 1.9 CM
BH CV ECHO MEAS - LVLD AP2: 7.4 CM
BH CV ECHO MEAS - LVLD AP4: 7.2 CM
BH CV ECHO MEAS - LVLS AP2: 5.7 CM
BH CV ECHO MEAS - LVLS AP4: 6 CM
BH CV ECHO MEAS - LVOT AREA (M): 3.5 CM^2
BH CV ECHO MEAS - LVOT AREA: 3.5 CM^2
BH CV ECHO MEAS - LVOT DIAM: 2.1 CM
BH CV ECHO MEAS - LVPWD: 1.3 CM
BH CV ECHO MEAS - MED PEAK E' VEL: 7.2 CM/SEC
BH CV ECHO MEAS - MV DEC SLOPE: 448 CM/SEC^2
BH CV ECHO MEAS - MV MAX PG: 4.2 MMHG
BH CV ECHO MEAS - MV MEAN PG: 1 MMHG
BH CV ECHO MEAS - MV P1/2T MAX VEL: 77.4 CM/SEC
BH CV ECHO MEAS - MV P1/2T: 50.6 MSEC
BH CV ECHO MEAS - MV V2 MAX: 103 CM/SEC
BH CV ECHO MEAS - MV V2 MEAN: 43 CM/SEC
BH CV ECHO MEAS - MV V2 VTI: 25 CM
BH CV ECHO MEAS - MVA P1/2T LCG: 2.8 CM^2
BH CV ECHO MEAS - MVA(P1/2T): 4.3 CM^2
BH CV ECHO MEAS - MVA(VTI): 3.2 CM^2
BH CV ECHO MEAS - PA ACC TIME: 0.17 SEC
BH CV ECHO MEAS - PA MAX PG (FULL): 1.1 MMHG
BH CV ECHO MEAS - PA MAX PG: 3.2 MMHG
BH CV ECHO MEAS - PA PR(ACCEL): 0.7 MMHG
BH CV ECHO MEAS - PA V2 MAX: 89.6 CM/SEC
BH CV ECHO MEAS - PULM A REVS DUR: 0.14 SEC
BH CV ECHO MEAS - PULM A REVS VEL: 30 CM/SEC
BH CV ECHO MEAS - PULM DIAS VEL: 32.1 CM/SEC
BH CV ECHO MEAS - PULM S/D: 1.5
BH CV ECHO MEAS - PULM SYS VEL: 49.3 CM/SEC
BH CV ECHO MEAS - RAP SYSTOLE: 3 MMHG
BH CV ECHO MEAS - RV MAX PG: 2.1 MMHG
BH CV ECHO MEAS - RV MEAN PG: 1 MMHG
BH CV ECHO MEAS - RV V1 MAX: 72.5 CM/SEC
BH CV ECHO MEAS - RV V1 MEAN: 50.9 CM/SEC
BH CV ECHO MEAS - RV V1 VTI: 16.7 CM
BH CV ECHO MEAS - SI(AO): 128.8 ML/M^2
BH CV ECHO MEAS - SI(CUBED): 13.8 ML/M^2
BH CV ECHO MEAS - SI(LVOT): 42.7 ML/M^2
BH CV ECHO MEAS - SI(MOD-SP2): 17.1 ML/M^2
BH CV ECHO MEAS - SI(MOD-SP4): 24 ML/M^2
BH CV ECHO MEAS - SI(TEICH): 15.9 ML/M^2
BH CV ECHO MEAS - SV(AO): 241.3 ML
BH CV ECHO MEAS - SV(CUBED): 25.9 ML
BH CV ECHO MEAS - SV(LVOT): 80 ML
BH CV ECHO MEAS - SV(MOD-SP2): 32 ML
BH CV ECHO MEAS - SV(MOD-SP4): 45 ML
BH CV ECHO MEAS - SV(TEICH): 29.8 ML
BH CV ECHO MEAS - TAPSE (>1.6): 1.8 CM
BH CV XLRA - RV BASE: 2.2 CM
BH CV XLRA - RV LENGTH: 4.7 CM
BH CV XLRA - RV MID: 1.7 CM
BH CV XLRA - TDI S': 11.7 CM/SEC
LEFT ATRIUM VOLUME INDEX: 27 ML/M2
LV EF 2D ECHO EST: 62 %
MAXIMAL PREDICTED HEART RATE: 143 BPM
SINUS: 2.8 CM
STJ: 2.5 CM
STRESS TARGET HR: 122 BPM

## 2021-06-25 ENCOUNTER — TELEPHONE (OUTPATIENT)
Dept: CARDIOLOGY | Facility: CLINIC | Age: 76
End: 2021-06-25

## 2021-06-25 NOTE — TELEPHONE ENCOUNTER
Called and discussed echo results in detail with patient.  No history of hypertension.  Did recommend monitoring blood pressure at home to ensure it is well controlled.  He does have chronic fatigue but attributes it to chronic pain.  Highly recommended checking home sleep study to rule out sleep apnea.  Discussed risks of untreated sleep apnea.  He verbalized understanding but declines at this time.  He will notify the office if he changes his mind.  He will also notify the office if he has any new or worsening symptoms prior to next visit or symptoms fail to improve.

## 2021-08-05 ENCOUNTER — TRANSCRIBE ORDERS (OUTPATIENT)
Dept: ADMINISTRATIVE | Facility: HOSPITAL | Age: 76
End: 2021-08-05

## 2021-08-05 DIAGNOSIS — M54.16 LUMBAR RADICULOPATHY: Primary | ICD-10-CM

## 2021-08-09 ENCOUNTER — TRANSCRIBE ORDERS (OUTPATIENT)
Dept: GENERAL RADIOLOGY | Facility: HOSPITAL | Age: 76
End: 2021-08-09

## 2021-08-09 DIAGNOSIS — M54.16 LUMBAR RADICULOPATHY: Primary | ICD-10-CM

## 2021-08-15 NOTE — PROGRESS NOTES
08/19/21 0000   Pre-Procedure Phone Call   Procedure Time Verified Yes   Arrival Time 1145  (8/19/2021)   Procedure Location Verified Yes   Medical History Reviewed No   NPO Status Reinforced Yes   Ride and Caregiver Arranged Yes   Phone Number for Ride/Caregiver Working on it.   Patient Knows to Bring Current Medications No   Bring Outside Films Requested No  (EPIC: MRI C-spine - 9/17/2018 is the most recent imaging.)

## 2021-08-19 ENCOUNTER — HOSPITAL ENCOUNTER (OUTPATIENT)
Dept: CT IMAGING | Facility: HOSPITAL | Age: 76
Discharge: HOME | End: 2021-08-19

## 2021-08-19 ENCOUNTER — HOSPITAL ENCOUNTER (OUTPATIENT)
Dept: GENERAL RADIOLOGY | Facility: HOSPITAL | Age: 76
Discharge: HOME | End: 2021-08-19

## 2021-08-19 VITALS
BODY MASS INDEX: 24.25 KG/M2 | WEIGHT: 160 LBS | RESPIRATION RATE: 18 BRPM | SYSTOLIC BLOOD PRESSURE: 124 MMHG | TEMPERATURE: 97.3 F | HEIGHT: 68 IN | HEART RATE: 75 BPM | OXYGEN SATURATION: 97 % | DIASTOLIC BLOOD PRESSURE: 82 MMHG

## 2021-08-19 DIAGNOSIS — M54.16 LUMBAR RADICULOPATHY: ICD-10-CM

## 2021-08-19 PROCEDURE — 72240 MYELOGRAPHY NECK SPINE: CPT

## 2021-08-19 PROCEDURE — 62304 MYELOGRAPHY LUMBAR INJECTION: CPT

## 2021-08-19 PROCEDURE — 25010000003 LIDOCAINE 1 % SOLUTION: Performed by: RADIOLOGY

## 2021-08-19 PROCEDURE — 63710000001 HYDROCODONE-ACETAMINOPHEN 5-325 MG TABLET: Performed by: RADIOLOGY

## 2021-08-19 PROCEDURE — 72132 CT LUMBAR SPINE W/DYE: CPT

## 2021-08-19 PROCEDURE — 0 IOPAMIDOL 41 % SOLUTION: Performed by: RADIOLOGY

## 2021-08-19 PROCEDURE — A9270 NON-COVERED ITEM OR SERVICE: HCPCS | Performed by: RADIOLOGY

## 2021-08-19 RX ORDER — SODIUM CHLORIDE 0.9 % (FLUSH) 0.9 %
3 SYRINGE (ML) INJECTION EVERY 12 HOURS SCHEDULED
Status: DISCONTINUED | OUTPATIENT
Start: 2021-08-19 | End: 2021-08-20 | Stop reason: HOSPADM

## 2021-08-19 RX ORDER — HYDROCODONE BITARTRATE AND ACETAMINOPHEN 5; 325 MG/1; MG/1
1 TABLET ORAL ONCE
Status: COMPLETED | OUTPATIENT
Start: 2021-08-19 | End: 2021-08-19

## 2021-08-19 RX ORDER — CHLORAL HYDRATE 500 MG
1 CAPSULE ORAL 4 TIMES DAILY
COMMUNITY
End: 2021-11-16

## 2021-08-19 RX ORDER — EZETIMIBE 10 MG/1
10 TABLET ORAL DAILY
COMMUNITY
Start: 2021-04-14 | End: 2021-10-07 | Stop reason: SDUPTHER

## 2021-08-19 RX ORDER — SODIUM CHLORIDE 0.9 % (FLUSH) 0.9 %
10 SYRINGE (ML) INJECTION AS NEEDED
Status: DISCONTINUED | OUTPATIENT
Start: 2021-08-19 | End: 2021-08-20 | Stop reason: HOSPADM

## 2021-08-19 RX ORDER — LIDOCAINE HYDROCHLORIDE 10 MG/ML
10 INJECTION, SOLUTION INFILTRATION; PERINEURAL ONCE
Status: COMPLETED | OUTPATIENT
Start: 2021-08-19 | End: 2021-08-19

## 2021-08-19 RX ADMIN — IOPAMIDOL 20 ML: 408 INJECTION, SOLUTION INTRATHECAL at 13:04

## 2021-08-19 RX ADMIN — HYDROCODONE BITARTRATE AND ACETAMINOPHEN 1 TABLET: 5; 325 TABLET ORAL at 12:20

## 2021-08-19 RX ADMIN — LIDOCAINE HYDROCHLORIDE 10 ML: 10 INJECTION, SOLUTION INFILTRATION; PERINEURAL at 13:05

## 2021-08-19 NOTE — DISCHARGE INSTRUCTIONS
EDUCATION /DISCHARGE INSTRUCTIONS:  A myelogram is a special radiology procedure of the spinal cord, spinal nerves and other related structures.  You will be awake during the examination.  An area of your lower back will be cleansed with an antiseptic solution.  The physician will inject a numbing medication in your lower back.  While your back is numb, a needle will be placed in the lower back area.  A small amount of spinal fluid may be withdrawn and sent to the lab if ordered by your physician. While the needle is in the back, an injection of a contrast material (xray dye) will be given through the needle.  The contrast material will allow the physician to see the spinal cord and spinal nerves.  Once injected, the needle will be removed and a band aid will be placed over the injection site.  The table will be tilted during the process to allow the contrast material to flow to particular areas in the spine.  Following the injection and xrays, you will be taken to the CT scan where more pictures will be taken. After the procedure is finished, the contrast material will be absorbed by your body and eliminated through your kidneys.  The radiologist will study and interpret your myelogram and send the results to your physician.  Procedure risks of a myelogram include, but are not limited to:  *  Bleeding   *  Seizure  *  Infection   *  Headache, possibly severe requiring a blood patch  *  Contrast reaction  *  Nerve or cord injury  *  Paralysis and death    Benefits of the procedure:    Best examination for delineating pathology related to spinal cord compression from a disc and/or nerve root compression  Alternatives to the procedure:MRI - a non invasive procedure requiring intravenous contrast injection. Cannot be done on patients with certain pacemakers or metal in the body.  MRI risks include possible reaction to the contrast material, movement of metal located in the body.   Benefit to MRI:  Non-invasive and  usually painless procedure.  THIS EDUCATION INFORMATION WAS REVIEWED PRIOR TO THE PROCEDURE AND CONSENT. Patient initials __________________Time__1207_______________      Important information following your myelogram:    *  When you get home, lie down with no more than 2 pillows under your head.  *  Sit up in the car going home. At home, sit up to eat and use the restroom only, then lie back down.   *  24 HOURS COMPLETE AT ________________________   *  Tomorrow, after 24 hours completed, take it easy and rest the next 2-3 days.  *  Do not drive for 24 hours following a myelogram  *  You may remove the bandage and shower in the morning  *  Increase your fluids for the next 24 hours.  Caffeinated drinks are encouraged.Increase your intake of fluids the next 2-3 days    Resume taking your blood thinner or Aspirin on _______8/19/21__________________      CALL YOUR PHYSICIAN FOR THE FOLLOWING:  * Pain at the injection site  * Redness, swelling, bruising or drainage at the injection site  * A fever by mouth of 101.0  * Any new symptoms  If you have problems with a headache that is not relieved with rest and medication, please call the Radiology Triage Nurse desk [phone]

## 2021-08-19 NOTE — H&P
Name: Mannie Fajardo ADMIT: 2021   : 1945  PCP: Jose M Nava MD    MRN: [id number] LOS: 0 days   AGE/SEX: 76 y.o. male  ROOM: Room/bed info not found       Chief complaint   Patient is a 76 y.o. male presents for myelogram    Past Surgical History:  Past Surgical History:   Procedure Laterality Date   • APPENDECTOMY     • CARDIAC SURGERY     • CORONARY ANGIOPLASTY WITH STENT PLACEMENT     • EXTRACORPOREAL SHOCKWAVE LITHOTRIPSY (ESWL), STENT INSERTION/REMOVAL Right 2017    Procedure: CYSTO RETROGRADE WITH STENT PLACEMENT;  Surgeon: Janes López MD;  Location: [facility] OR [facility];  Service:        Past Medical History:  Past Medical History:   Diagnosis Date   • AAA (abdominal aortic aneurysm) without rupture (CMS/HCC)    • Arthritis     back   • Back pain    • Backache    • CAD (coronary atherosclerotic disease)    • Disease of thyroid gland    • Dyslipidemia    • Edema    • History of transfusion    • Hyperhomocystinemia (CMS/HCC)    • Hyperlipidemia    • Stage 3 chronic kidney disease (CMS/HCC)        Home Medications:  (Not in a hospital admission)      Allergies:  Adhesive tape, Other, and Statins    Family History:  Family History   Problem Relation Age of Onset   • Heart disease Mother    • Stroke Father        Social History:  Social History     Tobacco Use   • Smoking status: Never Smoker   • Smokeless tobacco: Never Used   Substance Use Topics   • Alcohol use: No     Comment: occ caffeine use   • Drug use: No        Objective     Physical Exam:   Ok for myelogram    Vital Signs  Temp:  [97.3 °F (36.3 °C)] 97.3 °F (36.3 °C)  Heart Rate:  [74] 74  Resp:  [18] 18  BP: (147)/(88) 147/88    Anticipated Surgical Procedure:  myelogram    The risks, benefits and alternatives of this procedure have been discussed with the patient or responsible party: Yes        Alli Forrester MD  21  12:12 EDT

## 2021-08-19 NOTE — NURSING NOTE
Patient arrived in xray triage for a lumbar myelogram. Protective goggles and mask in place with all patient interactions today.

## 2021-08-20 ENCOUNTER — TELEPHONE (OUTPATIENT)
Dept: INTERVENTIONAL RADIOLOGY/VASCULAR | Facility: HOSPITAL | Age: 76
End: 2021-08-20

## 2021-08-31 DIAGNOSIS — E78.5 HYPERLIPIDEMIA, UNSPECIFIED HYPERLIPIDEMIA TYPE: ICD-10-CM

## 2021-08-31 DIAGNOSIS — I25.10 CORONARY ARTERY DISEASE INVOLVING NATIVE CORONARY ARTERY OF NATIVE HEART WITHOUT ANGINA PECTORIS: ICD-10-CM

## 2021-09-01 RX ORDER — OMEGA-3 ACID ETHYL ESTERS 1 G
2 CAPSULE ORAL 2 TIMES DAILY
Qty: 360 CAPSULE | Refills: 0 | Status: SHIPPED | OUTPATIENT
Start: 2021-09-01 | End: 2021-10-07 | Stop reason: SDUPTHER

## 2021-10-07 ENCOUNTER — LAB (OUTPATIENT)
Dept: LAB | Facility: HOSPITAL | Age: 76
End: 2021-10-07

## 2021-10-07 ENCOUNTER — OFFICE VISIT (OUTPATIENT)
Dept: CARDIOLOGY | Facility: CLINIC | Age: 76
End: 2021-10-07

## 2021-10-07 VITALS
DIASTOLIC BLOOD PRESSURE: 78 MMHG | HEART RATE: 75 BPM | BODY MASS INDEX: 24.71 KG/M2 | SYSTOLIC BLOOD PRESSURE: 132 MMHG | WEIGHT: 163 LBS | HEIGHT: 68 IN

## 2021-10-07 DIAGNOSIS — I25.10 CORONARY ARTERY DISEASE INVOLVING NATIVE CORONARY ARTERY OF NATIVE HEART WITHOUT ANGINA PECTORIS: Primary | ICD-10-CM

## 2021-10-07 DIAGNOSIS — E78.5 HYPERLIPIDEMIA, UNSPECIFIED HYPERLIPIDEMIA TYPE: ICD-10-CM

## 2021-10-07 DIAGNOSIS — I25.84 CORONARY ARTERY DISEASE DUE TO CALCIFIED CORONARY LESION: ICD-10-CM

## 2021-10-07 DIAGNOSIS — N28.9 RENAL INSUFFICIENCY: ICD-10-CM

## 2021-10-07 DIAGNOSIS — I25.10 CORONARY ARTERY DISEASE DUE TO CALCIFIED CORONARY LESION: ICD-10-CM

## 2021-10-07 LAB
ALBUMIN SERPL-MCNC: 4.8 G/DL (ref 3.5–5.2)
ALP SERPL-CCNC: 53 U/L (ref 39–117)
ALT SERPL W P-5'-P-CCNC: 21 U/L (ref 1–41)
AST SERPL-CCNC: 20 U/L (ref 1–40)
BILIRUB CONJ SERPL-MCNC: <0.2 MG/DL (ref 0–0.3)
BILIRUB INDIRECT SERPL-MCNC: NORMAL MG/DL
BILIRUB SERPL-MCNC: 0.4 MG/DL (ref 0–1.2)
CHOLEST SERPL-MCNC: 254 MG/DL (ref 0–200)
HDLC SERPL-MCNC: 47 MG/DL (ref 40–60)
LDLC SERPL CALC-MCNC: 174 MG/DL (ref 0–100)
LDLC/HDLC SERPL: 3.64 {RATIO}
PROT SERPL-MCNC: 6.9 G/DL (ref 6–8.5)
TRIGL SERPL-MCNC: 180 MG/DL (ref 0–150)
VLDLC SERPL-MCNC: 33 MG/DL (ref 5–40)

## 2021-10-07 PROCEDURE — 36415 COLL VENOUS BLD VENIPUNCTURE: CPT

## 2021-10-07 PROCEDURE — 80061 LIPID PANEL: CPT

## 2021-10-07 PROCEDURE — 99213 OFFICE O/P EST LOW 20 MIN: CPT | Performed by: INTERNAL MEDICINE

## 2021-10-07 PROCEDURE — 93000 ELECTROCARDIOGRAM COMPLETE: CPT | Performed by: INTERNAL MEDICINE

## 2021-10-07 PROCEDURE — 80076 HEPATIC FUNCTION PANEL: CPT

## 2021-10-07 RX ORDER — EZETIMIBE 10 MG/1
10 TABLET ORAL DAILY
Qty: 90 TABLET | Refills: 3 | Status: SHIPPED | OUTPATIENT
Start: 2021-10-07 | End: 2021-11-01

## 2021-10-07 RX ORDER — CLOPIDOGREL BISULFATE 75 MG
75 TABLET ORAL DAILY
Qty: 90 TABLET | Refills: 3 | Status: SHIPPED | OUTPATIENT
Start: 2021-10-07 | End: 2022-01-13 | Stop reason: SDUPTHER

## 2021-10-07 RX ORDER — OMEGA-3 ACID ETHYL ESTERS 1 G
2 CAPSULE ORAL 2 TIMES DAILY
Qty: 360 CAPSULE | Refills: 3 | Status: SHIPPED | OUTPATIENT
Start: 2021-10-07 | End: 2022-01-13 | Stop reason: SDUPTHER

## 2021-10-07 NOTE — PROGRESS NOTES
Date of Office Visit: 10/07/2021  Encounter Provider: Marlene Giordano MD  Place of Service: [facility] CARDIOLOGY  Patient Name: Mannie Fajardo  :1945    Chief complaint  Coronary artery disease, edema    History of Present Illness  The patient is a delightful, 76-year-old gentleman with history of hyperlipidemia, hyperhomocysteinemia, and coronary artery disease.  In , after an abnormal stress test, he underwent cardiac catheterization that revealed severe disease of the circumflex artery for which he received a drug-coated stent.  He has not had any intervening events since then, though he has maintained aspirin and Plavix therapy.  In 2016 with complaints of chest pain Lexiscan perfusion study was negative for ischemia.  Stress screening study in  showed a small abdominal aortic aneurysm for his vascular surgeons felt to be stable.  By 2021 had had progressive edema.  An echocardiogram showed an ejection fraction of 62% with mild left ventricular upper atrophy diastolic function was indeterminate.  There is aortic valve calcification without stenosis.  The function was normal.    Since last visit he has had no chest pain palpitations syncope near syncope.  Edema has improved and has been managed by nephrology.  Dr. Miranda gave him a diuretic but with significant diuresis he was quite uncomfortable and he chose to not continue this.  He has mild residual left leg edema but no pain or erythema.  He remains fairly sedentary had an MRI of his back and is waiting for recommendations regarding this.    Past Medical History:   Diagnosis Date   • AAA (abdominal aortic aneurysm) without rupture (HCC)    • Arthritis     back   • Back pain    • Backache    • CAD (coronary atherosclerotic disease)    • Disease of thyroid gland    • Dyslipidemia    • Edema    • History of transfusion    • Hyperhomocystinemia (HCC)    • Hyperlipidemia    • Stage 3 chronic kidney disease  (HCC)      Past Surgical History:   Procedure Laterality Date   • APPENDECTOMY     • CARDIAC SURGERY     • CORONARY ANGIOPLASTY WITH STENT PLACEMENT     • EXTRACORPOREAL SHOCKWAVE LITHOTRIPSY (ESWL), STENT INSERTION/REMOVAL Right 2/24/2017    Procedure: CYSTO RETROGRADE WITH STENT PLACEMENT;  Surgeon: Janes López MD;  [place] [facility] [place] [facility]  Service:      Outpatient Medications Prior to Visit   Medication Sig Dispense Refill   • aspirin 81 MG chewable tablet Chew 81 mg Daily.     • ergocalciferol (ERGOCALCIFEROL) 1.25 MG (54549 UT) capsule Take 50,000 Units by mouth 1 (One) Time Per Week.     • folic acid (RA FOLIC ACID) 400 MCG tablet Take 400 mcg by mouth Daily.     • ezetimibe (Zetia) 10 MG tablet Take 10 mg by mouth Daily.     • Lovaza 1 g capsule TAKE 2 CAPSULES BY MOUTH 2 (TWO) TIMES A DAY. DO NOT SUBSTITUTE 360 capsule 0   • Plavix 75 MG tablet Take 1 tablet by mouth Daily. Only give the Brand name Plavix. DO NOT SUBSTITUTE 90 tablet 3   • Omega-3 Fatty Acids (fish oil) 1000 MG capsule capsule Take 1 g by mouth 4 (Four) Times a Day.     • Zetia 10 MG tablet Take 1 tablet by mouth Daily. 90 tablet 3     No facility-administered medications prior to visit.       Allergies as of 10/07/2021 - Reviewed 10/07/2021   Allergen Reaction Noted   • Adhesive tape Other (See Comments) 04/14/2016   • Other Other (See Comments) 04/14/2016   • Statins Other (See Comments) 04/14/2016     Social History     Socioeconomic History   • Marital status: Single   • Number of children: 0   • Years of education: COLLEGE   Tobacco Use   • Smoking status: Never Smoker   • Smokeless tobacco: Never Used   Substance and Sexual Activity   • Alcohol use: No     Comment: occ caffeine use   • Drug use: No   • Sexual activity: Defer     Family History   Problem Relation Age of Onset   • Heart disease Mother    • Stroke Father      Review of Systems   Constitutional: Negative for chills, fever, weight gain and weight loss.  "  Cardiovascular: Negative for leg swelling.   Respiratory: Negative for cough, snoring and wheezing.    Hematologic/Lymphatic: Negative for bleeding problem. Does not bruise/bleed easily.   Skin: Negative for color change.   Musculoskeletal: Positive for joint pain and myalgias. Negative for falls.   Gastrointestinal: Negative for melena.   Genitourinary: Negative for hematuria.   Neurological: Negative for excessive daytime sleepiness.   Psychiatric/Behavioral: Negative for depression. The patient is not nervous/anxious.         Objective:     Vitals:    10/07/21 1112   BP: 132/78   Pulse: 75   Weight: 73.9 kg (163 lb)   Height: 172.7 cm (68\")     Body mass index is 24.78 kg/m².    Vitals reviewed.   Constitutional:       Appearance: Well-developed.   Eyes:      General: No scleral icterus.        Right eye: No discharge.      Conjunctiva/sclera: Conjunctivae normal.      Pupils: Pupils are equal, round, and reactive to light.   HENT:      Head: Normocephalic.      Nose: Nose normal.   Neck:      Thyroid: No thyromegaly.      Vascular: No JVD.   Pulmonary:      Effort: Pulmonary effort is normal. No respiratory distress.      Breath sounds: Normal breath sounds. No wheezing. No rales.   Cardiovascular:      Normal rate. Regular rhythm. Normal S1. Normal S2.      Murmurs: There is no murmur.      No gallop.   Pulses:     Intact distal pulses.   Edema:     Peripheral edema absent.   Abdominal:      General: Bowel sounds are normal. There is no distension.      Palpations: Abdomen is soft.      Tenderness: There is no abdominal tenderness. There is no rebound.   Musculoskeletal: Normal range of motion.         General: No tenderness.      Cervical back: Normal range of motion and neck supple. Skin:     General: Skin is warm and dry.      Findings: No erythema or rash.   Neurological:      Mental Status: Alert and oriented to person, place, and time.   Psychiatric:         Behavior: Behavior normal.         Thought " Content: Thought content normal.         Judgment: Judgment normal.       Lab Review:     ECG 12 Lead    Date/Time: 10/7/2021 11:45 AM  Performed by: Marlene Giordano MD  Authorized by: Marlene Giordano MD   Comparison: compared with previous ECG   Comparison to previous ECG: PVC is present  Rhythm: sinus rhythm  Ectopy: unifocal PVCs    Clinical impression: abnormal EKG          Assessment:       Diagnosis Plan   1. Coronary artery disease involving native coronary artery of native heart without angina pectoris  ECG 12 Lead    Plavix 75 MG tablet    Lovaza 1 g capsule   2. Hyperlipidemia, unspecified hyperlipidemia type  Lovaza 1 g capsule    Lipid Panel    Hepatic Function Panel   3. Coronary artery disease due to calcified coronary lesion     4. Renal insufficiency       Plan:       1.  Coronary artery disease with history of prior drug-coated stent placement.  Last stress test December 2016, negative for ischemia.  No anginal symptoms whatsoever at this point and care complicated by renal insufficiency.  Continue with dual antiplatelet therapy  2.  Edema.  Improved and minimal in the left leg today  3.  Chronic back pain.   4.  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  History of renal insufficiency, Dr. Miranda last creatinine on file on May/2021 1.52 with GFR of 45.  Follow-up with Dr. Watkins in June  6.  Abdominal aortic aneurysm followed by vascular surgery.  Is to follow-up next year  6.  Hyperlipidemia.  Intolerant of statins.  Currently on lovassa  and Zetia.  We will check fasting lipid level and liver function test today.  7.  Leg pain.  Normal ANDREW 10/2020.  8.  Carotid artery plaque.  Followed by vascular surgery felt to be stable for the patient    Time Spent: I spent 25 minutes caring for Mannie on this date of service. This time includes time spent by me in the following activities: preparing for the visit, reviewing tests, obtaining and/or reviewing a separately obtained history, performing a medically  appropriate examination and/or evaluation, counseling and educating the patient/family/caregiver, ordering medications, tests, or procedures, documenting information in the medical record and independently interpreting results and communicating that information with the patient/family/caregiver.   I spent 1 minutes on the separately reported service of ECG. This time is not included in the time used to support the E/M service also reported today.        Your medication list          Accurate as of October 7, 2021 11:59 PM. If you have any questions, ask your nurse or doctor.            CHANGE how you take these medications      Instructions Last Dose Given Next Dose Due   ezetimibe 10 MG tablet  Commonly known as: Zetia  What changed: Another medication with the same name was removed. Continue taking this medication, and follow the directions you see here.  Changed by: Marlene Giordano MD      Take 1 tablet by mouth Daily.          CONTINUE taking these medications      Instructions Last Dose Given Next Dose Due   aspirin 81 MG chewable tablet      Chew 81 mg Daily.       ergocalciferol 1.25 MG (14912 UT) capsule  Commonly known as: ERGOCALCIFEROL      Take 50,000 Units by mouth 1 (One) Time Per Week.       fish oil 1000 MG capsule capsule      Take 1 g by mouth 4 (Four) Times a Day.       Lovaza 1 g capsule  Generic drug: omega-3 acid ethyl esters      Take 2 capsules by mouth 2 (Two) Times a Day. Do NOT substitute       Plavix 75 MG tablet  Generic drug: clopidogrel      Take 1 tablet by mouth Daily. Only give the Brand name Plavix. DO NOT SUBSTITUTE       RA Folic Acid 400 MCG tablet  Generic drug: folic acid      Take 400 mcg by mouth Daily.             Where to Get Your Medications      These medications were sent to [facility]/pharmacy #[id number] - [place], [place] - [address] [place] [address] RD. AT [facility] - [phone] [facility] [phone]   [address] [place] [address] [address] [facility] [address]    Phone: [phone]    · ezetimibe 10 MG tablet  · Lovaza 1 g capsule  · Plavix 75 MG tablet         Patient is no longer taking -.  I corrected the med list to reflect this.  I did not stop these medications.      Dictated utilizing Dragon dictation

## 2021-10-31 DIAGNOSIS — I25.10 ATHEROSCLEROTIC HEART DISEASE OF NATIVE CORONARY ARTERY WITHOUT ANGINA PECTORIS: ICD-10-CM

## 2021-10-31 DIAGNOSIS — E78.5 HYPERLIPIDEMIA, UNSPECIFIED: ICD-10-CM

## 2021-11-01 RX ORDER — EZETIMIBE 10 MG/1
TABLET ORAL
Qty: 90 TABLET | Refills: 3 | Status: SHIPPED | OUTPATIENT
Start: 2021-11-01 | End: 2021-11-22 | Stop reason: SDUPTHER

## 2021-11-10 ENCOUNTER — OFFICE VISIT (OUTPATIENT)
Dept: NEUROSURGERY | Facility: CLINIC | Age: 76
End: 2021-11-10

## 2021-11-10 VITALS
BODY MASS INDEX: 24.58 KG/M2 | HEART RATE: 84 BPM | DIASTOLIC BLOOD PRESSURE: 74 MMHG | SYSTOLIC BLOOD PRESSURE: 132 MMHG | WEIGHT: 162.2 LBS | HEIGHT: 68 IN | TEMPERATURE: 97.4 F | OXYGEN SATURATION: 98 %

## 2021-11-10 DIAGNOSIS — M54.50 CHRONIC MIDLINE LOW BACK PAIN WITHOUT SCIATICA: ICD-10-CM

## 2021-11-10 DIAGNOSIS — G89.29 CHRONIC MIDLINE LOW BACK PAIN WITHOUT SCIATICA: ICD-10-CM

## 2021-11-10 DIAGNOSIS — M51.36 DDD (DEGENERATIVE DISC DISEASE), LUMBAR: Primary | ICD-10-CM

## 2021-11-10 PROCEDURE — 99204 OFFICE O/P NEW MOD 45 MIN: CPT | Performed by: NEUROLOGICAL SURGERY

## 2021-11-10 NOTE — PROGRESS NOTES
"Subjective   Patient ID: Mannie Fajardo is a 76 y.o. male is here today for follow-up.    He was last seen 9/25/17 for Degenerative disc disease, lumbar, Chronic midline low back pain without sciatica, and left leg weakness    He was referred to [facility].    8/19/21 CT L spine  [facility]  8/19/21 IR Meylogram [facility]    Patient reports today he would like to discuss an epidural he had.    I saw this gentleman 4 years ago.  He had very mild disc bulging at L4-L5 complained of agonizing pain in his lower back.  He had seen Dr. Jones earlier this year who ordered a myelogram which I reviewed with him which showed basically the same thing.  His pain is in the midline.  He had 1 epidural block or what might have been a trigger point injection at the [place] [facility] about a year ago and he said his pain was gone for 5 days but it recurred and they tried to do it again with no benefit.  He wanted to know if that could be reproduced and I told him apparently not given the 3 additional tries.  That still did not justify in my mind doing anything surgical.  I think this patient probably experiences the pain that he describes but I simply do not have a good explanation for it.  I was honest with him and told him that there was not much I could do for him or make any other suggestions other than to try and stay active and stay socially engaged with the world around him.  We will keep it open-ended.        History of Present Illness    The following portions of the patient's history were reviewed and updated as appropriate: allergies, current medications, past family history, past medical history, past social history, past surgical history and problem list.    Review of Systems   All other systems reviewed and are negative.          Objective     Vitals:    11/10/21 1638   BP: 132/74   Pulse: 84   Temp: 97.4 °F (36.3 °C)   SpO2: 98%   Weight: 73.6 kg (162 lb 3.2 oz)   Height: 172.7 cm (68\")     Body mass index is " 24.66 kg/m².      Physical Exam  Constitutional:       Appearance: He is well-developed.   HENT:      Head: Normocephalic and atraumatic.   Eyes:      Extraocular Movements: EOM normal.      Conjunctiva/sclera: Conjunctivae normal.      Pupils: Pupils are equal, round, and reactive to light.   Neck:      Vascular: No carotid bruit.   Neurological:      Mental Status: He is oriented to person, place, and time.      Coordination: Finger-Nose-Finger Test and Heel to Shin Test normal.      Gait: Gait is intact.      Deep Tendon Reflexes:      Reflex Scores:       Tricep reflexes are 2+ on the right side and 2+ on the left side.       Bicep reflexes are 2+ on the right side and 2+ on the left side.       Brachioradialis reflexes are 2+ on the right side and 2+ on the left side.       Patellar reflexes are 2+ on the right side and 2+ on the left side.       Achilles reflexes are 2+ on the right side and 2+ on the left side.  Psychiatric:         Speech: Speech normal.       Neurologic Exam     Mental Status   Oriented to person, place, and time.   Registration of memory: Good recent and remote memory.   Attention: normal. Concentration: normal.   Speech: speech is normal   Level of consciousness: alert  Knowledge: consistent with education.     Cranial Nerves     CN II   Visual fields full to confrontation.   Visual acuity: normal    CN III, IV, VI   Pupils are equal, round, and reactive to light.  Extraocular motions are normal.     CN V   Facial sensation intact.   Right corneal reflex: normal  Left corneal reflex: normal    CN VII   Facial expression full, symmetric.   Right facial weakness: none  Left facial weakness: none    CN VIII   Hearing: intact    CN IX, X   Palate: symmetric    CN XI   Right sternocleidomastoid strength: normal  Left sternocleidomastoid strength: normal    CN XII   Tongue: not atrophic  Tongue deviation: none    Motor Exam   Muscle bulk: normal  Right arm tone: normal  Left arm tone:  normal  Right leg tone: normal  Left leg tone: normal    Strength   Strength 5/5 except as noted.   Left quadriceps: 4/5    Sensory Exam   Light touch normal.     Gait, Coordination, and Reflexes     Gait  Gait: normal    Coordination   Finger to nose coordination: normal  Heel to shin coordination: normal    Reflexes   Right brachioradialis: 2+  Left brachioradialis: 2+  Right biceps: 2+  Left biceps: 2+  Right triceps: 2+  Left triceps: 2+  Right patellar: 2+  Left patellar: 2+  Right achilles: 2+  Left achilles: 2+  Right : 2+  Left : 2+          Assessment/Plan   Independent Review of Radiographic Studies:      I personally reviewed the images from the following studies.    Reviewed the lumbar myelogram done on 8/19/2021 which shows no mild disc space narrowing at L4-L5 with a very mild bulge but no significant stenosis or herniated disc.  Agree with the report.        Medical Decision Making:      Unfortunately, as like last time, I have nothing to offer this gentleman.  I only urged him to try and remain active as far as exercise goes and to socially engage with what friends he has did not become so socially isolated.  We will keep it open-ended.      Diagnoses and all orders for this visit:    1. DDD (degenerative disc disease), lumbar (Primary)    2. Chronic midline low back pain without sciatica      Return if symptoms worsen or fail to improve.

## 2021-11-16 ENCOUNTER — OFFICE VISIT (OUTPATIENT)
Dept: NEUROLOGY | Facility: CLINIC | Age: 76
End: 2021-11-16

## 2021-11-16 ENCOUNTER — TRANSCRIBE ORDERS (OUTPATIENT)
Dept: ADMINISTRATIVE | Facility: HOSPITAL | Age: 76
End: 2021-11-16

## 2021-11-16 ENCOUNTER — LAB (OUTPATIENT)
Dept: LAB | Facility: HOSPITAL | Age: 76
End: 2021-11-16

## 2021-11-16 VITALS
SYSTOLIC BLOOD PRESSURE: 150 MMHG | WEIGHT: 163 LBS | BODY MASS INDEX: 24.71 KG/M2 | HEIGHT: 68 IN | OXYGEN SATURATION: 98 % | DIASTOLIC BLOOD PRESSURE: 98 MMHG | HEART RATE: 76 BPM

## 2021-11-16 DIAGNOSIS — E55.9 VITAMIN D DEFICIENCY: Primary | ICD-10-CM

## 2021-11-16 DIAGNOSIS — I12.9 HYPERTENSIVE NEPHROPATHY: ICD-10-CM

## 2021-11-16 DIAGNOSIS — G57.22 FEMORAL NEUROPATHY OF LEFT LOWER EXTREMITY: Primary | ICD-10-CM

## 2021-11-16 DIAGNOSIS — N18.30 STAGE 3 CHRONIC KIDNEY DISEASE, UNSPECIFIED WHETHER STAGE 3A OR 3B CKD (HCC): ICD-10-CM

## 2021-11-16 DIAGNOSIS — E55.9 VITAMIN D DEFICIENCY: ICD-10-CM

## 2021-11-16 DIAGNOSIS — M10.9 GOUTY ARTHRITIS: ICD-10-CM

## 2021-11-16 PROBLEM — M25.559 HIP PAIN: Status: ACTIVE | Noted: 2018-04-25

## 2021-11-16 PROBLEM — G95.9 MYELOPATHY: Status: ACTIVE | Noted: 2019-01-11

## 2021-11-16 PROBLEM — E03.4 ACQUIRED ATROPHY OF THYROID: Status: ACTIVE | Noted: 2021-11-16

## 2021-11-16 PROBLEM — M85.80 OSTEOPENIA: Status: ACTIVE | Noted: 2018-04-09

## 2021-11-16 PROBLEM — G47.00 INSOMNIA: Status: ACTIVE | Noted: 2018-05-21

## 2021-11-16 PROBLEM — Z87.39 HISTORY OF OSTEOPENIA: Status: ACTIVE | Noted: 2018-04-30

## 2021-11-16 PROBLEM — I25.10 ATHEROSCLEROSIS OF CORONARY ARTERY: Status: ACTIVE | Noted: 2021-11-16

## 2021-11-16 PROBLEM — Z91.89 AT RISK FOR OSTEOPOROSIS: Status: ACTIVE | Noted: 2018-04-03

## 2021-11-16 PROBLEM — M62.81 MUSCLE WEAKNESS OF EXTREMITY: Status: ACTIVE | Noted: 2018-04-25

## 2021-11-16 LAB
25(OH)D3 SERPL-MCNC: 49.3 NG/ML (ref 30–100)
ALBUMIN SERPL-MCNC: 4.4 G/DL (ref 3.5–5.2)
ANION GAP SERPL CALCULATED.3IONS-SCNC: 6.9 MMOL/L (ref 5–15)
BACTERIA UR QL AUTO: ABNORMAL /HPF
BILIRUB UR QL STRIP: NEGATIVE
BUN SERPL-MCNC: 23 MG/DL (ref 8–23)
BUN/CREAT SERPL: 15.8 (ref 7–25)
CALCIUM SPEC-SCNC: 9.5 MG/DL (ref 8.6–10.5)
CHLORIDE SERPL-SCNC: 107 MMOL/L (ref 98–107)
CLARITY UR: CLEAR
CO2 SERPL-SCNC: 26.1 MMOL/L (ref 22–29)
COLOR UR: YELLOW
CREAT SERPL-MCNC: 1.46 MG/DL (ref 0.76–1.27)
GFR SERPL CREATININE-BSD FRML MDRD: 47 ML/MIN/1.73
GLUCOSE SERPL-MCNC: 97 MG/DL (ref 65–99)
GLUCOSE UR STRIP-MCNC: NEGATIVE MG/DL
HGB UR QL STRIP.AUTO: NEGATIVE
HYALINE CASTS UR QL AUTO: ABNORMAL /LPF
KETONES UR QL STRIP: ABNORMAL
LEUKOCYTE ESTERASE UR QL STRIP.AUTO: ABNORMAL
NITRITE UR QL STRIP: NEGATIVE
PH UR STRIP.AUTO: <=5 [PH] (ref 5–8)
PHOSPHATE SERPL-MCNC: 3 MG/DL (ref 2.5–4.5)
POTASSIUM SERPL-SCNC: 4.6 MMOL/L (ref 3.5–5.2)
PROT UR QL STRIP: NEGATIVE
RBC # UR: ABNORMAL /HPF
REF LAB TEST METHOD: ABNORMAL
SODIUM SERPL-SCNC: 140 MMOL/L (ref 136–145)
SP GR UR STRIP: 1.02 (ref 1–1.03)
SQUAMOUS #/AREA URNS HPF: ABNORMAL /HPF
UROBILINOGEN UR QL STRIP: ABNORMAL
WBC UR QL AUTO: ABNORMAL /HPF

## 2021-11-16 PROCEDURE — 99215 OFFICE O/P EST HI 40 MIN: CPT | Performed by: PSYCHIATRY & NEUROLOGY

## 2021-11-16 PROCEDURE — 82306 VITAMIN D 25 HYDROXY: CPT

## 2021-11-16 PROCEDURE — 36415 COLL VENOUS BLD VENIPUNCTURE: CPT | Performed by: INTERNAL MEDICINE

## 2021-11-16 PROCEDURE — 80069 RENAL FUNCTION PANEL: CPT | Performed by: INTERNAL MEDICINE

## 2021-11-16 PROCEDURE — 81001 URINALYSIS AUTO W/SCOPE: CPT

## 2021-11-16 NOTE — PROGRESS NOTES
"CC: Chronic severe low back pain and left groin pain, weakness in the left leg    HPI:  Mannie Fajardo is a  76 y.o.  right-handed White male who I had seen previously in 2018 with back pain and left leg pain but also had some upper motor neuron findings and MRI of the cervical spine demonstrated old evidence of left sided transverse myelitis.    He had been sent to me for an EMG which was done on the left leg showing normal nerve conductions and needle examination.  Brief clinical exam demonstrated upper motor neuron findings in the left arm and left leg and he was sent to me for a neurologic consultation.  His exam was suspicious for a left sided cervical spine lesion.  An MRI of the brain and cervical spine were obtained.  The MRI of the brain showed minor microvascular changes consistent with age.  The MRI of the cervical spine showed myelomalacia with left-sided localization consistent with transverse myelitis.     The patient states that he denies a specific functional problem the left arm other than from his previous injury to the hand.  He was similar not aware of the findings.  He had some previous evaluation by Dr. Rhodes at [place] in 2000 2005.  Actually had 2 spinal taps apparently and he may have had an MRI of his neck.  The patient had been on narcotics and went through narcotic withdrawal some point in time.    He has had 4 episodes of gout and has several swollen toes and stiff joints.  He states he has never seen a rheumatologist.  The patient has been followed up recently by Dr. Olson and a myelogram with post myelogram CT was done on the lumbar spine showing really no significant foraminal or central canal stenosis at any level.    Patient has continued complaints of severe low back pain \"10/10\" as well as left groin pain.  Groin pain is relatively tolerable he states but it is very persistent.  Is simply never goes away.  It affects his gait.  He describes some atrophy of his left thigh " compared to the right.  He has been seen by pain management and had several epidural steroid injections apparently and states that one of the first ones it helped his pain and he could  his knee much better but it wore off and a repeat attempts did nothing he states.  He believes he had some facet blocks which were not helpful and so presumably ablations would not be helpful.  He also describes that occasionally the left knee will give out.  It seems to be while on the stairs but he is holding the railing and has not fallen.    Past Medical History:   Diagnosis Date   • AAA (abdominal aortic aneurysm) without rupture (HCC)    • Arthritis     back   • Back pain    • Backache    • CAD (coronary atherosclerotic disease)    • Disease of thyroid gland    • Dyslipidemia    • Edema    • History of transfusion    • Hyperhomocystinemia (HCC)    • Hyperlipidemia    • Stage 3 chronic kidney disease (HCC)          Past Surgical History:   Procedure Laterality Date   • APPENDECTOMY     • CARDIAC SURGERY     • CORONARY ANGIOPLASTY WITH STENT PLACEMENT     • EXTRACORPOREAL SHOCKWAVE LITHOTRIPSY (ESWL), STENT INSERTION/REMOVAL Right 2/24/2017    Procedure: CYSTO RETROGRADE WITH STENT PLACEMENT;  Surgeon: Janes López MD;  [place] [facility] [place] [facility] [place]:            Current Outpatient Medications:   •  aspirin 81 MG chewable tablet, Chew 81 mg Daily., Disp: , Rfl:   •  ergocalciferol (ERGOCALCIFEROL) 1.25 MG (62723 UT) capsule, Take 50,000 Units by mouth 1 (One) Time Per Week., Disp: , Rfl:   •  folic acid (RA FOLIC ACID) 400 MCG tablet, Take 400 mcg by mouth Daily., Disp: , Rfl:   •  Lovaza 1 g capsule, Take 2 capsules by mouth 2 (Two) Times a Day. Do NOT substitute, Disp: 360 capsule, Rfl: 3  •  Plavix 75 MG tablet, Take 1 tablet by mouth Daily. Only give the Brand name Plavix. DO NOT SUBSTITUTE, Disp: 90 tablet, Rfl: 3  •  Zetia 10 MG tablet, TAKE 1 TABLET BY MOUTH EVERY DAY, Disp: 90 tablet, Rfl: 3      Family  "History   Problem Relation Age of Onset   • Heart disease Mother    • Stroke Father          Social History     Socioeconomic History   • Marital status: Single   • Number of children: 0   • Years of education: COLLEGE   Tobacco Use   • Smoking status: Never Smoker   • Smokeless tobacco: Never Used   Substance and Sexual Activity   • Alcohol use: No     Comment: occ caffeine use   • Drug use: No   • Sexual activity: Defer         Allergies   Allergen Reactions   • Adhesive Tape Other (See Comments)     Severe burn one time when left on too long   • Other Other (See Comments)     Severe burn one time when left on too long   • Statins Other (See Comments)     Muscle weakness in legs and arms one time only         Pain Scale: 10/10, low back pain        ROS:  Review of Systems   Constitutional: Positive for fatigue. Negative for activity change and appetite change.   Eyes: Negative for pain, redness and itching.   Respiratory: Negative for cough, choking and shortness of breath.    Allergic/Immunologic: Negative for environmental allergies and food allergies.   Neurological: Negative for dizziness, tremors, seizures, syncope, facial asymmetry, speech difficulty, weakness, light-headedness, numbness and headaches.   Psychiatric/Behavioral: Positive for sleep disturbance. Negative for agitation, behavioral problems, confusion, decreased concentration, dysphoric mood, hallucinations, self-injury and suicidal ideas. The patient is not nervous/anxious and is not hyperactive.          I have reviewed and agree with the above ROS completed by the medical assistant.      Physical Exam:  Vitals:    11/16/21 1102   BP: 150/98   Pulse: 76   SpO2: 98%   Weight: 73.9 kg (163 lb)   Height: 172.7 cm (68\")     Orthostatic BP:    Body mass index is 24.78 kg/m².    Physical Exam  General: Well-developed white male no acute distress.  Pain behaviors present.  HEENT: Normocephalic no evidence of trauma.    Throat negative  Neck: Supple.  " No thyromegaly.  Heart: Regular rate and rhythm  Extremities: No pedal edema.  Circumference of the left thigh at 10 cm above the upper edge of the patella was 40 cm.  On the right this circumference was 42.5 cm.  He has some swollen and very stiff toes for instance the right fourth toe is quite swollen and the left great toe is very stiff.  Straight leg raising produces tightness behind the knees.  Figure-of-four signs both medially and laterally were negative.      Neurological Exam:   Mental Status: Awake, alert, oriented to person, place and time.  Conversant without evidence of an affective disorder, thought disorder, delusions or hallucinations.  Attention span and concentration are normal.  HCF: No aphasia, apraxia or dysarthria.  Recent and remote memory intact.  Knowledge of recent events intact.  CN: I:   II: Visual fields full without left inattention   III, IV, VI: Eye movements intact without nystagmus or ptosis.  Pupils equal  round and reactive to light.   V,VII: Light touch and pinprick intact all 3 divisions of V.  Facial muscles symmetrical.   VIII: Hearing intact to finger rub   IX,X: Soft palate elevates symmetrically   XI: Sternomastoid and trapezius are strong.   XII: Tongue midline without atrophy or fasciculations  Motor: Normal tone and bulk in the upper and lower extremities   Power testing: Mild weakness of the left iliopsoas, hamstrings and quadriceps.  The patient indicates that pain is not increased but there is a Nelson sign on the left also.  Other muscles tested in the lower extremities and muscles tested in the upper extremities were normal.  Reflexes: Upper extremities: +2 diffusely        Lower extremities: Plus 2 diffusely        Toe signs: Downgoing  Sensory: Light touch: Diffusely intact in the lower extremities        Pinprick: Diffusely intact in the lower extremities        Vibration: Questionably reduced at the ankle        Position: Intact at the great toes    Cerebellar:  Finger-to-nose: Intact           Rapid movement: Intact           Heel-to-shin: Intact  Gait and Station: Antalgic coming to stand.  Antalgic walking.  Scuffs the left foot occasionally    Results:      Lab Results   Component Value Date    GLUCOSE 111 (H) 05/27/2021    BUN 20 05/27/2021    CREATININE 1.52 (H) 05/27/2021    EGFRIFNONA 45 (L) 05/27/2021    BCR 13.2 05/27/2021    CO2 26.5 05/27/2021    CALCIUM 10.1 05/27/2021    PROTENTOTREF 7.4 08/15/2018    ALBUMIN 4.80 10/07/2021    LABIL2 1.5 08/15/2018    AST 20 10/07/2021    ALT 21 10/07/2021       Lab Results   Component Value Date    WBC 9.04 05/28/2020    HGB 15.6 05/28/2020    HCT 46.0 05/28/2020    MCV 95.0 05/28/2020     05/28/2020         .  Lab Results   Component Value Date    RPR Non-Reactive 08/15/2018         Lab Results   Component Value Date    TSH 4.100 04/11/2018         Lab Results   Component Value Date    [id number] 379 08/15/2018         Lab Results   Component Value Date    FOLATE >20.00 08/15/2018         Lab Results   Component Value Date    HGBA1C 5.7 04/10/2018         Lab Results   Component Value Date    GLUCOSE 111 (H) 05/27/2021    BUN 20 05/27/2021    CREATININE 1.52 (H) 05/27/2021    EGFRIFNONA 45 (L) 05/27/2021    BCR 13.2 05/27/2021    K 4.4 05/27/2021    CO2 26.5 05/27/2021    CALCIUM 10.1 05/27/2021    PROTENTOTREF 7.4 08/15/2018    ALBUMIN 4.80 10/07/2021    LABIL2 1.5 08/15/2018    AST 20 10/07/2021    ALT 21 10/07/2021         Lab Results   Component Value Date    WBC 9.04 05/28/2020    HGB 15.6 05/28/2020    HCT 46.0 05/28/2020    MCV 95.0 05/28/2020     05/28/2020       Review of myelogram post myelogram CT is reviewed as above.  Review of previous MRI of the cervical spine, MRI of the brain as well as EMG from 2018 were reviewed as above      Assessment:   1.  Chronic severe low back pain with out surgical findings on myelogram.  2.  Chronic left groin pain over the femoral canal area with some weakness of  the iliopsoas, quadriceps and hamstrings but also present is a Nelson sign indicating some pain inhibition is present.          Plan:  1.  EMG left leg looking at femoral neuropathy  2.  CT scan of the abdomen pelvis looking especially in the area of the low left pelvis and femoral canal area for any structural abnormality which could affect the femoral nerve.  3.  To discuss results at his EMG.  4.  No further suggestions regarding his pain management.            Time: 45 minutes              Dictated utilizing Dragon dictation.

## 2021-11-22 DIAGNOSIS — I25.10 ATHEROSCLEROTIC HEART DISEASE OF NATIVE CORONARY ARTERY WITHOUT ANGINA PECTORIS: ICD-10-CM

## 2021-11-22 DIAGNOSIS — E78.5 HYPERLIPIDEMIA, UNSPECIFIED: ICD-10-CM

## 2021-11-22 RX ORDER — EZETIMIBE 10 MG/1
10 TABLET ORAL DAILY
Qty: 90 TABLET | Refills: 3 | Status: SHIPPED | OUTPATIENT
Start: 2021-11-22 | End: 2022-01-13 | Stop reason: SDUPTHER

## 2021-12-22 ENCOUNTER — HOSPITAL ENCOUNTER (OUTPATIENT)
Dept: CT IMAGING | Facility: HOSPITAL | Age: 76
Discharge: HOME | End: 2021-12-22
Admitting: PSYCHIATRY & NEUROLOGY

## 2021-12-22 ENCOUNTER — APPOINTMENT (OUTPATIENT)
Dept: CT IMAGING | Facility: HOSPITAL | Age: 76
End: 2021-12-22

## 2021-12-22 DIAGNOSIS — G57.22 FEMORAL NEUROPATHY OF LEFT LOWER EXTREMITY: ICD-10-CM

## 2021-12-22 LAB — CREAT BLDA-MCNC: 1.6 MG/DL (ref 0.6–1.3)

## 2021-12-22 PROCEDURE — 74177 CT ABD & PELVIS W/CONTRAST: CPT

## 2021-12-22 PROCEDURE — 82565 ASSAY OF CREATININE: CPT

## 2021-12-22 PROCEDURE — 25010000002 IOPAMIDOL 61 % SOLUTION: Performed by: PSYCHIATRY & NEUROLOGY

## 2021-12-22 RX ADMIN — IOPAMIDOL 85 ML: 612 INJECTION, SOLUTION INTRAVENOUS at 12:00

## 2022-01-13 DIAGNOSIS — I25.10 CORONARY ARTERY DISEASE INVOLVING NATIVE CORONARY ARTERY OF NATIVE HEART WITHOUT ANGINA PECTORIS: ICD-10-CM

## 2022-01-13 DIAGNOSIS — E78.5 HYPERLIPIDEMIA, UNSPECIFIED HYPERLIPIDEMIA TYPE: ICD-10-CM

## 2022-01-13 DIAGNOSIS — E78.5 HYPERLIPIDEMIA, UNSPECIFIED: ICD-10-CM

## 2022-01-13 DIAGNOSIS — I25.10 ATHEROSCLEROTIC HEART DISEASE OF NATIVE CORONARY ARTERY WITHOUT ANGINA PECTORIS: ICD-10-CM

## 2022-01-13 RX ORDER — OMEGA-3 ACID ETHYL ESTERS 1 G
2 CAPSULE ORAL 2 TIMES DAILY
Qty: 360 CAPSULE | Refills: 3 | Status: SHIPPED | OUTPATIENT
Start: 2022-01-13 | End: 2022-10-17 | Stop reason: SDUPTHER

## 2022-01-13 RX ORDER — CLOPIDOGREL BISULFATE 75 MG
75 TABLET ORAL DAILY
Qty: 90 TABLET | Refills: 3 | Status: SHIPPED | OUTPATIENT
Start: 2022-01-13 | End: 2022-10-17 | Stop reason: SDUPTHER

## 2022-01-13 RX ORDER — EZETIMIBE 10 MG/1
10 TABLET ORAL DAILY
Qty: 90 TABLET | Refills: 3 | Status: SHIPPED | OUTPATIENT
Start: 2022-01-13 | End: 2022-10-17 | Stop reason: SDUPTHER

## 2022-03-18 ENCOUNTER — HOSPITAL ENCOUNTER (OUTPATIENT)
Dept: INFUSION THERAPY | Facility: HOSPITAL | Age: 77
Discharge: HOME | End: 2022-03-18
Admitting: PSYCHIATRY & NEUROLOGY

## 2022-03-18 DIAGNOSIS — M54.17 LUMBOSACRAL RADICULOPATHY: Primary | ICD-10-CM

## 2022-03-18 DIAGNOSIS — G57.22 FEMORAL NEUROPATHY OF LEFT LOWER EXTREMITY: ICD-10-CM

## 2022-03-18 PROCEDURE — 95909 NRV CNDJ TST 5-6 STUDIES: CPT

## 2022-03-18 PROCEDURE — 95909 NRV CNDJ TST 5-6 STUDIES: CPT | Performed by: PSYCHIATRY & NEUROLOGY

## 2022-03-18 PROCEDURE — 95886 MUSC TEST DONE W/N TEST COMP: CPT | Performed by: PSYCHIATRY & NEUROLOGY

## 2022-03-18 PROCEDURE — 95886 MUSC TEST DONE W/N TEST COMP: CPT

## 2022-03-18 NOTE — PROCEDURES
EMG and Nerve Conduction Studies    I.      Instrument used: Neuromax 1002  II.     Please see data sheets for tabular summary of NCS and details on methods, temperatures and lab standards.   III.    EMG muscles tested for upper extremity studies include the deltoid, biceps, triceps, pronator teres, extensor digitorum communis, first dorsal interosseous and abductor pollicis brevis.    IV.   EMG muscles tested for lower extremity studies include the vastus lateralis, tibialis anterior, peroneus longus, medial gastrocnemius and extensor digitorum brevis.    V.    Additional muscles tested as needed.  Paraspinal muscles tested as needed.   VI.   Please see data sheets for tabular summary of EMG findings.   VII. The complete report includes the data sheets.      Indication: Chronic severe low back pain since the 1990s; weakness left thigh  History: 76-year-old male with longstanding severe low back pain plus weakness in the left thigh.  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    Myelogram with CT demonstrates no significant nerve root compromise in the lumbar spine.  CT scan of the abdomen and pelvis demonstrates no findings to suggest a mass involving the lumbosacral plexus      Ht: 172.7 cm  Wt: 73.9 kg  HbA1C:   Lab Results   Component Value Date    HGBA1C 5.7 04/10/2018     TSH:   Lab Results   Component Value Date    TSH 4.100 04/11/2018       Technical summary:  Nerve conduction studies were obtained in the left leg with 1 comparison on the right.  Skin temperatures were cold and so the feet were warmed.  Some of the temperatures did not reach 32 °C.  Temperature correction was not needed since the distal latencies were normal.  Needle examination was obtained on selected muscles of the left leg    Results:  1.  Normal left sural sensory distal latency and amplitude.  2.  Normal left superficial peroneal sensory distal latency with mildly low amplitude of 4.7 µV.  3.  Normal  left saphenous distal latency and amplitude.  4.  Normal left peroneal motor conduction velocities and distal latency with low amplitude of 1.0 mV from all 3 stimulation sites.  Normal right peroneal motor conduction velocities, distal latencies and amplitude from ankle stimulation slightly low from proximal sites.  5.  Normal left tibial motor conduction velocity, distal latency and amplitudes.  6.  Needle examination of selected muscles in the left leg including hamstring muscles demonstrate fibrillations and positive sharp waves in the left medial gastrocnemius with a mild increased number of large motor units increased firing rate and reduced interference pattern.  The remaining muscles tested in the left leg showed normal insertional activities, motor units and recruitment patterns.  Lumbar paraspinals at L5 showed no abnormality on either side.    Impression:  Abnormal study showing mildly low left peroneal sensory and motor amplitudes with no slowing of conduction with associated denervation changes and some reinnervation changes in the left medial gastrocnemius.  This pattern could be seen in a sciatic neuropathy.  A left S1 radiculopathy is not excluded.  No paraspinal abnormalities were seen however to demonstrate root level involvement.  Clinical correlation is suggested    Beto Brizuela M.D.        Addendum:  Patient has seen pain management at [facility] about 2 or 3 years ago and had some initial success but several subsequent lumbar blocks did nothing he states.  He agrees to be sent to a different pain management physician.  We will send him to see Dr. Mckeon at [facility].  He indicates also he has been on several medications to try to reduce pain including narcotics which have all been stopped either due to side effects or lack of affect.    The patient had been sent for a rheumatology consult however Dr. Suazo's office does not accept his insurance and the [facility] rheumatologist Dr. Schreiber  did not schedule an appointment.    GNS      Dictated utilizing Dragon dictation.

## 2022-04-22 ENCOUNTER — OFFICE VISIT (OUTPATIENT)
Dept: CARDIOLOGY | Facility: CLINIC | Age: 77
End: 2022-04-22

## 2022-04-22 ENCOUNTER — HOSPITAL ENCOUNTER (OUTPATIENT)
Dept: CARDIOLOGY | Facility: HOSPITAL | Age: 77
Discharge: HOME | End: 2022-04-22
Admitting: INTERNAL MEDICINE

## 2022-04-22 VITALS
BODY MASS INDEX: 24.1 KG/M2 | WEIGHT: 159 LBS | HEART RATE: 72 BPM | DIASTOLIC BLOOD PRESSURE: 78 MMHG | HEIGHT: 68 IN | SYSTOLIC BLOOD PRESSURE: 110 MMHG

## 2022-04-22 DIAGNOSIS — R93.89 ABNORMAL FINDINGS ON DIAGNOSTIC IMAGING OF OTHER SPECIFIED BODY STRUCTURES: ICD-10-CM

## 2022-04-22 DIAGNOSIS — I49.3 PVC (PREMATURE VENTRICULAR CONTRACTION): ICD-10-CM

## 2022-04-22 DIAGNOSIS — R94.31 ABNORMAL ELECTROCARDIOGRAM (ECG) (EKG): ICD-10-CM

## 2022-04-22 DIAGNOSIS — I49.3 PVC (PREMATURE VENTRICULAR CONTRACTION): Primary | ICD-10-CM

## 2022-04-22 DIAGNOSIS — M19.90 ARTHRITIS: ICD-10-CM

## 2022-04-22 LAB
ANION GAP SERPL CALCULATED.3IONS-SCNC: 11.2 MMOL/L (ref 5–15)
BUN SERPL-MCNC: 18 MG/DL (ref 8–23)
BUN/CREAT SERPL: 12 (ref 7–25)
CALCIUM SPEC-SCNC: 9.6 MG/DL (ref 8.6–10.5)
CHLORIDE SERPL-SCNC: 104 MMOL/L (ref 98–107)
CO2 SERPL-SCNC: 24.8 MMOL/L (ref 22–29)
CREAT SERPL-MCNC: 1.5 MG/DL (ref 0.76–1.27)
EGFRCR SERPLBLD CKD-EPI 2021: 47.7 ML/MIN/1.73
GLUCOSE SERPL-MCNC: 101 MG/DL (ref 65–99)
MAGNESIUM SERPL-MCNC: 2.2 MG/DL (ref 1.6–2.4)
POTASSIUM SERPL-SCNC: 4.4 MMOL/L (ref 3.5–5.2)
SODIUM SERPL-SCNC: 140 MMOL/L (ref 136–145)
TSH SERPL DL<=0.05 MIU/L-ACNC: 12.8 UIU/ML (ref 0.27–4.2)

## 2022-04-22 PROCEDURE — 93000 ELECTROCARDIOGRAM COMPLETE: CPT | Performed by: INTERNAL MEDICINE

## 2022-04-22 PROCEDURE — 83735 ASSAY OF MAGNESIUM: CPT | Performed by: INTERNAL MEDICINE

## 2022-04-22 PROCEDURE — 84443 ASSAY THYROID STIM HORMONE: CPT | Performed by: INTERNAL MEDICINE

## 2022-04-22 PROCEDURE — 80048 BASIC METABOLIC PNL TOTAL CA: CPT | Performed by: INTERNAL MEDICINE

## 2022-04-22 PROCEDURE — 99214 OFFICE O/P EST MOD 30 MIN: CPT | Performed by: INTERNAL MEDICINE

## 2022-04-22 PROCEDURE — 36415 COLL VENOUS BLD VENIPUNCTURE: CPT

## 2022-04-22 NOTE — PROGRESS NOTES
Date of Office Visit: 2022  Encounter Provider: Marlene Giordano MD  Place of Service: [facility] CARDIOLOGY  Patient Name: Mannie Fajardo  :1945    Chief complaint  Coronary artery disease    History of Present Illness  The patient is a delightful, 76-year-old gentleman with history of hyperlipidemia, abdominal aortic aneurysm, hyperhomocysteinemia, and coronary artery disease.  In , after an abnormal stress test, he underwent cardiac catheterization that revealed severe disease of the circumflex artery for which he received a drug-coated stent.  He has not had any intervening events since then, though he has maintained aspirin and Plavix therapy.  In 2016 with complaints of chest pain Lexiscan perfusion study was negative for ischemia.  Stress screening study in  showed a small abdominal aortic aneurysm for his vascular surgeons felt to be stable.  By 2021 had had progressive edema.  An echocardiogram showed an ejection fraction of 62% with mild left ventricular upper atrophy diastolic function was indeterminate.  There is aortic valve calcification without stenosis.  The function was normal.    Since the last visit he has no palpitations dizziness chest pain.  His chronic dyspnea on exertion is unchanged.  He remains fairly limited with his activities due to back pain.  He has had no stimulants.  He does complain of intermittent left arm discomfort is hard for him to tell if this is part of his chronic pain.  Hard to tell if it is exertional because he does not exert himself or    Blood work 10/2021 with , HDL 47, triglycerides 189 on 2021 of 1.6.  It had been 1.46 in 2021 with GFR 47.    Past Medical History:   Diagnosis Date   • AAA (abdominal aortic aneurysm) without rupture (HCC)    • Arthritis     back   • Back pain    • Backache    • CAD (coronary atherosclerotic disease)    • Disease of thyroid gland    • Dyslipidemia    •  Edema    • History of transfusion    • Hyperhomocystinemia (HCC)    • Hyperlipidemia    • Stage 3 chronic kidney disease (HCC)      Past Surgical History:   Procedure Laterality Date   • APPENDECTOMY     • CARDIAC SURGERY     • CORONARY ANGIOPLASTY WITH STENT PLACEMENT     • EXTRACORPOREAL SHOCKWAVE LITHOTRIPSY (ESWL), STENT INSERTION/REMOVAL Right 2/24/2017    Procedure: CYSTO RETROGRADE WITH STENT PLACEMENT;  Surgeon: Janes López MD;  [place] [facility] [place] [facility] [place]:      Outpatient Medications Prior to Visit   Medication Sig Dispense Refill   • aspirin 81 MG chewable tablet Chew 81 mg Daily.     • folic acid (FOLVITE) 400 MCG tablet Take 400 mcg by mouth Daily.     • Lovaza 1 g capsule Take 2 capsules by mouth 2 (Two) Times a Day. Do NOT substitute 360 capsule 3   • Plavix 75 MG tablet Take 1 tablet by mouth Daily. Only give the Brand name Plavix. DO NOT SUBSTITUTE 90 tablet 3   • Zetia 10 MG tablet Take 1 tablet by mouth Daily. 90 tablet 3   • ergocalciferol (ERGOCALCIFEROL) 1.25 MG (34969 UT) capsule Take 50,000 Units by mouth 1 (One) Time Per Week.       No facility-administered medications prior to visit.       Allergies as of 04/22/2022 - Reviewed 04/22/2022   Allergen Reaction Noted   • Adhesive tape Other (See Comments) 04/14/2016   • Other Other (See Comments) 04/14/2016   • Statins Other (See Comments) 04/14/2016     Social History     Socioeconomic History   • Marital status: Single   • Number of children: 0   • Years of education: COLLEGE   Tobacco Use   • Smoking status: Never Smoker   • Smokeless tobacco: Never Used   Substance and Sexual Activity   • Alcohol use: No     Comment: occ caffeine use   • Drug use: No   • Sexual activity: Defer     Family History   Problem Relation Age of Onset   • Heart disease Mother    • Stroke Father      Review of Systems   Constitutional: Negative for chills, fever, weight gain and weight loss.   Cardiovascular: Negative for leg swelling.   Respiratory:  "Negative for cough, snoring and wheezing.    Hematologic/Lymphatic: Negative for bleeding problem. Does not bruise/bleed easily.   Skin: Negative for color change.   Musculoskeletal: Positive for joint pain and myalgias. Negative for falls.   Gastrointestinal: Negative for melena.   Genitourinary: Negative for hematuria.   Neurological: Negative for excessive daytime sleepiness.   Psychiatric/Behavioral: Negative for depression. The patient is not nervous/anxious.         Objective:     Vitals:    04/22/22 1149   BP: 110/78   BP Location: Right arm   Patient Position: Lying   Cuff Size: Adult   Pulse: 72   Weight: 72.1 kg (159 lb)   Height: 172.7 cm (68\")     Body mass index is 24.18 kg/m².    Vitals reviewed.   Constitutional:       Appearance: Well-developed.   Eyes:      General: No scleral icterus.        Right eye: No discharge.      Conjunctiva/sclera: Conjunctivae normal.      Pupils: Pupils are equal, round, and reactive to light.   HENT:      Head: Normocephalic.      Nose: Nose normal.   Neck:      Thyroid: No thyromegaly.      Vascular: No JVD.   Pulmonary:      Effort: Pulmonary effort is normal. No respiratory distress.      Breath sounds: Normal breath sounds. No wheezing. No rales.   Cardiovascular:      Normal rate. Occasional ectopic beats. Regular rhythm. Normal S1. Normal S2.      Murmurs: There is no murmur.      No gallop.   Pulses:     Intact distal pulses.   Edema:     Peripheral edema absent.   Abdominal:      General: Bowel sounds are normal. There is no distension.      Palpations: Abdomen is soft.      Tenderness: There is no abdominal tenderness. There is no rebound.   Musculoskeletal: Normal range of motion.         General: No tenderness.      Cervical back: Normal range of motion and neck supple. Skin:     General: Skin is warm and dry.      Findings: No erythema or rash.   Neurological:      Mental Status: Alert and oriented to person, place, and time.   Psychiatric:         Behavior: " Behavior normal.         Thought Content: Thought content normal.         Judgment: Judgment normal.       Lab Review:     ECG 12 Lead    Date/Time: 4/22/2022 1:25 PM  Performed by: Marlene Giordano MD  Authorized by: Marlene Giordano MD   Comparison: compared with previous ECG   Comparison to previous ECG: Ventricular trigeminy is present  Rhythm: sinus rhythm  Ectopy: trigeminy    Clinical impression: abnormal EKG          Assessment:       Diagnosis Plan   1. PVC (premature ventricular contraction)  Magnesium    Basic Metabolic Panel    TSH    Adult Transthoracic Echo Complete W/ Cont if Necessary Per Protocol    Stress Test With Myocardial Perfusion One Day   2. Abnormal findings on diagnostic imaging of other specified body structures   TSH   3. Abnormal electrocardiogram (ECG) (EKG)   Stress Test With Myocardial Perfusion One Day   4. Arthritis  Ambulatory Referral to Rheumatology     Plan:       1.   PVCs.Associated with left arm discomfort with known coronary artery disease.  We will check magnesium potassium thyroid studies.  We will check an echocardiogram and Lexiscan cardiac stress test.  Depending on these results we will place a 24-hour Holter  2.  Coronary artery disease with history of prior drug-coated stent placement.  Last stress test December 2016, negative for ischemia.  No anginal symptoms whatsoever at this point and care complicated by renal insufficiency.  Continue with dual antiplatelet therapy  3.  Chronic back pain.  He would like a referral to rheumatology and I will place 1 at his request.  4.  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  History of renal insufficiency,  Daily last creatinine 12/2021 at 1.6.  6.  Abdominal aortic aneurysm followed by vascular surgery.  Is to follow-up next year  6.  Hyperlipidemia.  Intolerant of statins.  Currently on lovassa  and Zetia.  We will check fasting lipid level and liver function test today.  7.  Leg pain.  Normal ANDREW 10/2020.  8.  Carotid  artery plaque.  Followed by vascular surgery felt to be stable for the patient    Time Spent: I spent 35 minutes caring for Mannie on this date of service. This time includes time spent by me in the following activities: preparing for the visit, reviewing tests, obtaining and/or reviewing a separately obtained history, performing a medically appropriate examination and/or evaluation, counseling and educating the patient/family/caregiver, ordering medications, tests, or procedures, documenting information in the medical record and independently interpreting results and communicating that information with the patient/family/caregiver.   I spent 1 minutes on the separately reported service of ECG. This time is not included in the time used to support the E/M service also reported today.        Your medication list          Accurate as of April 22, 2022 11:59 PM. If you have any questions, ask your nurse or doctor.            CONTINUE taking these medications      Instructions Last Dose Given Next Dose Due   aspirin 81 MG chewable tablet      Chew 81 mg Daily.       ergocalciferol 1.25 MG (47399 UT) capsule  Commonly known as: ERGOCALCIFEROL      Take 50,000 Units by mouth 1 (One) Time Per Week.       folic acid 400 MCG tablet  Commonly known as: FOLVITE      Take 400 mcg by mouth Daily.       Lovaza 1 g capsule  Generic drug: omega-3 acid ethyl esters      Take 2 capsules by mouth 2 (Two) Times a Day. Do NOT substitute       Plavix 75 MG tablet  Generic drug: clopidogrel      Take 1 tablet by mouth Daily. Only give the Brand name Plavix. DO NOT SUBSTITUTE       Zetia 10 MG tablet  Generic drug: ezetimibe      Take 1 tablet by mouth Daily.              Patient is no longer taking -.  I corrected the med list to reflect this.  I did not stop these medications.      Dictated utilizing Dragon dictation

## 2022-04-25 NOTE — PROGRESS NOTES
Reviewed results and recommendations with Mannie Fajardo.  Patient verbalized understanding.    Thank you,  Lili Kelley RN  Triage Nurse [facility] – [place]

## 2022-05-18 ENCOUNTER — OFFICE VISIT (OUTPATIENT)
Dept: PAIN MEDICINE | Facility: CLINIC | Age: 77
End: 2022-05-18

## 2022-05-18 ENCOUNTER — TRANSCRIBE ORDERS (OUTPATIENT)
Dept: SURGERY | Facility: SURGERY CENTER | Age: 77
End: 2022-05-18

## 2022-05-18 ENCOUNTER — PREP FOR SURGERY (OUTPATIENT)
Dept: SURGERY | Facility: SURGERY CENTER | Age: 77
End: 2022-05-18

## 2022-05-18 DIAGNOSIS — M54.17 LUMBOSACRAL RADICULOPATHY: Primary | ICD-10-CM

## 2022-05-18 DIAGNOSIS — Z41.9 SURGERY, ELECTIVE: Primary | ICD-10-CM

## 2022-05-18 DIAGNOSIS — G89.29 CHRONIC LEFT-SIDED LOW BACK PAIN WITH LEFT-SIDED SCIATICA: ICD-10-CM

## 2022-05-18 DIAGNOSIS — G89.4 CHRONIC PAIN SYNDROME: Primary | ICD-10-CM

## 2022-05-18 DIAGNOSIS — M54.16 LEFT LUMBAR RADICULOPATHY: ICD-10-CM

## 2022-05-18 DIAGNOSIS — M54.42 CHRONIC LEFT-SIDED LOW BACK PAIN WITH LEFT-SIDED SCIATICA: ICD-10-CM

## 2022-05-18 PROCEDURE — 99204 OFFICE O/P NEW MOD 45 MIN: CPT | Performed by: ANESTHESIOLOGY

## 2022-05-18 NOTE — PATIENT INSTRUCTIONS
Given lumbar radiculopathy follows the left sided dermatome distrubution, patient would likely benefit from a left sided transforaminal epidural injection.  The procedure was described in detail and the risks, benefits and alternatives were discussed with the patient (including but not limited to: bleeding, infection, nerve damage, worsening of pain, inability to perform injection, paralysis, seizures, and death) who agreed to proceed.         --      Discussed with the patient that sedation is optional for this procedure.  The sedation offered is called conscious sedation which is different from general anesthesia that is utilized in surgical procedures. The dosing of the sedation is determined by the physician and they will be monitored throughout the procedure. With conscious sedation it is possible to remember parts or all of the procedure, this is normal. They will need to have a  with them as driving is prohibited following conscious sedation.     NPO instructions for conscious sedation:  --- Do not eat 6 hours prior to the procedure.   --- Do not drink any dairy or citrus 4 hours prior to the procedure.   --- Do not drink anything, including clear liquids, 2 hours prior to procedure.     If the NPO instructions are not followed then the procedure may be performed without sedation or the procedure will need to be rescheduled.         --------    Anticoagulation risks for procedures....   - there are risks to discontinuation of anticoagulants for neuraxial procedures and surgery.  Risks include but are not limited to deep vein thromboses, pulmonary emboli, myocardial infarction, and stroke    - Neuraxial access with a needle is relatively contraindicated while anticoagulated because of the risk of hemorrhage and/or epidural hematoma, which can be a neurosurgical emergency to evacuate bleeding.  Left unchecked, bleeding can cause nerve damage leading to paralysis and/or death.  --------        --------     Plavix (clopidogrel) must be held for seven days prior to a spinal injection.    Anticoagulation risks for procedures.... there are risks to discontinuation of anticoagulants such as Plavix for neuraxial procedures and surgery.  Risks include but are not limited to deep vein thromboses (blood clot, such as one in the leg), pulmonary emboli (blood clot in a lung), myocardial infarction (heart attack), and stroke.      Neuraxial access (approaching the spine with a needle), including spinal injection, is relatively contraindicated while anticoagulated because of the risk of hemorrhage and/or epidural hematoma (bleeding inside the spine), which can be a neurosurgical emergency to evacuate bleeding.  Left unchecked, bleeding can cause nerve damage leading to paralysis and/or death.    The risks and benefits of holding Plavix for a spinal injection must be weighed, and is a joint decision involving the patient as well as the prescriber of the anticoagulant drug.    --------

## 2022-05-18 NOTE — PROGRESS NOTES
CHIEF COMPLAINT  Mr. Fajardo has low back pain that started over 20 years ago. He has previously had PT and seen pain management in the past. He has also previously had back injections that didn't help a lot.    Subjective   Mannie Fajardo is a 76 y.o. male.   He presents to the office for evaluation of back pain. He was referred here by Dr. Brizuela.         Back Pain  This is a chronic problem. The current episode started more than 1 year ago. The problem occurs constantly. The problem has been gradually worsening since onset. The pain is present in the lumbar spine. The quality of the pain is described as aching, burning and shooting. The pain radiates to the left foot, left thigh and left knee. The pain is severe. The pain is the same all the time. The symptoms are aggravated by bending, position, sitting, standing and twisting. Associated symptoms include weakness (left leg). Pertinent negatives include no chest pain or numbness. Risk factors include lack of exercise, poor posture and sedentary lifestyle. He has tried heat, home exercises and ice (There is not a role for muscle relaxants that I can tell.  NSAIDs are contraindicated.) for the symptoms. The treatment provided no relief.        PEG Assessment   What number best describes your pain on average in the past week?10  What number best describes how, during the past week, pain has interfered with your enjoyment of life?10  What number best describes how, during the past week, pain has interfered with your general activity?  10    --  The aforementioned information the Chief Complaint section and above subjective data including any HPI data, and also the Review of Systems data, has been personally reviewed and affirmed.  --        Current Outpatient Medications:   •  aspirin 81 MG chewable tablet, Chew 81 mg Daily., Disp: , Rfl:   •  folic acid (FOLVITE) 400 MCG tablet, Take 400 mcg by mouth Daily., Disp: , Rfl:   •  Lovaza 1 g capsule, Take 2 capsules by  mouth 2 (Two) Times a Day. Do NOT substitute, Disp: 360 capsule, Rfl: 3  •  Plavix 75 MG tablet, Take 1 tablet by mouth Daily. Only give the Brand name Plavix. DO NOT SUBSTITUTE, Disp: 90 tablet, Rfl: 3  •  Zetia 10 MG tablet, Take 1 tablet by mouth Daily., Disp: 90 tablet, Rfl: 3    The following portions of the patient's history were reviewed and updated as appropriate: allergies, current medications, past family history, past medical history, past social history, past surgical history and problem list.    -------    The following portions of the patient's history were reviewed and updated as appropriate: allergies, current medications, past family history, past medical history, past social history, past surgical history and problem list.    Allergies   Allergen Reactions   • Adhesive Tape Other (See Comments)     Severe burn one time when left on too long   • Statins Other (See Comments)     Muscle weakness in legs and arms one time only       Current Outpatient Medications on File Prior to Visit   Medication Sig Dispense Refill   • aspirin 81 MG chewable tablet Chew 81 mg Daily.     • folic acid (FOLVITE) 400 MCG tablet Take 400 mcg by mouth Daily.     • Lovaza 1 g capsule Take 2 capsules by mouth 2 (Two) Times a Day. Do NOT substitute 360 capsule 3   • Plavix 75 MG tablet Take 1 tablet by mouth Daily. Only give the Brand name Plavix. DO NOT SUBSTITUTE 90 tablet 3   • Zetia 10 MG tablet Take 1 tablet by mouth Daily. 90 tablet 3     No current facility-administered medications on file prior to visit.       Patient Active Problem List   Diagnosis   • Abdominal aortic aneurysm (HCC)   • Hypercholesterolemia   • Coronary artery disease due to calcified coronary lesion   • Ureteric stone   • Chronic left-sided low back pain with left-sided sciatica   • Chronic midline low back pain without sciatica   • Degeneration of lumbar intervertebral disc   • Weakness of left leg   • Pain in left arm   • Stage 3 chronic kidney  disease (HCC)   • Opioid withdrawal (HCC)   • Coronary artery disease involving native coronary artery of native heart without angina pectoris   • Pain of left lower extremity   • Other specified symptoms and signs involving the circulatory and respiratory systems    • Renal insufficiency   • Edema leg   • Foot pain   • Hip pain   • Atherosclerosis of coronary artery   • Weight decreasing   • Vitamin D deficiency   • Urinary tract infection   • Seborrheic dermatitis   • Rash   • Primary lateral sclerosis (HCC)   • Personal history of pneumonia (recurrent)   • Personal history of other drug therapy   • Osteopenia   • Need for influenza vaccination   • Myelopathy (HCC)   • Muscle weakness of extremity   • Muscle strain   • Melanocytic nevus   • Low back strain   • Insomnia   • Inguinal hernia   • Hypothyroidism   • History of osteopenia   • Gout   • Benign prostatic hyperplasia   • At risk for osteoporosis   • Anemia   • Acute kidney failure (HCC)   • Acquired atrophy of thyroid   • Lumbosacral radiculopathy       Past Medical History:   Diagnosis Date   • AAA (abdominal aortic aneurysm) without rupture (HCC)    • Arthritis     back   • Back pain    • Backache    • CAD (coronary atherosclerotic disease)    • Disease of thyroid gland    • Dyslipidemia    • Edema    • History of transfusion    • Hyperhomocystinemia (HCC)    • Hyperlipidemia    • Stage 3 chronic kidney disease (HCC)        Past Surgical History:   Procedure Laterality Date   • APPENDECTOMY     • CARDIAC SURGERY     • CORONARY ANGIOPLASTY WITH STENT PLACEMENT     • EXTRACORPOREAL SHOCKWAVE LITHOTRIPSY (ESWL), STENT INSERTION/REMOVAL Right 2/24/2017    Procedure: CYSTO RETROGRADE WITH STENT PLACEMENT;  Surgeon: Janes López MD;  Location: [facility], [place], operated by [facility];  Service:        Family History   Problem Relation Age of Onset   • Heart disease Mother    • Stroke Father        Social History     Socioeconomic History   • Marital status: Single   • Number of  children: 0   • Years of education: COLLEGE   Tobacco Use   • Smoking status: Never Smoker   • Smokeless tobacco: Never Used   Substance and Sexual Activity   • Alcohol use: No     Comment: occ caffeine use   • Drug use: No   • Sexual activity: Defer       -------      REVIEW OF PERTINENT MEDICAL DATA    Thomas report is reviewed:  I reviewed the document in the electronic form under the PDMP tab in the Epic EMR...  - In this function, the report is a current report in as close to real-time as possible.  - The report was available for immediate review.  It was reviewed during the office visit.  - There is not pertinent data on the last page of the report. There is not concern for aberrant behavior based on this ekasper review.    He had a metabolic panel on April 22, 2022 and the GFR was 47.7.    He had a myelogram lumbar spine on August 19, 2021.  I reviewed the radiologist report.  I also independently reviewed the images.  There is some mild disc disease at L4-5 more than L5-S1.  No mass-effect on the nerves.    I reviewed the EMG nerve conduction test result from Dr. Brizuela from March 18,022.  He has mild slowing of left peroneal amplitudes.  This could be a sciatic neuropathy.  Does not exclude an S1 radiculopathy.    I reviewed office note from cardiologist Dr. Boyd and its from April 22, 2022.  Renal insufficiency.  Hyper homocystinemia.  Chronic back pain and the patient was requesting a rheumatology referral.  Coronary artery disease in the stent was placed many years ago.  He does continue on antiplatelet therapy. [ It is of note that he held Plavix without issues for the myelogram.  ]  History of PVCs.  Normal ankle-brachial index in October 2020.  History of leg pain.        Review of Systems   Constitutional: Negative for fatigue.   HENT: Negative for congestion.    Eyes: Negative for visual disturbance.   Respiratory: Negative for shortness of breath.    Cardiovascular: Negative for chest pain.  "  Gastrointestinal: Negative for constipation and diarrhea.   Genitourinary: Negative for difficulty urinating.   Musculoskeletal: Positive for back pain.   Neurological: Positive for weakness (left leg). Negative for numbness.   Psychiatric/Behavioral: Positive for sleep disturbance. The patient is not nervous/anxious.          Vitals:    05/18/22 1301   BP: 144/84   Pulse: 86   Resp: 18   Temp: 97.3 °F (36.3 °C)   SpO2: 97%   Weight: 73.6 kg (162 lb 3.2 oz)   Height: 172.7 cm (68\")   PainSc:   8         Objective   Physical Exam  Vitals and nursing note reviewed.   Constitutional:       General: He is not in acute distress.     Appearance: Normal appearance. He is well-developed. He is not toxic-appearing.   HENT:      Head: Normocephalic and atraumatic.      Right Ear: Hearing and external ear normal.      Left Ear: Hearing and external ear normal.      Nose: Nose normal.   Eyes:      General: Lids are normal.      Conjunctiva/sclera: Conjunctivae normal.      Pupils: Pupils are equal, round, and reactive to light.   Pulmonary:      Effort: Pulmonary effort is normal. No respiratory distress.   Musculoskeletal:      Lumbar back: Tenderness (Mild.) present. No deformity or spasms. Positive left straight leg raise test. Negative right straight leg raise test.   Neurological:      Mental Status: He is alert and oriented to person, place, and time.      Cranial Nerves: No cranial nerve deficit.      Motor: Weakness (He has weakness on the left hip flexion and extension as well as left knee flexion and extension.  He has weakness on left foot dorsiflexion but not on plantarflexion.) and atrophy present.      Gait: Gait abnormal.      Deep Tendon Reflexes:      Reflex Scores:       Patellar reflexes are 1+ on the right side and 0 on the left side.       Achilles reflexes are 0 on the right side and 0 on the left side.  Psychiatric:         Behavior: Behavior normal.         Assessment & Plan   Diagnoses and all orders " for this visit:    1. Chronic pain syndrome (Primary)    2. Left lumbar radiculopathy    3. Chronic left-sided low back pain with left-sided sciatica      In summary this 76-year-old gentleman has a history of quite significant back pain and worsening back pain.  He is bothered by left leg symptoms that include pain and paresthesia with evidence of the sciatica component.  He may have a radiculopathy based on EMG results.  He did not have any evidence of nerve impingement on the last CT scan but his painful symptoms are consistent with radiculopathies laterally and posteriorly and these are of the L4 and L5 and S1 dermatomal distributions.    Diagnostic lumbar transforaminal injections are reasonable and indicated.  Hopefully can obtain some significant therapeutic result also.  The targeted transforaminal injection is much more reasonable than the interlaminar approach.    --- Follow-up for procedure… The plan would be for anticipating a left L4 and left S1 lumbar transforaminal epidural steroid injection.  Then plan to see him back in a couple weeks after that.       --------    Anticoagulation risks for procedures....   - there are risks to discontinuation of anticoagulants for neuraxial procedures and surgery.  Risks include but are not limited to deep vein thromboses, pulmonary emboli, myocardial infarction, and stroke    - Neuraxial access with a needle is relatively contraindicated while anticoagulated because of the risk of hemorrhage and/or epidural hematoma, which can be a neurosurgical emergency to evacuate bleeding.  Left unchecked, bleeding can cause nerve damage leading to paralysis and/or death.  --------        --------    Plavix (clopidogrel) must be held for seven days prior to a spinal injection.    Anticoagulation risks for procedures.... there are risks to discontinuation of anticoagulants such as Plavix for neuraxial procedures and surgery.  Risks include but are not limited to deep vein  thromboses (blood clot, such as one in the leg), pulmonary emboli (blood clot in a lung), myocardial infarction (heart attack), and stroke.      Neuraxial access (approaching the spine with a needle), including spinal injection, is relatively contraindicated while anticoagulated because of the risk of hemorrhage and/or epidural hematoma (bleeding inside the spine), which can be a neurosurgical emergency to evacuate bleeding.  Left unchecked, bleeding can cause nerve damage leading to paralysis and/or death.    The risks and benefits of holding Plavix for a spinal injection must be weighed, and is a joint decision involving the patient as well as the prescriber of the anticoagulant drug.    --------      Reviewed the procedure at length with the patient.  Included in the review was expectations, complications, risk and benefits.The procedure was described in detail and the risks, benefits and alternatives were discussed with the patient (including but not limited to: bleeding, infection, nerve damage, worsening of pain, inability to perform injection, paralysis, seizures, coma, no pain relief and death) who agreed to proceed.  Discussed the potential for sedation if warranted/wanted.  The procedure will plan to be performed at [facility] with fluoroscopic guidance(unless ultrasound is indicated) and could potentially have steroids and contrast dye used. Questions were answered and in a way the patient could understand.  Patient verbalized understanding and wishes to proceed.  This intervention will be ordered.  Discussed with patient that all procedures are part of a multimodal plan of care and include either formal PT or a home exercise program.  Patient has no evidence of coagulopathy or current infection.    --        ---  Indications for epidural injection:  Plan is to proceed with epidural at the appropriate level.  If the patient receives significant pain reduction and improvement in  function and the plan will be to repeat the epidural when the pain worsens.  If a second epidural provides at least 6 weeks of sustained improvement that includes both pain reduction and improvement in function then an epidural injection could be repeated once again at the same level.  This is a mutual decision between the clinician and the patient that includes discussions including risks and benefits in detail as well as alternative therapies.  Patient's questions were answered to their satisfaction and to their understanding.  ---    Given lumbar radiculopathy follows the left L4-S1 dermatomal distrubution, patient would likely benefit from left sided transforaminal epidural injection.  The procedure was described in detail and the risks, benefits and alternatives were discussed with the patient (including but not limited to: bleeding, infection, nerve damage, worsening of pain, inability to perform injection, paralysis, seizures, and death) who agreed to proceed.         --      SHANNAN REPORT  SHANNAN report has been reviewed and scanned into the patient's chart.  Date of last SHANNAN : as above.  No current use of opioids.           Dictated utilizing Dragon dictation.     This document is intended for medical expert use only. Reading of this document by patients and/or patient's family without participating medical staff guidance may result in misinterpretation and unintended morbidity.   Any interpretation of such data is the responsibility of the patient and/or family member responsible for the patient in concert with their primary or specialist providers, not to be left for sources of online searches such as [employer], [employer] or similar queries. Relying on these approaches to knowledge may result in misinterpretation, misguided goals of care and even death should patients or family members try recommendations outside of the realm of professional medical care in a supervised way.      ---      Vitals:    05/18/22  1301   PainSc:   8          Mannie Fajardo reports a pain score of .  Given his pain assessment as noted, treatment options were discussed and the following options were decided upon as a follow-up plan to address the patient's pain: continuation of current treatment plan for pain.    Vitals:    05/18/22 1301   PainSc:   8          Mannie Fajardo reports a pain score of .  Given his pain assessment as noted, treatment options were discussed and the following options were decided upon as a follow-up plan to address the patient's pain: continuation of current treatment plan for pain.

## 2022-05-19 ENCOUNTER — HOSPITAL ENCOUNTER (OUTPATIENT)
Dept: CARDIOLOGY | Facility: HOSPITAL | Age: 77
Discharge: HOME | End: 2022-05-19

## 2022-05-19 VITALS — HEIGHT: 68 IN | WEIGHT: 162 LBS | BODY MASS INDEX: 24.55 KG/M2 | HEART RATE: 80 BPM

## 2022-05-19 DIAGNOSIS — I49.3 PVC (PREMATURE VENTRICULAR CONTRACTION): ICD-10-CM

## 2022-05-19 DIAGNOSIS — R94.31 ABNORMAL ELECTROCARDIOGRAM (ECG) (EKG): ICD-10-CM

## 2022-05-19 LAB
ASCENDING AORTA: 2.8 CM
BH CV ECHO MEAS - ACS: 2.26 CM
BH CV ECHO MEAS - AI P1/2T: 997 MSEC
BH CV ECHO MEAS - AO MAX PG: 5.4 MMHG
BH CV ECHO MEAS - AO MEAN PG: 2.7 MMHG
BH CV ECHO MEAS - AO ROOT DIAM: 2.9 CM
BH CV ECHO MEAS - AO V2 MAX: 116.1 CM/SEC
BH CV ECHO MEAS - AO V2 VTI: 27.2 CM
BH CV ECHO MEAS - AVA(I,D): 2.42 CM2
BH CV ECHO MEAS - EDV(CUBED): 54.2 ML
BH CV ECHO MEAS - EDV(MOD-SP2): 70 ML
BH CV ECHO MEAS - EDV(MOD-SP4): 68 ML
BH CV ECHO MEAS - EF(MOD-BP): 56 %
BH CV ECHO MEAS - EF(MOD-SP2): 51.4 %
BH CV ECHO MEAS - EF(MOD-SP4): 60.3 %
BH CV ECHO MEAS - ESV(CUBED): 21.5 ML
BH CV ECHO MEAS - ESV(MOD-SP2): 34 ML
BH CV ECHO MEAS - ESV(MOD-SP4): 27 ML
BH CV ECHO MEAS - FS: 26.6 %
BH CV ECHO MEAS - IVS/LVPW: 1.09 CM
BH CV ECHO MEAS - IVSD: 0.97 CM
BH CV ECHO MEAS - LAT PEAK E' VEL: 7.6 CM/SEC
BH CV ECHO MEAS - LV DIASTOLIC VOL/BSA (35-75): 36.4 CM2
BH CV ECHO MEAS - LV MASS(C)D: 106.1 GRAMS
BH CV ECHO MEAS - LV MAX PG: 3.3 MMHG
BH CV ECHO MEAS - LV MEAN PG: 1.72 MMHG
BH CV ECHO MEAS - LV SYSTOLIC VOL/BSA (12-30): 14.4 CM2
BH CV ECHO MEAS - LV V1 MAX: 91.3 CM/SEC
BH CV ECHO MEAS - LV V1 VTI: 21.1 CM
BH CV ECHO MEAS - LVIDD: 3.8 CM
BH CV ECHO MEAS - LVIDS: 2.8 CM
BH CV ECHO MEAS - LVOT AREA: 3.1 CM2
BH CV ECHO MEAS - LVOT DIAM: 1.99 CM
BH CV ECHO MEAS - LVPWD: 0.9 CM
BH CV ECHO MEAS - MED PEAK E' VEL: 5.9 CM/SEC
BH CV ECHO MEAS - MR MAX PG: 74.7 MMHG
BH CV ECHO MEAS - MR MAX VEL: 432.2 CM/SEC
BH CV ECHO MEAS - MV A DUR: 0.13 SEC
BH CV ECHO MEAS - MV A MAX VEL: 90.8 CM/SEC
BH CV ECHO MEAS - MV DEC SLOPE: 337.3 CM/SEC2
BH CV ECHO MEAS - MV DEC TIME: 0.18 MSEC
BH CV ECHO MEAS - MV E MAX VEL: 53.6 CM/SEC
BH CV ECHO MEAS - MV E/A: 0.59
BH CV ECHO MEAS - MV MAX PG: 3.9 MMHG
BH CV ECHO MEAS - MV MEAN PG: 1.37 MMHG
BH CV ECHO MEAS - MV P1/2T: 60.8 MSEC
BH CV ECHO MEAS - MV V2 VTI: 24.4 CM
BH CV ECHO MEAS - MVA(P1/2T): 3.6 CM2
BH CV ECHO MEAS - MVA(VTI): 2.7 CM2
BH CV ECHO MEAS - PA ACC TIME: 0.13 SEC
BH CV ECHO MEAS - PA PR(ACCEL): 20.4 MMHG
BH CV ECHO MEAS - PA V2 MAX: 101 CM/SEC
BH CV ECHO MEAS - PULM A REVS DUR: 0.14 SEC
BH CV ECHO MEAS - PULM A REVS VEL: 31.4 CM/SEC
BH CV ECHO MEAS - PULM DIAS VEL: 29 CM/SEC
BH CV ECHO MEAS - PULM S/D: 1.89
BH CV ECHO MEAS - PULM SYS VEL: 54.7 CM/SEC
BH CV ECHO MEAS - QP/QS: 0.43
BH CV ECHO MEAS - RAP SYSTOLE: 3 MMHG
BH CV ECHO MEAS - RV MAX PG: 2.48 MMHG
BH CV ECHO MEAS - RV V1 MAX: 78.8 CM/SEC
BH CV ECHO MEAS - RV V1 VTI: 13 CM
BH CV ECHO MEAS - RVOT DIAM: 1.67 CM
BH CV ECHO MEAS - RVSP: 30.2 MMHG
BH CV ECHO MEAS - SI(MOD-SP2): 19.3 ML/M2
BH CV ECHO MEAS - SI(MOD-SP4): 21.9 ML/M2
BH CV ECHO MEAS - SV(LVOT): 65.9 ML
BH CV ECHO MEAS - SV(MOD-SP2): 36 ML
BH CV ECHO MEAS - SV(MOD-SP4): 41 ML
BH CV ECHO MEAS - SV(RVOT): 28.5 ML
BH CV ECHO MEAS - TAPSE (>1.6): 1.71 CM
BH CV ECHO MEAS - TR MAX PG: 27.2 MMHG
BH CV ECHO MEAS - TR MAX VEL: 260.9 CM/SEC
BH CV ECHO MEASUREMENTS AVERAGE E/E' RATIO: 7.94
BH CV NUCLEAR PRIOR STUDY: 1
BH CV REST NUCLEAR ISOTOPE DOSE: 11.7 MCI
BH CV STRESS BP STAGE 1: NORMAL
BH CV STRESS COMMENTS STAGE 1: NORMAL
BH CV STRESS DOSE REGADENOSON STAGE 1: 0.4
BH CV STRESS DURATION MIN STAGE 1: 0
BH CV STRESS DURATION SEC STAGE 1: 10
BH CV STRESS HR STAGE 1: 92
BH CV STRESS NUCLEAR ISOTOPE DOSE: 33.4 MCI
BH CV STRESS PROTOCOL 1: NORMAL
BH CV STRESS RECOVERY BP: NORMAL MMHG
BH CV STRESS RECOVERY HR: 85 BPM
BH CV STRESS STAGE 1: 1
BH CV XLRA - RV BASE: 3.7 CM
BH CV XLRA - RV LENGTH: 5.9 CM
BH CV XLRA - RV MID: 2.47 CM
BH CV XLRA - TDI S': 6.4 CM/SEC
LEFT ATRIUM VOLUME INDEX: 20.3 ML/M2
LV EF 2D ECHO EST: 56 %
LV EF NUC BP: 61 %
MAXIMAL PREDICTED HEART RATE: 143 BPM
MAXIMAL PREDICTED HEART RATE: 143 BPM
PERCENT MAX PREDICTED HR: 64.34 %
SINUS: 2.9 CM
STJ: 2.7 CM
STRESS BASELINE BP: NORMAL MMHG
STRESS BASELINE HR: 71 BPM
STRESS PERCENT HR: 76 %
STRESS POST EXERCISE DUR SEC: 10 SEC
STRESS POST PEAK BP: NORMAL MMHG
STRESS POST PEAK HR: 92 BPM
STRESS TARGET HR: 122 BPM
STRESS TARGET HR: 122 BPM

## 2022-05-19 PROCEDURE — 78452 HT MUSCLE IMAGE SPECT MULT: CPT | Performed by: INTERNAL MEDICINE

## 2022-05-19 PROCEDURE — 93306 TTE W/DOPPLER COMPLETE: CPT

## 2022-05-19 PROCEDURE — 25010000002 REGADENOSON 0.4 MG/5ML SOLUTION: Performed by: INTERNAL MEDICINE

## 2022-05-19 PROCEDURE — 93306 TTE W/DOPPLER COMPLETE: CPT | Performed by: INTERNAL MEDICINE

## 2022-05-19 PROCEDURE — 93016 CV STRESS TEST SUPVJ ONLY: CPT | Performed by: INTERNAL MEDICINE

## 2022-05-19 PROCEDURE — A9502 TC99M TETROFOSMIN: HCPCS | Performed by: INTERNAL MEDICINE

## 2022-05-19 PROCEDURE — 78452 HT MUSCLE IMAGE SPECT MULT: CPT

## 2022-05-19 PROCEDURE — 93018 CV STRESS TEST I&R ONLY: CPT | Performed by: INTERNAL MEDICINE

## 2022-05-19 PROCEDURE — 0 TECHNETIUM TETROFOSMIN KIT: Performed by: INTERNAL MEDICINE

## 2022-05-19 PROCEDURE — 93017 CV STRESS TEST TRACING ONLY: CPT

## 2022-05-19 RX ADMIN — TETROFOSMIN 1 DOSE: 1.38 INJECTION, POWDER, LYOPHILIZED, FOR SOLUTION INTRAVENOUS at 11:26

## 2022-05-19 RX ADMIN — REGADENOSON 0.4 MG: 0.08 INJECTION, SOLUTION INTRAVENOUS at 11:27

## 2022-05-19 RX ADMIN — TETROFOSMIN 1 DOSE: 1.38 INJECTION, POWDER, LYOPHILIZED, FOR SOLUTION INTRAVENOUS at 10:30

## 2022-05-23 ENCOUNTER — LAB (OUTPATIENT)
Dept: LAB | Facility: SURGERY CENTER | Age: 77
End: 2022-05-23

## 2022-05-23 VITALS
OXYGEN SATURATION: 97 % | RESPIRATION RATE: 18 BRPM | HEIGHT: 68 IN | BODY MASS INDEX: 24.58 KG/M2 | SYSTOLIC BLOOD PRESSURE: 144 MMHG | HEART RATE: 86 BPM | WEIGHT: 162.2 LBS | TEMPERATURE: 97.3 F | DIASTOLIC BLOOD PRESSURE: 84 MMHG

## 2022-05-23 DIAGNOSIS — M54.17 LUMBOSACRAL RADICULOPATHY: ICD-10-CM

## 2022-05-23 LAB — SARS-COV-2 ORF1AB RESP QL NAA+PROBE: NOT DETECTED

## 2022-05-23 PROCEDURE — U0004 COV-19 TEST NON-CDC HGH THRU: HCPCS | Performed by: ANESTHESIOLOGY

## 2022-05-23 PROCEDURE — U0005 INFEC AGEN DETEC AMPLI PROBE: HCPCS | Performed by: ANESTHESIOLOGY

## 2022-05-24 ENCOUNTER — TELEPHONE (OUTPATIENT)
Dept: CARDIOLOGY | Facility: CLINIC | Age: 77
End: 2022-05-24

## 2022-05-25 ENCOUNTER — HOSPITAL ENCOUNTER (OUTPATIENT)
Facility: SURGERY CENTER | Age: 77
Setting detail: HOSPITAL OUTPATIENT SURGERY
Discharge: HOME | End: 2022-05-25
Attending: ANESTHESIOLOGY | Admitting: ANESTHESIOLOGY

## 2022-05-25 ENCOUNTER — HOSPITAL ENCOUNTER (OUTPATIENT)
Dept: GENERAL RADIOLOGY | Facility: SURGERY CENTER | Age: 77
Setting detail: HOSPITAL OUTPATIENT SURGERY
End: 2022-05-25

## 2022-05-25 VITALS
TEMPERATURE: 97.2 F | SYSTOLIC BLOOD PRESSURE: 151 MMHG | RESPIRATION RATE: 16 BRPM | OXYGEN SATURATION: 99 % | DIASTOLIC BLOOD PRESSURE: 78 MMHG | BODY MASS INDEX: 24.25 KG/M2 | WEIGHT: 160 LBS | HEART RATE: 74 BPM | HEIGHT: 68 IN

## 2022-05-25 DIAGNOSIS — Z41.9 SURGERY, ELECTIVE: ICD-10-CM

## 2022-05-25 PROCEDURE — 64483 NJX AA&/STRD TFRM EPI L/S 1: CPT | Performed by: ANESTHESIOLOGY

## 2022-05-25 PROCEDURE — 0 IOHEXOL 300 MG/ML SOLUTION 10 ML VIAL: Performed by: ANESTHESIOLOGY

## 2022-05-25 PROCEDURE — 64484 NJX AA&/STRD TFRM EPI L/S EA: CPT | Performed by: ANESTHESIOLOGY

## 2022-05-25 PROCEDURE — 77002 NEEDLE LOCALIZATION BY XRAY: CPT

## 2022-05-25 PROCEDURE — 25010000002 METHYLPREDNISOLONE PER 80 MG: Performed by: ANESTHESIOLOGY

## 2022-05-25 PROCEDURE — 76000 FLUOROSCOPY <1 HR PHYS/QHP: CPT

## 2022-05-25 RX ORDER — BUPIVACAINE HYDROCHLORIDE 7.5 MG/ML
INJECTION, SOLUTION EPIDURAL; RETROBULBAR AS NEEDED
Status: DISCONTINUED | OUTPATIENT
Start: 2022-05-25 | End: 2022-05-25 | Stop reason: HOSPADM

## 2022-05-31 ENCOUNTER — TRANSCRIBE ORDERS (OUTPATIENT)
Dept: ADMINISTRATIVE | Facility: HOSPITAL | Age: 77
End: 2022-05-31

## 2022-05-31 ENCOUNTER — LAB (OUTPATIENT)
Dept: LAB | Facility: HOSPITAL | Age: 77
End: 2022-05-31

## 2022-05-31 DIAGNOSIS — N18.30 STAGE 3 CHRONIC KIDNEY DISEASE, UNSPECIFIED WHETHER STAGE 3A OR 3B CKD: ICD-10-CM

## 2022-05-31 DIAGNOSIS — I12.9 HYPERTENSIVE NEPHROPATHY: ICD-10-CM

## 2022-05-31 DIAGNOSIS — E55.9 VITAMIN D DEFICIENCY: ICD-10-CM

## 2022-05-31 DIAGNOSIS — E55.9 VITAMIN D DEFICIENCY: Primary | ICD-10-CM

## 2022-05-31 LAB
25(OH)D3 SERPL-MCNC: 30.9 NG/ML (ref 30–100)
ALBUMIN SERPL-MCNC: 4.9 G/DL (ref 3.5–5.2)
ANION GAP SERPL CALCULATED.3IONS-SCNC: 11.3 MMOL/L (ref 5–15)
BACTERIA UR QL AUTO: ABNORMAL /HPF
BILIRUB UR QL STRIP: NEGATIVE
BUN SERPL-MCNC: 26 MG/DL (ref 8–23)
BUN/CREAT SERPL: 18.7 (ref 7–25)
CALCIUM SPEC-SCNC: 10.1 MG/DL (ref 8.6–10.5)
CHLORIDE SERPL-SCNC: 103 MMOL/L (ref 98–107)
CLARITY UR: CLEAR
CO2 SERPL-SCNC: 26.7 MMOL/L (ref 22–29)
COLOR UR: YELLOW
CREAT SERPL-MCNC: 1.39 MG/DL (ref 0.76–1.27)
EGFRCR SERPLBLD CKD-EPI 2021: 51.9 ML/MIN/1.73
GLUCOSE SERPL-MCNC: 96 MG/DL (ref 65–99)
GLUCOSE UR STRIP-MCNC: NEGATIVE MG/DL
HGB UR QL STRIP.AUTO: NEGATIVE
HYALINE CASTS UR QL AUTO: ABNORMAL /LPF
KETONES UR QL STRIP: ABNORMAL
LEUKOCYTE ESTERASE UR QL STRIP.AUTO: ABNORMAL
MUCOUS THREADS URNS QL MICRO: ABNORMAL /HPF
NITRITE UR QL STRIP: NEGATIVE
PH UR STRIP.AUTO: 5.5 [PH] (ref 5–8)
PHOSPHATE SERPL-MCNC: 3.2 MG/DL (ref 2.5–4.5)
POTASSIUM SERPL-SCNC: 4.3 MMOL/L (ref 3.5–5.2)
PROT UR QL STRIP: NEGATIVE
RBC # UR STRIP: ABNORMAL /HPF
REF LAB TEST METHOD: ABNORMAL
SODIUM SERPL-SCNC: 141 MMOL/L (ref 136–145)
SP GR UR STRIP: 1.02 (ref 1–1.03)
SQUAMOUS #/AREA URNS HPF: ABNORMAL /HPF
UROBILINOGEN UR QL STRIP: ABNORMAL
WBC # UR STRIP: ABNORMAL /HPF

## 2022-05-31 PROCEDURE — 81001 URINALYSIS AUTO W/SCOPE: CPT

## 2022-05-31 PROCEDURE — 82306 VITAMIN D 25 HYDROXY: CPT

## 2022-05-31 PROCEDURE — 36415 COLL VENOUS BLD VENIPUNCTURE: CPT | Performed by: INTERNAL MEDICINE

## 2022-05-31 PROCEDURE — 80069 RENAL FUNCTION PANEL: CPT | Performed by: INTERNAL MEDICINE

## 2022-06-01 NOTE — TELEPHONE ENCOUNTER
Called pt and gave him numbers for MD's on [facility].  Pt would like that location.  Advised him that some are in [facility] office but will eventually be in the new location on [facility] once this is finished being built.  Ok with pt.  Pt asked for results to be mail.  Advised him I will take care of this when I return on Friday.

## 2022-06-09 ENCOUNTER — OFFICE VISIT (OUTPATIENT)
Dept: PAIN MEDICINE | Facility: CLINIC | Age: 77
End: 2022-06-09

## 2022-06-09 ENCOUNTER — PREP FOR SURGERY (OUTPATIENT)
Dept: SURGERY | Facility: SURGERY CENTER | Age: 77
End: 2022-06-09

## 2022-06-09 VITALS
WEIGHT: 156 LBS | TEMPERATURE: 96.9 F | OXYGEN SATURATION: 93 % | HEART RATE: 87 BPM | RESPIRATION RATE: 20 BRPM | BODY MASS INDEX: 23.64 KG/M2 | HEIGHT: 68 IN | DIASTOLIC BLOOD PRESSURE: 88 MMHG | SYSTOLIC BLOOD PRESSURE: 146 MMHG

## 2022-06-09 DIAGNOSIS — M54.42 CHRONIC LEFT-SIDED LOW BACK PAIN WITH LEFT-SIDED SCIATICA: ICD-10-CM

## 2022-06-09 DIAGNOSIS — G89.4 CHRONIC PAIN SYNDROME: Primary | ICD-10-CM

## 2022-06-09 DIAGNOSIS — M54.16 LEFT LUMBAR RADICULOPATHY: ICD-10-CM

## 2022-06-09 DIAGNOSIS — G89.29 CHRONIC LEFT-SIDED LOW BACK PAIN WITH LEFT-SIDED SCIATICA: ICD-10-CM

## 2022-06-09 DIAGNOSIS — M47.816 LUMBAR FACET ARTHROPATHY: Primary | ICD-10-CM

## 2022-06-09 DIAGNOSIS — M47.816 LUMBAR FACET ARTHROPATHY: ICD-10-CM

## 2022-06-09 PROCEDURE — 99214 OFFICE O/P EST MOD 30 MIN: CPT | Performed by: NURSE PRACTITIONER

## 2022-06-09 RX ORDER — SODIUM CHLORIDE 0.9 % (FLUSH) 0.9 %
10 SYRINGE (ML) INJECTION AS NEEDED
Status: CANCELLED | OUTPATIENT
Start: 2022-06-09

## 2022-06-09 RX ORDER — SODIUM CHLORIDE 0.9 % (FLUSH) 0.9 %
10 SYRINGE (ML) INJECTION EVERY 12 HOURS SCHEDULED
Status: CANCELLED | OUTPATIENT
Start: 2022-06-09

## 2022-06-09 NOTE — PATIENT INSTRUCTIONS
"-------  Education about Medial Branch Blockade and RF Therapy:    This medial branch blockade (MBB) suggested is intended for diagnostic purposes, with the intent of offering the patient Radiofrequency thermal rhizotomy (RF) if the MBB is diagnostically effective.  The diagnostic blockade is necessary to determine the likelihood that RF therapy could be efficacious in providing long term relief to the patient.    Medial branches are sensory nerve branches that connect to a facet joint and transmit sensations & pain signals from that joint.  Facet is a term for the type of joints found in the spine.  Medial branches are the nerves that go to a facet, and therefore are also sometimes called \"facet joint nerves\" (FJNs).      In a medial branch blockade procedure, xray fluoroscopy is used to verify the locations of the outside of the joint lines which are being targeted.  Under xray guidance, needles are placed to these areas.  Contrast dye is injected to confirm proper placement, with dye flowing over the joint area, and to ensure that the dye does not flow into unintended areas such as a vein.  When this is confirmed, local anesthetic is injected to block the medial branch at that joint level.      If MBBs are diagnostically successful in blocking pain, then the patient is most likely a great candidate for Radiofrequency of those facet joint nerves.  In the RF procedure, needles are placed to the joint lines in the same fashion, and after testing, the needle tips are heated to thermally treat the nerves, blocking the nerves by in essence damaging the nerves with the heat treatment(non-pulsed).       Medically, a successful RF procedure should provide a patient with 50% pain relief or more for at least 6 months.  Clinical experience suggests that successful patients receive relief more in the range of 12 months on average.  We also discussed that a fortunate minority of patients receive therapeutic success from the " MBB, and may not require RF ablation.  If a patient receives more than 8 weeks of relief from MBB, then occasional repeat MBB for therapeutic purposes is a very reasonable alternative therapy.  This course of therapy is consistent with our LCDs according to our CMS  in the area, and therefore other insurance providers should follow accordingly.  We will monitor our patients to screen for these therapeutic responders and will offer RF therapy only when necessary.        We discussed that MBB & RF are not without risks.  Guidelines regarding anticoagulant use & neuraxial procedures will be respected.  Patients that are ill or otherwise may be at risk for sepsis will not have their spines accessed by neuraxial injections of any type.  This patient will not be offered these therapies if there is an increased risk.   We discussed that there is a risk of postprocedural pain and also a risk of worsening of clinical picture with these procedures as with any neuraxial procedure.    -------

## 2022-06-09 NOTE — PROGRESS NOTES
"CHIEF COMPLAINT  F/u back pain    Subjective   Mannie Fajardo is a 77 y.o. male  who presents for follow-up.  He has a history of back pain, left leg pain.  Left L4 and S1 LTFESI 5/25/2022 - helped to take the edge off the pain for about 12-24 hours, would not consider it to be significantly improved during this time, minimal relief.     Patient was referred to our clinic by Dr. Beto Brizuela with [facility] neurology.  Patient has had chronic low back pain for approximately last 20 years.  Conservative treatment includes physical therapy, previous back injections.    Patient was last seen by Dr. Nitin Olson 11/10/2021.  This was in follow-up of the lumbar myelogram.  No significant stenosis was seen.  Nothing to offer from a surgical standpoint.    Patient reports pain today is a 7 out of 10 VAS.  Location of the pain is lower lumbar spine, also left leg but states back pain is much more severe.      Had some type of injection about 3 years ago which provided 5 days of 100% pain relief, he states that they tried the injection on three additional occasions but were never able to reproduce the result.     Patient remained masked during entire encounter. No cough present. I donned a mask and eye protection throughout entire visit. Prior to donning mask and eye protection, hand hygiene was performed, as well as when it was doffed.  I was closer than 6 feet, but not for an extended period of time. No obvious exposure to any bodily fluids.    Back Pain  This is a chronic problem. The current episode started more than 1 year ago. The problem occurs daily. The pain is present in the lumbar spine. The quality of the pain is described as aching and burning. The pain radiates to the left thigh. The pain is at a severity of 7/10. The pain is severe. Exacerbated by: nothing, it is constant \"24/7\" Associated symptoms include weakness (left leg). Pertinent negatives include no chest pain, fever, headaches or numbness. Treatments " "tried: laying down. The treatment provided no relief.      PEG Assessment   What number best describes your pain on average in the past week?7  What number best describes how, during the past week, pain has interfered with your enjoyment of life?7  What number best describes how, during the past week, pain has interfered with your general activity?  7    Review of Pertinent Medical Data ---  CT Myelogram Lumbar       The following portions of the patient's history were reviewed and updated as appropriate: allergies, current medications, past family history, past medical history, past social history, past surgical history and problem list.    Review of Systems   Constitutional: Positive for activity change (dec). Negative for fatigue and fever.   HENT: Negative for congestion.    Eyes: Negative for visual disturbance.   Respiratory: Negative for cough and shortness of breath.    Cardiovascular: Negative for chest pain.   Gastrointestinal: Negative for constipation and diarrhea.   Genitourinary: Negative for difficulty urinating.   Musculoskeletal: Positive for back pain.   Neurological: Positive for weakness (left leg). Negative for dizziness, light-headedness, numbness and headaches.   Psychiatric/Behavioral: Positive for sleep disturbance (occ). Negative for agitation and suicidal ideas. The patient is not nervous/anxious.      I have reviewed and confirmed the accuracy of the ROS as documented by the MA/LPN/RN CONSUELO Pelaez    Vitals:    06/09/22 1332   BP: 146/88   Pulse: 87   Resp: 20   Temp: 96.9 °F (36.1 °C)   SpO2: 93%   Weight: 70.8 kg (156 lb)   Height: 172.7 cm (68\")   PainSc:   7   PainLoc: Back     Objective   Physical Exam  Vitals and nursing note reviewed.   Constitutional:       General: He is not in acute distress.     Appearance: Normal appearance. He is not ill-appearing.   Pulmonary:      Effort: Pulmonary effort is normal. No respiratory distress.   Musculoskeletal:      Lumbar back: " "Tenderness and bony tenderness present.      Comments: +lumbar facet tenderness/loading    Skin:     General: Skin is warm and dry.   Neurological:      Mental Status: He is alert and oriented to person, place, and time.      Motor: Motor function is intact.      Gait: Gait abnormal (slowed).   Psychiatric:         Mood and Affect: Mood normal.         Behavior: Behavior normal.       Assessment & Plan   Diagnoses and all orders for this visit:    1. Chronic pain syndrome (Primary)    2. Left lumbar radiculopathy    3. Chronic left-sided low back pain with left-sided sciatica    4. Lumbar facet arthropathy      --- Bilateral L2-L5 MBB     -------  Education about Medial Branch Blockade and RF Therapy:    This medial branch blockade (MBB) suggested is intended for diagnostic purposes, with the intent of offering the patient Radiofrequency thermal rhizotomy (RF) if the MBB is diagnostically effective.  The diagnostic blockade is necessary to determine the likelihood that RF therapy could be efficacious in providing long term relief to the patient.    Medial branches are sensory nerve branches that connect to a facet joint and transmit sensations & pain signals from that joint.  Facet is a term for the type of joints found in the spine.  Medial branches are the nerves that go to a facet, and therefore are also sometimes called \"facet joint nerves\" (FJNs).      In a medial branch blockade procedure, xray fluoroscopy is used to verify the locations of the outside of the joint lines which are being targeted.  Under xray guidance, needles are placed to these areas.  Contrast dye is injected to confirm proper placement, with dye flowing over the joint area, and to ensure that the dye does not flow into unintended areas such as a vein.  When this is confirmed, local anesthetic is injected to block the medial branch at that joint level.      If MBBs are diagnostically successful in blocking pain, then the patient is most " likely a great candidate for Radiofrequency of those facet joint nerves.  In the RF procedure, needles are placed to the joint lines in the same fashion, and after testing, the needle tips are heated to thermally treat the nerves, blocking the nerves by in essence damaging the nerves with the heat treatment(non-pulsed).       Medically, a successful RF procedure should provide a patient with 50% pain relief or more for at least 6 months.  Clinical experience suggests that successful patients receive relief more in the range of 12 months on average.  We also discussed that a fortunate minority of patients receive therapeutic success from the MBB, and may not require RF ablation.  If a patient receives more than 8 weeks of relief from MBB, then occasional repeat MBB for therapeutic purposes is a very reasonable alternative therapy.  This course of therapy is consistent with our LCDs according to our CMS  in the area, and therefore other insurance providers should follow accordingly.  We will monitor our patients to screen for these therapeutic responders and will offer RF therapy only when necessary.        We discussed that MBB & RF are not without risks.  Guidelines regarding anticoagulant use & neuraxial procedures will be respected.  Patients that are ill or otherwise may be at risk for sepsis will not have their spines accessed by neuraxial injections of any type.  This patient will not be offered these therapies if there is an increased risk.   We discussed that there is a risk of postprocedural pain and also a risk of worsening of clinical picture with these procedures as with any neuraxial procedure.    -------        --- Follow-up for procedure          As the clinician, I personally reviewed the SHANNAN from 6/9/2022 while the patient was in the office today.     Dictated utilizing Dragon dictation.     This document is intended for medical expert use only. Reading of this document by patients  and/or patient's family without participating medical staff guidance may result in misinterpretation and unintended morbidity.   Any interpretation of such data is the responsibility of the patient and/or family member responsible for the patient in concert with their primary or specialist providers, not to be left for sources of online searches such as [employer], [employer] or similar queries. Relying on these approaches to knowledge may result in misinterpretation, misguided goals of care and even death should patients or family members try recommendations outside of the realm of professional medical care in a supervised way.

## 2022-06-09 NOTE — H&P (VIEW-ONLY)
"CHIEF COMPLAINT  F/u back pain    Subjective   Mannie Fajardo is a 77 y.o. male  who presents for follow-up.  He has a history of back pain, left leg pain.  Left L4 and S1 LTFESI 5/25/2022 - helped to take the edge off the pain for about 12-24 hours, would not consider it to be significantly improved during this time, minimal relief.     Patient was referred to our clinic by Dr. Beto Brizuela with [facility] [place] neurology.  Patient has had chronic low back pain for approximately last 20 years.  Conservative treatment includes physical therapy, previous back injections.    Patient was last seen by Dr. Nitin Olson 11/10/2021.  This was in follow-up of the lumbar myelogram.  No significant stenosis was seen.  Nothing to offer from a surgical standpoint.    Patient reports pain today is a 7 out of 10 VAS.  Location of the pain is lower lumbar spine, also left leg but states back pain is much more severe.      Had some type of injection about 3 years ago which provided 5 days of 100% pain relief, he states that they tried the injection on three additional occasions but were never able to reproduce the result.     Patient remained masked during entire encounter. No cough present. I donned a mask and eye protection throughout entire visit. Prior to donning mask and eye protection, hand hygiene was performed, as well as when it was doffed.  I was closer than 6 feet, but not for an extended period of time. No obvious exposure to any bodily fluids.    Back Pain  This is a chronic problem. The current episode started more than 1 year ago. The problem occurs daily. The pain is present in the lumbar spine. The quality of the pain is described as aching and burning. The pain radiates to the left thigh. The pain is at a severity of 7/10. The pain is severe. Exacerbated by: nothing, it is constant \"24/7\" Associated symptoms include weakness (left leg). Pertinent negatives include no chest pain, fever, headaches or numbness. Treatments " "tried: laying down. The treatment provided no relief.      PEG Assessment   What number best describes your pain on average in the past week?7  What number best describes how, during the past week, pain has interfered with your enjoyment of life?7  What number best describes how, during the past week, pain has interfered with your general activity?  7    Review of Pertinent Medical Data ---  CT Myelogram Lumbar       The following portions of the patient's history were reviewed and updated as appropriate: allergies, current medications, past family history, past medical history, past social history, past surgical history and problem list.    Review of Systems   Constitutional: Positive for activity change (dec). Negative for fatigue and fever.   HENT: Negative for congestion.    Eyes: Negative for visual disturbance.   Respiratory: Negative for cough and shortness of breath.    Cardiovascular: Negative for chest pain.   Gastrointestinal: Negative for constipation and diarrhea.   Genitourinary: Negative for difficulty urinating.   Musculoskeletal: Positive for back pain.   Neurological: Positive for weakness (left leg). Negative for dizziness, light-headedness, numbness and headaches.   Psychiatric/Behavioral: Positive for sleep disturbance (occ). Negative for agitation and suicidal ideas. The patient is not nervous/anxious.      I have reviewed and confirmed the accuracy of the ROS as documented by the MA/LPN/RN CONSUELO Pelaez    Vitals:    06/09/22 1332   BP: 146/88   Pulse: 87   Resp: 20   Temp: 96.9 °F (36.1 °C)   SpO2: 93%   Weight: 70.8 kg (156 lb)   Height: 172.7 cm (68\")   PainSc:   7   PainLoc: Back     Objective   Physical Exam  Vitals and nursing note reviewed.   Constitutional:       General: He is not in acute distress.     Appearance: Normal appearance. He is not ill-appearing.   Pulmonary:      Effort: Pulmonary effort is normal. No respiratory distress.   Musculoskeletal:      Lumbar back: " "Tenderness and bony tenderness present.      Comments: +lumbar facet tenderness/loading    Skin:     General: Skin is warm and dry.   Neurological:      Mental Status: He is alert and oriented to person, place, and time.      Motor: Motor function is intact.      Gait: Gait abnormal (slowed).   Psychiatric:         Mood and Affect: Mood normal.         Behavior: Behavior normal.       Assessment & Plan   Diagnoses and all orders for this visit:    1. Chronic pain syndrome (Primary)    2. Left lumbar radiculopathy    3. Chronic left-sided low back pain with left-sided sciatica    4. Lumbar facet arthropathy      --- Bilateral L2-L5 MBB     -------  Education about Medial Branch Blockade and RF Therapy:    This medial branch blockade (MBB) suggested is intended for diagnostic purposes, with the intent of offering the patient Radiofrequency thermal rhizotomy (RF) if the MBB is diagnostically effective.  The diagnostic blockade is necessary to determine the likelihood that RF therapy could be efficacious in providing long term relief to the patient.    Medial branches are sensory nerve branches that connect to a facet joint and transmit sensations & pain signals from that joint.  Facet is a term for the type of joints found in the spine.  Medial branches are the nerves that go to a facet, and therefore are also sometimes called \"facet joint nerves\" (FJNs).      In a medial branch blockade procedure, xray fluoroscopy is used to verify the locations of the outside of the joint lines which are being targeted.  Under xray guidance, needles are placed to these areas.  Contrast dye is injected to confirm proper placement, with dye flowing over the joint area, and to ensure that the dye does not flow into unintended areas such as a vein.  When this is confirmed, local anesthetic is injected to block the medial branch at that joint level.      If MBBs are diagnostically successful in blocking pain, then the patient is most " likely a great candidate for Radiofrequency of those facet joint nerves.  In the RF procedure, needles are placed to the joint lines in the same fashion, and after testing, the needle tips are heated to thermally treat the nerves, blocking the nerves by in essence damaging the nerves with the heat treatment(non-pulsed).       Medically, a successful RF procedure should provide a patient with 50% pain relief or more for at least 6 months.  Clinical experience suggests that successful patients receive relief more in the range of 12 months on average.  We also discussed that a fortunate minority of patients receive therapeutic success from the MBB, and may not require RF ablation.  If a patient receives more than 8 weeks of relief from MBB, then occasional repeat MBB for therapeutic purposes is a very reasonable alternative therapy.  This course of therapy is consistent with our LCDs according to our CMS  in the area, and therefore other insurance providers should follow accordingly.  We will monitor our patients to screen for these therapeutic responders and will offer RF therapy only when necessary.        We discussed that MBB & RF are not without risks.  Guidelines regarding anticoagulant use & neuraxial procedures will be respected.  Patients that are ill or otherwise may be at risk for sepsis will not have their spines accessed by neuraxial injections of any type.  This patient will not be offered these therapies if there is an increased risk.   We discussed that there is a risk of postprocedural pain and also a risk of worsening of clinical picture with these procedures as with any neuraxial procedure.    -------        --- Follow-up for procedure          As the clinician, I personally reviewed the SHANNAN from 6/9/2022 while the patient was in the office today.     Dictated utilizing Dragon dictation.     This document is intended for medical expert use only. Reading of this document by patients  and/or patient's family without participating medical staff guidance may result in misinterpretation and unintended morbidity.   Any interpretation of such data is the responsibility of the patient and/or family member responsible for the patient in concert with their primary or specialist providers, not to be left for sources of online searches such as [employer], [employer] or similar queries. Relying on these approaches to knowledge may result in misinterpretation, misguided goals of care and even death should patients or family members try recommendations outside of the realm of professional medical care in a supervised way.

## 2022-06-10 ENCOUNTER — TRANSCRIBE ORDERS (OUTPATIENT)
Dept: SURGERY | Facility: SURGERY CENTER | Age: 77
End: 2022-06-10

## 2022-06-10 DIAGNOSIS — Z41.9 SURGERY, ELECTIVE: Primary | ICD-10-CM

## 2022-06-10 PROBLEM — M47.816 LUMBAR FACET ARTHROPATHY: Status: ACTIVE | Noted: 2022-06-10

## 2022-06-16 ENCOUNTER — TELEPHONE (OUTPATIENT)
Dept: CARDIOLOGY | Facility: CLINIC | Age: 77
End: 2022-06-16

## 2022-06-16 ENCOUNTER — TELEPHONE (OUTPATIENT)
Dept: INTERNAL MEDICINE | Facility: CLINIC | Age: 77
End: 2022-06-16

## 2022-06-16 NOTE — TELEPHONE ENCOUNTER
Caller: Mannie Fajardo    Relationship to patient: Self    Best call back number: [phone]    Chief complaint: NEW PATIENT TRYING TO ESTABLISH CARE - RECOMMENDED BY ANOTHER PROVIDER OF HIS    Type of visit: NEW PATIENT    Requested date: ASAP    If rescheduling, when is the original appointment: N/A - THE HUB EXPLAINED THAT DR. FOSTER IS NOT CURRENTLY ACCEPTING NEW PATIENTS.

## 2022-06-20 ENCOUNTER — OFFICE VISIT (OUTPATIENT)
Dept: INTERNAL MEDICINE | Facility: CLINIC | Age: 77
End: 2022-06-20

## 2022-06-20 VITALS
BODY MASS INDEX: 23.49 KG/M2 | DIASTOLIC BLOOD PRESSURE: 70 MMHG | OXYGEN SATURATION: 98 % | HEART RATE: 94 BPM | SYSTOLIC BLOOD PRESSURE: 132 MMHG | RESPIRATION RATE: 18 BRPM | WEIGHT: 155 LBS | HEIGHT: 68 IN

## 2022-06-20 DIAGNOSIS — N18.31 STAGE 3A CHRONIC KIDNEY DISEASE: ICD-10-CM

## 2022-06-20 DIAGNOSIS — M79.672 PAIN IN BOTH FEET: ICD-10-CM

## 2022-06-20 DIAGNOSIS — M10.9 GOUT, UNSPECIFIED CAUSE, UNSPECIFIED CHRONICITY, UNSPECIFIED SITE: ICD-10-CM

## 2022-06-20 DIAGNOSIS — E55.9 VITAMIN D DEFICIENCY: ICD-10-CM

## 2022-06-20 DIAGNOSIS — M79.643 CHRONIC HAND PAIN, UNSPECIFIED LATERALITY: ICD-10-CM

## 2022-06-20 DIAGNOSIS — E78.00 HYPERCHOLESTEROLEMIA: Primary | ICD-10-CM

## 2022-06-20 DIAGNOSIS — G89.29 CHRONIC HAND PAIN, UNSPECIFIED LATERALITY: ICD-10-CM

## 2022-06-20 DIAGNOSIS — E03.9 HYPOTHYROIDISM, UNSPECIFIED TYPE: ICD-10-CM

## 2022-06-20 DIAGNOSIS — M79.671 PAIN IN BOTH FEET: ICD-10-CM

## 2022-06-20 PROBLEM — N28.9 RENAL INSUFFICIENCY: Status: RESOLVED | Noted: 2020-10-26 | Resolved: 2022-06-20

## 2022-06-20 PROBLEM — M79.605 PAIN OF LEFT LOWER EXTREMITY: Status: RESOLVED | Noted: 2020-10-26 | Resolved: 2022-06-20

## 2022-06-20 PROBLEM — M89.9 DISORDER OF BONE AND CARTILAGE: Status: ACTIVE | Noted: 2018-04-09

## 2022-06-20 PROBLEM — M79.602 PAIN IN LEFT ARM: Status: RESOLVED | Noted: 2017-11-14 | Resolved: 2022-06-20

## 2022-06-20 PROBLEM — N20.1 URETERIC STONE: Status: RESOLVED | Noted: 2017-02-23 | Resolved: 2022-06-20

## 2022-06-20 PROBLEM — M25.559 HIP PAIN: Status: RESOLVED | Noted: 2018-04-25 | Resolved: 2022-06-20

## 2022-06-20 PROBLEM — M94.9 DISORDER OF BONE AND CARTILAGE: Status: ACTIVE | Noted: 2018-04-09

## 2022-06-20 PROCEDURE — 99214 OFFICE O/P EST MOD 30 MIN: CPT | Performed by: NURSE PRACTITIONER

## 2022-06-20 RX ORDER — LEVOTHYROXINE SODIUM 50 MCG
50 TABLET ORAL DAILY
Qty: 90 TABLET | Refills: 0 | Status: SHIPPED | OUTPATIENT
Start: 2022-06-20 | End: 2022-07-27

## 2022-06-20 RX ORDER — ERGOCALCIFEROL 1.25 MG/1
50000 CAPSULE ORAL WEEKLY
Qty: 12 CAPSULE | Refills: 3 | Status: SHIPPED | OUTPATIENT
Start: 2022-06-20 | End: 2022-06-27 | Stop reason: SDUPTHER

## 2022-06-20 NOTE — PROGRESS NOTES
Subjective   Mannie Fajardo is a 77 y.o. male. Patient is here today for   Chief Complaint   Patient presents with   • Establish Care   • Elevated TSH   • Hand and Toe pain          Vitals:    06/20/22 0936   BP: 132/70   Pulse: 94   Resp: 18   SpO2: 98%     Body mass index is 23.57 kg/m².  The following portions of the patient's history were reviewed and updated as appropriate: allergies, current medications, past family history, past medical history, past social history, past surgical history and problem list.    Past Medical History:   Diagnosis Date   • AAA (abdominal aortic aneurysm) without rupture (HCC)    • Arthritis     back   • Back pain    • Backache    • CAD (coronary atherosclerotic disease)    • Disease of thyroid gland    • Dyslipidemia    • Edema    • History of transfusion    • Hyperhomocystinemia (HCC)    • Hyperlipidemia    • Stage 3 chronic kidney disease (HCC)       Allergies   Allergen Reactions   • Adhesive Tape Other (See Comments)     Severe burn one time when left on too long   • Statins Other (See Comments)     Muscle weakness in legs and arms one time only      Social History     Socioeconomic History   • Marital status: Single   • Number of children: 0   • Years of education: COLLEGE   Tobacco Use   • Smoking status: Never Smoker   • Smokeless tobacco: Never Used   Substance and Sexual Activity   • Alcohol use: No     Comment: occ caffeine use   • Drug use: No   • Sexual activity: Defer        Current Outpatient Medications:   •  aspirin 81 MG chewable tablet, Chew 81 mg Daily., Disp: , Rfl:   •  folic acid (FOLVITE) 400 MCG tablet, Take 400 mcg by mouth Daily., Disp: , Rfl:   •  Lovaza 1 g capsule, Take 2 capsules by mouth 2 (Two) Times a Day. Do NOT substitute, Disp: 360 capsule, Rfl: 3  •  Plavix 75 MG tablet, Take 1 tablet by mouth Daily. Only give the Brand name Plavix. DO NOT SUBSTITUTE, Disp: 90 tablet, Rfl: 3  •  Zetia 10 MG tablet, Take 1 tablet by mouth Daily., Disp: 90 tablet,  Rfl: 3  •  Synthroid 50 MCG tablet, Take 1 tablet by mouth Daily. KEAGAN, Disp: 90 tablet, Rfl: 0  •  vitamin D (ERGOCALCIFEROL) 1.25 MG (93980 UT) capsule capsule, Take 1 capsule by mouth 1 (One) Time Per Week., Disp: 12 capsule, Rfl: 3     Objective     History of Present Illness  Mannie is a 77 year old male new patient who is here to establish care. He previous saw Dr Lancaster and Dr Fan at [facility] primary care. He has CAD, HLD, Hypothyroidism, AAA,  Vitamin D deficiency, chronic degenerative lumbar disc disease, lumbar radiculopathy, osteopenia,  He is followed by cardiology, pain management, nephrology  and Neurology. He was referred to rheumatology by neurology  For gouty arthritis and chronic hand and foot pain but he has had difficulty getting an appt. He would like a new referral to be placed if possible. He has not taken thyroid medication in a few years. TSH was checked by cardiology and was 12 so he was referred to primary care for evaluation  The following data was reviewed by: CONSUELO Naranjo on 06/20/2022:  Common labs    Common Labsle 12/22/21 4/22/22 5/31/22   Glucose  101 (A) 96   BUN  18 26 (A)   Creatinine 1.60 (A) 1.50 (A) 1.39 (A)   Sodium  140 141   Potassium  4.4 4.3   Chloride  104 103   Calcium  9.6 10.1   Albumin   4.90   (A) Abnormal value       Comments are available for some flowsheets but are not being displayed.           TSH    TSH 4/22/22   TSH 12.800 (A)   (A) Abnormal value                Data reviewed: Consultant notes .         Review of Systems   Constitutional: Positive for fatigue.   Cardiovascular: Negative for chest pain.   Musculoskeletal: Positive for arthralgias, back pain and gait problem.       Physical Exam  Constitutional:       General: He is not in acute distress.  Cardiovascular:      Rate and Rhythm: Normal rate and regular rhythm.      Heart sounds: Normal heart sounds.   Pulmonary:      Effort: Pulmonary effort is normal.      Breath sounds: Normal  breath sounds.   Musculoskeletal:      Right hand: Deformity and bony tenderness present.      Left hand: Deformity and bony tenderness present.      Right foot: Deformity and bony tenderness present.      Left foot: Deformity and bony tenderness present.   Neurological:      Mental Status: He is alert.         ASSESSMENT     Problems Addressed this Visit     Hypercholesterolemia - Primary    Stage 3 chronic kidney disease (HCC)    Foot pain    Relevant Orders    Ambulatory Referral to Rheumatology    Vitamin D deficiency    Hypothyroidism    Relevant Medications    Synthroid 50 MCG tablet    Gout    Relevant Orders    Ambulatory Referral to Rheumatology      Other Visit Diagnoses     Chronic hand pain, unspecified laterality          Diagnoses       Codes Comments    Hypercholesterolemia    -  Primary ICD-10-CM: E78.00  ICD-9-CM: 272.0     Hypothyroidism, unspecified type     ICD-10-CM: E03.9  ICD-9-CM: 244.9     Gout, unspecified cause, unspecified chronicity, unspecified site     ICD-10-CM: M10.9  ICD-9-CM: 274.9     Pain in both feet     ICD-10-CM: M79.671, M79.672  ICD-9-CM: 729.5     Chronic hand pain, unspecified laterality     ICD-10-CM: M79.643, G89.29  ICD-9-CM: 729.5, 338.29     Stage 3a chronic kidney disease (HCC)     ICD-10-CM: N18.31  ICD-9-CM: 585.3     Vitamin D deficiency     ICD-10-CM: E55.9  ICD-9-CM: 268.9           PLAN  Will start synthroid 50mcg - and recheck tsh and free t4 in 6 weeks   rx for vitamin d refilled   Will refer to rheumatology for gout/arthritis-   Continue current medications  Follow up with pain management, cardiology and nephrology as scheduled   Return in about 6 weeks (around 8/1/2022) for Recheck tsh and free t4 in 6 weeks (lab only) medicare wellness with fasting labs in 6 months .

## 2022-06-21 ENCOUNTER — TRANSCRIBE ORDERS (OUTPATIENT)
Dept: ADMINISTRATIVE | Facility: HOSPITAL | Age: 77
End: 2022-06-21

## 2022-06-21 ENCOUNTER — LAB (OUTPATIENT)
Dept: LAB | Facility: HOSPITAL | Age: 77
End: 2022-06-21

## 2022-06-21 DIAGNOSIS — Z01.818 OTHER SPECIFIED PRE-OPERATIVE EXAMINATION: Primary | ICD-10-CM

## 2022-06-21 LAB — SARS-COV-2 ORF1AB RESP QL NAA+PROBE: NOT DETECTED

## 2022-06-21 PROCEDURE — C9803 HOPD COVID-19 SPEC COLLECT: HCPCS | Performed by: SURGERY

## 2022-06-21 PROCEDURE — U0005 INFEC AGEN DETEC AMPLI PROBE: HCPCS | Performed by: SURGERY

## 2022-06-21 PROCEDURE — U0004 COV-19 TEST NON-CDC HGH THRU: HCPCS | Performed by: SURGERY

## 2022-06-23 ENCOUNTER — HOSPITAL ENCOUNTER (OUTPATIENT)
Dept: GENERAL RADIOLOGY | Facility: SURGERY CENTER | Age: 77
Setting detail: HOSPITAL OUTPATIENT SURGERY
End: 2022-06-23

## 2022-06-23 ENCOUNTER — HOSPITAL ENCOUNTER (OUTPATIENT)
Facility: SURGERY CENTER | Age: 77
Setting detail: HOSPITAL OUTPATIENT SURGERY
Discharge: HOME | End: 2022-06-23
Attending: ANESTHESIOLOGY | Admitting: ANESTHESIOLOGY

## 2022-06-23 VITALS
HEIGHT: 68 IN | HEART RATE: 68 BPM | BODY MASS INDEX: 23.49 KG/M2 | OXYGEN SATURATION: 98 % | WEIGHT: 155 LBS | TEMPERATURE: 98 F | SYSTOLIC BLOOD PRESSURE: 141 MMHG | DIASTOLIC BLOOD PRESSURE: 87 MMHG | RESPIRATION RATE: 17 BRPM

## 2022-06-23 DIAGNOSIS — Z41.9 SURGERY, ELECTIVE: ICD-10-CM

## 2022-06-23 PROCEDURE — 25010000002 METHYLPREDNISOLONE PER 40 MG: Performed by: ANESTHESIOLOGY

## 2022-06-23 PROCEDURE — 76000 FLUOROSCOPY <1 HR PHYS/QHP: CPT

## 2022-06-23 PROCEDURE — 25010000002 IOPAMIDOL 61 % SOLUTION 30 ML VIAL: Performed by: ANESTHESIOLOGY

## 2022-06-23 PROCEDURE — 64493 INJ PARAVERT F JNT L/S 1 LEV: CPT | Performed by: ANESTHESIOLOGY

## 2022-06-23 PROCEDURE — 64494 INJ PARAVERT F JNT L/S 2 LEV: CPT | Performed by: ANESTHESIOLOGY

## 2022-06-23 PROCEDURE — 77002 NEEDLE LOCALIZATION BY XRAY: CPT

## 2022-06-23 NOTE — DISCHARGE INSTRUCTIONS
[facility] - Post-procedure Instructions          --  While there are no absolute restrictions, it is recommended that you do not perform strenuous activity today. In the morning, you may resume your level of activity as before your block.    --  If you have a band-aid at your injection site, please remove it later today. Observe the area for any redness, swelling, pus-like drainage, or a temperature over 101°. If any of these symptoms occur, please call your doctor at [phone]. If after office hours, leave a message and the on-call provider will return your call.    --  Ice may be applied to your injection site. It is recommended you avoid direct heat (heating pad; hot tub) for 1-2 days.    --  Call [facility] at [phone] if you experience persistent headache, persistent bleeding from the injection site, or severe pain not relieved by heat or oral medication.    --  Do not make important decisions today.    --  Due to the effects of the block and/or the I.V. Sedation, DO NOT drive or operate hazardous machinery for 12 hours.  Local anesthetics may cause numbness after procedure and precautions must be taken with regards to operating equipment as well as with walking, even if ambulating with assistance of another person or with an assistive device.    --  Do not drink alcohol for 12 hours.    -- You may return to work tomorrow, or as directed by your referring doctor.    --  Occasionally you may notice a slight increase in your pain after the procedure. This should start to improve within the next 24-48 hours. Radiofrequency ablation procedure pain may last 3-4 weeks.    --  It may take as long as 3-4 days before you notice a gradual improvement in your pain and/or other symptoms.    -- You may continue to take your prescribed pain medication as needed.    --  Some normal possible side effects of steroid use could include fluid retention, increased blood sugar, dull headache,  increased sweating, increased appetite, mood swings and flushing.    --  Diabetics are recommended to watch their blood glucose level closely for 24-48 hours after the injection.    --  Must stay in PACU for 20 min upon arrival and prove no leg weakness before being discharged.    --  IN THE EVENT OF A LIFE THREATENING EMERGENCY, (CHEST PAIN, BREATHING DIFFICULTIES, PARALYSIS…) YOU SHOULD GO TO YOUR NEAREST EMERGENCY ROOM.    --  You should be contacted by our office within 2-3 days to schedule follow up or next appointment date.  If not contacted within 7 days, please call the office at [phone]

## 2022-06-23 NOTE — INTERVAL H&P NOTE
H&P reviewed. The patient was examined and there are no changes to the H&P.  Describes his pain as in the center.

## 2022-06-23 NOTE — OP NOTE
Bilateral L3-5 Lumbar Medial Branch Blockade  [facility]      PREOPERATIVE DIAGNOSIS:  Lumbar spondylosis without myelopathy    POSTOPERATIVE DIAGNOSIS:  Lumbar spondylosis without myelopathy    PROCEDURE:   Diagnostic Bilateral Lumbar Medial Branch Nerve Blockades, with fluoroscopy:  L3, L4, and L5 nerves (at the L4 & L5 transverse processes and the sacral alar groove) to block facet joints L4-5, and L5-S1  1. 84510-40 -- Bilateral Lumbar Facet blocks, 1st Level  2. 05624-50 -- Bilateral Lumbar Facet blocks, 2nd  Level    PRE-PROCEDURE DISCUSSION WITH PATIENT:    Risks and complications were discussed with the patient prior to starting the procedure and informed consent was obtained.      SURGEON:  Deepak Mckeon MD    REASON FOR PROCEDURE:    The patient complains of pain that seems to have a significant axial component and Painful area identified on exam under fluoroscopy    SEDATION:  Patient declined administration of moderate sedation    ANESTHETIC:  Marcaine 0.5%  STEROID:  Methylprednisolone (DEPO MEDROL) 40mg/ml  TOTAL VOLUME OF SOLUTION:  6ml    DESCRIPTON OF PROCEDURE:  After obtaining informed consent, IV access was not obtained in the preoperative area.   The patient was taken to the operating room.  The patient was placed in the prone position with a pillow under the abdomen. All pressure points were well padded.  EKG, blood pressure, and pulse oximeter were monitored.  The patient was monitored by the RN under my direction. The lumbosacral area was prepped with Chloraprep and draped in a sterile fashion. Under fluoroscopic guidance the transverse processes of the L4 and L5 vertebrae at the junctions of the superior articular processes were identified on the right. Also identified was the groove between the ala and the superior articular process of the sacrum on the ipsilateral side.  Skin and subcutaneous tissue were anesthetized with 1% lidocaine above each of these points. A  22-gauge spinal needle was introduced under fluoroscopic guidance at the above junctions. Aspiration was negative for blood and CSF.  After confirming the position of the needle with fluoroscope in all views, 0.25 mL of Omnipaque was injected, and after seeing the proper spread a total of 1 mL of the anesthetic solution noted above was injected at each of these points.  Needles were removed intact from each of the areas.  A similar procedure was repeated to block the L3, L4, and L5 nerves on the contralateral side.   Onset of analgesia was noted.  Vital signs remained stable throughout.      ESTIMATED BLOOD LOSS:  <5 mL  SPECIMENS:  none    COMPLICATIONS:   No complications were noted., There was no indication of vascular uptake on live injection of contrast dye., There was no indication of intrathecal uptake on live injection of contrast dye., There was not any evidence of dural puncture.   and The patient did not have any signs of postprocedure numbness nor weakness.    TOLERANCE & DISCHARGE CONDITION:    The patient tolerated the procedure well.  The patient was transported to the recovery area without difficulties.  The patient was discharged to home under the care of family in stable and satisfactory condition.    PLAN OF CARE:  1. The patient was given our standard instruction sheet.  2. We discussed that Lumbar Medial Branch Blockade is a diagnostic procedure in consideration for radiofrequency ablation if two diagnostic procedures prove to be positive for significant benefit.  If sustained relief of 6 to eight weeks is obtained, then an alternative plan could be therapeutic lumbar branch blockades.  3. The patient is asked to keep a pain log each hour for 8 hours after the procedure today.  4. The patient will  Return to clinic 2 wks.  5. The patient will resume all medications as per the medication reconciliation sheet.

## 2022-06-27 ENCOUNTER — TELEPHONE (OUTPATIENT)
Dept: INTERNAL MEDICINE | Facility: CLINIC | Age: 77
End: 2022-06-27

## 2022-06-27 RX ORDER — ERGOCALCIFEROL 1.25 MG/1
50000 CAPSULE ORAL WEEKLY
Qty: 12 CAPSULE | Refills: 3 | Status: SHIPPED | OUTPATIENT
Start: 2022-06-27

## 2022-06-27 NOTE — TELEPHONE ENCOUNTER
Caller: Mannie Fajardo    Relationship: Self    Best call back number:[phone]       What is the best time to reach you: ANYTIME     Who are you requesting to speak with (clinical staff, provider,  specific staff member): CLINICAL STAFF     What was the call regarding: PATIENT IS WANTING TO SPEAK TO SOMEONE ABOUT THE MEDICATION    vitamin D (ERGOCALCIFEROL) 1.25 MG (53083 UT) capsule capsule       PATIENT WOULD NOT DISCLOSE WHAT QUESTIONS HE HAS     Do you require a callback: YES

## 2022-06-27 NOTE — TELEPHONE ENCOUNTER
Patient requested Vitamin D to be filed with Part B.  I advised him that that is something his pharmacy would have to do when they fill the prescription. He said he told them but they were not in a hurry.  I advised him, only thing I can do it resend the prescription with a note stating to file with Part B.

## 2022-07-07 ENCOUNTER — OFFICE VISIT (OUTPATIENT)
Dept: PAIN MEDICINE | Facility: CLINIC | Age: 77
End: 2022-07-07

## 2022-07-07 ENCOUNTER — PREP FOR SURGERY (OUTPATIENT)
Dept: SURGERY | Facility: SURGERY CENTER | Age: 77
End: 2022-07-07

## 2022-07-07 VITALS
DIASTOLIC BLOOD PRESSURE: 90 MMHG | BODY MASS INDEX: 23.79 KG/M2 | HEART RATE: 84 BPM | SYSTOLIC BLOOD PRESSURE: 157 MMHG | WEIGHT: 157 LBS | RESPIRATION RATE: 20 BRPM | TEMPERATURE: 98.1 F | HEIGHT: 68 IN | OXYGEN SATURATION: 97 %

## 2022-07-07 DIAGNOSIS — M54.16 LEFT LUMBAR RADICULOPATHY: ICD-10-CM

## 2022-07-07 DIAGNOSIS — M47.816 LUMBAR FACET ARTHROPATHY: Primary | ICD-10-CM

## 2022-07-07 DIAGNOSIS — Z79.01 ANTICOAGULATED: ICD-10-CM

## 2022-07-07 DIAGNOSIS — G89.4 CHRONIC PAIN SYNDROME: Primary | ICD-10-CM

## 2022-07-07 DIAGNOSIS — M47.816 LUMBAR FACET ARTHROPATHY: ICD-10-CM

## 2022-07-07 PROCEDURE — 99214 OFFICE O/P EST MOD 30 MIN: CPT | Performed by: NURSE PRACTITIONER

## 2022-07-07 RX ORDER — SODIUM CHLORIDE 0.9 % (FLUSH) 0.9 %
10 SYRINGE (ML) INJECTION AS NEEDED
Status: CANCELLED | OUTPATIENT
Start: 2022-07-07

## 2022-07-07 RX ORDER — ALLOPURINOL 100 MG/1
300 TABLET ORAL
COMMUNITY
Start: 2022-06-30 | End: 2022-12-22 | Stop reason: DRUGHIGH

## 2022-07-07 RX ORDER — SODIUM CHLORIDE 0.9 % (FLUSH) 0.9 %
10 SYRINGE (ML) INJECTION EVERY 12 HOURS SCHEDULED
Status: CANCELLED | OUTPATIENT
Start: 2022-07-07

## 2022-07-07 NOTE — H&P (VIEW-ONLY)
CHIEF COMPLAINT  F/u back pain    Subjective   Mannie Fajardo is a 77 y.o. male  who presents for follow-up.  He has a history of back pain.     Procedures at this clinic ---   Bilateral L2-L5 MBB 6/23/2022 --- significant diagnostic relief for 1.5 days (80% - back pain)  Left L4 and S1 LTFESI 5/25/2022 - helped to take the edge off the pain for about 12-24 hours, would not consider it to be significantly improved during this time, minimal relief.      Patient was referred to our clinic by Dr. Beto Brizuela with [facility] neurology.  Patient has had chronic low back pain for approximately last 20 years.  Conservative treatment includes physical therapy, previous back injections.  He has been on multiple pain medications over the years, none have been beneficial.      Patient was last seen by Dr. Nitin Olson 11/10/2021.  This was in follow-up of the lumbar myelogram.  No significant stenosis was seen.  Nothing to offer from a surgical standpoint.     Patient reports pain today is a 7 out of 10 VAS.  Location of the pain is lower lumbar spine, also left leg but states back pain is much more severe.      Had some type of injection about 3 years ago which provided 5 days of 100% pain relief, he states that they tried the injection on three additional occasions but were never able to reproduce the result.  We will reach out to [facility] to try to obtain these records as he feels it also helped his leg.  I have explained to the patient that I do not feel there will be any interventional pain management which will help with the weakness/atrophy of the left leg.    Patient remained masked during entire encounter. No cough present. I donned a mask and eye protection throughout entire visit. Prior to donning mask and eye protection, hand hygiene was performed, as well as when it was doffed.  I was closer than 6 feet, but not for an extended period of time. No obvious exposure to any bodily fluids.    Back Pain  This is a chronic  "problem. The current episode started more than 1 year ago. The problem occurs daily. The pain is present in the lumbar spine. The quality of the pain is described as aching and burning. The pain radiates to the left thigh. The pain is at a severity of 7/10. The pain is severe. Exacerbated by: nothing, it is constant \"24/7\" Associated symptoms include weakness (left leg). Pertinent negatives include no chest pain, fever, headaches or numbness. Treatments tried: laying down. The treatment provided no relief.      PEG Assessment   What number best describes your pain on average in the past week?7  What number best describes how, during the past week, pain has interfered with your enjoyment of life?7  What number best describes how, during the past week, pain has interfered with your general activity?  7    Review of Pertinent Medical Data ---      I reviewed the EMG nerve conduction test result from Dr. Brizuela from March 18,022.  He has mild slowing of left peroneal amplitudes.  This could be a sciatic neuropathy.  Does not exclude an S1 radiculopathy.    The following portions of the patient's history were reviewed and updated as appropriate: allergies, current medications, past family history, past medical history, past social history, past surgical history and problem list.    Review of Systems   Constitutional: Positive for activity change (dec). Negative for fatigue and fever.   HENT: Negative for congestion.    Eyes: Negative for visual disturbance.   Respiratory: Negative for cough and shortness of breath.    Cardiovascular: Negative for chest pain.   Gastrointestinal: Negative for constipation and diarrhea.   Genitourinary: Negative for difficulty urinating.   Musculoskeletal: Positive for back pain.   Neurological: Positive for weakness (left leg). Negative for dizziness, light-headedness, numbness and headaches.   Psychiatric/Behavioral: Positive for sleep disturbance (occ). Negative for agitation and suicidal " "ideas. The patient is not nervous/anxious.      I have reviewed and confirmed the accuracy of the ROS as documented by the MA/LPN/RN CONSUELO Pelaez    Vitals:    07/07/22 1444   BP: 157/90   Pulse: 84   Resp: 20   Temp: 98.1 °F (36.7 °C)   SpO2: 97%   Weight: 71.2 kg (157 lb)   Height: 172.7 cm (68\")   PainSc:   7   PainLoc: Back     Objective   Physical Exam  Vitals and nursing note reviewed.   Constitutional:       General: He is not in acute distress.     Appearance: Normal appearance. He is not ill-appearing.   Pulmonary:      Effort: Pulmonary effort is normal. No respiratory distress.   Musculoskeletal:      Lumbar back: Tenderness and bony tenderness present.      Comments: +lumbar facet tenderness/loading    Skin:     General: Skin is warm and dry.   Neurological:      Mental Status: He is alert and oriented to person, place, and time.      Motor: Motor function is intact.      Gait: Gait abnormal (slowed).   Psychiatric:         Mood and Affect: Mood normal.         Behavior: Behavior normal.       Assessment & Plan   Diagnoses and all orders for this visit:    1. Chronic pain syndrome (Primary)    2. Lumbar facet arthropathy    3. Left lumbar radiculopathy    4. Anticoagulated      --- lumbar medial branch block bilateral L2-L5 (diagnostic injection #2). Will need to hold Plavix for procedure.      -------  Education about Medial Branch Blockade and RF Therapy:    This medial branch blockade (MBB) suggested is intended for diagnostic purposes, with the intent of offering the patient Radiofrequency thermal rhizotomy (RF) if the MBB is diagnostically effective.  The diagnostic blockade is necessary to determine the likelihood that RF therapy could be efficacious in providing long term relief to the patient.    Medial branches are sensory nerve branches that connect to a facet joint and transmit sensations & pain signals from that joint.  Facet is a term for the type of joints found in the spine.  " "Medial branches are the nerves that go to a facet, and therefore are also sometimes called \"facet joint nerves\" (FJNs).      In a medial branch blockade procedure, xray fluoroscopy is used to verify the locations of the outside of the joint lines which are being targeted.  Under xray guidance, needles are placed to these areas.  Contrast dye is injected to confirm proper placement, with dye flowing over the joint area, and to ensure that the dye does not flow into unintended areas such as a vein.  When this is confirmed, local anesthetic is injected to block the medial branch at that joint level.      If MBBs are diagnostically successful in blocking pain, then the patient is most likely a great candidate for Radiofrequency of those facet joint nerves.  In the RF procedure, needles are placed to the joint lines in the same fashion, and after testing, the needle tips are heated to thermally treat the nerves, blocking the nerves by in essence damaging the nerves with the heat treatment(non-pulsed).       Medically, a successful RF procedure should provide a patient with 50% pain relief or more for at least 6 months.  Clinical experience suggests that successful patients receive relief more in the range of 12 months on average.  We also discussed that a fortunate minority of patients receive therapeutic success from the MBB, and may not require RF ablation.  If a patient receives more than 8 weeks of relief from MBB, then occasional repeat MBB for therapeutic purposes is a very reasonable alternative therapy.  This course of therapy is consistent with our LCDs according to our CMS  in the area, and therefore other insurance providers should follow accordingly.  We will monitor our patients to screen for these therapeutic responders and will offer RF therapy only when necessary.      We discussed that MBB & RF are not without risks.  Guidelines regarding anticoagulant use & neuraxial procedures will be " respected.  Patients that are ill or otherwise may be at risk for sepsis will not have their spines accessed by neuraxial injections of any type.  This patient will not be offered these therapies if there is an increased risk.   We discussed that there is a risk of postprocedural pain and also a risk of worsening of clinical picture with these procedures as with any neuraxial procedure.    -------    --------    Anticoagulation risks for procedures....   - there are risks to discontinuation of anticoagulants for neuraxial procedures and surgery.  Risks include but are not limited to deep vein thromboses, pulmonary emboli, myocardial infarction, and stroke    - Neuraxial access with a needle is relatively contraindicated while anticoagulated because of the risk of hemorrhage and/or epidural hematoma, which can be a neurosurgical emergency to evacuate bleeding.  Left unchecked, bleeding can cause nerve damage leading to paralysis and/or death.  --------      --- Request records [facility] pain from 2018. Any office notes or procedure notes with Dr. Kiana Luna.    --- Follow-up for procedure and 1-2 weeks post          As the clinician, I personally reviewed the SHANNAN from 7/7/2022 while the patient was in the office today.    This document is intended for medical expert use only. Reading of this document by patients and/or patient's family without participating medical staff guidance may result in misinterpretation and unintended morbidity.   Any interpretation of such data is the responsibility of the patient and/or family member responsible for the patient in concert with their primary or specialist providers, not to be left for sources of online searches such as [employer], [employer] or similar queries. Relying on these approaches to knowledge may result in misinterpretation, misguided goals of care and even death should patients or family members try recommendations outside of the realm of professional medical care in  a supervised way.

## 2022-07-07 NOTE — PROGRESS NOTES
CHIEF COMPLAINT  F/u back pain    Subjective   Mannie Fajardo is a 77 y.o. male  who presents for follow-up.  He has a history of back pain.     Procedures at this clinic ---   Bilateral L2-L5 MBB 6/23/2022 --- significant diagnostic relief for 1.5 days (80% - back pain)  Left L4 and S1 LTFESI 5/25/2022 - helped to take the edge off the pain for about 12-24 hours, would not consider it to be significantly improved during this time, minimal relief.      Patient was referred to our clinic by Dr. Beto Brizuela with [facility] neurology.  Patient has had chronic low back pain for approximately last 20 years.  Conservative treatment includes physical therapy, previous back injections.  He has been on multiple pain medications over the years, none have been beneficial.      Patient was last seen by Dr. Nitin Olson 11/10/2021.  This was in follow-up of the lumbar myelogram.  No significant stenosis was seen.  Nothing to offer from a surgical standpoint.     Patient reports pain today is a 7 out of 10 VAS.  Location of the pain is lower lumbar spine, also left leg but states back pain is much more severe.      Had some type of injection about 3 years ago which provided 5 days of 100% pain relief, he states that they tried the injection on three additional occasions but were never able to reproduce the result.  We will reach out to [facility] to try to obtain these records as he feels it also helped his leg.  I have explained to the patient that I do not feel there will be any interventional pain management which will help with the weakness/atrophy of the left leg.    Patient remained masked during entire encounter. No cough present. I donned a mask and eye protection throughout entire visit. Prior to donning mask and eye protection, hand hygiene was performed, as well as when it was doffed.  I was closer than 6 feet, but not for an extended period of time. No obvious exposure to any bodily fluids.    Back Pain  This is a chronic  "problem. The current episode started more than 1 year ago. The problem occurs daily. The pain is present in the lumbar spine. The quality of the pain is described as aching and burning. The pain radiates to the left thigh. The pain is at a severity of 7/10. The pain is severe. Exacerbated by: nothing, it is constant \"24/7\" Associated symptoms include weakness (left leg). Pertinent negatives include no chest pain, fever, headaches or numbness. Treatments tried: laying down. The treatment provided no relief.      PEG Assessment   What number best describes your pain on average in the past week?7  What number best describes how, during the past week, pain has interfered with your enjoyment of life?7  What number best describes how, during the past week, pain has interfered with your general activity?  7    Review of Pertinent Medical Data ---      I reviewed the EMG nerve conduction test result from Dr. Brizuela from March 18,022.  He has mild slowing of left peroneal amplitudes.  This could be a sciatic neuropathy.  Does not exclude an S1 radiculopathy.    The following portions of the patient's history were reviewed and updated as appropriate: allergies, current medications, past family history, past medical history, past social history, past surgical history and problem list.    Review of Systems   Constitutional: Positive for activity change (dec). Negative for fatigue and fever.   HENT: Negative for congestion.    Eyes: Negative for visual disturbance.   Respiratory: Negative for cough and shortness of breath.    Cardiovascular: Negative for chest pain.   Gastrointestinal: Negative for constipation and diarrhea.   Genitourinary: Negative for difficulty urinating.   Musculoskeletal: Positive for back pain.   Neurological: Positive for weakness (left leg). Negative for dizziness, light-headedness, numbness and headaches.   Psychiatric/Behavioral: Positive for sleep disturbance (occ). Negative for agitation and suicidal " "ideas. The patient is not nervous/anxious.      I have reviewed and confirmed the accuracy of the ROS as documented by the MA/LPN/RN CONSUELO Pelaez    Vitals:    07/07/22 1444   BP: 157/90   Pulse: 84   Resp: 20   Temp: 98.1 °F (36.7 °C)   SpO2: 97%   Weight: 71.2 kg (157 lb)   Height: 172.7 cm (68\")   PainSc:   7   PainLoc: Back     Objective   Physical Exam  Vitals and nursing note reviewed.   Constitutional:       General: He is not in acute distress.     Appearance: Normal appearance. He is not ill-appearing.   Pulmonary:      Effort: Pulmonary effort is normal. No respiratory distress.   Musculoskeletal:      Lumbar back: Tenderness and bony tenderness present.      Comments: +lumbar facet tenderness/loading    Skin:     General: Skin is warm and dry.   Neurological:      Mental Status: He is alert and oriented to person, place, and time.      Motor: Motor function is intact.      Gait: Gait abnormal (slowed).   Psychiatric:         Mood and Affect: Mood normal.         Behavior: Behavior normal.       Assessment & Plan   Diagnoses and all orders for this visit:    1. Chronic pain syndrome (Primary)    2. Lumbar facet arthropathy    3. Left lumbar radiculopathy    4. Anticoagulated      --- lumbar medial branch block bilateral L2-L5 (diagnostic injection #2). Will need to hold Plavix for procedure.      -------  Education about Medial Branch Blockade and RF Therapy:    This medial branch blockade (MBB) suggested is intended for diagnostic purposes, with the intent of offering the patient Radiofrequency thermal rhizotomy (RF) if the MBB is diagnostically effective.  The diagnostic blockade is necessary to determine the likelihood that RF therapy could be efficacious in providing long term relief to the patient.    Medial branches are sensory nerve branches that connect to a facet joint and transmit sensations & pain signals from that joint.  Facet is a term for the type of joints found in the spine.  " "Medial branches are the nerves that go to a facet, and therefore are also sometimes called \"facet joint nerves\" (FJNs).      In a medial branch blockade procedure, xray fluoroscopy is used to verify the locations of the outside of the joint lines which are being targeted.  Under xray guidance, needles are placed to these areas.  Contrast dye is injected to confirm proper placement, with dye flowing over the joint area, and to ensure that the dye does not flow into unintended areas such as a vein.  When this is confirmed, local anesthetic is injected to block the medial branch at that joint level.      If MBBs are diagnostically successful in blocking pain, then the patient is most likely a great candidate for Radiofrequency of those facet joint nerves.  In the RF procedure, needles are placed to the joint lines in the same fashion, and after testing, the needle tips are heated to thermally treat the nerves, blocking the nerves by in essence damaging the nerves with the heat treatment(non-pulsed).       Medically, a successful RF procedure should provide a patient with 50% pain relief or more for at least 6 months.  Clinical experience suggests that successful patients receive relief more in the range of 12 months on average.  We also discussed that a fortunate minority of patients receive therapeutic success from the MBB, and may not require RF ablation.  If a patient receives more than 8 weeks of relief from MBB, then occasional repeat MBB for therapeutic purposes is a very reasonable alternative therapy.  This course of therapy is consistent with our LCDs according to our CMS  in the area, and therefore other insurance providers should follow accordingly.  We will monitor our patients to screen for these therapeutic responders and will offer RF therapy only when necessary.      We discussed that MBB & RF are not without risks.  Guidelines regarding anticoagulant use & neuraxial procedures will be " respected.  Patients that are ill or otherwise may be at risk for sepsis will not have their spines accessed by neuraxial injections of any type.  This patient will not be offered these therapies if there is an increased risk.   We discussed that there is a risk of postprocedural pain and also a risk of worsening of clinical picture with these procedures as with any neuraxial procedure.    -------    --------    Anticoagulation risks for procedures....   - there are risks to discontinuation of anticoagulants for neuraxial procedures and surgery.  Risks include but are not limited to deep vein thromboses, pulmonary emboli, myocardial infarction, and stroke    - Neuraxial access with a needle is relatively contraindicated while anticoagulated because of the risk of hemorrhage and/or epidural hematoma, which can be a neurosurgical emergency to evacuate bleeding.  Left unchecked, bleeding can cause nerve damage leading to paralysis and/or death.  --------      --- Request records [facility] pain from 2018. Any office notes or procedure notes with Dr. Kiana Luna.    --- Follow-up for procedure and 1-2 weeks post          As the clinician, I personally reviewed the SHANNAN from 7/7/2022 while the patient was in the office today.    This document is intended for medical expert use only. Reading of this document by patients and/or patient's family without participating medical staff guidance may result in misinterpretation and unintended morbidity.   Any interpretation of such data is the responsibility of the patient and/or family member responsible for the patient in concert with their primary or specialist providers, not to be left for sources of online searches such as [employer], [employer] or similar queries. Relying on these approaches to knowledge may result in misinterpretation, misguided goals of care and even death should patients or family members try recommendations outside of the realm of professional medical care in  a supervised way.

## 2022-07-08 ENCOUNTER — TRANSCRIBE ORDERS (OUTPATIENT)
Dept: SURGERY | Facility: SURGERY CENTER | Age: 77
End: 2022-07-08

## 2022-07-08 DIAGNOSIS — Z41.9 SURGERY, ELECTIVE: Primary | ICD-10-CM

## 2022-07-08 DIAGNOSIS — Z01.812 PRE-PROCEDURE LAB EXAM: Primary | ICD-10-CM

## 2022-07-08 NOTE — SIGNIFICANT NOTE
Patient educated on the following :    - If you are receiving Sedation for your procedure Nothing to Eat 6 hours and only clear liquids for 2 hours prior to your procedure.    -Your required COVID Test is Scheduled on 7/9 Between the Hours of 5061-2041  -You will only be notified if your COVID test Result is POSITIVE  -The importance of reducing your number of contacts by self quarantining after you COVID test until the date of your PROCEDURE  -You will need to have someone drive you home after your PROCEDURE and remain with you for 24 hours after the PROCEDURE  - The date of your procedure, your are welcome to have one visitor at bedside or remain within 10-15 minutes of [employer]  -You will need to arrive at 12:45 on 7/12 PROCEDURE  -Please contact [employer] PREOP at: [phone] with any questions and/or concerns

## 2022-07-09 ENCOUNTER — LAB (OUTPATIENT)
Dept: LAB | Facility: HOSPITAL | Age: 77
End: 2022-07-09

## 2022-07-09 DIAGNOSIS — Z01.812 PRE-PROCEDURE LAB EXAM: ICD-10-CM

## 2022-07-09 LAB — SARS-COV-2 ORF1AB RESP QL NAA+PROBE: NOT DETECTED

## 2022-07-09 PROCEDURE — U0004 COV-19 TEST NON-CDC HGH THRU: HCPCS

## 2022-07-09 PROCEDURE — U0005 INFEC AGEN DETEC AMPLI PROBE: HCPCS

## 2022-07-12 ENCOUNTER — HOSPITAL ENCOUNTER (OUTPATIENT)
Dept: GENERAL RADIOLOGY | Facility: SURGERY CENTER | Age: 77
Setting detail: HOSPITAL OUTPATIENT SURGERY
End: 2022-07-12

## 2022-07-12 ENCOUNTER — HOSPITAL ENCOUNTER (OUTPATIENT)
Facility: SURGERY CENTER | Age: 77
Setting detail: HOSPITAL OUTPATIENT SURGERY
Discharge: HOME | End: 2022-07-12
Attending: ANESTHESIOLOGY | Admitting: ANESTHESIOLOGY

## 2022-07-12 VITALS
RESPIRATION RATE: 16 BRPM | DIASTOLIC BLOOD PRESSURE: 62 MMHG | WEIGHT: 155 LBS | OXYGEN SATURATION: 97 % | BODY MASS INDEX: 23.49 KG/M2 | HEART RATE: 75 BPM | SYSTOLIC BLOOD PRESSURE: 137 MMHG | HEIGHT: 68 IN | TEMPERATURE: 97.1 F

## 2022-07-12 DIAGNOSIS — M47.816 LUMBAR FACET ARTHROPATHY: ICD-10-CM

## 2022-07-12 DIAGNOSIS — Z41.9 SURGERY, ELECTIVE: ICD-10-CM

## 2022-07-12 PROCEDURE — 64494 INJ PARAVERT F JNT L/S 2 LEV: CPT | Performed by: ANESTHESIOLOGY

## 2022-07-12 PROCEDURE — 77002 NEEDLE LOCALIZATION BY XRAY: CPT

## 2022-07-12 PROCEDURE — 64493 INJ PARAVERT F JNT L/S 1 LEV: CPT | Performed by: ANESTHESIOLOGY

## 2022-07-12 PROCEDURE — 25010000002 IOPAMIDOL 61 % SOLUTION 30 ML VIAL: Performed by: ANESTHESIOLOGY

## 2022-07-12 PROCEDURE — 76000 FLUOROSCOPY <1 HR PHYS/QHP: CPT

## 2022-07-12 RX ORDER — SODIUM CHLORIDE 0.9 % (FLUSH) 0.9 %
10 SYRINGE (ML) INJECTION EVERY 12 HOURS SCHEDULED
Status: DISCONTINUED | OUTPATIENT
Start: 2022-07-12 | End: 2022-07-12 | Stop reason: HOSPADM

## 2022-07-12 RX ORDER — SODIUM CHLORIDE 0.9 % (FLUSH) 0.9 %
10 SYRINGE (ML) INJECTION AS NEEDED
Status: DISCONTINUED | OUTPATIENT
Start: 2022-07-12 | End: 2022-07-12 | Stop reason: HOSPADM

## 2022-07-12 NOTE — DISCHARGE INSTRUCTIONS
[facility] - Post-procedure Instructions          --  While there are no absolute restrictions, it is recommended that you do not perform strenuous activity today. In the morning, you may resume your level of activity as before your block.    --  If you have a band-aid at your injection site, please remove it later today. Observe the area for any redness, swelling, pus-like drainage, or a temperature over 101°. If any of these symptoms occur, please call your doctor at [phone]. If after office hours, leave a message and the on-call provider will return your call.    --  Ice may be applied to your injection site. It is recommended you avoid direct heat (heating pad; hot tub) for 1-2 days.    --  Call [facility] at [phone] if you experience persistent headache, persistent bleeding from the injection site, or severe pain not relieved by heat or oral medication.    --  Do not make important decisions today.    --  Due to the effects of the block and/or the I.V. Sedation, DO NOT drive or operate hazardous machinery for 12 hours.  Local anesthetics may cause numbness after procedure and precautions must be taken with regards to operating equipment as well as with walking, even if ambulating with assistance of another person or with an assistive device.    --  Do not drink alcohol for 12 hours.    -- You may return to work tomorrow, or as directed by your referring doctor.    --  Occasionally you may notice a slight increase in your pain after the procedure. This should start to improve within the next 24-48 hours. Radiofrequency ablation procedure pain may last 3-4 weeks.    --  It may take as long as 3-4 days before you notice a gradual improvement in your pain and/or other symptoms.    -- You may continue to take your prescribed pain medication as needed.    --  Some normal possible side effects of steroid use could include fluid retention, increased blood sugar, dull headache,  "increased sweating, increased appetite, mood swings and flushing.    --  Diabetics are recommended to watch their blood glucose level closely for 24-48 hours after the injection.    --  Must stay in PACU for 20 min upon arrival and prove no leg weakness before being discharged.    --  IN THE EVENT OF A LIFE THREATENING EMERGENCY, (CHEST PAIN, BREATHING DIFFICULTIES, PARALYSIS…) YOU SHOULD GO TO YOUR NEAREST EMERGENCY ROOM.    --  You should be contacted by our office within 2-3 days to schedule follow up or next appointment date.  If not contacted within 7 days, please call the office at [phone]      --      -------  Education about Medial Branch Blockade and RF Therapy:    This medial branch blockade (MBB) suggested is intended for diagnostic purposes, with the intent of offering the patient Radiofrequency thermal rhizotomy (RF) if the MBB is diagnostically effective.  The diagnostic blockade is necessary to determine the likelihood that RF therapy could be efficacious in providing long term relief to the patient.    Medial branches are sensory nerve branches that connect to a facet joint and transmit sensations & pain signals from that joint.  Facet is a term for the type of joints found in the spine.  Medial branches are the nerves that go to a facet, and therefore are also sometimes called \"facet joint nerves\" (FJNs).      In a medial branch blockade procedure, xray fluoroscopy is used to verify the locations of the outside of the joint lines which are being targeted.  Under xray guidance, needles are placed to these areas.  Contrast dye is injected to confirm proper placement, with dye flowing over the joint area, and to ensure that the dye does not flow into unintended areas such as a vein.  When this is confirmed, local anesthetic is injected to block the medial branch at that joint level.      If MBBs are diagnostically successful in blocking pain, then the patient is most likely a great candidate for " Radiofrequency of those facet joint nerves.  In the RF procedure, needles are placed to the joint lines in the same fashion, and after testing, the needle tips are heated to thermally treat the nerves, blocking the nerves by in essence damaging the nerves with the heat treatment(non-pulsed).       Medically, a successful RF procedure should provide a patient with 50% pain relief or more for at least 6 months.  Clinical experience suggests that successful patients receive relief more in the range of 12 months on average.  We also discussed that a fortunate minority of patients receive therapeutic success from the MBB, and may not require RF ablation.  If a patient receives more than 8 weeks of relief from MBB, then occasional repeat MBB for therapeutic purposes is a very reasonable alternative therapy.  This course of therapy is consistent with our LCDs according to our CMS  in the area, and therefore other insurance providers should follow accordingly.  We will monitor our patients to screen for these therapeutic responders and will offer RF therapy only when necessary.        We discussed that MBB & RF are not without risks.  Guidelines regarding anticoagulant use & neuraxial procedures will be respected.  Patients that are ill or otherwise may be at risk for sepsis will not have their spines accessed by neuraxial injections of any type.  This patient will not be offered these therapies if there is an increased risk.   We discussed that there is a risk of postprocedural pain and also a risk of worsening of clinical picture with these procedures as with any neuraxial procedure.    -------

## 2022-07-12 NOTE — OP NOTE
Bilateral L3-5 Lumbar Medial Branch Blockade  [facility]      PREOPERATIVE DIAGNOSIS:  Lumbar spondylosis without myelopathy    POSTOPERATIVE DIAGNOSIS:  Lumbar spondylosis without myelopathy    PROCEDURE:   Diagnostic Bilateral Lumbar Medial Branch Nerve Blockades, with fluoroscopy:  L3, L4, and L5 nerves (at the L4 & L5 transverse processes and the sacral alar groove) to block facet joints L4-5, and L5-S1  1. 24915-15 -- Bilateral Lumbar Facet blocks, 1st Level  2. 72296-42 -- Bilateral Lumbar Facet blocks, 2nd  Level    PRE-PROCEDURE DISCUSSION WITH PATIENT:    Risks and complications were discussed with the patient prior to starting the procedure and informed consent was obtained.      SURGEON:  Deepak Mckeon MD    REASON FOR PROCEDURE:    The patient complains of pain that seems to have a significant axial component, Previous diagnostic positivity of a Lumbar Medial Branch Blockade at the same levels and The last blockade was helpful with 80+ % relief diagnostically.    SEDATION:  Patient declined administration of moderate sedation    ANESTHETIC:  Marcaine 0.5%  STEROID:  NONE  TOTAL VOLUME OF SOLUTION:  6ml    DESCRIPTON OF PROCEDURE:  After obtaining informed consent, IV access was not obtained in the preoperative area.   The patient was taken to the operating room.  The patient was placed in the prone position with a pillow under the abdomen. All pressure points were well padded.  EKG, blood pressure, and pulse oximeter were monitored.  The patient was monitored and sedated by the RN under my direction. The lumbosacral area was prepped with Chloraprep and draped in a sterile fashion. Under fluoroscopic guidance the transverse processes of the L4 and L5 vertebrae at the junctions of the superior articular processes were identified on the right. Also identified was the groove between the ala and the superior articular process of the sacrum on the ipsilateral side.  Skin and subcutaneous  tissue were anesthetized with 1% lidocaine above each of these points. A 22-gauge spinal needle was introduced under fluoroscopic guidance at the above junctions. Aspiration was negative for blood and CSF.  After confirming the position of the needle with fluoroscope in all views, 0.25 mL of Omnipaque was injected, and after seeing the proper spread a total of 1 mL of the anesthetic solution noted above was injected at each of these points.  Needles were removed intact from each of the areas.  A similar procedure was repeated to block the L3, L4, and L5 nerves on the contralateral side.   Onset of analgesia was noted.  Vital signs remained stable throughout.      ESTIMATED BLOOD LOSS:  <5 mL  SPECIMENS:  none    COMPLICATIONS:   No complications were noted., There was no indication of vascular uptake on live injection of contrast dye., There was no indication of intrathecal uptake on live injection of contrast dye., There was not any evidence of dural puncture.   and The patient did not have any signs of postprocedure numbness nor weakness.    TOLERANCE & DISCHARGE CONDITION:    The patient tolerated the procedure well.  The patient was transported to the recovery area without difficulties.  The patient was discharged to home under the care of family in stable and satisfactory condition.    PLAN OF CARE:  1. The patient was given our standard instruction sheet.  2. We discussed that Lumbar Medial Branch Blockade is a diagnostic procedure in consideration for radiofrequency ablation if two diagnostic procedures prove to be positive for significant benefit.  If sustained relief of 6 to eight weeks is obtained, then an alternative plan could be therapeutic lumbar branch blockades.  3. The patient is asked to keep a pain log each hour for 8 hours after the procedure today.  4. The patient will  Return to clinic 1 wk.  5. The patient will resume all medications as per the medication reconciliation sheet.

## 2022-07-19 ENCOUNTER — OFFICE VISIT (OUTPATIENT)
Dept: PAIN MEDICINE | Facility: CLINIC | Age: 77
End: 2022-07-19

## 2022-07-19 ENCOUNTER — PREP FOR SURGERY (OUTPATIENT)
Dept: SURGERY | Facility: SURGERY CENTER | Age: 77
End: 2022-07-19

## 2022-07-19 VITALS
HEART RATE: 100 BPM | RESPIRATION RATE: 20 BRPM | OXYGEN SATURATION: 100 % | DIASTOLIC BLOOD PRESSURE: 90 MMHG | BODY MASS INDEX: 24.1 KG/M2 | HEIGHT: 68 IN | TEMPERATURE: 97.3 F | SYSTOLIC BLOOD PRESSURE: 162 MMHG | WEIGHT: 159 LBS

## 2022-07-19 DIAGNOSIS — M47.816 LUMBAR FACET ARTHROPATHY: Primary | ICD-10-CM

## 2022-07-19 DIAGNOSIS — M54.16 LEFT LUMBAR RADICULOPATHY: ICD-10-CM

## 2022-07-19 DIAGNOSIS — G89.4 CHRONIC PAIN SYNDROME: ICD-10-CM

## 2022-07-19 PROCEDURE — 99214 OFFICE O/P EST MOD 30 MIN: CPT

## 2022-07-19 RX ORDER — METHYLPREDNISOLONE 4 MG/1
TABLET ORAL
Qty: 21 TABLET | Refills: 0 | Status: ON HOLD | OUTPATIENT
Start: 2022-07-19 | End: 2022-08-09

## 2022-07-19 RX ORDER — SODIUM CHLORIDE 0.9 % (FLUSH) 0.9 %
10 SYRINGE (ML) INJECTION EVERY 12 HOURS SCHEDULED
Status: CANCELLED | OUTPATIENT
Start: 2022-07-19

## 2022-07-19 RX ORDER — SODIUM CHLORIDE 0.9 % (FLUSH) 0.9 %
10 SYRINGE (ML) INJECTION AS NEEDED
Status: CANCELLED | OUTPATIENT
Start: 2022-07-19

## 2022-07-19 NOTE — PATIENT INSTRUCTIONS
--------    Plavix (clopidogrel) must be held for seven days prior to a spinal injection.    Anticoagulation risks for procedures.... there are risks to discontinuation of anticoagulants such as Plavix for neuraxial procedures and surgery.  Risks include but are not limited to deep vein thromboses (blood clot, such as one in the leg), pulmonary emboli (blood clot in a lung), myocardial infarction (heart attack), and stroke.      Neuraxial access (approaching the spine with a needle), including spinal injection, is relatively contraindicated while anticoagulated because of the risk of hemorrhage and/or epidural hematoma (bleeding inside the spine), which can be a neurosurgical emergency to evacuate bleeding.  Left unchecked, bleeding can cause nerve damage leading to paralysis and/or death.    The risks and benefits of holding Plavix for a spinal injection must be weighed, and is a joint decision involving the patient as well as the prescriber of the anticoagulant drug.    --------  Discussed with the patient that sedation is optional for this procedure.  The sedation offered is called conscious sedation which is different from general anesthesia that is utilized in surgical procedures. The dosing of the sedation is determined by the physician and they will be monitored throughout the procedure. With conscious sedation it is possible to remember parts or all of the procedure, this is normal. They will need to have a  with them as driving is prohibited following conscious sedation.      NPO instructions for conscious sedation:  --- Do not eat 6 hours prior to the procedure.   --- Do not drink any dairy or citrus 4 hours prior to the procedure.   --- Do not drink anything, including clear liquids, 2 hours prior to procedure.      If the NPO instructions are not followed then the procedure may be performed without sedation or the procedure will need to be rescheduled.      -------  Education about Medial Branch  "Blockade and RF Therapy:    This medial branch blockade (MBB) suggested is intended for diagnostic purposes, with the intent of offering the patient Radiofrequency thermal rhizotomy (RF) if the MBB is diagnostically effective.  The diagnostic blockade is necessary to determine the likelihood that RF therapy could be efficacious in providing long term relief to the patient.    Medial branches are sensory nerve branches that connect to a facet joint and transmit sensations & pain signals from that joint.  Facet is a term for the type of joints found in the spine.  Medial branches are the nerves that go to a facet, and therefore are also sometimes called \"facet joint nerves\" (FJNs).      In a medial branch blockade procedure, xray fluoroscopy is used to verify the locations of the outside of the joint lines which are being targeted.  Under xray guidance, needles are placed to these areas.  Contrast dye is injected to confirm proper placement, with dye flowing over the joint area, and to ensure that the dye does not flow into unintended areas such as a vein.  When this is confirmed, local anesthetic is injected to block the medial branch at that joint level.      If MBBs are diagnostically successful in blocking pain, then the patient is most likely a great candidate for Radiofrequency of those facet joint nerves.  In the RF procedure, needles are placed to the joint lines in the same fashion, and after testing, the needle tips are heated to thermally treat the nerves, blocking the nerves by in essence damaging the nerves with the heat treatment(non-pulsed).       Medically, a successful RF procedure should provide a patient with 50% pain relief or more for at least 6 months.  Clinical experience suggests that successful patients receive relief more in the range of 12 months on average.  We also discussed that a fortunate minority of patients receive therapeutic success from the MBB, and may not require RF ablation.  If " a patient receives more than 8 weeks of relief from MBB, then occasional repeat MBB for therapeutic purposes is a very reasonable alternative therapy.  This course of therapy is consistent with our LCDs according to our CMS  in the area, and therefore other insurance providers should follow accordingly.  We will monitor our patients to screen for these therapeutic responders and will offer RF therapy only when necessary.        We discussed that MBB & RF are not without risks.  Guidelines regarding anticoagulant use & neuraxial procedures will be respected.  Patients that are ill or otherwise may be at risk for sepsis will not have their spines accessed by neuraxial injections of any type.  This patient will not be offered these therapies if there is an increased risk.   We discussed that there is a risk of postprocedural pain and also a risk of worsening of clinical picture with these procedures as with any neuraxial procedure.    -------

## 2022-07-19 NOTE — PROGRESS NOTES
CHIEF COMPLAINT   Back pain    Subjective   Mannie Fajardo is a 77 y.o. male  who presents for follow-up.  He has a history of low back pain.     Patient presents for follow-up of PROCEDURE. Patient had a bilateral L2-L5 MBB performed by Dr. Mckeon on 7/12/22 for management of low back pain. He reports 80% relief from the procedure for 2 hours but then pain returned to baseline and has gradually increased over the last few days.     Today pain is 10/10VAS in severity. Pain is located in his low back. Describes this pain as a continuous aching and burning. Pain is worsened by prolonged position, standing/sitting, and lying flat. Pain improves reposition and lying on his side. This offers minimal relief. He does not utilize any OTC medications.  He reports he completed PT years ago but this offered no relief.    Patient reported to RN during the rooming process that his pain was a 10/10VAS. RN informed patient that when pain was this severe that they needed to proceed to the emergency room. Patient declined the need to go to ER but remained adamant during the rooming process that his pain was that severe.    Patient remained masked during entire encounter. No cough present. I donned a mask and eye protection throughout entire visit. Prior to donning mask and eye protection, hand hygiene was performed, as well as when it was doffed.  I was closer than 6 feet, but not for an extended period of time. No obvious exposure to any bodily fluids.    Back Pain  This is a chronic problem. The current episode started more than 1 year ago. The problem occurs daily. The problem has been gradually worsening since onset. The pain is present in the lumbar spine. The quality of the pain is described as aching and burning. The pain radiates to the left thigh. The pain is at a severity of 10/10. The pain is severe. The symptoms are aggravated by standing, sitting and lying down (prolonged standing, sititng). Associated symptoms include  weakness (left leg). Pertinent negatives include no chest pain, fever, headaches or numbness. Treatments tried: laying on side helps. The treatment provided no relief.     PEG Assessment   What number best describes your pain on average in the past week?10  What number best describes how, during the past week, pain has interfered with your enjoyment of life?10  What number best describes how, during the past week, pain has interfered with your general activity?  10    Review of Pertinent Medical Data ---  Reviewed office note from CONSUELO Pelaez from 7/7/2022.  Patient presents for follow-up from procedure.  He had a bilateral L2-L5 medial branch block on 6/23/2022.  He reported 80% relief for 1 and half days.  He also completed a left L4-S1 left TFESI on 5/25/2022.  He reported this took the edge off his pain for about 24 hours, reporting only minimal relief.  Patient was referred by Dr. Beto Brizuela with [facility], [place], operated by [facility] neurology.  Has had history of chronic low back pain for roughly 20 years.  Conservative treatment includes physical therapy and previous back injections.  He has been on multiple pain medications over the years but reported that none have been beneficial.  He was last seen by Dr. Olson November 2021.  That was for a follow-up from a lumbar myelogram.  No significant stenosis was seen at that time.  Dr. Olson had nothing to offer from a surgical standpoint.  Plan at this appointment was to repeat the L2 5 lumbar medial branch block.  Patient was instructed to follow-up 1 to 2 weeks after procedure.        The following portions of the patient's history were reviewed and updated as appropriate: allergies, current medications, past family history, past medical history, past social history, past surgical history and problem list.    Review of Systems   Constitutional: Positive for activity change (dec) and fatigue (occ). Negative for fever.   HENT: Negative for congestion.    Eyes: Negative  "for visual disturbance.   Respiratory: Negative for cough and shortness of breath.    Cardiovascular: Negative for chest pain.   Gastrointestinal: Negative for constipation and diarrhea.   Genitourinary: Negative for difficulty urinating.   Musculoskeletal: Positive for back pain. Negative for joint swelling.   Neurological: Positive for weakness (left leg). Negative for dizziness, light-headedness, numbness and headaches.   Psychiatric/Behavioral: Positive for agitation and sleep disturbance (occ). Negative for suicidal ideas. The patient is nervous/anxious.      I have reviewed and confirmed the accuracy of the ROS as documented by the MA/LPN/RN CONSUELO Mott    Vitals:    07/19/22 1008   BP: 162/90   Pulse: 100   Resp: 20   Temp: 97.3 °F (36.3 °C)   SpO2: 100%   Weight: 72.1 kg (159 lb)   Height: 172.7 cm (68\")   PainSc: 10-Worst pain ever   PainLoc: Back     Objective   Physical Exam  Constitutional:       Appearance: Normal appearance.   HENT:      Head: Normocephalic.   Cardiovascular:      Rate and Rhythm: Normal rate and regular rhythm.   Pulmonary:      Effort: Pulmonary effort is normal.      Breath sounds: Normal breath sounds.   Musculoskeletal:      Cervical back: Normal range of motion.      Lumbar back: Tenderness and bony tenderness present. Decreased range of motion.      Comments: + lumbar facet loading/tenderness   Skin:     General: Skin is warm and dry.      Capillary Refill: Capillary refill takes less than 2 seconds.   Neurological:      General: No focal deficit present.      Mental Status: He is alert and oriented to person, place, and time.      Gait: Gait abnormal (slowed).   Psychiatric:         Mood and Affect: Mood normal.         Behavior: Behavior normal.         Thought Content: Thought content normal.         Cognition and Memory: Cognition normal.       Assessment & Plan   Diagnoses and all orders for this visit:    1. Lumbar facet arthropathy (Primary)    2. Chronic pain " "syndrome    3. Left lumbar radiculopathy    Other orders  -     methylPREDNISolone (MEDROL) 4 MG dose pack; Take as directed on package instructions.  Dispense: 21 tablet; Refill: 0    --- Bilateral L2-L5 RFA - will need to hold Plavix for 7 days prior to procedure  -------  Education about Medial Branch Blockade and RF Therapy:  This medial branch blockade (MBB) suggested is intended for diagnostic purposes, with the intent of offering the patient Radiofrequency thermal rhizotomy (RF) if the MBB is diagnostically effective.  The diagnostic blockade is necessary to determine the likelihood that RF therapy could be efficacious in providing long term relief to the patient.    Medial branches are sensory nerve branches that connect to a facet joint and transmit sensations & pain signals from that joint.  Facet is a term for the type of joints found in the spine.  Medial branches are the nerves that go to a facet, and therefore are also sometimes called \"facet joint nerves\" (FJNs).      In a medial branch blockade procedure, xray fluoroscopy is used to verify the locations of the outside of the joint lines which are being targeted.  Under xray guidance, needles are placed to these areas.  Contrast dye is injected to confirm proper placement, with dye flowing over the joint area, and to ensure that the dye does not flow into unintended areas such as a vein.  When this is confirmed, local anesthetic is injected to block the medial branch at that joint level.      If MBBs are diagnostically successful in blocking pain, then the patient is most likely a great candidate for Radiofrequency of those facet joint nerves.  In the RF procedure, needles are placed to the joint lines in the same fashion, and after testing, the needle tips are heated to thermally treat the nerves, blocking the nerves by in essence damaging the nerves with the heat treatment(non-pulsed).       Medically, a successful RF procedure should provide a " patient with 50% pain relief or more for at least 6 months.  Clinical experience suggests that successful patients receive relief more in the range of 12 months on average.  We also discussed that a fortunate minority of patients receive therapeutic success from the MBB, and may not require RF ablation.  If a patient receives more than 8 weeks of relief from MBB, then occasional repeat MBB for therapeutic purposes is a very reasonable alternative therapy.  This course of therapy is consistent with our LCDs according to our CMS  in the area, and therefore other insurance providers should follow accordingly.  We will monitor our patients to screen for these therapeutic responders and will offer RF therapy only when necessary.    We discussed that MBB & RF are not without risks.  Guidelines regarding anticoagulant use & neuraxial procedures will be respected.  Patients that are ill or otherwise may be at risk for sepsis will not have their spines accessed by neuraxial injections of any type.  This patient will not be offered these therapies if there is an increased risk.   We discussed that there is a risk of postprocedural pain and also a risk of worsening of clinical picture with these procedures as with any neuraxial procedure.    -------  --------    Anticoagulation risks for procedures....   - there are risks to discontinuation of anticoagulants for neuraxial procedures and surgery.  Risks include but are not limited to deep vein thromboses, pulmonary emboli, myocardial infarction, and stroke    - Neuraxial access with a needle is relatively contraindicated while anticoagulated because of the risk of hemorrhage and/or epidural hematoma, which can be a neurosurgical emergency to evacuate bleeding.  Left unchecked, bleeding can cause nerve damage leading to paralysis and/or death.  --------  --- MDP sent to pharmacy   --- Instructed patient that if pain worsens or become intolerable he should go to the  emergency room  --- Follow-up for procedure     SHANNAN REPORT  As the clinician, I personally reviewed the SHANNAN from 7/19/22 while the patient was in the office today.    Dictated utilizing Dragon dictation.     This document is intended for medical expert use only. Reading of this document by patients and/or patient's family without participating medical staff guidance may result in misinterpretation and unintended morbidity.   Any interpretation of such data is the responsibility of the patient and/or family member responsible for the patient in concert with their primary or specialist providers, not to be left for sources of online searches such as [employer], [employer] or similar queries. Relying on these approaches to knowledge may result in misinterpretation, misguided goals of care and even death should patients or family members try recommendations outside of the realm of professional medical care in a supervised way.

## 2022-07-20 ENCOUNTER — TRANSCRIBE ORDERS (OUTPATIENT)
Dept: SURGERY | Facility: SURGERY CENTER | Age: 77
End: 2022-07-20

## 2022-07-20 DIAGNOSIS — Z41.9 SURGERY, ELECTIVE: Primary | ICD-10-CM

## 2022-07-27 RX ORDER — LEVOTHYROXINE SODIUM 50 MCG
TABLET ORAL
Qty: 90 TABLET | Refills: 0 | Status: SHIPPED | OUTPATIENT
Start: 2022-07-27 | End: 2022-12-02

## 2022-07-28 DIAGNOSIS — E03.9 HYPOTHYROIDISM, UNSPECIFIED TYPE: Primary | ICD-10-CM

## 2022-08-01 NOTE — PROGRESS NOTES
Date of Office Visit: 10/08/2019  Encounter Provider: Marlene Giordano MD  Place of Service: [facility] CARDIOLOGY  Patient Name: Mannie Fajardo  :1945    Chief complaint  CAD    History of Present Illness  The patient is a delightful, 74-year-old gentleman with history of hyperlipidemia, hyperhomocysteinemia, and coronary artery disease.  In , after an abnormal stress test, he underwent cardiac catheterization that revealed severe disease of the circumflex artery for which he received a drug-coated stent.  He has not had any intervening events since then, though he has maintained aspirin and Plavix therapy.  In 2016 with complaints of chest pain Lexiscan perfusion study was negative for ischemia.  Stress screening study in 2017 showed a small abdominal aortic aneurysm for his vascular surgeons felt to be stable.    Since the last visit he is not exercising.  He continues to struggle with back pain.  He denies any chest pain, shortness of breath, palpitations, syncope near syncope.  Who is being followed by vascular surgery who feels his abdominal aortic aneurysm is stable.    Past Medical History:   Diagnosis Date   • AAA (abdominal aortic aneurysm) without rupture (CMS/HCC)    • Arthritis     back   • Back pain    • Backache    • CAD (coronary atherosclerotic disease)    • Disease of thyroid gland    • Dyslipidemia    • Edema    • History of transfusion    • Hyperhomocystinemia (CMS/HCC)    • Hyperlipidemia    • Stage 3 chronic kidney disease (CMS/HCC)      Past Surgical History:   Procedure Laterality Date   • APPENDECTOMY     • CARDIAC SURGERY     • CORONARY ANGIOPLASTY WITH STENT PLACEMENT     • EXTRACORPOREAL SHOCKWAVE LITHOTRIPSY (ESWL), STENT INSERTION/REMOVAL Right 2017    Procedure: CYSTO RETROGRADE WITH STENT PLACEMENT;  Surgeon: Janes López MD;  Location: [facility] OR [facility];  Service:      Outpatient Medications Prior to Visit   Medication Sig  Dispense Refill   • aspirin 81 MG chewable tablet Chew 81 mg Daily.     • folic acid (RA FOLIC ACID) 400 MCG tablet Take 400 mcg by mouth Daily.     • LOVAZA 1 g capsule Take 1 capsule by mouth 2 (Two) Times a Day. 180 capsule 0   • PLAVIX 75 MG tablet Take 1 tablet by mouth Daily. 90 tablet 0   • ZETIA 10 MG tablet TAKE 1 TABLET DAILY. 90 tablet 0   • traMADol (ULTRAM) 50 MG tablet As Needed.  5   • triamcinolone (KENALOG) 0.1 % cream As Needed.  5     No facility-administered medications prior to visit.        Allergies as of 10/08/2019 - Reviewed 10/08/2019   Allergen Reaction Noted   • Adhesive tape Other (See Comments) 04/14/2016   • Statins Other (See Comments) 04/14/2016     Social History     Socioeconomic History   • Marital status: Single     Spouse name: Not on file   • Number of children: 0   • Years of education: COLLEGE   • Highest education level: Not on file   Occupational History   • Occupation: RETIRED   Tobacco Use   • Smoking status: Never Smoker   • Smokeless tobacco: Never Used   Substance and Sexual Activity   • Alcohol use: No     Comment: Daily caffeine use   • Drug use: No   • Sexual activity: Defer     Family History   Problem Relation Age of Onset   • Heart disease Mother    • Stroke Father      Review of Systems   Constitution: Negative for fever, malaise/fatigue, weight gain and weight loss.   HENT: Negative for ear pain, hearing loss, nosebleeds and sore throat.    Eyes: Negative for double vision, pain, vision loss in left eye and vision loss in right eye.   Cardiovascular:        See history of present illness.   Respiratory: Negative for cough, shortness of breath, sleep disturbances due to breathing, snoring and wheezing.    Endocrine: Negative for cold intolerance, heat intolerance and polyuria.   Skin: Negative for itching, poor wound healing and rash.   Musculoskeletal: Positive for back pain, joint pain and myalgias. Negative for joint swelling.   Gastrointestinal: Negative for  "abdominal pain, diarrhea, hematochezia, nausea and vomiting.   Genitourinary: Negative for hematuria and hesitancy.   Neurological: Negative for numbness, paresthesias and seizures.   Psychiatric/Behavioral: Negative for depression. The patient is not nervous/anxious.         Objective:     Vitals:    10/08/19 1052   BP: 112/78   Pulse: 69   Weight: 68 kg (150 lb)   Height: 172.7 cm (68\")     Body mass index is 22.81 kg/m².    Physical Exam   Constitutional: He is oriented to person, place, and time. He appears well-developed and well-nourished.   HENT:   Head: Normocephalic.   Nose: Nose normal.   Mouth/Throat: Oropharynx is clear and moist.   Eyes: Conjunctivae and EOM are normal. Pupils are equal, round, and reactive to light. Right eye exhibits no discharge. No scleral icterus.   Neck: Normal range of motion. Neck supple. No JVD present. No thyromegaly present.   Cardiovascular: Normal rate, regular rhythm, normal heart sounds and intact distal pulses. Exam reveals no gallop and no friction rub.   No murmur heard.  Pulses:       Carotid pulses are 2+ on the right side, and 2+ on the left side.       Radial pulses are 2+ on the right side, and 2+ on the left side.        Femoral pulses are 2+ on the right side, and 2+ on the left side.       Popliteal pulses are 2+ on the right side, and 2+ on the left side.        Dorsalis pedis pulses are 2+ on the right side, and 2+ on the left side.        Posterior tibial pulses are 2+ on the right side, and 2+ on the left side.   Pulmonary/Chest: Effort normal and breath sounds normal. No respiratory distress. He has no wheezes. He has no rales.   Abdominal: Soft. Bowel sounds are normal. He exhibits no distension. There is no hepatosplenomegaly. There is no tenderness. There is no rebound.   Musculoskeletal: Normal range of motion. He exhibits no edema or tenderness.   Neurological: He is alert and oriented to person, place, and time.   Skin: Skin is warm and dry. No rash " noted. No erythema.   Psychiatric: He has a normal mood and affect. His behavior is normal. Judgment and thought content normal.   Vitals reviewed.    Lab Review:     ECG 12 Lead  Date/Time: 10/8/2019 11:00 AM  Performed by: Marlene Giordano MD  Authorized by: Marlene Giordano MD   Comparison: compared with previous ECG   Rhythm: sinus rhythm    Clinical impression: normal ECG          Assessment:       Diagnosis Plan   1. Coronary artery disease involving native coronary artery of native heart without angina pectoris  ECG 12 Lead    Comprehensive Metabolic Panel    CBC & Differential    Lipid Panel    CBC Auto Differential   2. Coronary artery disease due to calcified coronary lesion     3. Stage 3 chronic kidney disease (CMS/HCC)     4. Abdominal aortic aneurysm (AAA) without rupture (CMS/HCC)     5. Hyperlipidemia, unspecified hyperlipidemia type       Plan:       1.  Coronary artery disease with history of prior drug-coated stent placement.  Medically doing well.  He is on dual platelet therapy at this time  2.  Chronic back pain.  This is chronic and unfortunately without much relief on epidurals.  3.  Hyperhomocystinemia on folic acid supplement   4.  History of renal insufficiency  5.  Abdominal aortic aneurysm followed by vascular surgery  6.  Hyperlipidemia.  Intolerant of statins.  Currently on lovassa  and Zetia       Your medication list           Accurate as of 10/8/19 11:59 PM. If you have any questions, ask your nurse or doctor.               CHANGE how you take these medications      Instructions Last Dose Given Next Dose Due   ezetimibe 10 MG tablet  Commonly known as:  ZETIA  What changed:    · how much to take  · additional instructions  Changed by:  Marlene Giordano MD      Take 1 tablet by mouth Daily. Do NOT substitute       LOVAZA 1 g capsule  Generic drug:  omega-3 acid ethyl esters  What changed:  additional instructions  Changed by:  Marlene Giordano MD      Take 1 capsule by mouth 2 (Two) Times a Day. Do  NOT substitute       PLAVIX 75 MG tablet  Generic drug:  clopidogrel  What changed:  additional instructions  Changed by:  Marlene Giordano MD      Take 1 tablet by mouth Daily. Only give the Brand name Plavix. DO NOT SUBSTITUTE          CONTINUE taking these medications      Instructions Last Dose Given Next Dose Due   aspirin 81 MG chewable tablet      Chew 81 mg Daily.       RA FOLIC ACID 400 MCG tablet  Generic drug:  folic acid      Take 400 mcg by mouth Daily.          STOP taking these medications    traMADol 50 MG tablet  Commonly known as:  ULTRAM  Stopped by:  Marlene Giordano MD        triamcinolone 0.1 % cream  Commonly known as:  KENALOG  Stopped by:  Marlene Giordano MD              Where to Get Your Medications      These medications were sent to [facility]/pharmacy #[id number] - [facility] [address] [place] [address] RD. AT [facility] [phone] [facility] [phone] [address] [place] [address]    Phone:  [phone]   · ezetimibe 10 MG tablet  · LOVAZA 1 g capsule  · PLAVIX 75 MG tablet         Patient is no longer taking -.  I corrected the med list to reflect this.  I did not stop these medications.    Dictated utilizing Dragon dictation   Methotrexate Counseling:  Patient counseled regarding adverse effects of methotrexate including but not limited to nausea, vomiting, abnormalities in liver function tests. Patients may develop mouth sores, rash, diarrhea, and abnormalities in blood counts. The patient understands that monitoring is required including LFT's and blood counts.  There is a rare possibility of scarring of the liver and lung problems that can occur when taking methotrexate. Persistent nausea, loss of appetite, pale stools, dark urine, cough, and shortness of breath should be reported immediately. Patient advised to discontinue methotrexate treatment at least three months before attempting to become pregnant.  I discussed the need for folate supplements while taking methotrexate.  These supplements can decrease side effects during methotrexate treatment. The patient verbalized understanding of the proper use and possible adverse effects of methotrexate.  All of the patient's questions and concerns were addressed.

## 2022-08-02 LAB
T4 FREE SERPL-MCNC: 1.2 NG/DL (ref 0.82–1.77)
TSH SERPL DL<=0.005 MIU/L-ACNC: 3.72 UIU/ML (ref 0.45–4.5)

## 2022-08-06 ENCOUNTER — LAB (OUTPATIENT)
Dept: LAB | Facility: HOSPITAL | Age: 77
End: 2022-08-06

## 2022-08-06 ENCOUNTER — LAB (OUTPATIENT)
Dept: LAB | Facility: SURGERY CENTER | Age: 77
End: 2022-08-06

## 2022-08-06 DIAGNOSIS — M47.816 LUMBAR FACET ARTHROPATHY: ICD-10-CM

## 2022-08-06 LAB — SARS-COV-2 ORF1AB RESP QL NAA+PROBE: NOT DETECTED

## 2022-08-06 PROCEDURE — C9803 HOPD COVID-19 SPEC COLLECT: HCPCS

## 2022-08-06 PROCEDURE — U0005 INFEC AGEN DETEC AMPLI PROBE: HCPCS

## 2022-08-06 PROCEDURE — U0004 COV-19 TEST NON-CDC HGH THRU: HCPCS

## 2022-08-08 ENCOUNTER — TELEPHONE (OUTPATIENT)
Dept: INTERNAL MEDICINE | Facility: CLINIC | Age: 77
End: 2022-08-08

## 2022-08-08 NOTE — TELEPHONE ENCOUNTER
----- Message from CONSUELO Dukes sent at 8/8/2022 12:39 PM EDT -----  Please call and let him know that that tsh and free t4 are normal. Continue current dose of levothyroxine

## 2022-08-09 ENCOUNTER — HOSPITAL ENCOUNTER (OUTPATIENT)
Dept: GENERAL RADIOLOGY | Facility: SURGERY CENTER | Age: 77
Setting detail: HOSPITAL OUTPATIENT SURGERY
End: 2022-08-09

## 2022-08-09 ENCOUNTER — HOSPITAL ENCOUNTER (OUTPATIENT)
Facility: SURGERY CENTER | Age: 77
Setting detail: HOSPITAL OUTPATIENT SURGERY
Discharge: HOME | End: 2022-08-09
Attending: ANESTHESIOLOGY | Admitting: ANESTHESIOLOGY

## 2022-08-09 VITALS
OXYGEN SATURATION: 96 % | BODY MASS INDEX: 24.1 KG/M2 | HEIGHT: 68 IN | SYSTOLIC BLOOD PRESSURE: 168 MMHG | WEIGHT: 159 LBS | TEMPERATURE: 97.7 F | RESPIRATION RATE: 16 BRPM | DIASTOLIC BLOOD PRESSURE: 86 MMHG | HEART RATE: 80 BPM

## 2022-08-09 DIAGNOSIS — M47.816 LUMBAR FACET ARTHROPATHY: ICD-10-CM

## 2022-08-09 DIAGNOSIS — Z41.9 SURGERY, ELECTIVE: ICD-10-CM

## 2022-08-09 PROCEDURE — 64635 DESTROY LUMB/SAC FACET JNT: CPT | Performed by: ANESTHESIOLOGY

## 2022-08-09 PROCEDURE — 99152 MOD SED SAME PHYS/QHP 5/>YRS: CPT | Performed by: ANESTHESIOLOGY

## 2022-08-09 PROCEDURE — 76000 FLUOROSCOPY <1 HR PHYS/QHP: CPT

## 2022-08-09 PROCEDURE — 64636 DESTROY L/S FACET JNT ADDL: CPT | Performed by: ANESTHESIOLOGY

## 2022-08-09 RX ORDER — SODIUM CHLORIDE 0.9 % (FLUSH) 0.9 %
10 SYRINGE (ML) INJECTION EVERY 12 HOURS SCHEDULED
Status: DISCONTINUED | OUTPATIENT
Start: 2022-08-09 | End: 2022-08-09 | Stop reason: HOSPADM

## 2022-08-09 RX ORDER — SODIUM CHLORIDE 0.9 % (FLUSH) 0.9 %
10 SYRINGE (ML) INJECTION AS NEEDED
Status: DISCONTINUED | OUTPATIENT
Start: 2022-08-09 | End: 2022-08-09 | Stop reason: HOSPADM

## 2022-08-09 NOTE — OP NOTE
Bilateral L3-5 Lumbar Medial Branch RADIOFREQUENCY  [facility]      PREOPERATIVE DIAGNOSIS:  Lumbar spondylosis without myelopathy    POSTOPERATIVE DIAGNOSIS:  Lumbar spondylosis without myelopathy    PROCEDURE:   Diagnostic Bilateral Lumbar Medial Branch Nerve thermal radiofrequency lesioning, with fluoroscopy:  L3, L4, and L5 nerves (at the L4 and L5 transverse processes and the sacral alar groove) to thermally treat the innervation to facet joints L4-5 and L5-S1  1. 62913-59 -- Bilateral L/S facet neuro destr., 1st Level  2. 14625-12 -- Bilateral L/S facet neuro destr., 2nd  Level    PRE-PROCEDURE DISCUSSION WITH PATIENT:    Risks and complications were discussed with the patient prior to starting the procedure and informed consent was obtained.      SURGEON:  Deepak Mckeon MD    REASON FOR PROCEDURE:    The patient complains of pain that seems to have a significant axial component and The patient admits to 80% or more pain relief diagnostically from medial branch blockades.    SEDATION:  Patient declined administration of moderate sedation    TIME OF PROCEDURE:   The intraoperative procedure time after administration of the sedative was 16 minutes.     ANESTHETIC:  Lidocaine 2%  STEROID:  NONE      DESCRIPTON OF PROCEDURE:  After obtaining informed consent, IV access  was not obtained in the preoperative area.   The patient was taken to the operating room.  The patient was placed in the prone position with a pillow under the abdomen. All pressure points were well padded.  EKG, blood pressure, and pulse oximeter were monitored.  The patient was monitored and sedated by the RN under my direction. The lumbosacral area was prepped with Chloraprep and draped in a sterile fashion.     Under fluoroscopic guidance the transverse processes of the L4 and L5 vertebrae at the junctions of the superior articular processes were identified on the right.  Also identified was the groove between the ala and  the superior articular process of the sacrum on the ipsilateral side.  Skin and subcutaneous tissue were anesthetized with 1ml of 1% lidocaine above each of these points. Then, radiofrequency probe needles were advanced in this fluoro view to the above junctions.  Aspiration was negative for blood and CSF.  After confirming the position of the needle with fluoroscope in all views, testing was initiated.  First, sensory testing was started on each needle a 1V and 50Hz and slowly decreased until painful pressure stimulation diminished at 0.5V.  Next, motor testing was confirmed to be negative at 3V and 2Hz for any radicular stimulation.  Then 1mL of the local anesthetic was instilled in each needle.  Two minutes elapsed, and during this time a lateral fluoroscopic view was confirmed again to ensure the needles had not advanced nor retracted.  Then, Radiofrequency Lesioning was initiated for 1.5 minutes at 85 degrees Celsius.  Needles were removed intact from each of the areas.     A similar procedure was repeated to address the L3, L4, and L5 nerves on the contralateral side.   Onset of analgesia was noted.  Vital signs remained stable throughout.      ESTIMATED BLOOD LOSS:  <5 mL  SPECIMENS:  none    COMPLICATIONS:   No complications were noted., There was not any evidence of dural puncture.   and The patient did not have any signs of postprocedure numbness nor weakness.    TOLERANCE & DISCHARGE CONDITION:    The patient tolerated the procedure well.  The patient was transported to the recovery area without difficulties.  The patient was discharged to home under the care of family in stable and satisfactory condition.    PLAN OF CARE:  1. The patient was given our standard instruction sheet.  2. The patient will  Return to clinic 4-6 wks.  3. The patient will resume all medications as per the medication reconciliation sheet.

## 2022-08-09 NOTE — DISCHARGE INSTRUCTIONS
[facility] - Post-procedure Instructions          --  While there are no absolute restrictions, it is recommended that you do not perform strenuous activity today. In the morning, you may resume your level of activity as before your block.    --  If you have a band-aid at your injection site, please remove it later today. Observe the area for any redness, swelling, pus-like drainage, or a temperature over 101°. If any of these symptoms occur, please call your doctor at [phone]. If after office hours, leave a message and the on-call provider will return your call.    --  Ice may be applied to your injection site. It is recommended you avoid direct heat (heating pad; hot tub) for 1-2 days.    --  Call [facility] at [phone] if you experience persistent headache, persistent bleeding from the injection site, or severe pain not relieved by heat or oral medication.    --  Do not make important decisions today.    --  Due to the effects of the block and/or the I.V. Sedation, DO NOT drive or operate hazardous machinery for 12 hours.  Local anesthetics may cause numbness after procedure and precautions must be taken with regards to operating equipment as well as with walking, even if ambulating with assistance of another person or with an assistive device.    --  Do not drink alcohol for 12 hours.    -- You may return to work tomorrow, or as directed by your referring doctor.    --  Occasionally you may notice a slight increase in your pain after the procedure. This should start to improve within the next 24-48 hours. Radiofrequency ablation procedure pain may last 3-4 weeks.    --  It may take as long as 3-4 days before you notice a gradual improvement in your pain and/or other symptoms.    -- You may continue to take your prescribed pain medication as needed.    --  Some normal possible side effects of steroid use could include fluid retention, increased blood sugar, dull headache,  increased sweating, increased appetite, mood swings and flushing.    --  Diabetics are recommended to watch their blood glucose level closely for 24-48 hours after the injection.    --  Must stay in PACU for 20 min upon arrival and prove no leg weakness before being discharged.    --  IN THE EVENT OF A LIFE THREATENING EMERGENCY, (CHEST PAIN, BREATHING DIFFICULTIES, PARALYSIS…) YOU SHOULD GO TO YOUR NEAREST EMERGENCY ROOM.    --  You should be contacted by our office within 2-3 days to schedule follow up or next appointment date.  If not contacted within 7 days, please call the office at [phone]

## 2022-09-06 ENCOUNTER — TELEPHONE (OUTPATIENT)
Dept: PAIN MEDICINE | Facility: CLINIC | Age: 77
End: 2022-09-06

## 2022-09-06 ENCOUNTER — OFFICE VISIT (OUTPATIENT)
Dept: PAIN MEDICINE | Facility: CLINIC | Age: 77
End: 2022-09-06

## 2022-09-06 VITALS
BODY MASS INDEX: 23.92 KG/M2 | HEART RATE: 91 BPM | SYSTOLIC BLOOD PRESSURE: 139 MMHG | DIASTOLIC BLOOD PRESSURE: 80 MMHG | HEIGHT: 68 IN | TEMPERATURE: 96.5 F | OXYGEN SATURATION: 98 % | WEIGHT: 157.8 LBS

## 2022-09-06 DIAGNOSIS — M54.16 LEFT LUMBAR RADICULOPATHY: ICD-10-CM

## 2022-09-06 DIAGNOSIS — G89.4 CHRONIC PAIN SYNDROME: ICD-10-CM

## 2022-09-06 DIAGNOSIS — M47.816 LUMBAR FACET ARTHROPATHY: Primary | ICD-10-CM

## 2022-09-06 PROCEDURE — 99214 OFFICE O/P EST MOD 30 MIN: CPT

## 2022-09-06 PROCEDURE — 96372 THER/PROPH/DIAG INJ SC/IM: CPT

## 2022-09-06 RX ORDER — METHYLPREDNISOLONE ACETATE 40 MG/ML
40 INJECTION, SUSPENSION INTRA-ARTICULAR; INTRALESIONAL; INTRAMUSCULAR; SOFT TISSUE ONCE
Status: COMPLETED | OUTPATIENT
Start: 2022-09-06 | End: 2022-09-06

## 2022-09-06 RX ORDER — METHYLPREDNISOLONE ACETATE 80 MG/ML
40 INJECTION, SUSPENSION INTRA-ARTICULAR; INTRALESIONAL; INTRAMUSCULAR; SOFT TISSUE ONCE
Qty: 0.5 ML | Refills: 0 | Status: SHIPPED | OUTPATIENT
Start: 2022-09-06 | End: 2022-09-06

## 2022-09-06 RX ADMIN — METHYLPREDNISOLONE ACETATE 40 MG: 40 INJECTION, SUSPENSION INTRA-ARTICULAR; INTRALESIONAL; INTRAMUSCULAR; SOFT TISSUE at 11:07

## 2022-09-06 NOTE — PROGRESS NOTES
CHIEF COMPLAINT  Back pain    Subjective   Mannie Fajardo is a 77 y.o. male  who presents to the office for follow-up of procedure.  He completed a bilateral L2-L5 RFA on 8/9/22 performed by Dr. cMkeon for management of low back pain. Patient reports NO relief from the procedure.     Today pain is 8/10VAS in severity. Pain is located in his low back. Describes this pain as a continuous aching and burning. Pain is worsened by prolonged position, standing/sitting, and lying flat. Pain improves reposition and lying on his side. This offers minimal relief. He does not utilize any OTC medications.  He reports he completed PT years ago but this offered no relief.    Procedures:  8/9/22 - bilateral L2-L5 MBB - NO relief  7/12/22 - bilateral L2-L5 MBB - 80% relief x 2 hours  6/23/22 - bilateral L2-L5 MBB - 80% relief x 1.5 days  5/25/22 - left L4-S1 TFLESI - minimal relief for 12-24 hours    Last seen by Dr. Nitin Wu 11/10/21 for follow up on lumbar myelogram. He reported no significant stenosis and nothing to offer from a surgical standpoint.     Patient states there was previous discussion with Dr. Mckeon in regards to a SCS. He is asking about moving forward with an Intracept Basivertebral Nerve Ablation prior to considering a SCS. I discussed this procedure with Dr. Mckeon and this is not something that is currently offered in [place]. The nearest location is [place]. Patient notified of this information following office visit.     Back Pain  This is a chronic problem. The current episode started more than 1 year ago. The problem occurs daily. The problem has been gradually worsening since onset. The pain is present in the lumbar spine. The quality of the pain is described as aching and burning. The pain radiates to the left thigh. The pain is at a severity of 8/10. The pain is severe. The symptoms are aggravated by standing, sitting and lying down (prolonged standing, sititng). Associated symptoms  Chronic nasal drainage and congestion.    Has always been a drooler and mouth breather but not congested and plugged up. Starting in October when she had an illness (covid negative).  12/16/21 treated a sinusitis with amox, improved briefly then symptoms returned. In February tried flonase for several weeks and still every day or other day but didn't make a long term difference.  Now is blowing thick purulent drainage from her nose. Not irritable. Sleeps ok at night. Sneezes a lot. Doesn't seem to have itchy nose or eyes.    GENERAL:  slightly uncomfortable but alert and not toxic  HYDRATION:  well hydrated: moist mucous membranes, good tear production & skin turgor, eyes not sunken  EARS:  Left TM dull with fluid and Right TM normal  NOSE:  Nasal mucosa red, thick purulent drainage  THROAT:  tonsillar hypertrophy 2+ and minimal erythema  NECK:  supple and no cervical or supraclavicular lymphadenopathy  LUNGS:  Chest totally clear; no rales, rhonchi, wheezes or stridor and no tachypnea or retractions    A/P:  See Diagnosis, Orders/Medication, and Follow-up instructions.  Chronic rhinitis with some purulence of and on  At times doesn't seem to hear as well,   Has purulent drainage today and fluid in left ear.  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 Pertinent negatives include no abdominal pain, chest pain, dysuria, fever, headaches or numbness. Treatments tried: laying on side helps. The treatment provided no relief.     PEG Assessment   What number best describes your pain on average in the past week?9  What number best describes how, during the past week, pain has interfered with your enjoyment of life?10  What number best describes how, during the past week, pain has interfered with your general activity?  10    Review of Pertinent Medical Data ---  POSTMYELOGRAM CT OF THE LUMBAR SPINE INCLUDING RECONSTRUCTION IMAGES  08/19/2021     HISTORY: Lumbar radiculopathy.     TECHNIQUE: Following the lumbar spine myelogram post myogram CT scan was  obtained from the lower thoracic spine to the sacrum.     Sagittal and coronal reconstruction images were reviewed.     FINDINGS: The T12-L1 intervertebral disc appears within normal limits.  No canal stenosis is seen.     At L1-2 there is minimal right intraforaminal disc protrusion seen on  axial image 51. No mass effect on the exiting L1 nerve roots are seen.     There is minimal ligamentum flavum hypertrophy with no canal stenosis.     The L2-3 intervertebral disc appears within normal limits with no canal  stenosis or significant foraminal narrowing.     The L3-4 disc appears within normal limits with no significant canal  stenosis or foraminal narrowing.     At L4-5 there is minimal intervertebral disc space narrowing with mild  broad-based disc bulge. There is minimal foraminal narrowing but no  definite mass effect on the exiting L4 nerve roots is seen. There is  bilateral facet joint arthropathy with no significant canal stenosis.     The L5-S1 disc space appears slightly narrowed with no compressive disc  herniation or canal stenosis. There is minimal facet joint arthropathy  with no significant foraminal narrowing.     The lumbar spine myelogram images show  "minimal mass effect on the  anterior thecal sac at L3-4 and L4-5. The exiting nerve root sleeves  appear to fill relatively well.     IMPRESSION:  Minimal lumbar degenerative disc disease as described.  Please see full discussion above.    Radiation dose reduction techniques were utilized, including automated  exposure control and exposure modulation based on body size.     This report was finalized on 8/19/2021 5:56 PM by Dr. Alli Forrester M.D.    The following portions of the patient's history were reviewed and updated as appropriate: allergies, current medications, past family history, past medical history, past social history, past surgical history and problem list.    Review of Systems   Constitutional: Positive for activity change (decreased) and fatigue. Negative for chills and fever.   HENT: Negative for congestion.    Eyes: Negative for visual disturbance.   Respiratory: Negative for chest tightness and shortness of breath.    Cardiovascular: Negative for chest pain.   Gastrointestinal: Negative for abdominal pain, constipation and diarrhea.   Genitourinary: Negative for difficulty urinating and dysuria.   Musculoskeletal: Positive for back pain.   Neurological: Positive for weakness (bilateral lower extremities). Negative for dizziness, light-headedness, numbness and headaches.   Psychiatric/Behavioral: Positive for sleep disturbance. Negative for agitation. The patient is not nervous/anxious.      I have reviewed and confirmed the accuracy of the ROS as documented by the MA/LPN/RN CONSUELO Mott     Vitals:    09/06/22 1033   BP: 139/80   Pulse: 91   Temp: 96.5 °F (35.8 °C)   SpO2: 98%   Weight: 71.6 kg (157 lb 12.8 oz)   Height: 172.7 cm (68\")   PainSc:   8   PainLoc: Back     Objective   Physical Exam  Constitutional:       Appearance: Normal appearance.   HENT:      Head: Normocephalic.   Cardiovascular:      Rate and Rhythm: Normal rate and regular rhythm.   Pulmonary:      Effort: " Pulmonary effort is normal.      Breath sounds: Normal breath sounds.   Musculoskeletal:      Cervical back: Normal range of motion.      Lumbar back: Tenderness and bony tenderness present. Decreased range of motion.      Comments: + lumbar facet loading/tenderness   Skin:     General: Skin is warm and dry.      Capillary Refill: Capillary refill takes less than 2 seconds.   Neurological:      General: No focal deficit present.      Mental Status: He is alert and oriented to person, place, and time.      Gait: Gait abnormal (slowed).   Psychiatric:         Mood and Affect: Mood normal.         Behavior: Behavior normal.         Thought Content: Thought content normal.         Cognition and Memory: Cognition normal.       Assessment & Plan   Diagnoses and all orders for this visit:    1. Lumbar facet arthropathy (Primary)  -     methylPREDNISolone acetate (DEPO-medrol) injection 40 mg  -     Ambulatory Referral to Neurosurgery    2. Chronic pain syndrome  -     methylPREDNISolone acetate (DEPO-medrol) injection 40 mg  -     Ambulatory Referral to Neurosurgery    3. Left lumbar radiculopathy  -     methylPREDNISolone acetate (DEPO-medrol) injection 40 mg    Other orders  -     methylPREDNISolone acetate (DEPO-medrol) 80 MG/ML injection; Inject 0.5 mL into the appropriate muscle as directed by prescriber 1 (One) Time for 1 dose.  Dispense: 0.5 mL; Refill: 0    --- 40mg Depo-medrol IM in office due to flare in pain   --- Referral to [facility]  --- Follow-up with Dr. Mckeon to discuss other options      SHANNAN REPORT  As part of the patient's treatment plan, I am prescribing controlled substances. The patient has been made aware of appropriate use of such medications, including potential risk of somnolence, limited ability to drive and/or work safely, and the potential for dependence or overdose. It has also been made clear that these medications are for use by this patient only, without concomitant use  of alcohol or other substances unless prescribed.     Patient has completed prescribing agreement detailing terms of continued prescribing of controlled substances, including monitoring SHANNAN reports, urine drug screening, and pill counts if necessary. The patient is aware that inappropriate use will results in cessation of prescribing such medications.    As the clinician, I personally reviewed the SHANNAN from 9/6/22 while the patient was in the office today.    History and physical exam exhibit continued safe and appropriate use of controlled substances.    Dictated utilizing Dragon dictation.      This document is intended for medical expert use only. Reading of this document by patients and/or patient's family without participating medical staff guidance may result in misinterpretation and unintended morbidity.   Any interpretation of such data is the responsibility of the patient and/or family member responsible for the patient in concert with their primary or specialist providers, not to be left for sources of online searches such as [employer], [employer] or similar queries. Relying on these approaches to knowledge may result in misinterpretation, misguided goals of care and even death should patients or family members try recommendations outside of the realm of professional medical care in a supervised way.    Patient remained masked during entire encounter. No cough present. I donned a mask and eye protection throughout entire visit. Prior to donning mask and eye protection, hand hygiene was performed, as well as when it was doffed.  I was closer than 6 feet, but not for an extended period of time. No obvious exposure to any bodily fluids.

## 2022-09-06 NOTE — TELEPHONE ENCOUNTER
Please let patient know that I spoke with Dr. Mckeon in regards to the intracept basivertebral nerve ablation. He was unfamiliar with this and has requested more information from the company. There is no one in [place] that currently does this procedure. The nearest place was [place]. I am going to place an order with [facility] for a second opinion and to see if they have anything else to offer. Please let me know if there is anything else I can do for him.

## 2022-10-17 ENCOUNTER — OFFICE VISIT (OUTPATIENT)
Dept: CARDIOLOGY | Facility: CLINIC | Age: 77
End: 2022-10-17

## 2022-10-17 VITALS
WEIGHT: 157 LBS | DIASTOLIC BLOOD PRESSURE: 78 MMHG | HEART RATE: 81 BPM | SYSTOLIC BLOOD PRESSURE: 140 MMHG | BODY MASS INDEX: 23.79 KG/M2 | HEIGHT: 68 IN

## 2022-10-17 DIAGNOSIS — E78.00 HYPERCHOLESTEROLEMIA: ICD-10-CM

## 2022-10-17 DIAGNOSIS — E78.5 HYPERLIPIDEMIA, UNSPECIFIED HYPERLIPIDEMIA TYPE: Primary | ICD-10-CM

## 2022-10-17 DIAGNOSIS — E78.5 HYPERLIPIDEMIA, UNSPECIFIED HYPERLIPIDEMIA TYPE: ICD-10-CM

## 2022-10-17 DIAGNOSIS — I25.10 ATHEROSCLEROTIC HEART DISEASE OF NATIVE CORONARY ARTERY WITHOUT ANGINA PECTORIS: ICD-10-CM

## 2022-10-17 DIAGNOSIS — I25.10 CORONARY ARTERY DISEASE INVOLVING NATIVE CORONARY ARTERY OF NATIVE HEART WITHOUT ANGINA PECTORIS: ICD-10-CM

## 2022-10-17 DIAGNOSIS — E78.5 HYPERLIPIDEMIA, UNSPECIFIED: ICD-10-CM

## 2022-10-17 DIAGNOSIS — I25.10 CORONARY ARTERY DISEASE INVOLVING NATIVE CORONARY ARTERY OF NATIVE HEART WITHOUT ANGINA PECTORIS: Primary | ICD-10-CM

## 2022-10-17 PROCEDURE — 93000 ELECTROCARDIOGRAM COMPLETE: CPT | Performed by: NURSE PRACTITIONER

## 2022-10-17 PROCEDURE — 99214 OFFICE O/P EST MOD 30 MIN: CPT | Performed by: NURSE PRACTITIONER

## 2022-10-17 RX ORDER — OMEGA-3 ACID ETHYL ESTERS 1 G
2 CAPSULE ORAL 2 TIMES DAILY
Qty: 360 CAPSULE | Refills: 0 | Status: SHIPPED | OUTPATIENT
Start: 2022-10-17 | End: 2023-01-26

## 2022-10-17 RX ORDER — EZETIMIBE 10 MG/1
10 TABLET ORAL DAILY
Qty: 90 TABLET | Refills: 0 | Status: SHIPPED | OUTPATIENT
Start: 2022-10-17 | End: 2023-01-26

## 2022-10-17 RX ORDER — CLOPIDOGREL BISULFATE 75 MG
75 TABLET ORAL DAILY
Qty: 90 TABLET | Refills: 3 | Status: SHIPPED | OUTPATIENT
Start: 2022-10-17 | End: 2023-01-26

## 2022-10-17 NOTE — PROGRESS NOTES
Date of Office Visit: 10/17/2022  Encounter Provider: Shayy Sellers, LUIS ALBERTO, APRN  Place of Service: [facility] CARDIOLOG[facility]  Patient Name: Mannie Fajardo  :1945        Subjective:     Chief Complaint:  Abnormal stress test, coronary artery disease, hyperlipidemia      History of Present Illness:  Mannie Fajardo is a 77 y.o. male patient of Dr. Giordano.  I am seeing this patient in the office today and I reviewed his records.    Patient has a history of hyperlipidemia, abdominal aortic aneurysm, hyper homocystinemia, coronary artery disease, chronic back pain, abnormal stress test.    PER PREVIOUS OFFICE NOTE:  In , after an abnormal stress test, he underwent cardiac catheterization that revealed severe disease of the circumflex artery for which he received a drug-coated stent.  He has not had any intervening events since then, though he has maintained aspirin and Plavix therapy.  In 2016 with complaints of chest pain Lexiscan perfusion study was negative for ischemia.  Stress screening study in  showed a small abdominal aortic aneurysm for his vascular surgeons felt to be stable.  By 2021 had had progressive edema.  An echocardiogram showed an ejection fraction of 62% with mild left ventricular upper atrophy diastolic function was indeterminate.  There is aortic valve calcification without stenosis.  The function was normal. THE FOLLOWING IS MY CONTRIBUTION TO NOTE:     When he was seen in the office 2022 by Dr. Giordano he had chronic dyspnea on exertion that was unchanged.  He had some intermittent left arm discomfort but possibly related to his chronic pain.  He had PVCs on EKG.  Echocardiogram 2022 showed normal LV systolic function with EF of 56%, moderate concentric LVH, grade 1 diastolic dysfunction, mild thickening of the aortic valve, trace mitral regurgitation, trace tricuspid regurgitation with normal RVSP.  Stress perfusion study 2022 showed small  amount of mild ischemia in the distal septum with EF of 61% considered intermediate risk study.  Patient was asymptomatic without chest pain or discomfort.  Given his chronic kidney disease it was decided to treat medically.  His blood pressure was borderline low prohibiting the addition of antianginals.      Patient presents to office today for follow-up appointment.  Patient reports he is doing okay overall since last visit.  Unfortunately he continues to deal with chronic back pain for which she follows with outside provider.  He attributes that to his higher than normal blood pressure reading today but he reports normally at home blood pressure is 120 systolic or lower.  He is feeling well from a cardiac standpoint.  He denies any chest pain or discomfort, shortness of breath, shortness of breath with exertion, palpitations, racing heartbeat sensation, dizziness, syncope, near syncope, or abnormal bleeding.   He is due to have lipids rechecked.        Past Medical History:   Diagnosis Date   • AAA (abdominal aortic aneurysm) without rupture    • Arthritis     back   • Back pain    • Backache    • CAD (coronary atherosclerotic disease)    • Disease of thyroid gland    • Dyslipidemia    • Edema    • History of transfusion    • Hyperhomocystinemia (HCC)    • Hyperlipidemia    • Stage 3 chronic kidney disease (HCC)      Past Surgical History:   Procedure Laterality Date   • APPENDECTOMY     • CARDIAC SURGERY     • CORONARY ANGIOPLASTY WITH STENT PLACEMENT     • EPIDURAL Left 5/25/2022    Procedure: LUMBAR/SACRAL TRANSFORAMINAL EPIDURAL - left L4 and Left S1;  Surgeon: Deepak Mckeon MD;  [facility];  Service: Pain Management;  Laterality: Left;   • EXTRACORPOREAL SHOCKWAVE LITHOTRIPSY (ESWL), STENT INSERTION/REMOVAL Right 2/24/2017    Procedure: CYSTO RETROGRADE WITH STENT PLACEMENT;  Surgeon: Janes López MD;  Location: [facility];  Service:    • MEDIAL BRANCH BLOCK Bilateral 6/23/2022     Procedure: Lumbar medial branch block bilateral L2-L5;  Surgeon: Deepak Mckeon MD;  Location: [facility];  Service: Pain Management;  Laterality: Bilateral;   • MEDIAL BRANCH BLOCK Bilateral 7/12/2022    Procedure: lumbar medial branch block  bilateral L2-L5;  Surgeon: Deepak Mckeon MD;  Location: [facility];  Service: Pain Management;  Laterality: Bilateral;   • RADIOFREQUENCY ABLATION Bilateral 8/9/2022    Procedure: L2-L5 RADIOFREQUENCY ABLATION LUMBAR;  Surgeon: Deepak Mckeon MD;  Location: [facility];  Service: Pain Management;  Laterality: Bilateral;     Outpatient Medications Prior to Visit   Medication Sig Dispense Refill   • allopurinol (ZYLOPRIM) 100 MG tablet 3 tablets.     • aspirin 81 MG chewable tablet Chew 81 mg Daily.     • folic acid (FOLVITE) 400 MCG tablet Take 400 mcg by mouth Daily.     • Synthroid 50 MCG tablet TAKE ONE TABLET BY MOUTH DAILY 90 tablet 0   • vitamin D (ERGOCALCIFEROL) 1.25 MG (17208 UT) capsule capsule Take 1 capsule by mouth 1 (One) Time Per Week. 12 capsule 3   • Lovaza 1 g capsule Take 2 capsules by mouth 2 (Two) Times a Day. Do NOT substitute 360 capsule 3   • Plavix 75 MG tablet Take 1 tablet by mouth Daily. Only give the Brand name Plavix. DO NOT SUBSTITUTE 90 tablet 3   • Zetia 10 MG tablet Take 1 tablet by mouth Daily. 90 tablet 3     No facility-administered medications prior to visit.       Allergies as of 10/17/2022 - Reviewed 10/17/2022   Allergen Reaction Noted   • Statins Other (See Comments) 07/18/2011   • Adhesive tape Other (See Comments) 04/14/2016   • Wound dressing adhesive Other (See Comments) 10/10/2011     Social History     Socioeconomic History   • Marital status: Single   • Number of children: 0   • Years of education: COLLEGE   Tobacco Use   • Smoking status: Never   • Smokeless tobacco: Never   Substance and Sexual Activity   • Alcohol use: No     Comment: occ caffeine use   • Drug use: No   • Sexual activity: Defer  "    Family History   Problem Relation Age of Onset   • Heart disease Mother    • Stroke Father        Review of Systems   Constitutional: Positive for malaise/fatigue (Chronic, unchanged).   Cardiovascular: Negative for chest pain.        SEE HPI   Respiratory: Negative for shortness of breath.    Hematologic/Lymphatic: Negative for bleeding problem.   Gastrointestinal: Negative for melena.   Genitourinary: Negative for hematuria.   Neurological: Negative for dizziness.   Psychiatric/Behavioral: Negative for altered mental status.          Objective:     Vitals:    10/17/22 1127   BP: 140/78   Pulse: 81   Weight: 71.2 kg (157 lb)   Height: 172.7 cm (68\")     Body mass index is 23.87 kg/m².      PHYSICAL EXAM:  Constitutional:       General: Not in acute distress.     Appearance: Well-developed. Not diaphoretic.   Eyes:      Pupils: Pupils are equal, round, and reactive to light.   HENT:      Head: Normocephalic and atraumatic.   Pulmonary:      Effort: Pulmonary effort is normal. No respiratory distress.      Breath sounds: Normal breath sounds. No wheezing. No rales.   Cardiovascular:      Normal rate. Regular rhythm.      Murmurs: There is no murmur.      No gallop. No click. No rub.   Edema:     Peripheral edema absent.   Abdominal:      General: Bowel sounds are normal.      Palpations: Abdomen is soft.   Musculoskeletal: Normal range of motion.         General: No tenderness or deformity. Skin:     General: Skin is warm and dry.      Findings: No erythema or rash.   Neurological:      Mental Status: Alert and oriented to person, place, and time.   Psychiatric:         Behavior: Behavior normal.         Judgment: Judgment normal.             ECG 12 Lead    Date/Time: 10/17/2022 1:07 PM  Performed by: Shayy Sellers DNP, APRN  Authorized by: Shayy Sellers DNP, CONSUELO   Comparison: compared with previous ECG from 10/8/2019  Rhythm: sinus rhythm  BPM: 81  Comments: No significant changes from previous " EKG              Assessment:       Diagnosis Plan   1. Coronary artery disease involving native coronary artery of native heart without angina pectoris        2. Hypercholesterolemia              Plan:     1. Coronary artery disease: With history of drug-eluting stent placement.  Recent stress test 5/2022 showed small amount of mild ischemia in the distal septum.  Medical management was recommended given chronic kidney disease and lack of symptoms/chest pain.  Patient remains on aspirin, Plavix, Zetia, Lovaza.  History of significant statin intolerance.  We will recheck lipid panel.  Patient continues to deny any chest pain or anginal symptoms.  2. Dyslipidemia: Recent CMP 8/2022 showed normal AST and ALT.  Recheck lipid panel.  3. History of PVCs: None present on EKG today.  Asymptomatic.  4. Chronic kidney disease: Follows with PCP.    5. Chronic back pain: Follows with rheumatology  6. Hyper homocystinemia: On folic acid  7. Leg pain: With normal ABIs 10/2020  8. Carotid artery plaque: Follows with vascular surgery and felt to be stable, per patient    Patient to keep April follow-up with Dr. Giordano as scheduled or follow-up sooner if needed for any new, recurrent, or worsening symptoms or concerns.  Discussed in detail signs/symptoms that warrant sooner call or follow-up to the office or ER visit.             Your medication list          Accurate as of October 17, 2022  1:13 PM. If you have any questions, ask your nurse or doctor.            CHANGE how you take these medications      Instructions Last Dose Given Next Dose Due   Lovaza 1 g capsule  Generic drug: omega-3 acid ethyl esters  What changed: additional instructions  Changed by: Shayy Sellers DNP, APRN      Take 2 capsules by mouth 2 (Two) Times a Day. BRAND NAME ONLY, Do NOT substitute       Zetia 10 MG tablet  Generic drug: ezetimibe  What changed: additional instructions  Changed by: Shayy Sellers DNP, APRN      Take 1 tablet by mouth Daily.  BRAND NAME ONLY, DO NOT SUBSTITUTE          CONTINUE taking these medications      Instructions Last Dose Given Next Dose Due   allopurinol 100 MG tablet  Commonly known as: ZYLOPRIM      3 tablets.       aspirin 81 MG chewable tablet      Chew 81 mg Daily.       folic acid 400 MCG tablet  Commonly known as: FOLVITE      Take 400 mcg by mouth Daily.       Plavix 75 MG tablet  Generic drug: clopidogrel      Take 1 tablet by mouth Daily. Only give the Brand name Plavix. DO NOT SUBSTITUTE       Synthroid 50 MCG tablet  Generic drug: levothyroxine      TAKE ONE TABLET BY MOUTH DAILY       vitamin D 1.25 MG (07567 UT) capsule capsule  Commonly known as: ERGOCALCIFEROL      Take 1 capsule by mouth 1 (One) Time Per Week.             Where to Get Your Medications      These medications were sent to [facility] [id number] - [address] AT [facility] - [phone]  - [phone] [place] [address]    Phone: [phone]   · Lovaza 1 g capsule  · Plavix 75 MG tablet  · Zetia 10 MG tablet         The above medication changes may not have been made by this provider.  Patient's medication list was updated to reflect medications they are currently taking including medication changes made by other providers.            Thanks,    Shayy Sellers, DNP, APRN  10/17/2022             Dictated utilizing Dragon dictation

## 2022-11-07 ENCOUNTER — OFFICE VISIT (OUTPATIENT)
Dept: PAIN MEDICINE | Facility: CLINIC | Age: 77
End: 2022-11-07

## 2022-11-07 ENCOUNTER — PREP FOR SURGERY (OUTPATIENT)
Dept: SURGERY | Facility: SURGERY CENTER | Age: 77
End: 2022-11-07

## 2022-11-07 VITALS
TEMPERATURE: 98 F | RESPIRATION RATE: 20 BRPM | OXYGEN SATURATION: 97 % | DIASTOLIC BLOOD PRESSURE: 65 MMHG | WEIGHT: 159 LBS | BODY MASS INDEX: 24.1 KG/M2 | HEIGHT: 68 IN | SYSTOLIC BLOOD PRESSURE: 134 MMHG

## 2022-11-07 DIAGNOSIS — M54.42 CHRONIC LEFT-SIDED LOW BACK PAIN WITH LEFT-SIDED SCIATICA: ICD-10-CM

## 2022-11-07 DIAGNOSIS — M54.50 CHRONIC BILATERAL LOW BACK PAIN, UNSPECIFIED WHETHER SCIATICA PRESENT: ICD-10-CM

## 2022-11-07 DIAGNOSIS — G89.29 CHRONIC LEFT-SIDED LOW BACK PAIN WITH LEFT-SIDED SCIATICA: ICD-10-CM

## 2022-11-07 DIAGNOSIS — M54.16 LEFT LUMBAR RADICULOPATHY: Primary | ICD-10-CM

## 2022-11-07 DIAGNOSIS — G89.29 CHRONIC BILATERAL LOW BACK PAIN, UNSPECIFIED WHETHER SCIATICA PRESENT: ICD-10-CM

## 2022-11-07 DIAGNOSIS — M54.17 LUMBOSACRAL RADICULOPATHY: ICD-10-CM

## 2022-11-07 DIAGNOSIS — G89.4 CHRONIC PAIN SYNDROME: Primary | ICD-10-CM

## 2022-11-07 PROCEDURE — 99214 OFFICE O/P EST MOD 30 MIN: CPT | Performed by: ANESTHESIOLOGY

## 2022-11-07 NOTE — H&P (VIEW-ONLY)
CHIEF COMPLAINT  F/u back pain. Pt sts pain has worsened since last ov.     Subjective   Mannie Fajardo is a 77 y.o. male  who presents for follow-up.  He has a history of lumbar and lumbosacral area pain.    On August 9, 2022 he had a bilateral L3 and L4 and L5 lumbar medial branch radiofrequency ablation.  He did not realize any relief from the procedure.    He is having a flare of pain into the left leg.  He has longstanding history of a left leg radiculopathy.  This includes radicular pain and also left leg weakness.    Back Pain  This is a chronic problem. The current episode started more than 1 year ago. The problem occurs daily. The problem is unchanged. The pain is present in the lumbar spine. The quality of the pain is described as aching and burning. The pain radiates to the left thigh. The pain is severe. The symptoms are aggravated by standing, sitting and lying down (prolonged standing, sititng). Pertinent negatives include no abdominal pain, chest pain, dysuria, fever, headaches, numbness or weakness. He has tried heat, home exercises and ice (laying on side helps.  Poor NSAID candidate because of the need for Plavix) for the symptoms. The treatment provided no relief.        PEG Assessment   What number best describes your pain on average in the past week?8  What number best describes how, during the past week, pain has interfered with your enjoyment of life?8  What number best describes how, during the past week, pain has interfered with your general activity?  8    .--  The aforementioned information the Chief Complaint section and above subjective data including any HPI data, and also the Review of Systems data, has been personally reviewed and affirmed.  --        Review of Pertinent Medical Data   -----    ANANT-mac report is reviewed:  I reviewed the document in the electronic form under the PDMP tab in the Epic EMR...  - In this function, the report is a current report in as close to real-time  as possible.  - The report was available for immediate review.    - I did trudy the report as reviewed.  - There is not concern for aberrant behavior based on this ekasper review.      I reviewed the result once again on the March 18, 2022 EMG nerve conduction study.  There is some evidence of left S1 radiculopathy.  He had some mild decrease in left peroneal amplitudes and also some reinnervation changes of the left gastrocnemius.    He has a history of some mild degenerative disc disease from in August 2021 myelogram.  I reviewed the results of his August 19, 2021 post myelogram CT scan of the lumbar spine.    I reviewed note from [facility] [place] from September 28, 2022.  It was with CONSUELO Flores.  The evaluation was for left lower extremity weakness.  Its been going on for several years.  No known injuries.  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  Plan was referral to Dr. Beto Brizuela.  Patient again was also inquiring about the intercept procedure.    He had a CBC on August 9, 2022 with platelet count 222,000.      ----      The following portions of the patient's history were reviewed and updated as appropriate: allergies, current medications, past family history, past medical history, past social history, past surgical history and problem list.    -------    The following portions of the patient's history were reviewed and updated as appropriate: allergies, current medications, past family history, past medical history, past social history, past surgical history and problem list.    Allergies   Allergen Reactions   • Statins Other (See Comments)     Muscle weakness in legs and arms one time only  Muscle weakness in legs and arms one time only  Other reaction(s): Other (See Comments)  Muscle weakness in legs and arms one time only    Muscle weakness in legs and arms one time only   • Adhesive Tape Other (See Comments)     Severe burn one time when left on too long   • Wound  Dressing Adhesive Other (See Comments)     Severe burn one time when left on too long         Current Outpatient Medications:   •  allopurinol (ZYLOPRIM) 100 MG tablet, 3 tablets., Disp: , Rfl:   •  aspirin 81 MG chewable tablet, Chew 81 mg Daily., Disp: , Rfl:   •  folic acid (FOLVITE) 400 MCG tablet, Take 400 mcg by mouth Daily., Disp: , Rfl:   •  Lovaza 1 g capsule, Take 2 capsules by mouth 2 (Two) Times a Day. BRAND NAME ONLY, Do NOT substitute, Disp: 360 capsule, Rfl: 0  •  Plavix 75 MG tablet, Take 1 tablet by mouth Daily. Only give the Brand name Plavix. DO NOT SUBSTITUTE, Disp: 90 tablet, Rfl: 3  •  Synthroid 50 MCG tablet, TAKE ONE TABLET BY MOUTH DAILY, Disp: 90 tablet, Rfl: 0  •  vitamin D (ERGOCALCIFEROL) 1.25 MG (73616 UT) capsule capsule, Take 1 capsule by mouth 1 (One) Time Per Week., Disp: 12 capsule, Rfl: 3  •  Zetia 10 MG tablet, Take 1 tablet by mouth Daily. BRAND NAME ONLY, DO NOT SUBSTITUTE, Disp: 90 tablet, Rfl: 0    Current Outpatient Medications on File Prior to Visit   Medication Sig Dispense Refill   • allopurinol (ZYLOPRIM) 100 MG tablet 3 tablets.     • aspirin 81 MG chewable tablet Chew 81 mg Daily.     • folic acid (FOLVITE) 400 MCG tablet Take 400 mcg by mouth Daily.     • Lovaza 1 g capsule Take 2 capsules by mouth 2 (Two) Times a Day. BRAND NAME ONLY, Do NOT substitute 360 capsule 0   • Plavix 75 MG tablet Take 1 tablet by mouth Daily. Only give the Brand name Plavix. DO NOT SUBSTITUTE 90 tablet 3   • Synthroid 50 MCG tablet TAKE ONE TABLET BY MOUTH DAILY 90 tablet 0   • vitamin D (ERGOCALCIFEROL) 1.25 MG (09710 UT) capsule capsule Take 1 capsule by mouth 1 (One) Time Per Week. 12 capsule 3   • Zetia 10 MG tablet Take 1 tablet by mouth Daily. BRAND NAME ONLY, DO NOT SUBSTITUTE 90 tablet 0     No current facility-administered medications on file prior to visit.       Patient Active Problem List   Diagnosis   • Abdominal aortic aneurysm (HCC)   • Hypercholesterolemia   • Coronary  artery disease due to calcified coronary lesion   • Chronic left-sided low back pain with left-sided sciatica   • Chronic midline low back pain without sciatica   • Degeneration of lumbar intervertebral disc   • Weakness of left leg   • Stage 3 chronic kidney disease (HCC)   • Opioid withdrawal (HCC)   • Coronary artery disease involving native coronary artery of native heart without angina pectoris   • Other specified symptoms and signs involving the circulatory and respiratory systems    • Edema leg   • Foot pain   • Atherosclerosis of coronary artery   • Vitamin D deficiency   • Seborrheic dermatitis   • Rash   • Primary lateral sclerosis (HCC)   • Personal history of pneumonia (recurrent)   • Personal history of other drug therapy   • Osteopenia   • Myelopathy (HCC)   • Muscle weakness of extremity   • Melanocytic nevus   • Insomnia   • Inguinal hernia   • Hypothyroidism   • History of osteopenia   • Gout   • Benign prostatic hyperplasia   • At risk for osteoporosis   • Anemia   • Acquired atrophy of thyroid   • Lumbosacral radiculopathy   • Lumbar facet arthropathy   • Disorder of bone and cartilage       Past Medical History:   Diagnosis Date   • AAA (abdominal aortic aneurysm) without rupture    • Arthritis     back   • Back pain    • Backache    • CAD (coronary atherosclerotic disease)    • Disease of thyroid gland    • Dyslipidemia    • Edema    • History of transfusion    • Hyperhomocystinemia (HCC)    • Hyperlipidemia    • Stage 3 chronic kidney disease (HCC)        Past Surgical History:   Procedure Laterality Date   • APPENDECTOMY     • CARDIAC SURGERY     • CORONARY ANGIOPLASTY WITH STENT PLACEMENT     • EPIDURAL Left 5/25/2022    Procedure: LUMBAR/SACRAL TRANSFORAMINAL EPIDURAL - left L4 and Left S1;  Surgeon: Deepak Mckeon MD;  Location: [facility];  Service: Pain Management;  Laterality: Left;   • EXTRACORPOREAL SHOCKWAVE LITHOTRIPSY (ESWL), STENT INSERTION/REMOVAL Right 2/24/2017     Procedure: CYSTO RETROGRADE WITH STENT PLACEMENT;  Surgeon: Janes López MD;  Location:  [facility];  Service:    • MEDIAL BRANCH BLOCK Bilateral 6/23/2022    Procedure: Lumbar medial branch block bilateral L2-L5;  Surgeon: Deepak Mckeon MD;  Location: [facility] OR;  Service: Pain Management;  Laterality: Bilateral;   • MEDIAL BRANCH BLOCK Bilateral 7/12/2022    Procedure: lumbar medial branch block  bilateral L2-L5;  Surgeon: Deepak Mckeon MD;  Location: [facility];  Service: Pain Management;  Laterality: Bilateral;   • RADIOFREQUENCY ABLATION Bilateral 8/9/2022    Procedure: L2-L5 RADIOFREQUENCY ABLATION LUMBAR;  Surgeon: Deepak Mckeon MD;  Location: [facility];  Service: Pain Management;  Laterality: Bilateral;       Family History   Problem Relation Age of Onset   • Heart disease Mother    • Stroke Father        Social History     Socioeconomic History   • Marital status: Single   • Number of children: 0   • Years of education: COLLEGE   Tobacco Use   • Smoking status: Never   • Smokeless tobacco: Never   Substance and Sexual Activity   • Alcohol use: No     Comment: occ caffeine use   • Drug use: No   • Sexual activity: Defer       -------        Review of Systems   Constitutional: Positive for activity change (dec) and fatigue (occ). Negative for fever.   HENT: Negative for congestion.    Eyes: Negative for visual disturbance.   Respiratory: Negative for cough and shortness of breath.    Cardiovascular: Negative for chest pain.   Gastrointestinal: Negative for abdominal pain, constipation and diarrhea.   Genitourinary: Negative for difficulty urinating and dysuria.   Musculoskeletal: Positive for back pain.   Neurological: Negative for dizziness, weakness, light-headedness, numbness and headaches.   Psychiatric/Behavioral: Negative for agitation, sleep disturbance and suicidal ideas. The patient is not nervous/anxious.        Vitals:    11/07/22 1020   BP: 134/65   Resp: 20  "  Temp: 98 °F (36.7 °C)   SpO2: 97%   Weight: 72.1 kg (159 lb)   Height: 172.7 cm (68\")   PainSc:   8   PainLoc: Back         Objective   Physical Exam  Constitutional:       Appearance: Normal appearance.   HENT:      Head: Normocephalic.   Cardiovascular:      Rate and Rhythm: Normal rate and regular rhythm.   Pulmonary:      Effort: Pulmonary effort is normal.      Breath sounds: Normal breath sounds.   Musculoskeletal:      Cervical back: Normal range of motion.      Lumbar back: Tenderness present. Decreased range of motion (kyphotic posture). Positive left straight leg raise test. Negative right straight leg raise test.      Comments: + lumbar facet loading/tenderness   Skin:     General: Skin is warm and dry.      Capillary Refill: Capillary refill takes less than 2 seconds.   Neurological:      General: No focal deficit present.      Mental Status: He is alert and oriented to person, place, and time.      Motor: Weakness (diffuse in LLE) present.      Gait: Gait abnormal (slowed).   Psychiatric:         Mood and Affect: Mood normal.             Assessment & Plan   Diagnoses and all orders for this visit:    1. Chronic pain syndrome (Primary)    2. Lumbosacral radiculopathy  -     Ambulatory Referral to Neurology    3. Chronic left-sided low back pain with left-sided sciatica    4. Chronic bilateral low back pain, unspecified whether sciatica present  -     Ambulatory Referral to Pain Management      77-year-old gentleman who has back pain and unfortunately is having a flare of some left leg sciatica symptoms.  He has a history of left lumbar/lumbosacral radiculopathy.      --- Follow-up for procedure..... LUMBAR EPIDURAL steroid injection, likely L5S1, likely left paramedian approach  --- Patient request referral to [facility] to request consult re: Intracept procedure  --- patient planning to follow up with Dr. Brizuela @ [facility]; he had previously seen Dr. Brizuela " and as this referral does not appear to have been processed through the [facility] office I will go ahead and place it to my colleague in our medical group.         SHANNAN REPORT  SHANNAN report has been reviewed and scanned into the patient's chart.  Date of last SHANNAN : as above.  No current use of opioids.           Dictated utilizing Dragon dictation.     This document is intended for medical expert use only. Reading of this document by patients and/or patient's family without participating medical staff guidance may result in misinterpretation and unintended morbidity.   Any interpretation of such data is the responsibility of the patient and/or family member responsible for the patient in concert with their primary or specialist providers, not to be left for sources of online searches such as [employer], [employer] or similar queries. Relying on these approaches to knowledge may result in misinterpretation, misguided goals of care and even death should patients or family members try recommendations outside of the realm of professional medical care in a supervised way.    Patient remained masked during entire encounter. No cough present. I donned a mask and eye protection throughout entire visit. Prior to donning mask and eye protection, hand hygiene was performed, as well as when it was doffed.  I was closer than 6 feet, but not for an extended period of time. No obvious exposure to any bodily fluids.    --      Vitals:    11/07/22 1020   PainSc:   8   PainLoc: Back          Mannie Fajardo reports a pain score of 8.  Given his pain assessment as noted, treatment options were discussed and the following options were decided upon as a follow-up plan to address the patient's pain: referral to specialist for assistance in pain treatment guidance.

## 2022-11-07 NOTE — PROGRESS NOTES
CHIEF COMPLAINT  F/u back pain. Pt sts pain has worsened since last ov.     Subjective   Mannie Fajardo is a 77 y.o. male  who presents for follow-up.  He has a history of lumbar and lumbosacral area pain.    On August 9, 2022 he had a bilateral L3 and L4 and L5 lumbar medial branch radiofrequency ablation.  He did not realize any relief from the procedure.    He is having a flare of pain into the left leg.  He has longstanding history of a left leg radiculopathy.  This includes radicular pain and also left leg weakness.    Back Pain  This is a chronic problem. The current episode started more than 1 year ago. The problem occurs daily. The problem is unchanged. The pain is present in the lumbar spine. The quality of the pain is described as aching and burning. The pain radiates to the left thigh. The pain is severe. The symptoms are aggravated by standing, sitting and lying down (prolonged standing, sititng). Pertinent negatives include no abdominal pain, chest pain, dysuria, fever, headaches, numbness or weakness. He has tried heat, home exercises and ice (laying on side helps.  Poor NSAID candidate because of the need for Plavix) for the symptoms. The treatment provided no relief.        PEG Assessment   What number best describes your pain on average in the past week?8  What number best describes how, during the past week, pain has interfered with your enjoyment of life?8  What number best describes how, during the past week, pain has interfered with your general activity?  8    .--  The aforementioned information the Chief Complaint section and above subjective data including any HPI data, and also the Review of Systems data, has been personally reviewed and affirmed.  --        Review of Pertinent Medical Data   -----    ANANT-mac report is reviewed:  I reviewed the document in the electronic form under the PDMP tab in the Epic EMR...  - In this function, the report is a current report in as close to real-time  as possible.  - The report was available for immediate review.    - I did trudy the report as reviewed.  - There is not concern for aberrant behavior based on this ekasper review.      I reviewed the result once again on the March 18, 2022 EMG nerve conduction study.  There is some evidence of left S1 radiculopathy.  He had some mild decrease in left peroneal amplitudes and also some reinnervation changes of the left gastrocnemius.    He has a history of some mild degenerative disc disease from in August 2021 myelogram.  I reviewed the results of his August 19, 2021 post myelogram CT scan of the lumbar spine.    I reviewed note from [facility] [place] from September 28, 2022.  It was with CONSUELO Flores.  The evaluation was for left lower extremity weakness.  Its been going on for several years.  No known injuries.  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  Plan was referral to Dr. Beto Brizuela.  Patient again was also inquiring about the intercept procedure.    He had a CBC on August 9, 2022 with platelet count 222,000.      ----      The following portions of the patient's history were reviewed and updated as appropriate: allergies, current medications, past family history, past medical history, past social history, past surgical history and problem list.    -------    The following portions of the patient's history were reviewed and updated as appropriate: allergies, current medications, past family history, past medical history, past social history, past surgical history and problem list.    Allergies   Allergen Reactions   • Statins Other (See Comments)     Muscle weakness in legs and arms one time only  Muscle weakness in legs and arms one time only  Other reaction(s): Other (See Comments)  Muscle weakness in legs and arms one time only    Muscle weakness in legs and arms one time only   • Adhesive Tape Other (See Comments)     Severe burn one time when left on too long   • Wound  Dressing Adhesive Other (See Comments)     Severe burn one time when left on too long         Current Outpatient Medications:   •  allopurinol (ZYLOPRIM) 100 MG tablet, 3 tablets., Disp: , Rfl:   •  aspirin 81 MG chewable tablet, Chew 81 mg Daily., Disp: , Rfl:   •  folic acid (FOLVITE) 400 MCG tablet, Take 400 mcg by mouth Daily., Disp: , Rfl:   •  Lovaza 1 g capsule, Take 2 capsules by mouth 2 (Two) Times a Day. BRAND NAME ONLY, Do NOT substitute, Disp: 360 capsule, Rfl: 0  •  Plavix 75 MG tablet, Take 1 tablet by mouth Daily. Only give the Brand name Plavix. DO NOT SUBSTITUTE, Disp: 90 tablet, Rfl: 3  •  Synthroid 50 MCG tablet, TAKE ONE TABLET BY MOUTH DAILY, Disp: 90 tablet, Rfl: 0  •  vitamin D (ERGOCALCIFEROL) 1.25 MG (79181 UT) capsule capsule, Take 1 capsule by mouth 1 (One) Time Per Week., Disp: 12 capsule, Rfl: 3  •  Zetia 10 MG tablet, Take 1 tablet by mouth Daily. BRAND NAME ONLY, DO NOT SUBSTITUTE, Disp: 90 tablet, Rfl: 0    Current Outpatient Medications on File Prior to Visit   Medication Sig Dispense Refill   • allopurinol (ZYLOPRIM) 100 MG tablet 3 tablets.     • aspirin 81 MG chewable tablet Chew 81 mg Daily.     • folic acid (FOLVITE) 400 MCG tablet Take 400 mcg by mouth Daily.     • Lovaza 1 g capsule Take 2 capsules by mouth 2 (Two) Times a Day. BRAND NAME ONLY, Do NOT substitute 360 capsule 0   • Plavix 75 MG tablet Take 1 tablet by mouth Daily. Only give the Brand name Plavix. DO NOT SUBSTITUTE 90 tablet 3   • Synthroid 50 MCG tablet TAKE ONE TABLET BY MOUTH DAILY 90 tablet 0   • vitamin D (ERGOCALCIFEROL) 1.25 MG (39645 UT) capsule capsule Take 1 capsule by mouth 1 (One) Time Per Week. 12 capsule 3   • Zetia 10 MG tablet Take 1 tablet by mouth Daily. BRAND NAME ONLY, DO NOT SUBSTITUTE 90 tablet 0     No current facility-administered medications on file prior to visit.       Patient Active Problem List   Diagnosis   • Abdominal aortic aneurysm (HCC)   • Hypercholesterolemia   • Coronary  artery disease due to calcified coronary lesion   • Chronic left-sided low back pain with left-sided sciatica   • Chronic midline low back pain without sciatica   • Degeneration of lumbar intervertebral disc   • Weakness of left leg   • Stage 3 chronic kidney disease (HCC)   • Opioid withdrawal (HCC)   • Coronary artery disease involving native coronary artery of native heart without angina pectoris   • Other specified symptoms and signs involving the circulatory and respiratory systems    • Edema leg   • Foot pain   • Atherosclerosis of coronary artery   • Vitamin D deficiency   • Seborrheic dermatitis   • Rash   • Primary lateral sclerosis (HCC)   • Personal history of pneumonia (recurrent)   • Personal history of other drug therapy   • Osteopenia   • Myelopathy (HCC)   • Muscle weakness of extremity   • Melanocytic nevus   • Insomnia   • Inguinal hernia   • Hypothyroidism   • History of osteopenia   • Gout   • Benign prostatic hyperplasia   • At risk for osteoporosis   • Anemia   • Acquired atrophy of thyroid   • Lumbosacral radiculopathy   • Lumbar facet arthropathy   • Disorder of bone and cartilage       Past Medical History:   Diagnosis Date   • AAA (abdominal aortic aneurysm) without rupture    • Arthritis     back   • Back pain    • Backache    • CAD (coronary atherosclerotic disease)    • Disease of thyroid gland    • Dyslipidemia    • Edema    • History of transfusion    • Hyperhomocystinemia (HCC)    • Hyperlipidemia    • Stage 3 chronic kidney disease (HCC)        Past Surgical History:   Procedure Laterality Date   • APPENDECTOMY     • CARDIAC SURGERY     • CORONARY ANGIOPLASTY WITH STENT PLACEMENT     • EPIDURAL Left 5/25/2022    Procedure: LUMBAR/SACRAL TRANSFORAMINAL EPIDURAL - left L4 and Left S1;  Surgeon: Deepak Mckeon MD;  Location: [facility];  Service: Pain Management;  Laterality: Left;   • EXTRACORPOREAL SHOCKWAVE LITHOTRIPSY (ESWL), STENT INSERTION/REMOVAL Right 2/24/2017     Procedure: CYSTO RETROGRADE WITH STENT PLACEMENT;  Surgeon: Janes López MD;  Location:  [facility];  Service:    • MEDIAL BRANCH BLOCK Bilateral 6/23/2022    Procedure: Lumbar medial branch block bilateral L2-L5;  Surgeon: Deepak Mckeon MD;  [facility]: [facility];  Service: Pain Management;  Laterality: Bilateral;   • MEDIAL BRANCH BLOCK Bilateral 7/12/2022    Procedure: lumbar medial branch block  bilateral L2-L5;  Surgeon: Deepak Mckeon MD;  Location: [facility];  Service: Pain Management;  Laterality: Bilateral;   • RADIOFREQUENCY ABLATION Bilateral 8/9/2022    Procedure: L2-L5 RADIOFREQUENCY ABLATION LUMBAR;  Surgeon: Deepak Mckeon MD;  Location: [facility];  Service: Pain Management;  Laterality: Bilateral;       Family History   Problem Relation Age of Onset   • Heart disease Mother    • Stroke Father        Social History     Socioeconomic History   • Marital status: Single   • Number of children: 0   • Years of education: COLLEGE   Tobacco Use   • Smoking status: Never   • Smokeless tobacco: Never   Substance and Sexual Activity   • Alcohol use: No     Comment: occ caffeine use   • Drug use: No   • Sexual activity: Defer       -------        Review of Systems   Constitutional: Positive for activity change (dec) and fatigue (occ). Negative for fever.   HENT: Negative for congestion.    Eyes: Negative for visual disturbance.   Respiratory: Negative for cough and shortness of breath.    Cardiovascular: Negative for chest pain.   Gastrointestinal: Negative for abdominal pain, constipation and diarrhea.   Genitourinary: Negative for difficulty urinating and dysuria.   Musculoskeletal: Positive for back pain.   Neurological: Negative for dizziness, weakness, light-headedness, numbness and headaches.   Psychiatric/Behavioral: Negative for agitation, sleep disturbance and suicidal ideas. The patient is not nervous/anxious.        Vitals:    11/07/22 1020   BP: 134/65   Resp: 20  "  Temp: 98 °F (36.7 °C)   SpO2: 97%   Weight: 72.1 kg (159 lb)   Height: 172.7 cm (68\")   PainSc:   8   PainLoc: Back         Objective   Physical Exam  Constitutional:       Appearance: Normal appearance.   HENT:      Head: Normocephalic.   Cardiovascular:      Rate and Rhythm: Normal rate and regular rhythm.   Pulmonary:      Effort: Pulmonary effort is normal.      Breath sounds: Normal breath sounds.   Musculoskeletal:      Cervical back: Normal range of motion.      Lumbar back: Tenderness present. Decreased range of motion (kyphotic posture). Positive left straight leg raise test. Negative right straight leg raise test.      Comments: + lumbar facet loading/tenderness   Skin:     General: Skin is warm and dry.      Capillary Refill: Capillary refill takes less than 2 seconds.   Neurological:      General: No focal deficit present.      Mental Status: He is alert and oriented to person, place, and time.      Motor: Weakness (diffuse in LLE) present.      Gait: Gait abnormal (slowed).   Psychiatric:         Mood and Affect: Mood normal.             Assessment & Plan   Diagnoses and all orders for this visit:    1. Chronic pain syndrome (Primary)    2. Lumbosacral radiculopathy  -     Ambulatory Referral to Neurology    3. Chronic left-sided low back pain with left-sided sciatica    4. Chronic bilateral low back pain, unspecified whether sciatica present  -     Ambulatory Referral to Pain Management      77-year-old gentleman who has back pain and unfortunately is having a flare of some left leg sciatica symptoms.  He has a history of left lumbar/lumbosacral radiculopathy.      --- Follow-up for procedure..... LUMBAR EPIDURAL steroid injection, likely L5S1, likely left paramedian approach  --- Patient request referral to [facility] to request consult re: Intracept procedure  --- patient planning to follow up with Dr. Brizuela @ [facility]; he had previously seen Dr. Brizuela " and as this referral does not appear to have been processed through the [facility] office I will go ahead and place it to my colleague in our medical group.         SHANNAN REPORT  SHANNAN report has been reviewed and scanned into the patient's chart.  Date of last SHANNAN : as above.  No current use of opioids.           Dictated utilizing Dragon dictation.     This document is intended for medical expert use only. Reading of this document by patients and/or patient's family without participating medical staff guidance may result in misinterpretation and unintended morbidity.   Any interpretation of such data is the responsibility of the patient and/or family member responsible for the patient in concert with their primary or specialist providers, not to be left for sources of online searches such as [employer], [employer] or similar queries. Relying on these approaches to knowledge may result in misinterpretation, misguided goals of care and even death should patients or family members try recommendations outside of the realm of professional medical care in a supervised way.    Patient remained masked during entire encounter. No cough present. I donned a mask and eye protection throughout entire visit. Prior to donning mask and eye protection, hand hygiene was performed, as well as when it was doffed.  I was closer than 6 feet, but not for an extended period of time. No obvious exposure to any bodily fluids.    --      Vitals:    11/07/22 1020   PainSc:   8   PainLoc: Back          Mannie Fajardo reports a pain score of 8.  Given his pain assessment as noted, treatment options were discussed and the following options were decided upon as a follow-up plan to address the patient's pain: referral to specialist for assistance in pain treatment guidance.

## 2022-11-09 ENCOUNTER — TRANSCRIBE ORDERS (OUTPATIENT)
Dept: SURGERY | Facility: SURGERY CENTER | Age: 77
End: 2022-11-09

## 2022-11-09 ENCOUNTER — PREP FOR SURGERY (OUTPATIENT)
Dept: SURGERY | Facility: SURGERY CENTER | Age: 77
End: 2022-11-09

## 2022-11-09 DIAGNOSIS — M54.16 LEFT LUMBAR RADICULOPATHY: Primary | ICD-10-CM

## 2022-11-09 DIAGNOSIS — Z41.9 SURGERY, ELECTIVE: Primary | ICD-10-CM

## 2022-11-09 RX ORDER — SODIUM CHLORIDE 0.9 % (FLUSH) 0.9 %
10 SYRINGE (ML) INJECTION AS NEEDED
Status: CANCELLED | OUTPATIENT
Start: 2022-11-09

## 2022-11-09 RX ORDER — SODIUM CHLORIDE 0.9 % (FLUSH) 0.9 %
10 SYRINGE (ML) INJECTION EVERY 12 HOURS SCHEDULED
Status: CANCELLED | OUTPATIENT
Start: 2022-11-09

## 2022-11-15 NOTE — DISCHARGE INSTRUCTIONS
[facility] - Post-procedure Instructions          --  While there are no absolute restrictions, it is recommended that you do not perform strenuous activity today. In the morning, you may resume your level of activity as before your block.    --  If you have a band-aid at your injection site, please remove it later today. Observe the area for any redness, swelling, pus-like drainage, or a temperature over 101°. If any of these symptoms occur, please call your doctor at [phone]. If after office hours, leave a message and the on-call provider will return your call.    --  Ice may be applied to your injection site. It is recommended you avoid direct heat (heating pad; hot tub) for 1-2 days.    --  Call [facility] at [phone] if you experience persistent headache, persistent bleeding from the injection site, or severe pain not relieved by heat or oral medication.    --  Do not make important decisions today.    --  Due to the effects of the block and/or the I.V. Sedation, DO NOT drive or operate hazardous machinery for 12 hours.  Local anesthetics may cause numbness after procedure and precautions must be taken with regards to operating equipment as well as with walking, even if ambulating with assistance of another person or with an assistive device.    --  Do not drink alcohol for 12 hours.    -- You may return to work tomorrow, or as directed by your referring doctor.    --  Occasionally you may notice a slight increase in your pain after the procedure. This should start to improve within the next 24-48 hours. Radiofrequency ablation procedure pain may last 3-4 weeks.    --  It may take as long as 3-4 days before you notice a gradual improvement in your pain and/or other symptoms.    -- You may continue to take your prescribed pain medication as needed.    --  Some normal possible side effects of steroid use could include fluid retention, increased blood sugar, dull headache,  increased sweating, increased appetite, mood swings and flushing.    --  Diabetics are recommended to watch their blood glucose level closely for 24-48 hours after the injection.    --  Must stay in PACU for 20 min upon arrival and prove no leg weakness before being discharged.    --  IN THE EVENT OF A LIFE THREATENING EMERGENCY, (CHEST PAIN, BREATHING DIFFICULTIES, PARALYSIS…) YOU SHOULD GO TO YOUR NEAREST EMERGENCY ROOM.    --  You should be contacted by our office within 2-3 days to schedule follow up or next appointment date.  If not contacted within 7 days, please call the office at [phone]    Resume plavix in 24 hours.

## 2022-11-16 ENCOUNTER — HOSPITAL ENCOUNTER (OUTPATIENT)
Dept: GENERAL RADIOLOGY | Facility: SURGERY CENTER | Age: 77
Setting detail: HOSPITAL OUTPATIENT SURGERY
End: 2022-11-16

## 2022-11-16 ENCOUNTER — HOSPITAL ENCOUNTER (OUTPATIENT)
Facility: SURGERY CENTER | Age: 77
Setting detail: HOSPITAL OUTPATIENT SURGERY
Discharge: HOME | End: 2022-11-16
Attending: ANESTHESIOLOGY | Admitting: ANESTHESIOLOGY

## 2022-11-16 VITALS
OXYGEN SATURATION: 98 % | BODY MASS INDEX: 24.1 KG/M2 | RESPIRATION RATE: 18 BRPM | HEART RATE: 80 BPM | SYSTOLIC BLOOD PRESSURE: 141 MMHG | DIASTOLIC BLOOD PRESSURE: 76 MMHG | HEIGHT: 68 IN | TEMPERATURE: 98.2 F | WEIGHT: 159 LBS

## 2022-11-16 DIAGNOSIS — Z41.9 SURGERY, ELECTIVE: ICD-10-CM

## 2022-11-16 PROCEDURE — 62323 NJX INTERLAMINAR LMBR/SAC: CPT | Performed by: ANESTHESIOLOGY

## 2022-11-16 PROCEDURE — 77002 NEEDLE LOCALIZATION BY XRAY: CPT

## 2022-11-16 PROCEDURE — 25010000002 IOPAMIDOL 61 % SOLUTION: Performed by: ANESTHESIOLOGY

## 2022-11-16 PROCEDURE — 76000 FLUOROSCOPY <1 HR PHYS/QHP: CPT

## 2022-11-16 RX ORDER — SODIUM CHLORIDE 0.9 % (FLUSH) 0.9 %
10 SYRINGE (ML) INJECTION EVERY 12 HOURS SCHEDULED
Status: DISCONTINUED | OUTPATIENT
Start: 2022-11-16 | End: 2022-11-16 | Stop reason: HOSPADM

## 2022-11-16 RX ORDER — SODIUM CHLORIDE 0.9 % (FLUSH) 0.9 %
10 SYRINGE (ML) INJECTION AS NEEDED
Status: DISCONTINUED | OUTPATIENT
Start: 2022-11-16 | End: 2022-11-16 | Stop reason: HOSPADM

## 2022-11-16 NOTE — OP NOTE
L4/L5 Interlaminar Lumbar Epidural Steroid Injection   [facility]    PREOPERATIVE DIAGNOSIS:   Lumbar Radiculopathy  POSTOPERATIVE DIAGNOSIS:  Same as preop diagnosis    PROCEDURE:   Lumbar Epidural Steroid Injection, Therapeutic Interlaminar Injection, with epidurogram, at  L4/L5 level    PRE-PROCEDURE DISCUSSION WITH PATIENT:    Risks and complications were discussed with the patient prior to starting the procedure and informed consent was obtained.  We discussed various topics including but not limited to bleeding, infection, injury, paralysis, nerve injury, dural puncture, coma, death, worsening of clinical picture, lack of pain relief, and postprocedural soreness.    SURGEON:  Carly Hairston MD    REASON FOR PROCEDURE:    Diagnostic injection at this level is needed, Degenerative changes are noted in the area. and Radiating pattern of pain is likely consistent with degenerative changes in the area.  On MRI L4-5 shows mild central bulge, there is a far left lateral annular tear at L3-4.  Level changed to L4-5.      SEDATION:  Patient declined administration of moderate sedation    ANESTHETIC:  Marcaine 0.5%  STEROID:   15mg dexamethasone    DESCRIPTON OF PROCEDURE:    After obtaining informed consent, I.V. was not started in the preop area.   The patient was taken to the operating room and placed in the prone position.  EKG, blood pressure, and pulse oximeter were monitored throughout, and sedation was provided as needed by the RN under my guidance. All pressure points were well padded.  The lumbar spine area was prepped with Chloraprep and draped in a sterile fashion.      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  The skin and subcutaneous tissue over the area was anesthetized with 1% Lidocaine.  An 18-Gauge Tuohy needle was then advanced through the anesthetized skin tract under fluoroscopic  guidance in a coaxial view using a loss of resistance technique.  Lateral fluoroscopy was used to verify appropriate needle depth.  Once the needle tip was felt to be in the posterior epidural space, aspiration was noted to be negative for blood or CSF.  A volume of 1mL of Isovue was then injected under live fluoroscopy in an AP view which produced good epidural spread with no evidence of loculation, vascular run-off or intrathecal spread.  Subsequently, a total volume of 5mL consisting of 15mg of dexamethasone, 0.5mL of 0.5% bupivcacaine and normal saline was injected without resistance.  The needle was removed intact.     ESTIMATED BLOOD LOSS:  <5 mL  SPECIMENS:  None    COMPLICATIONS:     No complications were noted., There was no indication of vascular uptake on live injection of contrast dye. and There was no indication of intrathecal uptake on live injection of contrast dye.    TOLERANCE & DISCHARGE CONDITION:    The patient tolerated the procedure well.  The patient was transported to the recovery area without difficulties.  The patient was discharged to home under the care of family in stable and satisfactory condition.    PLAN OF CARE:  1. The patient was given our standard instruction sheet.  2. The patient will Return to clinic 4-6 wks with Dr. Mckeon  3. The patient will resume all medications as per the medication reconciliation sheet.

## 2022-11-21 ENCOUNTER — OFFICE VISIT (OUTPATIENT)
Dept: PAIN MEDICINE | Facility: CLINIC | Age: 77
End: 2022-11-21

## 2022-11-21 VITALS
BODY MASS INDEX: 24.71 KG/M2 | OXYGEN SATURATION: 96 % | SYSTOLIC BLOOD PRESSURE: 152 MMHG | TEMPERATURE: 97.5 F | HEART RATE: 82 BPM | DIASTOLIC BLOOD PRESSURE: 97 MMHG | RESPIRATION RATE: 18 BRPM | HEIGHT: 68 IN | WEIGHT: 163 LBS

## 2022-11-21 DIAGNOSIS — M54.16 LEFT LUMBAR RADICULOPATHY: ICD-10-CM

## 2022-11-21 DIAGNOSIS — M51.36 DEGENERATION OF LUMBAR INTERVERTEBRAL DISC: ICD-10-CM

## 2022-11-21 DIAGNOSIS — M62.89 PSOAS SYNDROME: Primary | ICD-10-CM

## 2022-11-21 DIAGNOSIS — G89.4 CHRONIC PAIN SYNDROME: ICD-10-CM

## 2022-11-21 PROCEDURE — 99213 OFFICE O/P EST LOW 20 MIN: CPT | Performed by: ANESTHESIOLOGY

## 2022-12-01 DIAGNOSIS — E78.00 HYPERCHOLESTEROLEMIA: ICD-10-CM

## 2022-12-01 DIAGNOSIS — E03.9 HYPOTHYROIDISM, UNSPECIFIED TYPE: Primary | ICD-10-CM

## 2022-12-01 DIAGNOSIS — N18.31 STAGE 3A CHRONIC KIDNEY DISEASE: ICD-10-CM

## 2022-12-02 RX ORDER — LEVOTHYROXINE SODIUM 50 MCG
TABLET ORAL
Qty: 90 TABLET | Refills: 0 | Status: SHIPPED | OUTPATIENT
Start: 2022-12-02 | End: 2023-03-02

## 2022-12-13 LAB
ALBUMIN SERPL-MCNC: 4.8 G/DL (ref 3.5–5.2)
ALBUMIN/GLOB SERPL: 2.5 G/DL
ALP SERPL-CCNC: 70 U/L (ref 39–117)
ALT SERPL-CCNC: 18 U/L (ref 1–41)
APPEARANCE UR: ABNORMAL
AST SERPL-CCNC: 21 U/L (ref 1–40)
BACTERIA #/AREA URNS HPF: ABNORMAL /HPF
BASOPHILS # BLD AUTO: 0.06 10*3/MM3 (ref 0–0.2)
BASOPHILS NFR BLD AUTO: 0.7 % (ref 0–1.5)
BILIRUB SERPL-MCNC: 0.5 MG/DL (ref 0–1.2)
BILIRUB UR QL STRIP: NEGATIVE
BUN SERPL-MCNC: 17 MG/DL (ref 8–23)
BUN/CREAT SERPL: 13.3 (ref 7–25)
CALCIUM SERPL-MCNC: 9.4 MG/DL (ref 8.6–10.5)
CASTS URNS MICRO: ABNORMAL
CHLORIDE SERPL-SCNC: 105 MMOL/L (ref 98–107)
CHOLEST SERPL-MCNC: 253 MG/DL (ref 0–200)
CO2 SERPL-SCNC: 27.2 MMOL/L (ref 22–29)
COLOR UR: YELLOW
CREAT SERPL-MCNC: 1.28 MG/DL (ref 0.76–1.27)
EGFRCR SERPLBLD CKD-EPI 2021: 57.3 ML/MIN/1.73
EOSINOPHIL # BLD AUTO: 0.33 10*3/MM3 (ref 0–0.4)
EOSINOPHIL NFR BLD AUTO: 3.8 % (ref 0.3–6.2)
EPI CELLS #/AREA URNS HPF: ABNORMAL /HPF
ERYTHROCYTE [DISTWIDTH] IN BLOOD BY AUTOMATED COUNT: 13 % (ref 12.3–15.4)
GLOBULIN SER CALC-MCNC: 1.9 GM/DL
GLUCOSE SERPL-MCNC: 106 MG/DL (ref 65–99)
GLUCOSE UR QL STRIP: NEGATIVE
HCT VFR BLD AUTO: 44.4 % (ref 37.5–51)
HDLC SERPL-MCNC: 50 MG/DL (ref 40–60)
HGB BLD-MCNC: 14.9 G/DL (ref 13–17.7)
HGB UR QL STRIP: ABNORMAL
IMM GRANULOCYTES # BLD AUTO: 0.1 10*3/MM3 (ref 0–0.05)
IMM GRANULOCYTES NFR BLD AUTO: 1.2 % (ref 0–0.5)
KETONES UR QL STRIP: ABNORMAL
LDLC SERPL CALC-MCNC: 177 MG/DL (ref 0–100)
LDLC/HDLC SERPL: 3.49 {RATIO}
LEUKOCYTE ESTERASE UR QL STRIP: ABNORMAL
LYMPHOCYTES # BLD AUTO: 1.77 10*3/MM3 (ref 0.7–3.1)
LYMPHOCYTES NFR BLD AUTO: 20.4 % (ref 19.6–45.3)
MCH RBC QN AUTO: 31.8 PG (ref 26.6–33)
MCHC RBC AUTO-ENTMCNC: 33.6 G/DL (ref 31.5–35.7)
MCV RBC AUTO: 94.7 FL (ref 79–97)
MONOCYTES # BLD AUTO: 0.65 10*3/MM3 (ref 0.1–0.9)
MONOCYTES NFR BLD AUTO: 7.5 % (ref 5–12)
NEUTROPHILS # BLD AUTO: 5.76 10*3/MM3 (ref 1.7–7)
NEUTROPHILS NFR BLD AUTO: 66.4 % (ref 42.7–76)
NITRITE UR QL STRIP: NEGATIVE
NRBC BLD AUTO-RTO: 0 /100 WBC (ref 0–0.2)
PH UR STRIP: 5.5 [PH] (ref 5–8)
PLATELET # BLD AUTO: 262 10*3/MM3 (ref 140–450)
POTASSIUM SERPL-SCNC: 4.2 MMOL/L (ref 3.5–5.2)
PROT SERPL-MCNC: 6.7 G/DL (ref 6–8.5)
PROT UR QL STRIP: ABNORMAL
RBC # BLD AUTO: 4.69 10*6/MM3 (ref 4.14–5.8)
RBC #/AREA URNS HPF: ABNORMAL /HPF
SODIUM SERPL-SCNC: 141 MMOL/L (ref 136–145)
SP GR UR STRIP: 1.02 (ref 1–1.03)
T4 FREE SERPL-MCNC: 1.08 NG/DL (ref 0.93–1.7)
TRIGL SERPL-MCNC: 143 MG/DL (ref 0–150)
TSH SERPL DL<=0.005 MIU/L-ACNC: 8.11 UIU/ML (ref 0.27–4.2)
UROBILINOGEN UR STRIP-MCNC: ABNORMAL MG/DL
VLDLC SERPL CALC-MCNC: 26 MG/DL (ref 5–40)
WBC # BLD AUTO: 8.67 10*3/MM3 (ref 3.4–10.8)
WBC #/AREA URNS HPF: ABNORMAL /HPF

## 2022-12-16 ENCOUNTER — TELEPHONE (OUTPATIENT)
Dept: PAIN MEDICINE | Facility: CLINIC | Age: 77
End: 2022-12-16

## 2022-12-16 NOTE — TELEPHONE ENCOUNTER
Provider: TABATHA  Caller:EDGAR TEMPLE  Relationship to Patient:SELF  Reason for Call:  PATIENT MESSAGE FOR DR MAYS 12/16/2022 PATIENT SAW DR MAYS 11 2022 - WANTED TO LET HIM KNOW HASN'T HEARD ANYTHING FROM THE PHISIATRIST(CLARIFIED NOT A PSYCHIATRIST) HE WAS GOING TO REFER HIM TO - PATIENT REQUESTS CALL BACK  [phone] TO FURTHER DISCUSS.

## 2022-12-22 ENCOUNTER — OFFICE VISIT (OUTPATIENT)
Dept: INTERNAL MEDICINE | Facility: CLINIC | Age: 77
End: 2022-12-22

## 2022-12-22 VITALS
WEIGHT: 164 LBS | HEIGHT: 68 IN | BODY MASS INDEX: 24.86 KG/M2 | DIASTOLIC BLOOD PRESSURE: 86 MMHG | SYSTOLIC BLOOD PRESSURE: 142 MMHG | OXYGEN SATURATION: 95 % | HEART RATE: 73 BPM

## 2022-12-22 DIAGNOSIS — E78.00 HYPERCHOLESTEROLEMIA: Primary | ICD-10-CM

## 2022-12-22 DIAGNOSIS — E03.9 HYPOTHYROIDISM, UNSPECIFIED TYPE: ICD-10-CM

## 2022-12-22 PROCEDURE — 99214 OFFICE O/P EST MOD 30 MIN: CPT | Performed by: NURSE PRACTITIONER

## 2022-12-22 PROCEDURE — 1125F AMNT PAIN NOTED PAIN PRSNT: CPT | Performed by: NURSE PRACTITIONER

## 2022-12-22 PROCEDURE — 1159F MED LIST DOCD IN RCRD: CPT | Performed by: NURSE PRACTITIONER

## 2022-12-22 PROCEDURE — 1170F FXNL STATUS ASSESSED: CPT | Performed by: NURSE PRACTITIONER

## 2022-12-22 PROCEDURE — G0439 PPPS, SUBSEQ VISIT: HCPCS | Performed by: NURSE PRACTITIONER

## 2022-12-22 RX ORDER — LEVOTHYROXINE SODIUM 25 MCG
25 TABLET ORAL DAILY
Qty: 90 TABLET | Refills: 0 | Status: SHIPPED | OUTPATIENT
Start: 2022-12-22

## 2022-12-22 RX ORDER — LEVOTHYROXINE SODIUM 0.03 MG/1
25 TABLET ORAL DAILY
Qty: 90 TABLET | Refills: 0 | Status: SHIPPED | OUTPATIENT
Start: 2022-12-22 | End: 2022-12-22

## 2022-12-22 RX ORDER — ALLOPURINOL 300 MG/1
TABLET ORAL
COMMUNITY
Start: 2022-12-03

## 2022-12-22 NOTE — PROGRESS NOTES
The ABCs of the Annual Wellness Visit  Subsequent Medicare Wellness Visit    Subjective    Mannie Fajardo is a 77 y.o. male who presents for a Subsequent Medicare Wellness Visit.    The following portions of the patient's history were reviewed and   updated as appropriate: allergies, current medications, past family history, past medical history, past social history, past surgical history and problem list.    Compared to one year ago, the patient feels his physical   health is worse.    Compared to one year ago, the patient feels his mental   health is the same.    Recent Hospitalizations:  He was not admitted to the hospital during the last year.       Current Medical Providers:  Patient Care Team:  Lili Sanders APRN as PCP - General (Family Medicine)  Marlene Giordano MD as Consulting Physician (Cardiology)  Alexis Reed MD as Consulting Physician (Nephrology)    Outpatient Medications Prior to Visit   Medication Sig Dispense Refill   • allopurinol (ZYLOPRIM) 300 MG tablet      • aspirin 81 MG chewable tablet Chew 81 mg Daily.     • folic acid (FOLVITE) 400 MCG tablet Take 400 mcg by mouth Daily.     • Lovaza 1 g capsule Take 2 capsules by mouth 2 (Two) Times a Day. BRAND NAME ONLY, Do NOT substitute 360 capsule 0   • Plavix 75 MG tablet Take 1 tablet by mouth Daily. Only give the Brand name Plavix. DO NOT SUBSTITUTE 90 tablet 3   • Synthroid 50 MCG tablet TAKE ONE TABLET BY MOUTH DAILY 90 tablet 0   • vitamin D (ERGOCALCIFEROL) 1.25 MG (48813 UT) capsule capsule Take 1 capsule by mouth 1 (One) Time Per Week. 12 capsule 3   • Zetia 10 MG tablet Take 1 tablet by mouth Daily. BRAND NAME ONLY, DO NOT SUBSTITUTE 90 tablet 0   • allopurinol (ZYLOPRIM) 100 MG tablet 3 tablets.       No facility-administered medications prior to visit.       No opioid medication identified on active medication list. I have reviewed chart for other potential  high risk medication/s and harmful drug interactions in the  elderly.          Aspirin is on active medication list. Aspirin use is indicated based on review of current medical condition/s. Pros and cons of this therapy have been discussed today. Benefits of this medication outweigh potential harm.  Patient has been encouraged to continue taking this medication.  .      Patient Active Problem List   Diagnosis   • Abdominal aortic aneurysm (HCC)   • Hypercholesterolemia   • Coronary artery disease due to calcified coronary lesion   • Chronic left-sided low back pain with left-sided sciatica   • Chronic midline low back pain without sciatica   • Degeneration of lumbar intervertebral disc   • Weakness of left leg   • Stage 3 chronic kidney disease (HCC)   • Opioid withdrawal (HCC)   • Coronary artery disease involving native coronary artery of native heart without angina pectoris   • Other specified symptoms and signs involving the circulatory and respiratory systems    • Edema leg   • Foot pain   • Atherosclerosis of coronary artery   • Vitamin D deficiency   • Seborrheic dermatitis   • Rash   • Primary lateral sclerosis (HCC)   • Personal history of pneumonia (recurrent)   • Personal history of other drug therapy   • Osteopenia   • Myelopathy (HCC)   • Muscle weakness of extremity   • Melanocytic nevus   • Insomnia   • Inguinal hernia   • Hypothyroidism   • History of osteopenia   • Gout   • Benign prostatic hyperplasia   • At risk for osteoporosis   • Anemia   • Acquired atrophy of thyroid   • Lumbosacral radiculopathy   • Lumbar facet arthropathy   • Disorder of bone and cartilage   • Left lumbar radiculopathy     Advance Care Planning  Advance Directive is not on file.  ACP discussion was held with the patient during this visit. Patient does not have an advance directive, declines further assistance.     Objective    Vitals:    12/22/22 0946   BP: 142/86   BP Location: Right arm   Patient Position: Sitting   Cuff Size: Adult   Pulse: 73   SpO2: 95%   Weight: 74.4 kg (164  "lb)   Height: 172.7 cm (67.99\")   PainSc: 10-Worst pain ever   PainLoc: Back     Estimated body mass index is 24.94 kg/m² as calculated from the following:    Height as of this encounter: 172.7 cm (67.99\").    Weight as of this encounter: 74.4 kg (164 lb).    BMI is within normal parameters. No other follow-up for BMI required.      Does the patient have evidence of cognitive impairment? No    Lab Results   Component Value Date    CHLPL 253 (H) 12/12/2022    TRIG 143 12/12/2022    HDL 50 12/12/2022     (H) 12/12/2022    VLDL 26 12/12/2022        HEALTH RISK ASSESSMENT    Smoking Status:  Social History     Tobacco Use   Smoking Status Never   Smokeless Tobacco Never     Alcohol Consumption:  Social History     Substance and Sexual Activity   Alcohol Use No    Comment: occ caffeine use     Fall Risk Screen:    STEADI Fall Risk Assessment has not been completed.    Depression Screening:  PHQ-2/PHQ-9 Depression Screening 11/7/2022   Little Interest or Pleasure in Doing Things 0-->not at all   Feeling Down, Depressed or Hopeless 0-->not at all   PHQ-9: Brief Depression Severity Measure Score 0       Health Habits and Functional and Cognitive Screening:  No flowsheet data found.    Age-appropriate Screening Schedule:  Refer to the list below for future screening recommendations based on patient's age, sex and/or medical conditions. Orders for these recommended tests are listed in the plan section. The patient has been provided with a written plan.    Health Maintenance   Topic Date Due   • ZOSTER VACCINE (1 of 2) 12/22/2022 (Originally [birth year]/31/1994)   • INFLUENZA VACCINE  03/31/2023 (Originally 8/1/2022)   • TDAP/TD VACCINES (1 - Tdap) 06/20/2023 (Originally 5/31/1964)   • DXA SCAN  12/22/2023 (Originally 5/31/2020)   • LIPID PANEL  12/12/2023                CMS Preventative Services Quick Reference  Risk Factors Identified During Encounter  Chronic Pain: is followed by pain management and was referred to Physical " and rehab medicine   Fall risk  Declined colon cancer screening   Discussed flu and covid vaccines and declines  Declines Repeat DEXA     The above risks/problems have been discussed with the patient.  Pertinent information has been shared with the patient in the After Visit Summary.  An After Visit Summary and PPPS were made available to the patient.    Follow Up:   Next Medicare Wellness visit to be scheduled in 1 year.       Additional E&M Note during same encounter follows:  Patient has multiple medical problems which are significant and separately identifiable that require additional work above and beyond the Medicare Wellness Visit.      Chief Complaint  Medicare Wellness-subsequent (Review of of medical history )     Subjective        HPI  Mannie Fajardo is also being seen today for a follow up for Hypothyroidism. He has been compliant with his medication and is not experiencing any side effects. He has chronic pain and was referred to physical pain and rehab medicine by Pain management . He is seeing Rhe[facility]ology at [facility] for Gout. He is followed by cardiology.     I reviewed his labs in detail and gave him a copy  Orders Only on 12/01/2022   Component Date Value Ref Range Status   • WBC 12/12/2022 8.67  3.40 - 10.80 10*3/mm3 Final   • RBC 12/12/2022 4.69  4.14 - 5.80 10*6/mm3 Final   • Hemoglobin 12/12/2022 14.9  13.0 - 17.7 g/dL Final   • Hematocrit 12/12/2022 44.4  37.5 - 51.0 % Final   • MCV 12/12/2022 94.7  79.0 - 97.0 fL Final   • MCH 12/12/2022 31.8  26.6 - 33.0 pg Final   • MCHC 12/12/2022 33.6  31.5 - 35.7 g/dL Final   • RDW 12/12/2022 13.0  12.3 - 15.4 % Final   • Platelets 12/12/2022 262  140 - 450 10*3/mm3 Final   • Neutrophil Rel % 12/12/2022 66.4  42.7 - 76.0 % Final   • Lymphocyte Rel % 12/12/2022 20.4  19.6 - 45.3 % Final   • Monocyte Rel % 12/12/2022 7.5  5.0 - 12.0 % Final   • Eosinophil Rel % 12/12/2022 3.8  0.3 - 6.2 % Final   • Basophil Rel % 12/12/2022 0.7  0.0 - 1.5 % Final   •  Neutrophils Absolute 12/12/2022 5.76  1.70 - 7.00 10*3/mm3 Final   • Lymphocytes Absolute 12/12/2022 1.77  0.70 - 3.10 10*3/mm3 Final   • Monocytes Absolute 12/12/2022 0.65  0.10 - 0.90 10*3/mm3 Final   • Eosinophils Absolute 12/12/2022 0.33  0.00 - 0.40 10*3/mm3 Final   • Basophils Absolute 12/12/2022 0.06  0.00 - 0.20 10*3/mm3 Final   • Immature Granulocyte Rel % 12/12/2022 1.2 (H)  0.0 - 0.5 % Final   • Immature Grans Absolute 12/12/2022 0.10 (H)  0.00 - 0.05 10*3/mm3 Final   • nRBC 12/12/2022 0.0  0.0 - 0.2 /100 WBC Final   • Glucose 12/12/2022 106 (H)  65 - 99 mg/dL Final   • BUN 12/12/2022 17  8 - 23 mg/dL Final   • Creatinine 12/12/2022 1.28 (H)  0.76 - 1.27 mg/dL Final   • EGFR Result 12/12/2022 57.3 (L)  >60.0 mL/min/1.73 Final    Comment: National Kidney Foundation and American Society of  Nephrology (ASN) Task Force recommended calculation based  on the Chronic Kidney Disease Epidemiology Collaboration  (CKD-EPI) equation refit without adjustment for race.  GFR Normal >60  Chronic Kidney Disease <60  Kidney Failure <15  The GFR formula is only valid for adults with stable renal  function between ages 18 and 70.     • BUN/Creatinine Ratio 12/12/2022 13.3  7.0 - 25.0 Final   • Sodium 12/12/2022 141  136 - 145 mmol/L Final   • Potassium 12/12/2022 4.2  3.5 - 5.2 mmol/L Final   • Chloride 12/12/2022 105  98 - 107 mmol/L Final   • Total CO2 12/12/2022 27.2  22.0 - 29.0 mmol/L Final   • Calcium 12/12/2022 9.4  8.6 - 10.5 mg/dL Final   • Total Protein 12/12/2022 6.7  6.0 - 8.5 g/dL Final   • Albumin 12/12/2022 4.80  3.50 - 5.20 g/dL Final   • Globulin 12/12/2022 1.9  gm/dL Final   • A/G Ratio 12/12/2022 2.5  g/dL Final   • Total Bilirubin 12/12/2022 0.5  0.0 - 1.2 mg/dL Final   • Alkaline Phosphatase 12/12/2022 70  39 - 117 U/L Final   • AST (SGOT) 12/12/2022 21  1 - 40 U/L Final   • ALT (SGPT) 12/12/2022 18  1 - 41 U/L Final   • Total Cholesterol 12/12/2022 253 (H)  0 - 200 mg/dL Final    Comment: Cholesterol  Reference Ranges  (U.S. Department of Health and Human Services ATP III  Classifications)  Desirable          <200 mg/dL  Borderline High    200-239 mg/dL  High Risk          >240 mg/dL  Triglyceride Reference Ranges  (U.S. Department of Health and Human Services ATP III  Classifications)  Normal           <150 mg/dL  Borderline High  150-199 mg/dL  High             200-499 mg/dL  Very High        >500 mg/dL  HDL Reference Ranges  (U.S. Department of Health and Human Services ATP III  Classifications)  Low     <40 mg/dl (major risk factor for CHD)  High    >60 mg/dl ('negative' risk factor for CHD)  LDL Reference Ranges  (U.S. Department of Health and Human Services ATP III  Classifications)  Optimal          <100 mg/dL  Near Optimal     100-129 mg/dL  Borderline High  130-159 mg/dL  High             160-189 mg/dL  Very High        >189 mg/dL     • Triglycerides 12/12/2022 143  0 - 150 mg/dL Final   • HDL Cholesterol 12/12/2022 50  40 - 60 mg/dL Final   • VLDL Cholesterol Julien 12/12/2022 26  5 - 40 mg/dL Final   • LDL Chol Calc (NIH) 12/12/2022 177 (H)  0 - 100 mg/dL Final   • LDL/HDL RATIO 12/12/2022 3.49   Final   • TSH 12/12/2022 8.110 (H)  0.270 - 4.200 uIU/mL Final   • Specific Gravity, UA 12/12/2022 1.021  1.005 - 1.030 Final   • pH, UA 12/12/2022 5.5  5.0 - 8.0 Final   • Color, UA 12/12/2022 Yellow   Final    REFERENCE RANGE: Yellow, Straw   • Appearance, UA 12/12/2022 Cloudy (A)  Clear Final   • Leukocytes, UA 12/12/2022 See below: (A)  Negative Final    Small (1+)   • Protein 12/12/2022 See below: (A)  Negative Final    30 mg/dL (1+)   • Glucose, UA 12/12/2022 Negative  Negative Final   • Ketones 12/12/2022 Trace (A)  Negative Final   • Blood, UA 12/12/2022 See below: (A)  Negative Final    Large (3+)   • Bilirubin, UA 12/12/2022 Negative  Negative Final   • Urobilinogen, UA 12/12/2022 Comment   Final    Comment: 1.0 E.U./dL  REFERENCE RANGE: 0.2 - 1.0 E.U./dL     • Nitrite, UA 12/12/2022 Negative  Negative  "Final   • WBC, UA 12/12/2022 0-2  /HPF Final    REFERENCE RANGE: None Seen, 0-2   • RBC, UA 12/12/2022 31-50 (A)  /HPF Final    REFERENCE RANGE: None Seen, 0-2   • Epithelial Cells (non renal) 12/12/2022 Comment  /HPF Final    Comment: None Seen  REFERENCE RANGE: None Seen, 0-2     • Cast Type 12/12/2022 Comment   Final    HYALINE CASTS, UA            None Seen        /LPF       None Seen  N   • Bacteria, UA 12/12/2022 Comment  None Seen /HPF Final    None Seen   • Free T4 12/12/2022 1.08  0.93 - 1.70 ng/dL Final    Results may be falsely increased if patient taking Biotin.              Objective   Vital Signs:  /86 (BP Location: Right arm, Patient Position: Sitting, Cuff Size: Adult)   Pulse 73   Ht 172.7 cm (67.99\")   Wt 74.4 kg (164 lb)   SpO2 95%   BMI 24.94 kg/m²     Physical Exam  Vitals and nursing note reviewed.   Constitutional:       General: He is not in acute distress.     Appearance: He is well-groomed.   HENT:      Head: Normocephalic.   Cardiovascular:      Rate and Rhythm: Normal rate and regular rhythm.      Comments: BP recheck 138/70   Pulmonary:      Effort: Pulmonary effort is normal.   Skin:     General: Skin is warm and dry.   Neurological:      Mental Status: He is alert and oriented to person, place, and time.      Gait: Gait abnormal.                   Assessment and Plan   Diagnoses and all orders for this visit:    1. Hypercholesterolemia (Primary)    2. Hypothyroidism, unspecified type    Other orders  -     Discontinue: levothyroxine (Synthroid) 25 MCG tablet; Take 1 tablet by mouth Daily. In addition to 50mcg for a total of 75mcg daily  Dispense: 90 tablet; Refill: 0  -     Synthroid 25 MCG tablet; Take 1 tablet by mouth Daily. In addition to 50mcg for a total of 75mcg daily. Brand name only  Dispense: 90 tablet; Refill: 0       TC and LDL are elevated, pt will continue zetia and lovaza prescribed by cardiology. He cannot take statins    TSH is elevated at 8, free t4 is " normal, will increase synthroid to 75mcg  patient just picked up a 90 day  rx for synthroid 50mcg will add 25mcg and recheck tsh and free t4 in 12 weeks   I spent 30 minutes caring for Mannie on this date of service. This time includes time spent by me in the following activities:preparing for the visit, reviewing tests, obtaining and/or reviewing a separately obtained history, performing a medically appropriate examination and/or evaluation , counseling and educating the patient/family/caregiver, documenting information in the medical record and independently interpreting results and communicating that information with the patient/family/caregiver  Follow Up   Return in about 12 weeks (around 3/16/2023) for labs only tsh and free t4 .  Patient was given instructions and counseling regarding his condition or for health maintenance advice. Please see specific information pulled into the AVS if appropriate.

## 2023-01-26 DIAGNOSIS — I25.10 ATHEROSCLEROTIC HEART DISEASE OF NATIVE CORONARY ARTERY WITHOUT ANGINA PECTORIS: ICD-10-CM

## 2023-01-26 DIAGNOSIS — E78.5 HYPERLIPIDEMIA, UNSPECIFIED: ICD-10-CM

## 2023-01-26 DIAGNOSIS — I25.10 CORONARY ARTERY DISEASE INVOLVING NATIVE CORONARY ARTERY OF NATIVE HEART WITHOUT ANGINA PECTORIS: ICD-10-CM

## 2023-01-26 DIAGNOSIS — E78.5 HYPERLIPIDEMIA, UNSPECIFIED HYPERLIPIDEMIA TYPE: ICD-10-CM

## 2023-01-26 RX ORDER — OMEGA-3 ACID ETHYL ESTERS 1 G
CAPSULE ORAL
Qty: 360 CAPSULE | Refills: 1 | Status: SHIPPED | OUTPATIENT
Start: 2023-01-26

## 2023-01-26 RX ORDER — EZETIMIBE 10 MG/1
TABLET ORAL
Qty: 90 TABLET | Refills: 1 | Status: SHIPPED | OUTPATIENT
Start: 2023-01-26

## 2023-01-26 RX ORDER — CLOPIDOGREL BISULFATE 75 MG
TABLET ORAL
Qty: 90 TABLET | Refills: 1 | Status: SHIPPED | OUTPATIENT
Start: 2023-01-26

## 2023-02-13 ENCOUNTER — OFFICE VISIT (OUTPATIENT)
Dept: NEUROLOGY | Facility: CLINIC | Age: 78
End: 2023-02-13
Payer: MEDICARE

## 2023-02-13 VITALS
BODY MASS INDEX: 24.86 KG/M2 | DIASTOLIC BLOOD PRESSURE: 80 MMHG | WEIGHT: 164 LBS | OXYGEN SATURATION: 100 % | HEIGHT: 68 IN | SYSTOLIC BLOOD PRESSURE: 140 MMHG | HEART RATE: 86 BPM

## 2023-02-13 DIAGNOSIS — M54.32 LEFT SIDED SCIATICA: Primary | ICD-10-CM

## 2023-02-13 PROCEDURE — 99215 OFFICE O/P EST HI 40 MIN: CPT | Performed by: PSYCHIATRY & NEUROLOGY

## 2023-02-13 NOTE — PROGRESS NOTES
CC: Chronic lumbar pain    HPI:  Mannie Fajardo is a  77 y.o.  right-handed white male who I am seeing in follow-up with chronic low back pain.  This pain is predominantly in the midline of the lumbar spine.  It has been present for years.  He has failed physical therapy and he has failed pain management that tried ablations and epidural steroid injections.  The patient was evaluated extensively in 2018 and the history of the work-up is taken from the previous note 11/16/2021 with additions/modifications as indicated:    He had been sent to me for an EMG which was done on the left leg 8/14/2018 showing normal nerve conductions and needle examination.  Brief clinical exam demonstrated upper motor neuron findings in the left arm and left leg and he was sent to me for a neurologic consultation.  His exam was suspicious for a left sided cervical spine lesion.  An MRI of the brain and cervical spine were obtained.  The MRI of the brain showed minor microvascular changes consistent with age.  The MRI of the cervical spine showed myelomalacia x2 with left-sided localization consistent with transverse myelitis.     The patient states that he denies a specific functional problem the left arm other than from his previous injury to the hand.  He was simply not aware of the findings.  He had some previous evaluation by Dr. Rhodes at [place] in 2000 and 2005.  Actually had 2 spinal taps apparently and he may have had an MRI of his neck.  The patient had been on narcotics and went through narcotic withdrawal at some point in time.     He has had 4 episodes of gout and has several swollen toes and stiff joints.  He states he has never seen a rheumatologist.  The patient has been followed up recently by Dr. Olson and a myelogram with post myelogram CT was done on the lumbar spine showing really no significant foraminal or central canal stenosis at any level.  I reviewed the films and agree.  The patient also had a bone scan  "8/27/2018 which showed some uptake in the sacrum which was thought perhaps to represent an insufficiency fracture.     Patient has continued complaints of severe low back pain \"10/10\" as well as left groin pain.  Groin pain is relatively tolerable he states but it is very persistent.  Is simply never goes away.  It affects his gait.  He describes some atrophy of his left thigh compared to the right.  He has been seen by pain management and had several epidural steroid injections apparently and states that one of the first ones it helped his pain and he could  his knee much better but it wore off and a repeat attempts did nothing he states.   He also describes that occasionally the left knee will give out.  It seems to be while on the stairs but he is holding the railing and has not fallen.  He has assistive devices at home but chooses not to use them.                  Past Medical History:   Diagnosis Date   • AAA (abdominal aortic aneurysm) without rupture    • Arthritis     back   • Back pain    • Backache    • CAD (coronary atherosclerotic disease)    • Disease of thyroid gland    • Dyslipidemia    • Edema    • History of transfusion    • Hyperhomocystinemia (HCC)    • Hyperlipidemia    • Stage 3 chronic kidney disease (HCC)          Past Surgical History:   Procedure Laterality Date   • APPENDECTOMY     • CARDIAC SURGERY     • CORONARY ANGIOPLASTY WITH STENT PLACEMENT     • EPIDURAL Left 5/25/2022    Procedure: LUMBAR/SACRAL TRANSFORAMINAL EPIDURAL - left L4 and Left S1;  Surgeon: Deepak Mckeon MD;  Location: [facility];  Service: Pain Management;  Laterality: Left;   • EXTRACORPOREAL SHOCKWAVE LITHOTRIPSY (ESWL), STENT INSERTION/REMOVAL Right 2/24/2017    Procedure: CYSTO RETROGRADE WITH STENT PLACEMENT;  Surgeon: Janes López MD;  Location: [facility];  Service:    • LUMBAR EPIDURAL INJECTION N/A 11/16/2022    Procedure: LUMBAR EPIDURAL STEROID INJECTION INTRALAMINAR L5-S1 (LEFT " PARAMEDIAN APPROACH);  Surgeon: Carly Hairston MD;  [facility];  Service: Pain Management;  Laterality: N/A;   • MEDIAL BRANCH BLOCK Bilateral 6/23/2022    Procedure: Lumbar medial branch block bilateral L2-L5;  Surgeon: Deepak Mckeon MD;  Location: [facility];  Service: Pain Management;  Laterality: Bilateral;   • MEDIAL BRANCH BLOCK Bilateral 7/12/2022    Procedure: lumbar medial branch block  bilateral L2-L5;  Surgeon: Deepak Mckeon MD;  Location: [facility];  Service: Pain Management;  Laterality: Bilateral;   • RADIOFREQUENCY ABLATION Bilateral 8/9/2022    Procedure: L2-L5 RADIOFREQUENCY ABLATION LUMBAR;  Surgeon: Deepak Mckeon MD;  Location: [facility];  Service: Pain Management;  Laterality: Bilateral;           Current Outpatient Medications:   •  allopurinol (ZYLOPRIM) 300 MG tablet, , Disp: , Rfl:   •  aspirin 81 MG chewable tablet, Chew 81 mg Daily., Disp: , Rfl:   •  folic acid (FOLVITE) 400 MCG tablet, Take 400 mcg by mouth Daily., Disp: , Rfl:   •  Lovaza 1 g capsule, TAKE TWO CAPSULES BY MOUTH TWICE A DAY, Disp: 360 capsule, Rfl: 1  •  Plavix 75 MG tablet, TAKE ONE TABLET BY MOUTH DAILY, Disp: 90 tablet, Rfl: 1  •  Synthroid 25 MCG tablet, Take 1 tablet by mouth Daily. In addition to 50mcg for a total of 75mcg daily. Brand name only, Disp: 90 tablet, Rfl: 0  •  Synthroid 50 MCG tablet, TAKE ONE TABLET BY MOUTH DAILY, Disp: 90 tablet, Rfl: 0  •  vitamin D (ERGOCALCIFEROL) 1.25 MG (56467 UT) capsule capsule, Take 1 capsule by mouth 1 (One) Time Per Week., Disp: 12 capsule, Rfl: 3  •  Zetia 10 MG tablet, TAKE ONE TABLET BY MOUTH DAILY, Disp: 90 tablet, Rfl: 1      Family History   Problem Relation Age of Onset   • Heart disease Mother    • Stroke Father          Social History     Socioeconomic History   • Marital status: Single   • Number of children: 0   • Years of education: COLLEGE   Tobacco Use   • Smoking status: Never   • Smokeless tobacco: Never   Substance  "and Sexual Activity   • Alcohol use: No     Comment: occ caffeine use   • Drug use: No   • Sexual activity: Defer         Allergies   Allergen Reactions   • Statins Other (See Comments)     Muscle weakness in legs and arms one time only  Muscle weakness in legs and arms one time only  Other reaction(s): Other (See Comments)  Muscle weakness in legs and arms one time only    Muscle weakness in legs and arms one time only   • Adhesive Tape Other (See Comments)     Severe burn one time when left on too long   • Wound Dressing Adhesive Other (See Comments)     Severe burn one time when left on too long         Pain Scale: 10/10        ROS:  Review of Systems   Constitutional: Positive for activity change (decreased) and fatigue. Negative for unexpected weight change.   HENT: Negative for ear pain, hearing loss, nosebleeds and tinnitus.    Eyes: Negative for photophobia, pain and visual disturbance.   Respiratory: Negative for chest tightness, shortness of breath, wheezing and stridor.    Cardiovascular: Negative for chest pain, palpitations and leg swelling.   Musculoskeletal: Positive for gait problem (balance>2 fals x1year). Negative for neck pain and neck stiffness.   Neurological: Positive for weakness (left leg worsening). Negative for tremors.   Psychiatric/Behavioral: Negative for confusion and decreased concentration. The patient is not nervous/anxious.          I have reviewed and agree with the above ROS completed by the medical assistant.      Physical Exam:  Vitals:    02/13/23 1601   BP: 140/80   Pulse: 86   SpO2: 100%   Weight: 74.4 kg (164 lb)   Height: 172.7 cm (68\")     Orthostatic BP:    Body mass index is 24.94 kg/m².    Physical Exam  General: Relatively well-developed white male who appears to have notable pain  HEENT: Normocephalic no evidence of trauma  Neck: Supple  Heart: Regular rate and rhythm  Extremities: No pedal edema      Neurological Exam:   Mental Status: Awake, alert, oriented to " person, place and time.  Conversant without evidence of an affective disorder, thought disorder, delusions or hallucinations.  Attention span and concentration are normal.  HCF: No aphasia, apraxia or dysarthria.  Recent and remote memory intact.  Knowledge of recent events intact.  CN: I:   II: Visual fields full without left inattention   III, IV, VI: Eye movements intact without nystagmus or ptosis.  Pupils equal round and reactive to light.   V,VII: Light touch and pinprick intact all 3 divisions of V.  Facial muscles symmetrical.   VIII: Hearing intact to finger rub   IX,X: Soft palate elevates symmetrically   XI: Sternomastoid and trapezius are strong.   XII: Tongue midline without atrophy or fasciculations  Motor: Spasticity present in the left arm and left leg.  Normal bulk in the upper and lower extremities   Power testing: Mild diffuse weakness in the left arm and left leg with the worst weakness probably in the left iliopsoas muscle.  The gluteus medius and adductors were pretty strong.  Reflexes: Upper extremities: +2 diffusely        Lower extremities: +2 diffusely        Toe signs: Upgoing on the left downgoing on the right  Sensory: Light touch:        Pinprick:        Vibration:        Position:    Cerebellar: Finger-to-nose: Intact           Rapid movement: Intact           Heel-to-shin: Intact  Gait and Station: Hemiparetic and unsteady.  The arm is held against his side with elbow flexed and fisted.    Results:      Lab Results   Component Value Date    GLUCOSE 106 (H) 12/12/2022    BUN 17 12/12/2022    CREATININE 1.28 (H) 12/12/2022    EGFRIFNONA 47 (L) 11/16/2021    BCR 13.3 12/12/2022    CO2 27.2 12/12/2022    CALCIUM 9.4 12/12/2022    PROTENTOTREF 6.7 12/12/2022    ALBUMIN 4.80 12/12/2022    LABIL2 2.5 12/12/2022    AST 21 12/12/2022    ALT 18 12/12/2022       Lab Results   Component Value Date    WBC 8.67 12/12/2022    HGB 14.9 12/12/2022    HCT 44.4 12/12/2022    MCV 94.7 12/12/2022    PLT  262 12/12/2022         .  Lab Results   Component Value Date    RPR Non-Reactive 08/15/2018         Lab Results   Component Value Date    TSH 8.110 (H) 12/12/2022         Lab Results   Component Value Date    GTXXOGJH91 379 08/15/2018         Lab Results   Component Value Date    FOLATE >20.00 08/15/2018         Lab Results   Component Value Date    HGBA1C 5.7 04/10/2018         Lab Results   Component Value Date    GLUCOSE 106 (H) 12/12/2022    BUN 17 12/12/2022    CREATININE 1.28 (H) 12/12/2022    EGFRIFNONA 47 (L) 11/16/2021    BCR 13.3 12/12/2022    K 4.2 12/12/2022    CO2 27.2 12/12/2022    CALCIUM 9.4 12/12/2022    PROTENTOTREF 6.7 12/12/2022    ALBUMIN 4.80 12/12/2022    LABIL2 2.5 12/12/2022    AST 21 12/12/2022    ALT 18 12/12/2022         Lab Results   Component Value Date    WBC 8.67 12/12/2022    HGB 14.9 12/12/2022    HCT 44.4 12/12/2022    MCV 94.7 12/12/2022     12/12/2022             Assessment:   1.  Chronic midline low back pain-origin is not entirely clear.  His previous evaluation did not show evidence for root impingement on MRI or myelogram/CT scan.  He did have a needle exam changes in his gastrocnemius plus low amplitude peroneal motor and sensory studies.  These argue for a sciatic neuropathy being the origin.  Regarding his left iliopsoas weakness a component of this is due to his transverse myelitis.  The exam is not particularly suggestive of a superimposed femoral neuropathy  2.  Chronic transverse myelitis at 2 levels upper cervical spine        Plan:  1.  Repeat MRI lumbar spine-patient states he cannot hold still for the MRI due to his severe back pain and requests this be done under anesthesia.  He states this was done previously under anesthesia.  2.  I have asked the patient to call in after studies obtained.  If nothing is particularly worrisome then I would repeat his bone scan  3.  Follow-up with Hiral Mcmahon NP in about 3 months        Time: 45  minutes                Dictated utilizing Dragon dictation.

## 2023-02-20 ENCOUNTER — OFFICE VISIT (OUTPATIENT)
Dept: PAIN MEDICINE | Facility: CLINIC | Age: 78
End: 2023-02-20
Payer: MEDICARE

## 2023-02-20 VITALS
HEIGHT: 68 IN | SYSTOLIC BLOOD PRESSURE: 143 MMHG | DIASTOLIC BLOOD PRESSURE: 82 MMHG | HEART RATE: 85 BPM | OXYGEN SATURATION: 95 % | WEIGHT: 164.8 LBS | TEMPERATURE: 98.2 F | BODY MASS INDEX: 24.98 KG/M2

## 2023-02-20 DIAGNOSIS — M54.16 LEFT LUMBAR RADICULOPATHY: ICD-10-CM

## 2023-02-20 DIAGNOSIS — G89.4 CHRONIC PAIN SYNDROME: Primary | ICD-10-CM

## 2023-02-20 PROCEDURE — 99214 OFFICE O/P EST MOD 30 MIN: CPT | Performed by: ANESTHESIOLOGY

## 2023-02-20 NOTE — PROGRESS NOTES
CHIEF COMPLAINT  F/U back pain- patient states that his pain has remained the same since his last visit.     Subjective   Mannie Fajardo is a 77 y.o. male  who presents for follow-up.  He has a history of back pain.          History of Present Illness  In review, his previous epidural injection on November 16 did not provide any benefit.  He has a noted history of concern about cervical transverse myelitis.  There was concern about a lumbar radiculopathy on EMG.  He has a history of abnormal gait and left lower extremity weakness.  There was also previous concern about psoas muscle atrophy.    We attempted to refer him to a clinic in [place] at his request to consider the intercept procedure which has not been performed to my knowledge by anyone locally.  The patient states that he has tried to reach out to them a couple of times and that they have not contacted him and he has been unable to schedule a consultation.       PEG Assessment   What number best describes your pain on average in the past week?8  What number best describes how, during the past week, pain has interfered with your enjoyment of life?10  What number best describes how, during the past week, pain has interfered with your general activity?  10    --  The aforementioned information the Chief Complaint section and above subjective data including any HPI data, and also the Review of Systems data, has been personally reviewed and affirmed.  --        Review of Pertinent Medical Data ---  E-mac report is reviewed:  I reviewed the document in the electronic form under the PDMP tab in the Epic EMR...  - In this function, the report is a current report in as close to real-time as possible.  - The report was available for immediate review.    - I did trudy the report as reviewed.  - There is not concern for aberrant behavior based on this ekasper review.    Review of November 29, 2022 office note from Dr Gonzales, rheum, treating tophaceous gout  with allopurinol.    Review of February 13, 2023 note from neurologist Dr. Beto Brizuela.  MRI for low back pain.  Noted history of transverse myelitis and cervical spine    Review of February 14, 2023 note from physiatrist Dr. Meyer @ [facility]... Patient declined PT and OT from the PM&R specialist    The following portions of the patient's history were reviewed and updated as appropriate: allergies, current medications, past family history, past medical history, past social history, past surgical history and problem list.    -------    The following portions of the patient's history were reviewed and updated as appropriate: allergies, current medications, past family history, past medical history, past social history, past surgical history and problem list.    Allergies   Allergen Reactions   • Statins Other (See Comments)     Muscle weakness in legs and arms one time only  Muscle weakness in legs and arms one time only  Other reaction(s): Other (See Comments)  Muscle weakness in legs and arms one time only    Muscle weakness in legs and arms one time only   • Adhesive Tape Other (See Comments)     Severe burn one time when left on too long   • Wound Dressing Adhesive Other (See Comments)     Severe burn one time when left on too long         Current Outpatient Medications:   •  allopurinol (ZYLOPRIM) 300 MG tablet, , Disp: , Rfl:   •  aspirin 81 MG chewable tablet, Chew 81 mg Daily., Disp: , Rfl:   •  folic acid (FOLVITE) 400 MCG tablet, Take 400 mcg by mouth Daily., Disp: , Rfl:   •  Lovaza 1 g capsule, TAKE TWO CAPSULES BY MOUTH TWICE A DAY, Disp: 360 capsule, Rfl: 1  •  Plavix 75 MG tablet, TAKE ONE TABLET BY MOUTH DAILY, Disp: 90 tablet, Rfl: 1  •  Synthroid 25 MCG tablet, Take 1 tablet by mouth Daily. In addition to 50mcg for a total of 75mcg daily. Brand name only, Disp: 90 tablet, Rfl: 0  •  Synthroid 50 MCG tablet, TAKE ONE TABLET BY MOUTH DAILY, Disp: 90 tablet, Rfl: 0  •  vitamin D (ERGOCALCIFEROL) 1.25 MG  (14879 UT) capsule capsule, Take 1 capsule by mouth 1 (One) Time Per Week., Disp: 12 capsule, Rfl: 3  •  Zetia 10 MG tablet, TAKE ONE TABLET BY MOUTH DAILY, Disp: 90 tablet, Rfl: 1    Current Outpatient Medications on File Prior to Visit   Medication Sig Dispense Refill   • allopurinol (ZYLOPRIM) 300 MG tablet      • aspirin 81 MG chewable tablet Chew 81 mg Daily.     • folic acid (FOLVITE) 400 MCG tablet Take 400 mcg by mouth Daily.     • Lovaza 1 g capsule TAKE TWO CAPSULES BY MOUTH TWICE A  capsule 1   • Plavix 75 MG tablet TAKE ONE TABLET BY MOUTH DAILY 90 tablet 1   • Synthroid 25 MCG tablet Take 1 tablet by mouth Daily. In addition to 50mcg for a total of 75mcg daily. Brand name only 90 tablet 0   • Synthroid 50 MCG tablet TAKE ONE TABLET BY MOUTH DAILY 90 tablet 0   • vitamin D (ERGOCALCIFEROL) 1.25 MG (30528 UT) capsule capsule Take 1 capsule by mouth 1 (One) Time Per Week. 12 capsule 3   • Zetia 10 MG tablet TAKE ONE TABLET BY MOUTH DAILY 90 tablet 1     No current facility-administered medications on file prior to visit.       Patient Active Problem List   Diagnosis   • Abdominal aortic aneurysm (HCC)   • Hypercholesterolemia   • Coronary artery disease due to calcified coronary lesion   • Chronic left-sided low back pain with left-sided sciatica   • Chronic midline low back pain without sciatica   • Degeneration of lumbar intervertebral disc   • Weakness of left leg   • Stage 3 chronic kidney disease (HCC)   • Opioid withdrawal (HCC)   • Coronary artery disease involving native coronary artery of native heart without angina pectoris   • Other specified symptoms and signs involving the circulatory and respiratory systems    • Edema leg   • Foot pain   • Atherosclerosis of coronary artery   • Vitamin D deficiency   • Seborrheic dermatitis   • Rash   • Primary lateral sclerosis (HCC)   • Personal history of pneumonia (recurrent)   • Personal history of other drug therapy   • Osteopenia   • Myelopathy  (HCC)   • Muscle weakness of extremity   • Melanocytic nevus   • Insomnia   • Inguinal hernia   • Hypothyroidism   • History of osteopenia   • Gout   • Benign prostatic hyperplasia   • At risk for osteoporosis   • Anemia   • Acquired atrophy of thyroid   • Lumbosacral radiculopathy   • Lumbar facet arthropathy   • Disorder of bone and cartilage   • Left lumbar radiculopathy       Past Medical History:   Diagnosis Date   • AAA (abdominal aortic aneurysm) without rupture    • Arthritis     back   • Back pain    • Backache    • CAD (coronary atherosclerotic disease)    • Disease of thyroid gland    • Dyslipidemia    • Edema    • History of transfusion    • Hyperhomocystinemia (HCC)    • Hyperlipidemia    • Stage 3 chronic kidney disease (HCC)        Past Surgical History:   Procedure Laterality Date   • APPENDECTOMY     • CARDIAC SURGERY     • CORONARY ANGIOPLASTY WITH STENT PLACEMENT     • EPIDURAL Left 5/25/2022    Procedure: LUMBAR/SACRAL TRANSFORAMINAL EPIDURAL - left L4 and Left S1;  Surgeon: Deepak Mckeon MD;  Location: [facility] OR;  Service: Pain Management;  Laterality: Left;   • EXTRACORPOREAL SHOCKWAVE LITHOTRIPSY (ESWL), STENT INSERTION/REMOVAL Right 2/24/2017    Procedure: CYSTO RETROGRADE WITH STENT PLACEMENT;  Surgeon: Janes López MD;  Location: [facility];  Service:    • LUMBAR EPIDURAL INJECTION N/A 11/16/2022    Procedure: LUMBAR EPIDURAL STEROID INJECTION INTRALAMINAR L5-S1 (LEFT PARAMEDIAN APPROACH);  Surgeon: Carly Hairston MD;  Location: [facility] MAIN OR;  Service: Pain Management;  Laterality: N/A;   • MEDIAL BRANCH BLOCK Bilateral 6/23/2022    Procedure: Lumbar medial branch block bilateral L2-L5;  Surgeon: Deepak Mckeon MD;  Location: [facility] MAIN OR;  Service: Pain Management;  Laterality: Bilateral;   • MEDIAL BRANCH BLOCK Bilateral 7/12/2022    Procedure: lumbar medial branch block  bilateral L2-L5;  Surgeon: Deepak Mckeon MD;  Location: [facility] MA[facility] OR;  Service:  "Pain Management;  Laterality: Bilateral;   • RADIOFREQUENCY ABLATION Bilateral 8/9/2022    Procedure: L2-L5 RADIOFREQUENCY ABLATION LUMBAR;  Surgeon: Deepak Mckeon MD;  Location: [facility] MAIN OR;  Service: Pain Management;  Laterality: Bilateral;       Family History   Problem Relation Age of Onset   • Heart disease Mother    • Stroke Father        Social History     Socioeconomic History   • Marital status: Single   • Number of children: 0   • Years of education: COLLEGE   Tobacco Use   • Smoking status: Never   • Smokeless tobacco: Never   Substance and Sexual Activity   • Alcohol use: No     Comment: occ caffeine use   • Drug use: No   • Sexual activity: Defer       -------        Review of Systems   Constitutional: Positive for fatigue. Negative for activity change, chills and fever.   HENT: Negative for congestion.    Eyes: Negative for visual disturbance.   Respiratory: Negative for chest tightness and shortness of breath.    Cardiovascular: Negative for chest pain.   Gastrointestinal: Negative for abdominal pain, constipation and diarrhea.   Genitourinary: Negative for difficulty urinating and dysuria.   Musculoskeletal: Positive for back pain.   Neurological: Positive for weakness (left leg). Negative for dizziness, light-headedness, numbness and headaches.   Psychiatric/Behavioral: Positive for sleep disturbance. Negative for agitation. The patient is not nervous/anxious.        Vitals:    02/20/23 1109   BP: 143/82   Pulse: 85   Temp: 98.2 °F (36.8 °C)   SpO2: 95%   Weight: 74.8 kg (164 lb 12.8 oz)   Height: 172.7 cm (68\")   PainSc:   8   PainLoc: Back         Objective   Physical Exam  VSS, NNR, NCAT, NMNA, NRD, AAOx3.        Assessment & Plan   Diagnoses and all orders for this visit:    1. Chronic pain syndrome (Primary)    2. Left lumbar radiculopathy      At this point I have little to offer the gentleman.  He does continue to follow with neurologist.  He has radicular type symptoms unresponsive to " epidural injections.  There are some significant barriers to consider treatment with advanced neuromodulation.    We will await his pending MRI of the lumbar spine with interest to see if there is any other appearance of the lesions in the area which could explain severity of this suffering from low back pain.    --- Follow-up CLYDE CAMPBELL REPORT  SHANNAN report has been reviewed and scanned into the patient's chart.  Date of last SHANNAN : as above.  No current use of opioids.         Dictated utilizing Dragon dictation.       Patient remained masked during entire encounter. No cough present. I donned a mask and eye protection throughout entire visit. Prior to donning mask and eye protection, hand hygiene was performed, as well as when it was doffed.  I was closer than 6 feet, but not for an extended period of time. No obvious exposure to any bodily fluids.    ---      Vitals:    02/20/23 1109   PainSc:   8   PainLoc: Back          Mannie Fajardo reports a pain score of 8.  Given his pain assessment as noted, treatment options were discussed and the following options were decided upon as a follow-up plan to address the patient's pain: continuation of current treatment plan for pain.

## 2023-02-20 NOTE — PATIENT INSTRUCTIONS
----------  Education about SCS therapy:    -  This was an extended office visit in which we entered into discussion about advanced pain relieving techniques, and discussed implantable pain therapies.  We discussed advanced neuromodulation in the form of Spinal Cord Stimulation.  This is a reasonable therapy for patients who have exhausted basic nonnarcotic options, basic modalities and physical therapies, and do not have any other reasonable surgical options.  This therapy as an alternative to long term high dose opioid therapy.    -  Risks include but are not limited to bleeding, infection, injury, paralysis, nerve injury, dural puncture, and risk for postprocedural pain.  Implanted equipment risks include but are not limited to lead migration, lead fracture, risk of loss of pain relieving stimulation, risk of electrical shock, and risk of system failure.    - We discussed the theory and basic science behind SCS therapy including but not limited to energy delivery and relevant anatomy, in terms that are easy to understand and also with use of illustrative devices.  Spinal Cord Stimulation therapies apply an electromagnetic field to a specific area on the spinal cord (Dorsal Column) to attempt to block transmission of painful signals from the peripheral nerves to the brain.    -  We discussed that prior to trialing, I request that patients review relevant materials and perform some research, and also have a follow up education session with a device specialist from the .  Also, insurance requires a presurgical psychological evaluation.  When these have been completed, and all the patient's questions have been answered to their satisfaction, then we will plan to request authorization for trialing.   - We discussed the trialing process (aka Phase 1)  that usually lasts a week, and the temporary nature of this trial.  Trial success will determine whether or not we proceed to implant.  We discussed  reasonable expectations, and that I feel that consistent 50% pain relief is medically successful and is a reasonable expectation to justify moving forward to permanent implant.    -  Additional risks of Phase 1 include but are not limited to bleeding on insertion, bleeding on lead removal, and procedural site soreness.  - We discussed the percutaneous surgical implant, including postsurgical restrictions, risks, and alternatives.   For spinal cord stimulation implanted device (aka SCS Phase 2) there is usually a midline vertical incision for the spinally implanted leads, and also a horizontal incision in the posterior lumbar flank for implantation of the battery & computer (aka IPG).  The leads are tunneled from the midline incision to the medial aspect of the battery pocket incision.    -  Postoperative restrictions include limiting the following activity as much as possible for 90 days:  Lifting >10 lbs, bending at the waist, stretching/reaching overhead, and twisting.  ----------

## 2023-03-02 RX ORDER — LEVOTHYROXINE SODIUM 50 MCG
TABLET ORAL
Qty: 90 TABLET | Refills: 0 | Status: SHIPPED | OUTPATIENT
Start: 2023-03-02

## 2023-03-08 ENCOUNTER — TELEPHONE (OUTPATIENT)
Dept: NEUROLOGY | Facility: CLINIC | Age: 78
End: 2023-03-08

## 2023-03-08 NOTE — TELEPHONE ENCOUNTER
Caller: Mannie Fajardo    Relationship: Self    Best call back number: [phone]    What was the call regarding: PATIENT IS CALLING BECAUSE DR. LINARES'S MRI ORDER LEFT OFF THE PART THAT HE REQUIRES SEDATION AND IT CANNOT BE SCHEDULED LIKE THAT UNLESS IT IS ON THE ORDERS. PLEASE UPDATE MRI ORDER.    Do you require a callback: YES

## 2023-03-09 DIAGNOSIS — E03.9 HYPOTHYROIDISM, UNSPECIFIED TYPE: Primary | ICD-10-CM

## 2023-03-13 ENCOUNTER — TELEPHONE (OUTPATIENT)
Dept: NEUROLOGY | Facility: CLINIC | Age: 78
End: 2023-03-13
Payer: MEDICARE

## 2023-03-13 DIAGNOSIS — R20.0 ANESTHESIA: Primary | ICD-10-CM

## 2023-03-13 NOTE — TELEPHONE ENCOUNTER
Dr. Brizuela    [place] with scheduling called and said that this patient needs pre-admission testing per radiology since he's having his MRI with anesthesia.  They need orders for a BMP and chest xray.    Thank you!    Karmen

## 2023-04-10 NOTE — PROGRESS NOTES
04/11/23 0003   Pre-Procedure Phone Call   Procedure Time Verified Yes   Arrival Time 1115   Procedure Location Verified Yes   Medical History Reviewed No   NPO Status Reinforced Yes   Ride and Caregiver Arranged Yes   Patient Knows to Bring Current Medications Yes   Bring Outside Films Requested No

## 2023-04-11 ENCOUNTER — LAB (OUTPATIENT)
Dept: LAB | Facility: HOSPITAL | Age: 78
End: 2023-04-11
Payer: MEDICARE

## 2023-04-11 ENCOUNTER — APPOINTMENT (OUTPATIENT)
Dept: MRI IMAGING | Facility: HOSPITAL | Age: 78
End: 2023-04-11
Payer: MEDICARE

## 2023-04-11 ENCOUNTER — HOSPITAL ENCOUNTER (OUTPATIENT)
Dept: INTERVENTIONAL RADIOLOGY/VASCULAR | Facility: HOSPITAL | Age: 78
Discharge: HOME | End: 2023-04-11
Payer: MEDICARE

## 2023-04-11 ENCOUNTER — HOSPITAL ENCOUNTER (OUTPATIENT)
Dept: GENERAL RADIOLOGY | Facility: HOSPITAL | Age: 78
Discharge: HOME | End: 2023-04-11
Payer: MEDICARE

## 2023-04-11 ENCOUNTER — ANESTHESIA EVENT (OUTPATIENT)
Dept: MRI IMAGING | Facility: HOSPITAL | Age: 78
End: 2023-04-11
Payer: MEDICARE

## 2023-04-11 ENCOUNTER — HOSPITAL ENCOUNTER (OUTPATIENT)
Dept: MRI IMAGING | Facility: HOSPITAL | Age: 78
Discharge: HOME | End: 2023-04-11
Payer: MEDICARE

## 2023-04-11 ENCOUNTER — ANESTHESIA (OUTPATIENT)
Dept: MRI IMAGING | Facility: HOSPITAL | Age: 78
End: 2023-04-11
Payer: MEDICARE

## 2023-04-11 VITALS
HEIGHT: 68 IN | OXYGEN SATURATION: 95 % | WEIGHT: 158 LBS | DIASTOLIC BLOOD PRESSURE: 80 MMHG | SYSTOLIC BLOOD PRESSURE: 141 MMHG | TEMPERATURE: 97.6 F | BODY MASS INDEX: 23.95 KG/M2 | RESPIRATION RATE: 14 BRPM | HEART RATE: 77 BPM

## 2023-04-11 DIAGNOSIS — R20.0 ANESTHESIA: ICD-10-CM

## 2023-04-11 DIAGNOSIS — M54.32 LEFT SIDED SCIATICA: ICD-10-CM

## 2023-04-11 LAB
ANION GAP SERPL CALCULATED.3IONS-SCNC: 9.5 MMOL/L (ref 5–15)
BUN SERPL-MCNC: 19 MG/DL (ref 8–23)
BUN/CREAT SERPL: 13.2 (ref 7–25)
CALCIUM SPEC-SCNC: 9.8 MG/DL (ref 8.6–10.5)
CHLORIDE SERPL-SCNC: 106 MMOL/L (ref 98–107)
CO2 SERPL-SCNC: 23.5 MMOL/L (ref 22–29)
CREAT SERPL-MCNC: 1.44 MG/DL (ref 0.76–1.27)
EGFRCR SERPLBLD CKD-EPI 2021: 49.7 ML/MIN/1.73
GLUCOSE SERPL-MCNC: 119 MG/DL (ref 65–99)
HOLD SPECIMEN: NORMAL
POTASSIUM SERPL-SCNC: 4.2 MMOL/L (ref 3.5–5.2)
SODIUM SERPL-SCNC: 139 MMOL/L (ref 136–145)
WHOLE BLOOD HOLD SPECIMEN: NORMAL

## 2023-04-11 PROCEDURE — 71046 X-RAY EXAM CHEST 2 VIEWS: CPT

## 2023-04-11 PROCEDURE — 25010000002 PROPOFOL 10 MG/ML EMULSION: Performed by: ANESTHESIOLOGY

## 2023-04-11 PROCEDURE — 80048 BASIC METABOLIC PNL TOTAL CA: CPT

## 2023-04-11 PROCEDURE — 72148 MRI LUMBAR SPINE W/O DYE: CPT

## 2023-04-11 PROCEDURE — 36415 COLL VENOUS BLD VENIPUNCTURE: CPT

## 2023-04-11 RX ORDER — EPHEDRINE SULFATE 50 MG/ML
INJECTION, SOLUTION INTRAVENOUS AS NEEDED
Status: DISCONTINUED | OUTPATIENT
Start: 2023-04-11 | End: 2023-04-11 | Stop reason: SURG

## 2023-04-11 RX ORDER — ONDANSETRON 2 MG/ML
4 INJECTION INTRAMUSCULAR; INTRAVENOUS ONCE AS NEEDED
OUTPATIENT
Start: 2023-04-11

## 2023-04-11 RX ORDER — DIPHENHYDRAMINE HYDROCHLORIDE 50 MG/ML
12.5 INJECTION INTRAMUSCULAR; INTRAVENOUS
OUTPATIENT
Start: 2023-04-11

## 2023-04-11 RX ORDER — EPHEDRINE SULFATE 50 MG/ML
5 INJECTION, SOLUTION INTRAVENOUS ONCE AS NEEDED
OUTPATIENT
Start: 2023-04-11

## 2023-04-11 RX ORDER — LEVOTHYROXINE SODIUM 0.05 MG/1
1 TABLET ORAL DAILY
COMMUNITY
Start: 2023-03-02

## 2023-04-11 RX ORDER — PROMETHAZINE HYDROCHLORIDE 25 MG/1
25 TABLET ORAL ONCE AS NEEDED
OUTPATIENT
Start: 2023-04-11

## 2023-04-11 RX ORDER — SODIUM CHLORIDE, SODIUM LACTATE, POTASSIUM CHLORIDE, CALCIUM CHLORIDE 600; 310; 30; 20 MG/100ML; MG/100ML; MG/100ML; MG/100ML
INJECTION, SOLUTION INTRAVENOUS CONTINUOUS PRN
Status: DISCONTINUED | OUTPATIENT
Start: 2023-04-11 | End: 2023-04-11 | Stop reason: SURG

## 2023-04-11 RX ORDER — PROMETHAZINE HYDROCHLORIDE 25 MG/1
25 SUPPOSITORY RECTAL ONCE AS NEEDED
OUTPATIENT
Start: 2023-04-11

## 2023-04-11 RX ORDER — IPRATROPIUM BROMIDE AND ALBUTEROL SULFATE 2.5; .5 MG/3ML; MG/3ML
3 SOLUTION RESPIRATORY (INHALATION) ONCE AS NEEDED
OUTPATIENT
Start: 2023-04-11

## 2023-04-11 RX ORDER — HYDROCODONE BITARTRATE AND ACETAMINOPHEN 7.5; 325 MG/1; MG/1
1 TABLET ORAL EVERY 4 HOURS PRN
OUTPATIENT
Start: 2023-04-11 | End: 2023-04-18

## 2023-04-11 RX ORDER — CYCLOBENZAPRINE HCL 5 MG
5 TABLET ORAL
COMMUNITY
Start: 2023-04-04 | End: 2023-04-20

## 2023-04-11 RX ORDER — LIDOCAINE HYDROCHLORIDE 20 MG/ML
INJECTION, SOLUTION INFILTRATION; PERINEURAL AS NEEDED
Status: DISCONTINUED | OUTPATIENT
Start: 2023-04-11 | End: 2023-04-11 | Stop reason: SURG

## 2023-04-11 RX ORDER — DROPERIDOL 2.5 MG/ML
0.62 INJECTION, SOLUTION INTRAMUSCULAR; INTRAVENOUS
OUTPATIENT
Start: 2023-04-11

## 2023-04-11 RX ORDER — NALOXONE HCL 0.4 MG/ML
0.2 VIAL (ML) INJECTION AS NEEDED
OUTPATIENT
Start: 2023-04-11

## 2023-04-11 RX ORDER — FENTANYL CITRATE 50 UG/ML
25 INJECTION, SOLUTION INTRAMUSCULAR; INTRAVENOUS
OUTPATIENT
Start: 2023-04-11

## 2023-04-11 RX ORDER — HYDROMORPHONE HYDROCHLORIDE 1 MG/ML
0.25 INJECTION, SOLUTION INTRAMUSCULAR; INTRAVENOUS; SUBCUTANEOUS
OUTPATIENT
Start: 2023-04-11

## 2023-04-11 RX ORDER — HYDROCODONE BITARTRATE AND ACETAMINOPHEN 5; 325 MG/1; MG/1
1 TABLET ORAL ONCE AS NEEDED
OUTPATIENT
Start: 2023-04-11

## 2023-04-11 RX ORDER — HYDRALAZINE HYDROCHLORIDE 20 MG/ML
5 INJECTION INTRAMUSCULAR; INTRAVENOUS
OUTPATIENT
Start: 2023-04-11

## 2023-04-11 RX ORDER — LABETALOL HYDROCHLORIDE 5 MG/ML
5 INJECTION, SOLUTION INTRAVENOUS
OUTPATIENT
Start: 2023-04-11

## 2023-04-11 RX ORDER — PROPOFOL 10 MG/ML
VIAL (ML) INTRAVENOUS AS NEEDED
Status: DISCONTINUED | OUTPATIENT
Start: 2023-04-11 | End: 2023-04-11 | Stop reason: SURG

## 2023-04-11 RX ORDER — FLUMAZENIL 0.1 MG/ML
0.2 INJECTION INTRAVENOUS AS NEEDED
OUTPATIENT
Start: 2023-04-11

## 2023-04-11 RX ADMIN — EPHEDRINE SULFATE 15 MG: 50 INJECTION INTRAVENOUS at 12:45

## 2023-04-11 RX ADMIN — SODIUM CHLORIDE, POTASSIUM CHLORIDE, SODIUM LACTATE AND CALCIUM CHLORIDE: 600; 310; 30; 20 INJECTION, SOLUTION INTRAVENOUS at 12:14

## 2023-04-11 RX ADMIN — PROPOFOL 200 MG: 10 INJECTION, EMULSION INTRAVENOUS at 12:20

## 2023-04-11 RX ADMIN — LIDOCAINE HYDROCHLORIDE 60 MG: 20 INJECTION, SOLUTION INFILTRATION; PERINEURAL at 12:19

## 2023-04-11 RX ADMIN — EPHEDRINE SULFATE 10 MG: 50 INJECTION INTRAVENOUS at 12:41

## 2023-04-11 NOTE — NURSING NOTE
"Spoke with Lambert Sanders, patient's ride, to notify of pt's discharge time of 2:00pm today. States \"it might be 3 or 4 before I can get there.\" Desk phone number given to Mr. Sanders with instructions to call when approaching hospital to help facilitate pt .   "

## 2023-04-11 NOTE — ANESTHESIA PROCEDURE NOTES
Airway  Date/Time: 4/11/2023 12:21 PM  Airway not difficult    General Information and Staff    Patient location during procedure: OR  Anesthesiologist: Tejas Rendon MD    Indications and Patient Condition    Preoxygenated: yes  Mask difficulty assessment: 0 - not attempted    Final Airway Details  Final airway type: supraglottic airway      Successful airway: LMA  Size 4     Number of attempts at approach: 1  Assessment: lips, teeth, and gum same as pre-op and atraumatic intubation

## 2023-04-11 NOTE — ANESTHESIA PREPROCEDURE EVALUATION
Anesthesia Evaluation     NPO Solid Status: > 8 hours  NPO Liquid Status: > 2 hours           Airway   Mallampati: III  TM distance: >3 FB  Neck ROM: full  Dental - normal exam     Pulmonary - normal exam   Cardiovascular - normal exam    (+) CAD, cardiac stents more than 12 months ago hyperlipidemia,       Neuro/Psych  GI/Hepatic/Renal/Endo    (+)   renal disease CRI,     Musculoskeletal     (+) back pain,   Abdominal    Substance History      OB/GYN          Other   arthritis,                      Anesthesia Plan    ASA 3     general     (I have reviewed the patient's history with the patient and the chart, including all pertinent laboratory results and imaging. I have explained the risks of anesthesia including but not limited to dental damage, sore throat, nausea, corneal abrasion, nerve injury, MI, stroke, and death.      /78, HR 74)    Anesthetic plan, risks, benefits, and alternatives have been provided, discussed and informed consent has been obtained with: patient.        CODE STATUS:

## 2023-04-11 NOTE — PROGRESS NOTES
Discharged home via private car with friend.  Escorted to vehicle per this RN.  No acute distress noted and tolerating po well.  All belongings returned to patient prior to discharge and discharge instructions given verbally and in writing to pt and friend.

## 2023-04-11 NOTE — NURSING NOTE
Patient arrived to bay  16 in IR preop area.  PPE worn per patient and care providers for any bedside care.

## 2023-04-11 NOTE — ANESTHESIA POSTPROCEDURE EVALUATION
Patient: Mannie Fajardo    Procedure Summary     Date: 04/11/23 Room / Location: [facility]    Anesthesia Start: 1214 Anesthesia Stop: 1306    Procedure: MRI LUMBAR SPINE WO CONTRAST Diagnosis: Left sided sciatica    Scheduled Providers:  Provider: Tejas Rendon MD    Anesthesia Type: general ASA Status: 3          Anesthesia Type: general    Vitals  No vitals data found for the desired time range.          Post Anesthesia Care and Evaluation    Patient location during evaluation: bedside  Pain management: adequate    Airway patency: patent  Anesthetic complications: No anesthetic complications    Cardiovascular status: acceptable  Respiratory status: acceptable  Hydration status: acceptable

## 2023-04-12 ENCOUNTER — TELEPHONE (OUTPATIENT)
Dept: NEUROLOGY | Facility: CLINIC | Age: 78
End: 2023-04-12

## 2023-04-12 DIAGNOSIS — G89.29 CHRONIC MIDLINE LOW BACK PAIN WITHOUT SCIATICA: Primary | ICD-10-CM

## 2023-04-12 DIAGNOSIS — M54.50 CHRONIC MIDLINE LOW BACK PAIN WITHOUT SCIATICA: Primary | ICD-10-CM

## 2023-04-14 ENCOUNTER — TELEPHONE (OUTPATIENT)
Dept: NEUROLOGY | Facility: CLINIC | Age: 78
End: 2023-04-14
Payer: MEDICARE

## 2023-04-14 NOTE — TELEPHONE ENCOUNTER
----- Message from Beto Brizuela MD sent at 4/12/2023  5:58 PM EDT -----  His follow-up MRI of the lumbar spine shows some degenerative disc disease at L4/5 and L5/S1 without significant spinal stenosis or foraminal stenosis at any level.  He does have a small AAA measuring 3.4 cm in diameter which is somewhat larger compared to the previous study/5/2018 at 2.9 cm.    I doubt AAA has anything to do with his back pain.  It still relatively small.    As I mentioned in my note I will now order a bone scan to reassess for any bony abnormality that could explain his severe chronic back pain.    Anita, please let him know    GNS

## 2023-04-19 NOTE — TELEPHONE ENCOUNTER
MRI of the lumbar spine showed mild degenerative disc disease without any compression of nerve roots.  His fifth lumbar has a developmental finding which is not worrisome.    The radiologist reported on his abdominal aortic aneurysm.  It appears he has had several tests in the past regarding this such as ultrasound and CT scans I am not sure who is following this.  If he has not seen a vascular surgeon and he should speak with his primary care about this further.    Please let him know    GNS

## 2023-04-19 NOTE — PROGRESS NOTES
Date of Office Visit: 2023  Encounter Provider: CONSUELO Higgins  Place of Service: [facility] CARDIOLOGY  Patient Name: Mannie Fajardo  :1945    Chief complaint  Follow-up hyperlipidemia, hyper homocystinemia, coronary artery disease    History of Present Illness  Patient is a 77-year-old male patient of Dr. Giordano.  Past medical history includes hyperlipidemia, abdominal aortic aneurysm, hyper homocystinemia, coronary artery disease, chronic back pain, abnormal stress test.     He was initially seen in , after an abnormal stress test, and he underwent cardiac catheterization that revealed severe disease of the circumflex artery for which he received a drug-coated stent.  He has not had any intervening events since then, though he has maintained aspirin and Plavix therapy.  In 2016 with complaints of chest pain Lexiscan perfusion study was negative for ischemia.  Stress screening study in  showed a small abdominal aortic aneurysm for his vascular surgeons felt to be stable.  By 2021 had had progressive edema.  An echocardiogram showed an ejection fraction of 62% with mild left ventricular upper atrophy diastolic function was indeterminate.  There is aortic valve calcification without stenosis. The function was normal. Echocardiogram 2022 after complaints of left arm pain and increased PVCs seen on EKG showed normal LV systolic function with EF of 56%, moderate concentric LVH, grade 1 diastolic dysfunction, mild thickening of the aortic valve, trace mitral regurgitation, trace tricuspid regurgitation with normal RVSP.  Stress perfusion study 2022 showed small amount of mild ischemia in the distal septum with EF of 61% considered intermediate risk study.  Patient was asymptomatic without chest pain or discomfort.  Given his chronic kidney disease it was decided to treat medically.  His blood pressure was borderline low prohibiting the addition of  antianginals.    Interval history  Patient presents today for routine follow-up.  I will visit with him for the first time today and have reviewed his medical record. Since last visit, he has overall been doing well. He denies palpitations, SOA, dizziness, chest pain or chest pressure, syncope or presyncope. He states edema is stable but fatigue is worsening. He is not currently exercising. BP today is at goal and he reports readings at home are similar.  He has been struggling with chronic back pain that is limiting his activity as well as his sleep.  He is following with neurology and has had negative MRI thus far and there are plans for a bone scan in the near future.  No plans for surgical intervention at this time.  He continues to follow with Dr. Alexis Reed for chronic kidney disease.    Labs 4/11/2023 show creatinine 1.44, sodium 139, potassium 4.2, calcium 9.8  Past Medical History:   Diagnosis Date   • AAA (abdominal aortic aneurysm) without rupture    • Arthritis     back   • Back pain    • Backache    • CAD (coronary atherosclerotic disease)    • Disease of thyroid gland    • Dyslipidemia    • Edema    • History of transfusion    • Hyperhomocystinemia    • Hyperlipidemia    • Stage 3 chronic kidney disease      Past Surgical History:   Procedure Laterality Date   • APPENDECTOMY     • CARDIAC SURGERY     • CORONARY ANGIOPLASTY WITH STENT PLACEMENT     • EPIDURAL Left 5/25/2022    Procedure: LUMBAR/SACRAL TRANSFORAMINAL EPIDURAL - left L4 and Left S1;  Surgeon: Deepak Mckeon MD;  Location: [facility];  Service: Pain Management;  Laterality: Left;   • EXTRACORPOREAL SHOCKWAVE LITHOTRIPSY (ESWL), STENT INSERTION/REMOVAL Right 2/24/2017    Procedure: CYSTO RETROGRADE WITH STENT PLACEMENT;  Surgeon: Janes López MD;  Location: [facility];  Service:    • LUMBAR EPIDURAL INJECTION N/A 11/16/2022    Procedure: LUMBAR EPIDURAL STEROID INJECTION INTRALAMINAR L5-S1 (LEFT PARAMEDIAN APPROACH);   Surgeon: Carly Hairston MD;  Location: [facility];  Service: Pain Management;  Laterality: N/A;   • MEDIAL BRANCH BLOCK Bilateral 6/23/2022    Procedure: Lumbar medial branch block bilateral L2-L5;  Surgeon: Deepak Mckeon MD;  Location: [facility];  Service: Pain Management;  Laterality: Bilateral;   • MEDIAL BRANCH BLOCK Bilateral 7/12/2022    Procedure: lumbar medial branch block  bilateral L2-L5;  Surgeon: Deepak Mckeon MD;  Location: [facility];  Service: Pain Management;  Laterality: Bilateral;   • RADIOFREQUENCY ABLATION Bilateral 8/9/2022    Procedure: L2-L5 RADIOFREQUENCY ABLATION LUMBAR;  Surgeon: Deepak Mckeon MD;  Location: [facility];  Service: Pain Management;  Laterality: Bilateral;     Outpatient Medications Prior to Visit   Medication Sig Dispense Refill   • allopurinol (ZYLOPRIM) 300 MG tablet Take 1 tablet by mouth Daily.     • aspirin 81 MG chewable tablet Chew 1 tablet Daily.     • folic acid (FOLVITE) 400 MCG tablet Take 1 tablet by mouth Daily.     • levothyroxine (SYNTHROID, LEVOTHROID) 50 MCG tablet Take 1 tablet by mouth Daily.     • Lovaza 1 g capsule TAKE TWO CAPSULES BY MOUTH TWICE A  capsule 1   • Plavix 75 MG tablet TAKE ONE TABLET BY MOUTH DAILY 90 tablet 1   • vitamin D (ERGOCALCIFEROL) 1.25 MG (03413 UT) capsule capsule Take 1 capsule by mouth 1 (One) Time Per Week. 12 capsule 3   • Zetia 10 MG tablet TAKE ONE TABLET BY MOUTH DAILY 90 tablet 1   • cyclobenzaprine (FLEXERIL) 5 MG tablet Take 1 tablet by mouth. (Patient not taking: Reported on 4/20/2023)     • Synthroid 25 MCG tablet Take 1 tablet by mouth Daily. In addition to 50mcg for a total of 75mcg daily. Brand name only (Patient not taking: Reported on 4/20/2023) 90 tablet 0   • Synthroid 50 MCG tablet TAKE ONE TABLET BY MOUTH DAILY (Patient not taking: Reported on 4/20/2023) 90 tablet 0     No facility-administered medications prior to visit.       Allergies as of 04/20/2023 - Reviewed  "04/20/2023   Allergen Reaction Noted   • Statins Other (See Comments) 07/18/2011   • Adhesive tape Other (See Comments) 04/14/2016   • Wound dressing adhesive Other (See Comments) 10/10/2011     Social History     Socioeconomic History   • Marital status: Single   • Number of children: 0   • Years of education: COLLEGE   Tobacco Use   • Smoking status: Never   • Smokeless tobacco: Never   Substance and Sexual Activity   • Alcohol use: No     Comment: occ caffeine use   • Drug use: No   • Sexual activity: Defer     Family History   Problem Relation Age of Onset   • Heart disease Mother    • Stroke Father      Review of Systems   Constitutional: Positive for malaise/fatigue.   Cardiovascular: Negative for chest pain, claudication, dyspnea on exertion, leg swelling, near-syncope, orthopnea, palpitations, paroxysmal nocturnal dyspnea and syncope.   Respiratory: Negative for shortness of breath.    Musculoskeletal: Positive for back pain. Negative for falls.   Neurological: Negative for brief paralysis, dizziness, headaches and light-headedness.   All other systems reviewed and are negative.       Objective:     Vitals:    04/20/23 1011   BP: 118/74   Pulse: 72   Weight: 73.9 kg (163 lb)   Height: 172.7 cm (68\")     Body mass index is 24.78 kg/m².    Vitals reviewed.   Constitutional:       General: Not in acute distress.     Appearance: Well-developed and not in distress. Frail. Not diaphoretic.   HENT:      Head: Normocephalic.   Pulmonary:      Effort: Pulmonary effort is normal. No respiratory distress.      Breath sounds: Normal breath sounds. No wheezing. No rhonchi. No rales.   Cardiovascular:      Normal rate. Regular rhythm.      Murmurs: There is no murmur.   Pulses:     Intact distal pulses.   Edema:     Peripheral edema absent.   Skin:     General: Skin is warm and dry. There is no cyanosis.      Findings: No rash.   Neurological:      Mental Status: Alert and oriented to person, place, and time. "   Psychiatric:         Behavior: Behavior normal. Behavior is cooperative.         Thought Content: Thought content normal.         Judgment: Judgment normal.       Lab Review:     ECG 12 Lead    Date/Time: 4/20/2023 11:05 AM  Performed by: Jenna Aguilar APRN  Authorized by: Jenna Aguilar APRN   Comparison: compared with previous ECG   Similar to previous ECG  Rhythm: sinus rhythm  Rate: normal  BPM: 72  QRS axis: normal  Other: no other findings  Comments: Similar to prior.  No new ischemic changes          Assessment:       Diagnosis Plan   1. Coronary artery disease involving native coronary artery of native heart without angina pectoris        2. Hypercholesterolemia        3. PVC (premature ventricular contraction)        4. Renal insufficiency        5. Edema leg        6. Carotid artery plaque, bilateral          Plan:       1. Coronary artery disease: With history of drug-eluting stent placement.  Recent stress test 5/2022 showed small amount of mild ischemia in the distal septum.  Medical management was recommended given chronic kidney disease and lack of symptoms/chest pain.  Patient remains on aspirin, Plavix, Zetia, Lovaza.  History of significant statin intolerance. He continues to deny chest pain or other anginal symptoms, however he is very inactive due to his increasing back pain.   2. Dyslipidemia: Managed per PCP.  On Lovaza and Zetia due to history of significant statin intolerance.  3. History of PVCs: None present on EKG today.  Asymptomatic.  4. Chronic kidney disease: Follows with nephrology   5. Chronic back pain: Follows with rheumatology and neurology.  MRI thus far has been negative.  6. Hyper homocystinemia: On folic acid  7. Leg pain: With normal ABIs 10/2020  8. Carotid artery plaque: Follows with vascular surgery and felt to be stable, per patient report      Time Spent: I spent 30 minutes caring for Mannie on this date of service. This time includes time spent by me in the  following activities: preparing for the visit, reviewing tests, performing a medically appropriate examination and/or evaluation, counseling and educating the patient/family/caregiver, ordering medications, tests, or procedures and documenting information in the medical record.   I spent 1 minutes on the separately reported service of ECG. This time is not included in the time used to support the E/M service also reported today.        Your medication list          Accurate as of April 20, 2023 11:06 AM. If you have any questions, ask your nurse or doctor.            CHANGE how you take these medications      Instructions Last Dose Given Next Dose Due   levothyroxine 50 MCG tablet  Commonly known as: SYNTHROID, LEVOTHROID  What changed: Another medication with the same name was removed. Continue taking this medication, and follow the directions you see here.  Changed by: CONSUELO Higgins      Take 1 tablet by mouth Daily.          CONTINUE taking these medications      Instructions Last Dose Given Next Dose Due   allopurinol 300 MG tablet  Commonly known as: ZYLOPRIM      Take 1 tablet by mouth Daily.       aspirin 81 MG chewable tablet      Chew 1 tablet Daily.       folic acid 400 MCG tablet  Commonly known as: FOLVITE      Take 1 tablet by mouth Daily.       Lovaza 1 g capsule  Generic drug: omega-3 acid ethyl esters      TAKE TWO CAPSULES BY MOUTH TWICE A DAY       Plavix 75 MG tablet  Generic drug: clopidogrel      TAKE ONE TABLET BY MOUTH DAILY       vitamin D 1.25 MG (19831 UT) capsule capsule  Commonly known as: ERGOCALCIFEROL      Take 1 capsule by mouth 1 (One) Time Per Week.       Zetia 10 MG tablet  Generic drug: ezetimibe      TAKE ONE TABLET BY MOUTH DAILY          STOP taking these medications    cyclobenzaprine 5 MG tablet  Commonly known as: FLEXERIL  Stopped by: CONSUELO Higgins               Patient is no longer taking -.  I corrected the med list to reflect this.  I did not stop these  medications.      Dictated utilizing Dragon dictation

## 2023-04-20 ENCOUNTER — OFFICE VISIT (OUTPATIENT)
Dept: CARDIOLOGY | Facility: CLINIC | Age: 78
End: 2023-04-20
Payer: MEDICARE

## 2023-04-20 VITALS
BODY MASS INDEX: 24.71 KG/M2 | HEIGHT: 68 IN | DIASTOLIC BLOOD PRESSURE: 74 MMHG | HEART RATE: 72 BPM | SYSTOLIC BLOOD PRESSURE: 118 MMHG | WEIGHT: 163 LBS

## 2023-04-20 DIAGNOSIS — N28.9 RENAL INSUFFICIENCY: ICD-10-CM

## 2023-04-20 DIAGNOSIS — R60.0 EDEMA LEG: ICD-10-CM

## 2023-04-20 DIAGNOSIS — E78.00 HYPERCHOLESTEROLEMIA: ICD-10-CM

## 2023-04-20 DIAGNOSIS — I25.10 CORONARY ARTERY DISEASE INVOLVING NATIVE CORONARY ARTERY OF NATIVE HEART WITHOUT ANGINA PECTORIS: Primary | ICD-10-CM

## 2023-04-20 DIAGNOSIS — I65.23 CAROTID ARTERY PLAQUE, BILATERAL: ICD-10-CM

## 2023-04-20 DIAGNOSIS — I49.3 PVC (PREMATURE VENTRICULAR CONTRACTION): ICD-10-CM

## 2023-04-20 NOTE — TELEPHONE ENCOUNTER
I spoke with patient and read the providers response to his questions.  Pt verbalized understanding and asked if I could mail him his MRI result and all communication in regards to his MRI results, from DR LINARES.  Pt address verified with pt.  Mailed.

## 2023-05-01 ENCOUNTER — HOSPITAL ENCOUNTER (OUTPATIENT)
Dept: NUCLEAR MEDICINE | Facility: HOSPITAL | Age: 78
Discharge: HOME | End: 2023-05-01
Payer: MEDICARE

## 2023-05-01 DIAGNOSIS — M54.50 CHRONIC MIDLINE LOW BACK PAIN WITHOUT SCIATICA: ICD-10-CM

## 2023-05-01 DIAGNOSIS — G89.29 CHRONIC MIDLINE LOW BACK PAIN WITHOUT SCIATICA: ICD-10-CM

## 2023-05-01 PROCEDURE — A9503 TC99M MEDRONATE: HCPCS | Performed by: PSYCHIATRY & NEUROLOGY

## 2023-05-01 PROCEDURE — 0 TECHNETIUM MEDRONATE KIT: Performed by: PSYCHIATRY & NEUROLOGY

## 2023-05-01 PROCEDURE — 78306 BONE IMAGING WHOLE BODY: CPT

## 2023-05-01 RX ORDER — TC 99M MEDRONATE 20 MG/10ML
22.9 INJECTION, POWDER, LYOPHILIZED, FOR SOLUTION INTRAVENOUS
Status: COMPLETED | OUTPATIENT
Start: 2023-05-01 | End: 2023-05-01

## 2023-05-01 RX ADMIN — Medication 22.9 MILLICURIE: at 10:36

## 2023-06-01 ENCOUNTER — TRANSCRIBE ORDERS (OUTPATIENT)
Dept: ADMINISTRATIVE | Facility: HOSPITAL | Age: 78
End: 2023-06-01
Payer: MEDICARE

## 2023-06-01 ENCOUNTER — LAB (OUTPATIENT)
Dept: LAB | Facility: HOSPITAL | Age: 78
End: 2023-06-01
Payer: MEDICARE

## 2023-06-01 DIAGNOSIS — N18.31 CHRONIC KIDNEY DISEASE (CKD) STAGE G3A/A1, MODERATELY DECREASED GLOMERULAR FILTRATION RATE (GFR) BETWEEN 45-59 ML/MIN/1.73 SQUARE METER AND ALBUMINURIA CREATININE RATIO LESS THAN 30 MG/G (CMS/H*: Primary | ICD-10-CM

## 2023-06-01 DIAGNOSIS — N18.31 CHRONIC KIDNEY DISEASE (CKD) STAGE G3A/A1, MODERATELY DECREASED GLOMERULAR FILTRATION RATE (GFR) BETWEEN 45-59 ML/MIN/1.73 SQUARE METER AND ALBUMINURIA CREATININE RATIO LESS THAN 30 MG/G (CMS/H*: ICD-10-CM

## 2023-06-01 DIAGNOSIS — I12.9 HYPERTENSIVE NEPHROPATHY: ICD-10-CM

## 2023-06-01 DIAGNOSIS — E55.9 VITAMIN D DEFICIENCY: ICD-10-CM

## 2023-06-01 LAB
25(OH)D3 SERPL-MCNC: 58.3 NG/ML (ref 30–100)
ALBUMIN SERPL-MCNC: 4.4 G/DL (ref 3.5–5.2)
ANION GAP SERPL CALCULATED.3IONS-SCNC: 10 MMOL/L (ref 5–15)
BILIRUB UR QL STRIP: NEGATIVE
BUN SERPL-MCNC: 21 MG/DL (ref 8–23)
BUN/CREAT SERPL: 14.7 (ref 7–25)
CALCIUM SPEC-SCNC: 9.1 MG/DL (ref 8.6–10.5)
CHLORIDE SERPL-SCNC: 106 MMOL/L (ref 98–107)
CLARITY UR: CLEAR
CO2 SERPL-SCNC: 24 MMOL/L (ref 22–29)
COLOR UR: YELLOW
CREAT SERPL-MCNC: 1.43 MG/DL (ref 0.76–1.27)
CREAT UR-MCNC: 271.5 MG/DL
EGFRCR SERPLBLD CKD-EPI 2021: 49.8 ML/MIN/1.73
GLUCOSE SERPL-MCNC: 120 MG/DL (ref 65–99)
GLUCOSE UR STRIP-MCNC: NEGATIVE MG/DL
HGB UR QL STRIP.AUTO: NEGATIVE
KETONES UR QL STRIP: ABNORMAL
LEUKOCYTE ESTERASE UR QL STRIP.AUTO: NEGATIVE
NITRITE UR QL STRIP: NEGATIVE
PH UR STRIP.AUTO: 5.5 [PH] (ref 5–8)
PHOSPHATE SERPL-MCNC: 2.6 MG/DL (ref 2.5–4.5)
POTASSIUM SERPL-SCNC: 4.1 MMOL/L (ref 3.5–5.2)
PROT ?TM UR-MCNC: 15.3 MG/DL
PROT UR QL STRIP: NEGATIVE
PROT/CREAT UR: 56.4 MG/G CREA (ref 0–200)
SODIUM SERPL-SCNC: 140 MMOL/L (ref 136–145)
SP GR UR STRIP: 1.03 (ref 1–1.03)
UROBILINOGEN UR QL STRIP: ABNORMAL

## 2023-06-01 PROCEDURE — 82306 VITAMIN D 25 HYDROXY: CPT

## 2023-06-01 PROCEDURE — 84156 ASSAY OF PROTEIN URINE: CPT

## 2023-06-01 PROCEDURE — 36415 COLL VENOUS BLD VENIPUNCTURE: CPT

## 2023-06-01 PROCEDURE — 81003 URINALYSIS AUTO W/O SCOPE: CPT

## 2023-06-01 PROCEDURE — 80069 RENAL FUNCTION PANEL: CPT | Performed by: INTERNAL MEDICINE

## 2023-06-01 PROCEDURE — 82570 ASSAY OF URINE CREATININE: CPT

## 2023-06-05 NOTE — PROGRESS NOTES
CC: Chronic midline low back pain    HPI:  Mannie Fajardo is a  78 y.o.  right-handed white male who I am seeing in follow-up with chronic midline low back pain as well as some groin pain.  In addition he has history of transverse myelitis of the cervical spine.  I saw him last 2/13/23 with the following history with additions/modifications as indicated:    His back pain is predominantly in the midline of the lumbar spine.  It has been present for years.  He has failed physical therapy and he has failed pain management that tried ablations and epidural steroid injections.  He believes he has had dry needling also which was not helpful.  The patient was evaluated extensively in 2018.     He had been sent to me for an EMG which was done on the left leg 8/14/2018 showing normal nerve conductions and needle examination.  Brief clinical exam demonstrated upper motor neuron findings in the left arm and left leg and he was sent to me for a neurologic consultation.  His exam was suspicious for a left sided cervical spine lesion.  An MRI of the brain and cervical spine were obtained.  The MRI of the brain showed minor microvascular changes consistent with age.  The MRI of the cervical spine showed myelomalacia x2 with left-sided localization consistent with transverse myelitis in the area of the pyramidal tract on the left.     The patient states that he notes some specific functional problem the left arm being a little weaker than the right.  He had some previous evaluation by Dr. Rhodes at [place] in 2000 and 2005.  Actually had 2 spinal taps apparently and he may have had an MRI of his neck.  The patient had been on narcotics and went through narcotic withdrawal at some point in time.     He has had 4 episodes of gout and has several swollen toes and stiff joints.  He states he has never seen a rheumatologist.  The patient has been followed up recently by Dr. Olson and a myelogram with post myelogram CT was done on  "the lumbar spine showing really no significant foraminal or central canal stenosis at any level.  I reviewed the films and agree.  The patient also had a bone scan 8/27/2018 which showed some uptake in the sacrum which was thought perhaps to represent an insufficiency fracture.     Patient has continued complaints of severe low back pain \"10/10\" as well as left groin pain.  Groin pain is relatively tolerable he states but it is very persistent.  Is simply never goes away.  It affects his gait.  He describes some atrophy of his left thigh compared to the right.  He has been seen by pain management and had several epidural steroid injections apparently and states that one of the first ones it helped his pain and he could  his knee much better but it wore off and a repeat attempts did nothing he states.   He also describes that occasionally the left knee will give out.  It seems to be while on the stairs but he is holding the railing and has not fallen.  He has assistive devices at home but chooses not to use them.    After I saw him last I obtained an MRI of the lumbar spine which showed miscellaneous degenerative disc disease but no high-grade foraminal or central canal stenosis was seen.  I then obtained another bone scan which showed some hotspots in the interphalangeal joints of the hands as well as in the cervical spine but nothing of any significance in the low back.             Past Medical History:   Diagnosis Date    AAA (abdominal aortic aneurysm) without rupture     Arthritis     back    Back pain     Backache     CAD (coronary atherosclerotic disease)     Disease of thyroid gland     Dyslipidemia     Edema     History of transfusion     Hyperhomocystinemia     Hyperlipidemia     Stage 3 chronic kidney disease          Past Surgical History:   Procedure Laterality Date    APPENDECTOMY      CARDIAC SURGERY      CORONARY ANGIOPLASTY WITH STENT PLACEMENT      EPIDURAL Left 5/25/2022    Procedure: " LUMBAR/SACRAL TRANSFORAMINAL EPIDURAL - left L4 and Left S1;  Surgeon: Deepak Mckeon MD;  Location: [facility];  Service: Pain Management;  Laterality: Left;    EXTRACORPOREAL SHOCKWAVE LITHOTRIPSY (ESWL), STENT INSERTION/REMOVAL Right 2/24/2017    Procedure: CYSTO RETROGRADE WITH STENT PLACEMENT;  Surgeon: Janes López MD;  Location:  [facility];  Service:     LUMBAR EPIDURAL INJECTION N/A 11/16/2022    Procedure: LUMBAR EPIDURAL STEROID INJECTION INTRALAMINAR L5-S1 (LEFT PARAMEDIAN APPROACH);  Surgeon: Carly Hairston MD;  Location: [facility];  Service: Pain Management;  Laterality: N/A;    MEDIAL BRANCH BLOCK Bilateral 6/23/2022    Procedure: Lumbar medial branch block bilateral L2-L5;  Surgeon: Deepak Mckeon MD;  Location: [facility];  Service: Pain Management;  Laterality: Bilateral;    MEDIAL BRANCH BLOCK Bilateral 7/12/2022    Procedure: lumbar medial branch block  bilateral L2-L5;  Surgeon: Deepak Mckeon MD;  [facility]: [facility];  Service: Pain Management;  Laterality: Bilateral;    RADIOFREQUENCY ABLATION Bilateral 8/9/2022    Procedure: L2-L5 RADIOFREQUENCY ABLATION LUMBAR;  Surgeon: Deepak Mckeon MD;  Location: [facility];  Service: Pain Management;  Laterality: Bilateral;           Current Outpatient Medications:     allopurinol (ZYLOPRIM) 300 MG tablet, Take 1.5 tablets by mouth Daily., Disp: , Rfl:     aspirin 81 MG chewable tablet, Chew 1 tablet Daily., Disp: , Rfl:     DULoxetine (CYMBALTA) 30 MG capsule, Take 1 capsule by mouth Daily., Disp: 90 capsule, Rfl: 1    folic acid (FOLVITE) 400 MCG tablet, Take 1 tablet by mouth Daily., Disp: , Rfl:     Lovaza 1 g capsule, TAKE TWO CAPSULES BY MOUTH TWICE A DAY, Disp: 360 capsule, Rfl: 1    Plavix 75 MG tablet, TAKE ONE TABLET BY MOUTH DAILY, Disp: 90 tablet, Rfl: 1    vitamin D (ERGOCALCIFEROL) 1.25 MG (68690 UT) capsule capsule, Take 1 capsule by mouth 1 (One) Time Per Week., Disp: 12 capsule, Rfl: 3     "Zetia 10 MG tablet, TAKE ONE TABLET BY MOUTH DAILY, Disp: 90 tablet, Rfl: 1    levothyroxine (SYNTHROID, LEVOTHROID) 50 MCG tablet, Take 1 tablet by mouth Daily. (Patient not taking: Reported on 6/6/2023), Disp: , Rfl:       Family History   Problem Relation Age of Onset    Heart disease Mother     Stroke Father          Social History     Socioeconomic History    Marital status: Single    Number of children: 0    Years of education: COLLEGE   Tobacco Use    Smoking status: Never    Smokeless tobacco: Never   Substance and Sexual Activity    Alcohol use: No     Comment: occ caffeine use    Drug use: No    Sexual activity: Defer         Allergies   Allergen Reactions    Statins Other (See Comments)     Muscle weakness in legs and arms one time only  Muscle weakness in legs and arms one time only  Other reaction(s): Other (See Comments)  Muscle weakness in legs and arms one time only    Muscle weakness in legs and arms one time only    Adhesive Tape Other (See Comments)     Severe burn one time when left on too long    Wound Dressing Adhesive Other (See Comments)     Severe burn one time when left on too long         Pain Scale:        ROS:  Review of Systems    Chronic severe midline low back pain.  No radicular pain.  No neck pain at all he states.  Left-sided arm and leg weakness.      Physical Exam:  Vitals:    06/06/23 1510   BP: 140/82   Pulse: 78   SpO2: 98%   Weight: 73.9 kg (163 lb)   Height: 172.7 cm (68\")     Orthostatic BP:    Body mass index is 24.78 kg/m².    Physical Exam  General: Well-developed white male with complaints of chronic severe midline low back pain  HEENT: Normocephalic no evidence of trauma  Neck: Supple  Heart: Regular rate and rhythm  Extremities: No pedal edema.  Mild tightness of the left lower lumbar paraspinals compared to the right      Neurological Exam:   Mental Status: Awake, alert, oriented to person, place and time.  Conversant without evidence of an affective disorder, " thought disorder, delusions or hallucinations.  Attention span and concentration are normal.  HCF: No aphasia, apraxia or dysarthria.  Recent and remote memory intact.  Knowledge   of recent events intact.  CN: I:   II: Visual fields full without left inattention   III, IV, VI: Eye movements intact without nystagmus or ptosis.  Pupils equal  round and reactive to light.   V,VII: Light touch and pinprick intact all 3 divisions of V.  Facial muscles symmetrical.   VIII: Hearing intact to finger rub   IX,X: Soft palate elevates symmetrically   XI: Sternomastoid and trapezius are strong.   XII: Tongue midline without atrophy or fasciculations  Motor: Increased tone in the left arm and leg.  And bulk in the upper and lower extremities   Power testing: Mild left arm and leg weakness a little more weak in the leg  Reflexes: Upper extremities: Slightly brisker on the left at the triceps and knee jerk with crossed adductor jerk on the right at when striking the left knee        Lower extremities: As above        Toe signs:  Sensory: Light touch:        Pinprick:        Vibration:        Position:    Cerebellar: Finger-to-nose: Intact           Rapid movement: Slowed in the left hand           Heel-to-shin:  Gait and Station: Hemiparetic gait on the left    Results:      Lab Results   Component Value Date    GLUCOSE 120 (H) 06/01/2023    BUN 21 06/01/2023    CREATININE 1.43 (H) 06/01/2023    EGFRIFNONA 47 (L) 11/16/2021    BCR 14.7 06/01/2023    CO2 24.0 06/01/2023    CALCIUM 9.1 06/01/2023    PROTENTOTREF 6.7 12/12/2022    ALBUMIN 4.4 06/01/2023    LABIL2 2.5 12/12/2022    AST 21 12/12/2022    ALT 18 12/12/2022       Lab Results   Component Value Date    WBC 8.67 12/12/2022    HGB 14.9 12/12/2022    HCT 44.4 12/12/2022    MCV 94.7 12/12/2022     12/12/2022         .  Lab Results   Component Value Date    RPR Non-Reactive 08/15/2018         Lab Results   Component Value Date    TSH 8.110 (H) 12/12/2022         Lab  Results   Component Value Date    [id number] 379 08/15/2018         Lab Results   Component Value Date    FOLATE >20.00 08/15/2018         Lab Results   Component Value Date    HGBA1C 5.7 04/10/2018         Lab Results   Component Value Date    GLUCOSE 120 (H) 06/01/2023    BUN 21 06/01/2023    CREATININE 1.43 (H) 06/01/2023    EGFRIFNONA 47 (L) 11/16/2021    BCR 14.7 06/01/2023    K 4.1 06/01/2023    CO2 24.0 06/01/2023    CALCIUM 9.1 06/01/2023    PROTENTOTREF 6.7 12/12/2022    ALBUMIN 4.4 06/01/2023    LABIL2 2.5 12/12/2022    AST 21 12/12/2022    ALT 18 12/12/2022         Lab Results   Component Value Date    WBC 8.67 12/12/2022    HGB 14.9 12/12/2022    HCT 44.4 12/12/2022    MCV 94.7 12/12/2022     12/12/2022             Assessment:   1.  Chronic midline low back pain without radicular quality.  His only exam findings relate to his upper motor neuron lesions in the cervical spine consistent with transverse myelitis which apparently is more than 20 years old.  I believe that his low back pain is of musculoskeletal origin.  I was unable to identify a structural lesion that would explain this.  2.  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  The patient indicates he has been tried on gabapentin and Cymbalta previously.  He did not recall being placed on an antidepressant.  I felt it to be reasonable to put him on Cymbalta 30 mg nightly.  If he tolerates this then we would escalate the dosage to 60 mg nightly.  2.  The patient asks about tramadol.  Given his history of previous narcotic use and withdrawal I would defer to his primary this question.  He states he had taken this medication before and it did help some  3.  Follow-up in 6 months.  I asked him to call in within a month to decide if we should escalate the dosage of Cymbalta time: 30 minutes        30 minutes          Dictated utilizing Dragon dictation.

## 2023-06-06 ENCOUNTER — OFFICE VISIT (OUTPATIENT)
Dept: NEUROLOGY | Facility: CLINIC | Age: 78
End: 2023-06-06
Payer: MEDICARE

## 2023-06-06 VITALS
WEIGHT: 163 LBS | HEIGHT: 68 IN | HEART RATE: 78 BPM | OXYGEN SATURATION: 98 % | SYSTOLIC BLOOD PRESSURE: 140 MMHG | BODY MASS INDEX: 24.71 KG/M2 | DIASTOLIC BLOOD PRESSURE: 82 MMHG

## 2023-06-06 DIAGNOSIS — G89.29 CHRONIC MIDLINE LOW BACK PAIN WITHOUT SCIATICA: Primary | ICD-10-CM

## 2023-06-06 DIAGNOSIS — M54.50 CHRONIC MIDLINE LOW BACK PAIN WITHOUT SCIATICA: Primary | ICD-10-CM

## 2023-06-06 PROCEDURE — 1159F MED LIST DOCD IN RCRD: CPT | Performed by: PSYCHIATRY & NEUROLOGY

## 2023-06-06 PROCEDURE — 1160F RVW MEDS BY RX/DR IN RCRD: CPT | Performed by: PSYCHIATRY & NEUROLOGY

## 2023-06-06 PROCEDURE — 99214 OFFICE O/P EST MOD 30 MIN: CPT | Performed by: PSYCHIATRY & NEUROLOGY

## 2023-06-06 RX ORDER — DULOXETIN HYDROCHLORIDE 30 MG/1
30 CAPSULE, DELAYED RELEASE ORAL DAILY
Qty: 90 CAPSULE | Refills: 1 | Status: SHIPPED | OUTPATIENT
Start: 2023-06-06

## 2023-06-06 RX ORDER — DULOXETIN HYDROCHLORIDE 30 MG/1
30 CAPSULE, DELAYED RELEASE ORAL DAILY
Qty: 30 CAPSULE | Refills: 3 | Status: SHIPPED | OUTPATIENT
Start: 2023-06-06 | End: 2023-06-06 | Stop reason: SDUPTHER

## 2023-06-06 NOTE — LETTER
June 6, 2023       No Recipients    Patient: Mannie Fajardo   YOB: 1945   Date of Visit: 6/6/2023       Dear Dr. Sims Recipients:    Thank you for referring Mannie Fajardo to me for evaluation. Below are the relevant portions of my assessment and plan of care.    If you have questions, please do not hesitate to call me. I look forward to following Mannie along with you.         Sincerely,        Beto Brizuela MD        CC:   No Recipients    Beto Brizuela MD  06/06/23 1700  Sign when Signing Visit  CC: Chronic midline low back pain    HPI:  Mannie Fajardo is a  78 y.o.  right-handed white male who I am seeing in follow-up with chronic midline low back pain as well as some groin pain.  In addition he has history of transverse myelitis of the cervical spine.  I saw him last 2/13/23 with the following history with additions/modifications as indicated:    His back pain is predominantly in the midline of the lumbar spine.  It has been present for years.  He has failed physical therapy and he has failed pain management that tried ablations and epidural steroid injections.  He believes he has had dry needling also which was not helpful.  The patient was evaluated extensively in 2018.     He had been sent to me for an EMG which was done on the left leg 8/14/2018 showing normal nerve conductions and needle examination.  Brief clinical exam demonstrated upper motor neuron findings in the left arm and left leg and he was sent to me for a neurologic consultation.  His exam was suspicious for a left sided cervical spine lesion.  An MRI of the brain and cervical spine were obtained.  The MRI of the brain showed minor microvascular changes consistent with age.  The MRI of the cervical spine showed myelomalacia x2 with left-sided localization consistent with transverse myelitis in the area of the pyramidal tract on the left.     The patient states that he notes some specific functional problem the left arm being a  "little weaker than the right.  He had some previous evaluation by Dr. Rhodes at [place] in 2000 and 2005.  Actually had 2 spinal taps apparently and he may have had an MRI of his neck.  The patient had been on narcotics and went through narcotic withdrawal at some point in time.     He has had 4 episodes of gout and has several swollen toes and stiff joints.  He states he has never seen a rheumatologist.  The patient has been followed up recently by Dr. Olson and a myelogram with post myelogram CT was done on the lumbar spine showing really no significant foraminal or central canal stenosis at any level.  I reviewed the films and agree.  The patient also had a bone scan 8/27/2018 which showed some uptake in the sacrum which was thought perhaps to represent an insufficiency fracture.     Patient has continued complaints of severe low back pain \"10/10\" as well as left groin pain.  Groin pain is relatively tolerable he states but it is very persistent.  Is simply never goes away.  It affects his gait.  He describes some atrophy of his left thigh compared to the right.  He has been seen by pain management and had several epidural steroid injections apparently and states that one of the first ones it helped his pain and he could  his knee much better but it wore off and a repeat attempts did nothing he states.   He also describes that occasionally the left knee will give out.  It seems to be while on the stairs but he is holding the railing and has not fallen.  He has assistive devices at home but chooses not to use them.    After I saw him last I obtained an MRI of the lumbar spine which showed miscellaneous degenerative disc disease but no high-grade foraminal or central canal stenosis was seen.  I then obtained another bone scan which showed some hotspots in the interphalangeal joints of the hands as well as in the cervical spine but nothing of any significance in the low back.             Past Medical " History:   Diagnosis Date   • AAA (abdominal aortic aneurysm) without rupture    • Arthritis     back   • Back pain    • Backache    • CAD (coronary atherosclerotic disease)    • Disease of thyroid gland    • Dyslipidemia    • Edema    • History of transfusion    • Hyperhomocystinemia    • Hyperlipidemia    • Stage 3 chronic kidney disease          Past Surgical History:   Procedure Laterality Date   • APPENDECTOMY     • CARDIAC SURGERY     • CORONARY ANGIOPLASTY WITH STENT PLACEMENT     • EPIDURAL Left 5/25/2022    Procedure: LUMBAR/SACRAL TRANSFORAMINAL EPIDURAL - left L4 and Left S1;  Surgeon: Deepak Mckeon MD;  [facility];  Service: Pain Management;  Laterality: Left;   • EXTRACORPOREAL SHOCKWAVE LITHOTRIPSY (ESWL), STENT INSERTION/REMOVAL Right 2/24/2017    Procedure: CYSTO RETROGRADE WITH STENT PLACEMENT;  Surgeon: Janes López MD;  Location: [facility];  Service:    • LUMBAR EPIDURAL INJECTION N/A 11/16/2022    Procedure: LUMBAR EPIDURAL STEROID INJECTION INTRALAMINAR L5-S1 (LEFT PARAMEDIAN APPROACH);  Surgeon: Carly Hairston MD;  Location: [facility];  Service: Pain Management;  Laterality: N/A;   • MEDIAL BRANCH BLOCK Bilateral 6/23/2022    Procedure: Lumbar medial branch block bilateral L2-L5;  Surgeon: Deepak Mckeon MD;  [facility];  Service: Pain Management;  Laterality: Bilateral;   • MEDIAL BRANCH BLOCK Bilateral 7/12/2022    Procedure: lumbar medial branch block  bilateral L2-L5;  Surgeon: Deepak Mckeon MD;  [facility];  Service: Pain Management;  Laterality: Bilateral;   • RADIOFREQUENCY ABLATION Bilateral 8/9/2022    Procedure: L2-L5 RADIOFREQUENCY ABLATION LUMBAR;  Surgeon: Deepak Mckeon MD;  [facility]: [facility];  Service: Pain Management;  Laterality: Bilateral;           Current Outpatient Medications:   •  allopurinol (ZYLOPRIM) 300 MG tablet, Take 1.5 tablets by mouth Daily., Disp: , Rfl:   •  aspirin 81 MG  chewable tablet, Chew 1 tablet Daily., Disp: , Rfl:   •  DULoxetine (CYMBALTA) 30 MG capsule, Take 1 capsule by mouth Daily., Disp: 90 capsule, Rfl: 1  •  folic acid (FOLVITE) 400 MCG tablet, Take 1 tablet by mouth Daily., Disp: , Rfl:   •  Lovaza 1 g capsule, TAKE TWO CAPSULES BY MOUTH TWICE A DAY, Disp: 360 capsule, Rfl: 1  •  Plavix 75 MG tablet, TAKE ONE TABLET BY MOUTH DAILY, Disp: 90 tablet, Rfl: 1  •  vitamin D (ERGOCALCIFEROL) 1.25 MG (56637 UT) capsule capsule, Take 1 capsule by mouth 1 (One) Time Per Week., Disp: 12 capsule, Rfl: 3  •  Zetia 10 MG tablet, TAKE ONE TABLET BY MOUTH DAILY, Disp: 90 tablet, Rfl: 1  •  levothyroxine (SYNTHROID, LEVOTHROID) 50 MCG tablet, Take 1 tablet by mouth Daily. (Patient not taking: Reported on 6/6/2023), Disp: , Rfl:       Family History   Problem Relation Age of Onset   • Heart disease Mother    • Stroke Father          Social History     Socioeconomic History   • Marital status: Single   • Number of children: 0   • Years of education: COLLEGE   Tobacco Use   • Smoking status: Never   • Smokeless tobacco: Never   Substance and Sexual Activity   • Alcohol use: No     Comment: occ caffeine use   • Drug use: No   • Sexual activity: Defer         Allergies   Allergen Reactions   • Statins Other (See Comments)     Muscle weakness in legs and arms one time only  Muscle weakness in legs and arms one time only  Other reaction(s): Other (See Comments)  Muscle weakness in legs and arms one time only    Muscle weakness in legs and arms one time only   • Adhesive Tape Other (See Comments)     Severe burn one time when left on too long   • Wound Dressing Adhesive Other (See Comments)     Severe burn one time when left on too long         Pain Scale:        ROS:  Review of Systems    Chronic severe midline low back pain.  No radicular pain.  No neck pain at all he states.  Left-sided arm and leg weakness.      Physical Exam:  Vitals:    06/06/23 1510   BP: 140/82   Pulse: 78   SpO2:  "98%   Weight: 73.9 kg (163 lb)   Height: 172.7 cm (68\")     Orthostatic BP:    Body mass index is 24.78 kg/m².    Physical Exam  General: Well-developed white male with complaints of chronic severe midline low back pain  HEENT: Normocephalic no evidence of trauma  Neck: Supple  Heart: Regular rate and rhythm  Extremities: No pedal edema.  Mild tightness of the left lower lumbar paraspinals compared to the right      Neurological Exam:   Mental Status: Awake, alert, oriented to person, place and time.  Conversant without evidence of an affective disorder, thought disorder, delusions or hallucinations.  Attention span and concentration are normal.  HCF: No aphasia, apraxia or dysarthria.  Recent and remote memory intact.  Knowledge   of recent events intact.  CN: I:   II: Visual fields full without left inattention   III, IV, VI: Eye movements intact without nystagmus or ptosis.  Pupils equal  round and reactive to light.   V,VII: Light touch and pinprick intact all 3 divisions of V.  Facial muscles symmetrical.   VIII: Hearing intact to finger rub   IX,X: Soft palate elevates symmetrically   XI: Sternomastoid and trapezius are strong.   XII: Tongue midline without atrophy or fasciculations  Motor: Increased tone in the left arm and leg.  And bulk in the upper and lower extremities   Power testing: Mild left arm and leg weakness a little more weak in the leg  Reflexes: Upper extremities: Slightly brisker on the left at the triceps and knee jerk with crossed adductor jerk on the right at when striking the left knee        Lower extremities: As above        Toe signs:  Sensory: Light touch:        Pinprick:        Vibration:        Position:    Cerebellar: Finger-to-nose: Intact           Rapid movement: Slowed in the left hand           Heel-to-shin:  Gait and Station: Hemiparetic gait on the left    Results:      Lab Results   Component Value Date    GLUCOSE 120 (H) 06/01/2023    BUN 21 06/01/2023    CREATININE 1.43 " (H) 06/01/2023    EGFRIFNONA 47 (L) 11/16/2021    BCR 14.7 06/01/2023    CO2 24.0 06/01/2023    CALCIUM 9.1 06/01/2023    PROTENTOTREF 6.7 12/12/2022    ALBUMIN 4.4 06/01/2023    LABIL2 2.5 12/12/2022    AST 21 12/12/2022    ALT 18 12/12/2022       Lab Results   Component Value Date    WBC 8.67 12/12/2022    HGB 14.9 12/12/2022    HCT 44.4 12/12/2022    MCV 94.7 12/12/2022     12/12/2022         .  Lab Results   Component Value Date    RPR Non-Reactive 08/15/2018         Lab Results   Component Value Date    TSH 8.110 (H) 12/12/2022         Lab Results   Component Value Date    [id number] 379 08/15/2018         Lab Results   Component Value Date    FOLATE >20.00 08/15/2018         Lab Results   Component Value Date    HGBA1C 5.7 04/10/2018         Lab Results   Component Value Date    GLUCOSE 120 (H) 06/01/2023    BUN 21 06/01/2023    CREATININE 1.43 (H) 06/01/2023    EGFRIFNONA 47 (L) 11/16/2021    BCR 14.7 06/01/2023    K 4.1 06/01/2023    CO2 24.0 06/01/2023    CALCIUM 9.1 06/01/2023    PROTENTOTREF 6.7 12/12/2022    ALBUMIN 4.4 06/01/2023    LABIL2 2.5 12/12/2022    AST 21 12/12/2022    ALT 18 12/12/2022         Lab Results   Component Value Date    WBC 8.67 12/12/2022    HGB 14.9 12/12/2022    HCT 44.4 12/12/2022    MCV 94.7 12/12/2022     12/12/2022             Assessment:   1.  Chronic midline low back pain without radicular quality.  His only exam findings relate to his upper motor neuron lesions in the cervical spine consistent with transverse myelitis which apparently is more than 20 years old.  I believe that his low back pain is of musculoskeletal origin.  I was unable to identify a structural lesion that would explain this.  2.  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  The patient indicates he has been tried on gabapentin and Cymbalta previously.  He did not recall being placed on an antidepressant.  I felt it to be reasonable to put  him on Cymbalta 30 mg nightly.  If he tolerates this then we would escalate the dosage to 60 mg nightly.  2.  The patient asks about tramadol.  Given his history of previous narcotic use and withdrawal I would defer to his primary this question.  He states he had taken this medication before and it did help some  3.  Follow-up in 6 months.  I asked him to call in within a month to decide if we should escalate the dosage of Cymbalta time: 30 minutes        30 minutes          Dictated utilizing Dragon dictation.

## 2023-06-07 ENCOUNTER — TELEPHONE (OUTPATIENT)
Dept: NEUROLOGY | Facility: CLINIC | Age: 78
End: 2023-06-07

## 2023-06-07 NOTE — TELEPHONE ENCOUNTER
Called and spoke to patient. He explained that the wrong quantity was called in and he will run out of [facility]. I stated that Dr. Brizuela wanted to make sure he was tolerating the medication before you start 60 mg. He stated he just wanted to make sure that is correct the next time he goes so he does have to keep going back to the pharmacy. I explained to pt once we know what he is tolerating we will make sure enough quantity is sent to pharmacy if he is increased to 60 mg. Pt stated understanding. He said he will call back in 30 days to make sure he is tolerating and right script is sent.

## 2023-06-07 NOTE — TELEPHONE ENCOUNTER
Caller: EDGAR    Relationship: SELF    Best call back number: [phone]    Which medication are you concerned about: DULoxetine (CYMBALTA) 30 MG capsule     Who prescribed you this medication:     What are your concerns: PT STATES THAT HIM AND  AGREED THAT AFTER 30 DAYS HE WOULD INCREASE TO 60MG DAILY. HE STATES THE 90 DAY SUPPLY DOES NOT REFLECT THAT AND HE WOULD BE OUT OF MEDICATION IN 60 DAYS ONCE HE INCREASED AFTER 30 DAYS. PLEASE REVIEW AND ADVISE.

## 2023-06-13 NOTE — TELEPHONE ENCOUNTER
MEDICATION CONCERNS    Caller: Mannie Fajardo    Relationship: Self    Best call back number: [phone]    Preferred pharmacy: [facility] [id number] - [address] AT [facility] [address] - [phone]  - [phone]    What medications are you currently taking:   Current Outpatient Medications on File Prior to Visit   Medication Sig Dispense Refill    allopurinol (ZYLOPRIM) 300 MG tablet Take 1.5 tablets by mouth Daily.      aspirin 81 MG chewable tablet Chew 1 tablet Daily.      DULoxetine (CYMBALTA) 30 MG capsule Take 1 capsule by mouth Daily. 90 capsule 1    folic acid (FOLVITE) 400 MCG tablet Take 1 tablet by mouth Daily.      levothyroxine (SYNTHROID, LEVOTHROID) 50 MCG tablet Take 1 tablet by mouth Daily. (Patient not taking: Reported on 6/6/2023)      Lovaza 1 g capsule TAKE TWO CAPSULES BY MOUTH TWICE A  capsule 1    Plavix 75 MG tablet TAKE ONE TABLET BY MOUTH DAILY 90 tablet 1    vitamin D (ERGOCALCIFEROL) 1.25 MG (19568 UT) capsule capsule Take 1 capsule by mouth 1 (One) Time Per Week. 12 capsule 3    Zetia 10 MG tablet TAKE ONE TABLET BY MOUTH DAILY 90 tablet 1     No current facility-administered medications on file prior to visit.     Which medication are you concerned about: DULoxetine (CYMBALTA) 30 MG capsule- Take 1 capsule by mouth Daily.    Who prescribed you this medication: DR. LNIARES    When did you start taking these medications: 6/6/2023    What are your concerns: PT STATES THAT SINCE STARTING THE DULOXETINE MEDICATION, HE HAS FELT ILL. HE COMPLAINS OF LOSS OF APPETITE AND NAUSEA. PT NOTES THAT HE HAS NOT HAD ANY PAIN RELIEF SINCE STARTING THE MEDICATION EITHER.  PT IS WONDERING IF DR. LINARES HAS ANY FURTHER RECOMMENDATIONS IN THESE REGARDS.    How long have you had these concerns: SINCE STARTING DULOXETINE ON 6/6/2023    PLEASE REVIEW AND ADVISE.

## 2023-06-15 NOTE — TELEPHONE ENCOUNTER
Caller: Mannie Fajardo    Relationship: Self    Best call back number: [phone]    What is the best time to reach you: ANY TIME     Who are you requesting to speak with (clinical staff, provider,  specific staff member): CLINICAL STAFF OR PROVIDER    What was the call regarding: PT IS REQUESTING A CALL BACK     PLEASE REVIEW    THANK YOU

## 2023-06-16 NOTE — TELEPHONE ENCOUNTER
Spoke with patient on the phone about duloxetine.  He states that it made him nauseous and he had no appetite.  He was taking it around 5 PM.  He is stopped taking due to the adverse effects and no relief in his pain.  He will talk with his primary care provider about continuing tramadol.  Will discuss with Dr. Brizuela to see if there is another option to help with his pain.  He will plan to come for his follow-up in 6 months or call sooner.

## 2023-08-28 RX ORDER — ERGOCALCIFEROL 1.25 MG/1
CAPSULE ORAL
Qty: 12 CAPSULE | Refills: 3 | Status: SHIPPED | OUTPATIENT
Start: 2023-08-28

## 2023-09-25 ENCOUNTER — OFFICE VISIT (OUTPATIENT)
Dept: CARDIOLOGY | Facility: CLINIC | Age: 78
End: 2023-09-25
Payer: MEDICARE

## 2023-09-25 VITALS
HEIGHT: 68 IN | DIASTOLIC BLOOD PRESSURE: 72 MMHG | WEIGHT: 150 LBS | BODY MASS INDEX: 22.73 KG/M2 | HEART RATE: 73 BPM | SYSTOLIC BLOOD PRESSURE: 128 MMHG

## 2023-09-25 DIAGNOSIS — I25.119 CORONARY ARTERY DISEASE INVOLVING NATIVE CORONARY ARTERY OF NATIVE HEART WITH ANGINA PECTORIS: ICD-10-CM

## 2023-09-25 DIAGNOSIS — R07.2 PRECORDIAL PAIN: Primary | ICD-10-CM

## 2023-09-25 DIAGNOSIS — N18.31 STAGE 3A CHRONIC KIDNEY DISEASE: ICD-10-CM

## 2023-09-25 NOTE — PROGRESS NOTES
Date of Office Visit: 2023  Encounter Provider: Marlene Giordano MD  Place of Service: [facility]  Patient Name: Mannie Fajardo  :1945    Chief complaint  Coronary artery disease    History of Present Illness  The patient is a delightful, 78-year-old gentleman with history of hyperlipidemia, abdominal aortic aneurysm, hyperhomocysteinemia, and coronary artery disease.  In , after an abnormal stress test, he underwent cardiac catheterization that revealed severe disease of the circumflex artery for which he received a drug-coated stent.  He has not had any intervening events since then, though he has maintained aspirin and Plavix therapy.  In 2016 with complaints of chest pain Lexiscan perfusion study was negative for ischemia.  Stress screening study in  showed a small abdominal aortic aneurysm for his vascular surgeons felt to be stable.  On 2022 with PVCs and shortness of breath and echocardiogram showed an ejection fraction of 56%, moderate left ventricular hypertrophy, grade 1 diastolic function, trivial valve regurgitation normal right-sided pressures.  Stress perfusion study showed a small area of ischemia in the distal septum which was new from prior nuclear stress test in 2016.  It was felt to be an intermediate risk study.    Since last visit he has had no palpitations or dizziness.  He believes over the past 1 week his dyspnea is worse he is more fatigued he had 1 episode of chest discomfort last week described as twinges and pressure.  He has chronic dependent edema that is unchanged.    Past Medical History:   Diagnosis Date    AAA (abdominal aortic aneurysm) without rupture     Arthritis     back    Back pain     Backache     CAD (coronary atherosclerotic disease)     Disease of thyroid gland     Dyslipidemia     Edema     History of transfusion     Hyperhomocystinemia     Hyperlipidemia     Stage 3 chronic kidney disease      Past  Surgical History:   Procedure Laterality Date    APPENDECTOMY      CARDIAC SURGERY      CORONARY ANGIOPLASTY WITH STENT PLACEMENT      EPIDURAL Left 5/25/2022    Procedure: LUMBAR/SACRAL TRANSFORAMINAL EPIDURAL - left L4 and Left S1;  Surgeon: Deepak Mckeon MD;  Location: [facility];  Service: Pain Management;  Laterality: Left;    EXTRACORPOREAL SHOCKWAVE LITHOTRIPSY (ESWL), STENT INSERTION/REMOVAL Right 2/24/2017    Procedure: CYSTO RETROGRADE WITH STENT PLACEMENT;  Surgeon: Janes López MD;  Location:  [facility];  Service:     LUMBAR EPIDURAL INJECTION N/A 11/16/2022    Procedure: LUMBAR EPIDURAL STEROID INJECTION INTRALAMINAR L5-S1 (LEFT PARAMEDIAN APPROACH);  Surgeon: Carly Hairston MD;  Location: [facility];  Service: Pain Management;  Laterality: N/A;    MEDIAL BRANCH BLOCK Bilateral 6/23/2022    Procedure: Lumbar medial branch block bilateral L2-L5;  Surgeon: Deepak Mckeon MD;  Location: [facility];  Service: Pain Management;  Laterality: Bilateral;    MEDIAL BRANCH BLOCK Bilateral 7/12/2022    Procedure: lumbar medial branch block  bilateral L2-L5;  Surgeon: Deepak Mckeon MD;  Location: [facility];  Service: Pain Management;  Laterality: Bilateral;    RADIOFREQUENCY ABLATION Bilateral 8/9/2022    Procedure: L2-L5 RADIOFREQUENCY ABLATION LUMBAR;  Surgeon: Deepak Mckeon MD;  Location: [facility];  Service: Pain Management;  Laterality: Bilateral;     Outpatient Medications Prior to Visit   Medication Sig Dispense Refill    allopurinol (ZYLOPRIM) 300 MG tablet Take 1.5 tablets by mouth Daily.      aspirin 81 MG chewable tablet Chew 1 tablet Daily.      folic acid (FOLVITE) 400 MCG tablet Take 1 tablet by mouth Daily.      Lovaza 1 g capsule TAKE TWO CAPSULES BY MOUTH TWICE A  capsule 1    Plavix 75 MG tablet TAKE ONE TABLET BY MOUTH DAILY 90 tablet 1    vitamin D (ERGOCALCIFEROL) 1.25 MG (74203 UT) capsule capsule TAKE ONE CAPSULE BY MOUTH ONCE WEEKLY 12  "capsule 3    DULoxetine (CYMBALTA) 30 MG capsule Take 1 capsule by mouth Daily. (Patient not taking: Reported on 9/25/2023) 90 capsule 1    levothyroxine (SYNTHROID, LEVOTHROID) 50 MCG tablet Take 1 tablet by mouth Daily. (Patient not taking: Reported on 6/6/2023)      Zetia 10 MG tablet TAKE ONE TABLET BY MOUTH DAILY (Patient not taking: Reported on 9/25/2023) 90 tablet 1     No facility-administered medications prior to visit.       Allergies as of 09/25/2023 - Reviewed 09/25/2023   Allergen Reaction Noted    Statins Other (See Comments) 07/18/2011    Adhesive tape Other (See Comments) 04/14/2016    Wound dressing adhesive Other (See Comments) 10/10/2011     Social History     Socioeconomic History    Marital status: Single    Number of children: 0    Years of education: COLLEGE   Tobacco Use    Smoking status: Never    Smokeless tobacco: Never   Substance and Sexual Activity    Alcohol use: No     Comment: occ caffeine use    Drug use: No    Sexual activity: Defer     Family History   Problem Relation Age of Onset    Heart disease Mother     Stroke Father      Review of Systems   Constitutional: Negative for chills, fever, weight gain and weight loss.   Cardiovascular:  Positive for dyspnea on exertion. Negative for leg swelling.   Respiratory:  Negative for cough, snoring and wheezing.    Hematologic/Lymphatic: Negative for bleeding problem. Does not bruise/bleed easily.   Skin:  Negative for color change.   Musculoskeletal:  Positive for myalgias. Negative for falls and joint pain.   Gastrointestinal:  Negative for melena.   Genitourinary:  Negative for hematuria.   Neurological:  Negative for excessive daytime sleepiness.   Psychiatric/Behavioral:  Negative for depression. The patient is not nervous/anxious.       Objective:     Vitals:    09/25/23 1103   BP: 128/72   Pulse: 73   Weight: 68 kg (150 lb)   Height: 172.7 cm (68\")     Body mass index is 22.81 kg/m².    Vitals reviewed.   Constitutional:       " Appearance: Well-developed.   Eyes:      General: No scleral icterus.        Right eye: No discharge.      Conjunctiva/sclera: Conjunctivae normal.      Pupils: Pupils are equal, round, and reactive to light.   HENT:      Head: Normocephalic.      Nose: Nose normal.   Neck:      Thyroid: No thyromegaly.      Vascular: No JVD.   Pulmonary:      Effort: Pulmonary effort is normal. No respiratory distress.      Breath sounds: Normal breath sounds. No wheezing. No rales.   Cardiovascular:      Normal rate. Regular rhythm. Normal S1. Normal S2.       Murmurs: There is no murmur.      No gallop.    Pulses:     Intact distal pulses.      Carotid: 2+ bilaterally.     Radial: 2+ bilaterally.     Femoral: 2+ bilaterally.     Popliteal: 2+ bilaterally.     Dorsalis pedis: 2+ bilaterally.     Posterior tibial: 2+ bilaterally.  Edema:     Peripheral edema absent.   Abdominal:      General: Bowel sounds are normal. There is no distension.      Palpations: Abdomen is soft.      Tenderness: There is no abdominal tenderness. There is no rebound.   Musculoskeletal: Normal range of motion.         General: No tenderness.      Cervical back: Normal range of motion and neck supple. Skin:     General: Skin is warm and dry.      Findings: No erythema or rash.   Neurological:      Mental Status: Alert and oriented to person, place, and time.   Psychiatric:         Behavior: Behavior normal.         Thought Content: Thought content normal.         Judgment: Judgment normal.     Lab Review:   Lab Results - Last 18 Months   Lab Units 08/07/23  1137 12/12/22  1143 11/29/22  1045   WBC 10*3/uL 11.66* 8.67 9.89   RBC 10*6/uL 4.66 4.69 4.70   HEMOGLOBIN g/dL 15.1 14.9 14.9   HEMATOCRIT % 45.7 44.4 46.7   MCV fL 98.1 94.7 99.4   MCH pg 32.4 31.8 31.7   MCHC g/dL 33.0 33.6 31.9   RDW % 13.2 13.0 13.4   PLATELETS 10*3/uL 238 262 221   NEUTROPHIL % % 78.3 66.4 78.3   LYMPHOCYTE % % 10.5* 20.4 11.6*   MONOCYTES % % 6.9 7.5 6.5   EOSINOPHIL % % 3.5 3.8  2.2   BASOPHIL % % 0.4 0.7 0.5   NEUTROS ABS 10*3/uL 9.13* 5.76 7.74   LYMPHS ABS 10*3/uL 1.22 1.77 1.15   MONOS ABS 10*3/uL 0.80 0.65 0.64   EOS ABS 10*3/uL 0.41 0.33 0.22   BASOS ABS 10*3/uL 0.05 0.06 0.05   IMM GRAN % %  --  1.2*  --    IMMATURE GRANS (ABS) 10*3/mm3  --  0.10*  --    MPV fL 11.1  --  10.9       Lab Results - Last 18 Months   Lab Units 08/07/23  1137 06/01/23  1130 05/31/23  1059 04/11/23  1102 12/12/22  1143   GLUCOSE mg/dL  --  120*  --  119* 106*   BUN mg/dL  --  21  --  19 17   CREATININE mg/dL  --  1.43*  --  1.44* 1.28*   SODIUM mmol/L  --  140  --  139 141   POTASSIUM mmol/L  --  4.1  --  4.2 4.2   CHLORIDE mmol/L  --  106  --  106 105   CO2 mmol/L  --  24.0  --  23.5 27.2   CALCIUM mg/dL  --  9.1  --  9.8 9.4   ALBUMIN g/dL  --  4.4  --   --  4.80   ALT (SGPT) U/L 11  --  17  --  18   AST (SGOT) U/L 17  --  20  --  21   ALK PHOS U/L  --   --   --   --  70   BILIRUBIN mg/dL  --   --   --   --  0.5   BUN / CREAT RATIO   --  14.7  --  13.2 13.3   ANION GAP mmol/L  --  10.0  --  9.5  --    EGFR mL/min/1.73  --  49.8*  --  49.7*  --      Lab Results - Last 18 Months   Lab Units 12/12/22  1143   TRIGLYCERIDES mg/dL 143   HDL CHOL mg/dL 50   LDL CHOL mg/dL 177*   VLDL CHOLESTEROL ZOHRA mg/dL 26   LDL/HDL RATIO  3.49       Lab Results - Last 18 Months   Lab Units 12/12/22  1143 08/01/22  1112   TSH uIU/mL 8.110* 3.720       ECG 12 Lead    Date/Time: 9/25/2023 11:26 AM  Performed by: Marlene Giordano MD  Authorized by: Marlene Giordano MD   Comparison: compared with previous ECG   Similar to previous ECG  Rhythm: sinus rhythm    Clinical impression: normal ECG          Diagnosis Plan   1. Precordial pain  ECG 12 Lead      2. Coronary artery disease involving native coronary artery of native heart with angina pectoris        3. Stage 3a chronic kidney disease          Plan:         1.  Chest pain and worsening dyspnea.  Recommended cardiac cath given prior abnormal stress test however did not wish to pursue  this.  Opting to pursue further follow-up with Dr. Guerrero and additional blood test.  He is aware that without adequate evaluation he is at risk for myocardial infarction and death.  However he does not think his chest pain is likely cardiac and he wishes to defer cardiac cath due to renal insufficiency..  2.  Coronary artery disease with history of prior drug-coated stent placement.  Stress test 2022 showed ischemia and with chest pain symptoms above recommended cardiac cath.  However he declines.  3.  Chronic back pain.  Unfortunately still troubled with this.  4.  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  History of renal insufficiency,  Daily last creatinine 12/2021 at 1.6.  6.  Abdominal aortic aneurysm followed by vascular surgery.  [place] to be stable and was recently seen by vascular surgery..  6.  Hyperlipidemia.  Intolerant of statins.  Currently on lovassa  and Zetia.   7.  Leg pain.  Normal ANDREW 10/2020.  8.  Carotid artery plaque.  Followed by vascular surgery felt to be stable for the patient    Time Spent: I spent 45 minutes caring for Mannie on this date of service. This time includes time spent by me in the following activities: preparing for the visit, reviewing tests, obtaining and/or reviewing a separately obtained history, performing a medically appropriate examination and/or evaluation, counseling and educating the patient/family/caregiver, referring and communicating with other health care professionals, documenting information in the medical record, and independently interpreting results and communicating that information with the patient/family/caregiver.   I spent 1 minutes on the separately reported service of ECG. This time is not included in the time used to support the E/M service also reported today.        Your medication list            Accurate as of September 25, 2023 11:59 PM. If you have any questions, ask your nurse or doctor.                CONTINUE taking these  medications        Instructions Last Dose Given Next Dose Due   allopurinol 300 MG tablet  Commonly known as: ZYLOPRIM      Take 1.5 tablets by mouth Daily.       aspirin 81 MG chewable tablet      Chew 1 tablet Daily.       folic acid 400 MCG tablet  Commonly known as: FOLVITE      Take 1 tablet by mouth Daily.       Lovaza 1 g capsule  Generic drug: omega-3 acid ethyl esters      TAKE TWO CAPSULES BY MOUTH TWICE A DAY       Plavix 75 MG tablet  Generic drug: clopidogrel      TAKE ONE TABLET BY MOUTH DAILY       vitamin D 1.25 MG (52592 UT) capsule capsule  Commonly known as: ERGOCALCIFEROL      TAKE ONE CAPSULE BY MOUTH ONCE WEEKLY                Patient is no longer taking -.  I corrected the med list to reflect this.  I did not stop these medications.      Dictated utilizing Dragon dictation

## 2023-10-01 PROBLEM — I25.119 CORONARY ARTERY DISEASE INVOLVING NATIVE CORONARY ARTERY OF NATIVE HEART WITH ANGINA PECTORIS: Status: ACTIVE | Noted: 2019-10-09

## 2024-01-05 ENCOUNTER — OFFICE VISIT (OUTPATIENT)
Dept: NEUROLOGY | Facility: CLINIC | Age: 79
End: 2024-01-05
Payer: MEDICARE

## 2024-01-05 VITALS
SYSTOLIC BLOOD PRESSURE: 120 MMHG | OXYGEN SATURATION: 93 % | HEIGHT: 68 IN | BODY MASS INDEX: 22.73 KG/M2 | HEART RATE: 99 BPM | DIASTOLIC BLOOD PRESSURE: 74 MMHG | WEIGHT: 150 LBS

## 2024-01-05 DIAGNOSIS — M47.812 SPONDYLOSIS OF CERVICAL REGION WITHOUT MYELOPATHY OR RADICULOPATHY: ICD-10-CM

## 2024-01-05 DIAGNOSIS — G37.3 TRANSVERSE MYELITIS: Primary | ICD-10-CM

## 2024-01-05 DIAGNOSIS — M47.816 SPONDYLOSIS OF LUMBAR SPINE: ICD-10-CM

## 2024-01-05 PROBLEM — K59.00 CONSTIPATION: Status: ACTIVE | Noted: 2024-01-05

## 2024-01-05 PROBLEM — M62.838 OTHER MUSCLE SPASM: Status: ACTIVE | Noted: 2023-04-26

## 2024-01-05 PROBLEM — E78.1 HYPERTRIGLYCERIDEMIA: Status: ACTIVE | Noted: 2024-01-05

## 2024-01-05 PROBLEM — M79.605 PAIN IN LEFT LEG: Status: ACTIVE | Noted: 2023-02-14

## 2024-01-05 PROBLEM — G83.10: Status: ACTIVE | Noted: 2017-09-25

## 2024-01-05 PROBLEM — Z95.5 HISTORY OF RIGHT CORONARY ARTERY STENT PLACEMENT: Status: ACTIVE | Noted: 2024-01-05

## 2024-01-05 PROCEDURE — 1160F RVW MEDS BY RX/DR IN RCRD: CPT | Performed by: PSYCHIATRY & NEUROLOGY

## 2024-01-05 PROCEDURE — 1159F MED LIST DOCD IN RCRD: CPT | Performed by: PSYCHIATRY & NEUROLOGY

## 2024-01-05 PROCEDURE — 99214 OFFICE O/P EST MOD 30 MIN: CPT | Performed by: PSYCHIATRY & NEUROLOGY

## 2024-01-05 RX ORDER — TRAMADOL HYDROCHLORIDE 50 MG/1
TABLET ORAL
COMMUNITY
Start: 2023-12-15

## 2024-01-05 RX ORDER — LACTULOSE 10 G/15ML
SOLUTION ORAL
COMMUNITY
Start: 2023-10-11

## 2024-01-05 NOTE — PROGRESS NOTES
CC: Cervical transverse myelitis; chronic low back and left groin pain    HPI:  Mannie Fajardo is a  78 y.o.  right-handed [place] male who I am seeing in follow-up with a history of cervical transverse myelitis of undetermined age and chronic low back pain and left groin pain.  I saw him last 6/16/2023 with the following history taken from that note with additions/modifications as indicated:    Is back pain is predominantly in the midline of the lumbar spine.  It has been present for many years.  He has failed physical therapy and he has failed pain management that tried ablations and epidural steroid injections.  He believes he has had dry needling also which was not helpful.  The patient was evaluated extensively in 2018.      He had been sent to me for an EMG which was done on the left leg 8/14/2018 showing normal nerve conductions and needle examination.  Brief clinical exam demonstrated upper motor neuron findings in the left arm and left leg and he was sent to me for a neurologic consultation.  His exam was suspicious for a left sided cervical spine lesion.  An MRI of the brain and cervical spine were obtained.  The MRI of the brain showed minor microvascular changes consistent with age.  The MRI of the cervical spine showed myelomalacia x2 with left-sided localization consistent with transverse myelitis in the area of the pyramidal tract on the left.     The patient states that he notes some specific functional problems and mild weakness in the the left arm.  He had some previous evaluation by Dr. Rhodes at [place] [facility] in 2000 and 2005.  Actually had 2 spinal taps apparently and he may have had an MRI of his neck.  The patient had been on narcotics and went through narcotic withdrawal at some point in time.     He has had 4 episodes of gout and has several swollen toes and stiff joints.  He had not seen a rheumatologist until seeing Dr. Gonzales.  The patient has been followed up recently by Dr. Olson  "and a myelogram with post myelogram CT was done on the lumbar spine showing really no significant foraminal or central canal stenosis at any level.  I reviewed the films and agree.  The patient also had a bone scan 8/27/2018 which showed some uptake in the sacrum which was thought perhaps to represent an insufficiency fracture.     Patient has continued complaints of severe low back pain \"10/10\" as well as left groin pain.  Groin pain is relatively tolerable he states but it is very persistent.  Is simply never goes away.  It affects his gait.  He describes some atrophy of his left thigh compared to the right.  He has been seen by pain management and had several epidural steroid injections apparently and states that one of the first ones it helped his pain and he could  his knee much better but it wore off and a repeat attempts did nothing he states.   He also describes that occasionally the left knee will give out.  It seems to be while on the stairs but he is holding the railing and has not fallen.  He has assistive devices at home but chooses not to use them.     After I saw him last I obtained an MRI of the lumbar spine which showed miscellaneous degenerative disc disease but no high-grade foraminal or central canal stenosis was seen.  I then obtained another bone scan which showed some hotspots in the interphalangeal joints of the hands as well as in the cervical spine but nothing of any significance in the low back.    The patient has a new primary care, [facility]  She sent him for repeat MRI of the cervical and thoracic spine as well as left hip.  I reviewed the reports but cannot see the films since [facility].  He continues to demonstrate the 2 areas of transverse myelitis in the cervical spine.  The MRI of the thoracic spine demonstrated at approximately T7 syrinx which is not visible on the previous study from 2016.  There is no cord " expansion but perhaps some slight cord atrophy at that level.  MRI of the left hip demonstrates chondromalacia with it being worse posteriorly.    The patient continues to describe his chronic back pain and left hip pain.  It is worse with weightbearing.  He notices some weakness in the left leg as well as in the left arm.  It is not substantially different.        Past Medical History:   Diagnosis Date    AAA (abdominal aortic aneurysm) without rupture     Arthritis     back    Back pain     Backache     CAD (coronary atherosclerotic disease)     Disease of thyroid gland     Dyslipidemia     Edema     History of transfusion     Hyperhomocystinemia     Hyperlipidemia     Stage 3 chronic kidney disease          Past Surgical History:   Procedure Laterality Date    APPENDECTOMY      CARDIAC SURGERY      CORONARY ANGIOPLASTY WITH STENT PLACEMENT      EPIDURAL Left 5/25/2022    Procedure: LUMBAR/SACRAL TRANSFORAMINAL EPIDURAL - left L4 and Left S1;  Surgeon: Deepak Mckeon MD;  Location: [facility] [place] MAIN OR;  Service: Pain Management;  Laterality: Left;    EXTRACORPOREAL SHOCKWAVE LITHOTRIPSY (ESWL), STENT INSERTION/REMOVAL Right 2/24/2017    Procedure: CYSTO RETROGRADE WITH STENT PLACEMENT;  Surgeon: Janes López MD;  Location: [facility] [place];  Service:     LUMBAR EPIDURAL INJECTION N/A 11/16/2022    Procedure: LUMBAR EPIDURAL STEROID INJECTION INTRALAMINAR L5-S1 (LEFT PARAMEDIAN APPROACH);  Surgeon: Carly Hairston MD;  Location: [facility] [place] MAIN OR;  Service: Pain Management;  Laterality: N/A;    MEDIAL BRANCH BLOCK Bilateral 6/23/2022    Procedure: Lumbar medial branch block bilateral L2-L5;  Surgeon: Deepak Mckeon MD;  Location: [facility] [place] MAIN OR;  Service: Pain Management;  Laterality: Bilateral;    MEDIAL BRANCH BLOCK Bilateral 7/12/2022    Procedure: lumbar medial branch block  bilateral L2-L5;  Surgeon: Deepak Mckeon MD;  Location: [facility] [place] MAIN OR;  Service: Pain Management;  Laterality: Bilateral;     RADIOFREQUENCY ABLATION Bilateral 8/9/2022    Procedure: L2-L5 RADIOFREQUENCY ABLATION LUMBAR;  Surgeon: Deepak Mckeon MD;  [place] [facility] [place];  Service: Pain Management;  Laterality: Bilateral;           Current Outpatient Medications:     allopurinol (ZYLOPRIM) 300 MG tablet, Take 1.5 tablets by mouth Daily., Disp: , Rfl:     aspirin 81 MG chewable tablet, Chew 1 tablet Daily., Disp: , Rfl:     folic acid (FOLVITE) 400 MCG tablet, Take 1 tablet by mouth Daily., Disp: , Rfl:     lactulose (CHRONULAC) 10 GM/15ML solution, Take 15ml by mouth one time daily, Disp: , Rfl:     Lovaza 1 g capsule, TAKE TWO CAPSULES BY MOUTH TWICE A DAY, Disp: 360 capsule, Rfl: 1    Plavix 75 MG tablet, TAKE ONE TABLET BY MOUTH DAILY, Disp: 90 tablet, Rfl: 1    traMADol (ULTRAM) 50 MG tablet, , Disp: , Rfl:     vitamin D (ERGOCALCIFEROL) 1.25 MG (38655 UT) capsule capsule, TAKE ONE CAPSULE BY MOUTH ONCE WEEKLY, Disp: 12 capsule, Rfl: 3      Family History   Problem Relation Age of Onset    Heart disease Mother     Stroke Father          Social History     Socioeconomic History    Marital status: Single    Number of children: 0    Years of education: COLLEGE   Tobacco Use    Smoking status: Never    Smokeless tobacco: Never   Substance and Sexual Activity    Alcohol use: No     Comment: occ caffeine use    Drug use: No    Sexual activity: Defer         Allergies   Allergen Reactions    Statins Other (See Comments)     Muscle weakness in legs and arms one time only  Muscle weakness in legs and arms one time only  Other reaction(s): Other (See Comments)  Muscle weakness in legs and arms one time only    Muscle weakness in legs and arms one time only    Adhesive Tape Other (See Comments)     Severe burn one time when left on too long    Wound Dressing Adhesive Other (See Comments)     Severe burn one time when left on too long         Pain Scale: 10/10        ROS:  Review of Systems   Constitutional:  Positive for fatigue. Negative  "for unexpected weight change.   HENT:  Negative for ear pain, hearing loss, nosebleeds and tinnitus.    Eyes:  Negative for photophobia, pain and visual disturbance.   Respiratory:  Negative for chest tightness, shortness of breath and wheezing.    Cardiovascular:  Negative for chest pain and palpitations.   Musculoskeletal:  Positive for back pain.   Neurological:  Positive for weakness (left arm).         I have reviewed and agree with the above ROS completed by the medical assistant.      Physical Exam:  Vitals:    01/05/24 1057   BP: 120/74   Pulse: 99   SpO2: 93%   Weight: 68 kg (150 lb)   Height: 172.7 cm (68\")     Orthostatic BP:    Body mass index is 22.81 kg/m².    Physical Exam  General: Thin white male no acute distress  HEENT: Normocephalic no evidence of trauma  Neck: Supple  Heart: Regular rate and rhythm  Extremities: No pedal edema      Neurological Exam:   Mental Status: Awake, alert, oriented to person, place and time.  Conversant without evidence of an affective disorder, thought disorder, delusions or hallucinations.  Attention span and concentration are normal.  HCF: No aphasia, apraxia or dysarthria.  Recent and remote memory intact.  Knowledge  of recent events intact.  CN: I:   II: Visual fields full without left inattention   III, IV, VI: Eye movements intact without nystagmus or ptosis.  Pupils equal   round and reactive to light.   V,VII: Light touch and pinprick intact all 3 divisions of V.  Facial muscles symmetrical.   VIII: Hearing intact to finger rub   IX,X: Soft palate elevates symmetrically   XI: Sternomastoid and trapezius are strong.   XII: Tongue midline without atrophy or fasciculations  Motor: Mild increased tone left arm and leg with normal bulk in the upper and lower extremities   Power testing: Mild weakness in the left arm and left leg.  Reflexes: Upper extremities: Mild reflex asymmetry brisker on the left        Lower extremities:        Toe signs:  Sensory: Light " touch:        Pinprick:        Vibration:        Position:    Cerebellar: Finger-to-nose: Intact           Rapid movement: Slower movement with the left hand           Heel-to-shin:  Gait and Station: Antalgic.  Left leg is externally rotated and drags at some.    Results:      Lab Results   Component Value Date    GLUCOSE 120 (H) 06/01/2023    BUN 21 06/01/2023    CREATININE 1.43 (H) 06/01/2023    EGFRIFNONA 47 (L) 11/16/2021    EGFRIFAFRI >60 11/29/2022    BCR 14.7 06/01/2023    CO2 24.0 06/01/2023    CALCIUM 9.1 06/01/2023    PROTENTOTREF 6.7 12/12/2022    ALBUMIN 4.4 06/01/2023    LABIL2 2.5 12/12/2022    AST 17 08/07/2023    ALT 11 08/07/2023       Lab Results   Component Value Date    WBC 11.66 (H) 08/07/2023    HGB 15.1 08/07/2023    HCT 45.7 08/07/2023    MCV 98.1 08/07/2023     08/07/2023         .  Lab Results   Component Value Date    RPR Non-Reactive 08/15/2018         Lab Results   Component Value Date    TSH 8.110 (H) 12/12/2022         Lab Results   Component Value Date    PRBASDHQ71 379 08/15/2018         Lab Results   Component Value Date    FOLATE >20.00 08/15/2018         Lab Results   Component Value Date    HGBA1C 5.7 04/10/2018         Lab Results   Component Value Date    GLUCOSE 120 (H) 06/01/2023    BUN 21 06/01/2023    CREATININE 1.43 (H) 06/01/2023    EGFRIFNONA 47 (L) 11/16/2021    EGFRIFAFRI >60 11/29/2022    BCR 14.7 06/01/2023    K 4.1 06/01/2023    CO2 24.0 06/01/2023    CALCIUM 9.1 06/01/2023    PROTENTOTREF 6.7 12/12/2022    ALBUMIN 4.4 06/01/2023    LABIL2 2.5 12/12/2022    AST 17 08/07/2023    ALT 11 08/07/2023         Lab Results   Component Value Date    WBC 11.66 (H) 08/07/2023    HGB 15.1 08/07/2023    HCT 45.7 08/07/2023    MCV 98.1 08/07/2023     08/07/2023             Assessment:   1.  Transverse myelitis cervical spine x 2, age-indeterminate-exam evidence present.  Doubt it is significantly related to his painful symptoms.  2.  T7 syrinx-also not clearly  related.  Seems to have developed since prior MRI 2016.  I again doubt it is significantly related to his painful symptoms  3.  Chronic low back and left groin pain-orthopedic in nature          Plan:  1.  I have asked him to collect his disc from [place] and have the films uploaded into [employer] system for simple access  2.  No further suggestions regarding pain treatment.  Last time I saw him we tried Cymbalta which caused some cognitive symptoms which she could not tolerate.  3.  Follow-up in about 8 months            Time: 30 minutes            Dictated utilizing Dragon dictation.

## 2024-01-05 NOTE — LETTER
January 5, 2024       No Recipients    Patient: Mannie Fajardo   YOB: 1945   Date of Visit: 1/5/2024     Dear Beatrice Gardner MD:       Thank you for referring Mannie Fajardo to me for evaluation. Below are the relevant portions of my assessment and plan of care.    If you have questions, please do not hesitate to call me. I look forward to following Mannie along with you.         Sincerely,        Beto Brizuela MD        CC:   No Recipients    Beto Brizuela MD  01/05/24 1239  Sign when Signing Visit  CC: Cervical transverse myelitis; chronic low back and left groin pain    HPI:  Mannie Fajardo is a  78 y.o.  right-handed [place] male who I am seeing in follow-up with a history of cervical transverse myelitis of undetermined age and chronic low back pain and left groin pain.  I saw him last 6/16/2023 with the following history taken from that note with additions/modifications as indicated:    Is back pain is predominantly in the midline of the lumbar spine.  It has been present for many years.  He has failed physical therapy and he has failed pain management that tried ablations and epidural steroid injections.  He believes he has had dry needling also which was not helpful.  The patient was evaluated extensively in 2018.      He had been sent to me for an EMG which was done on the left leg 8/14/2018 showing normal nerve conductions and needle examination.  Brief clinical exam demonstrated upper motor neuron findings in the left arm and left leg and he was sent to me for a neurologic consultation.  His exam was suspicious for a left sided cervical spine lesion.  An MRI of the brain and cervical spine were obtained.  The MRI of the brain showed minor microvascular changes consistent with age.  The MRI of the cervical spine showed myelomalacia x2 with left-sided localization consistent with transverse myelitis in the area of the pyramidal tract on the left.     The patient states that he notes some  "specific functional problems and mild weakness in the the left arm.  He had some previous evaluation by Dr. Rhodes at [place] in 2000 and 2005.  Actually had 2 spinal taps apparently and he may have had an MRI of his neck.  The patient had been on narcotics and went through narcotic withdrawal at some point in time.     He has had 4 episodes of gout and has several swollen toes and stiff joints.  He had not seen a rheumatologist until seeing Dr. Gonzales.  The patient has been followed up recently by Dr. Olson and a myelogram with post myelogram CT was done on the lumbar spine showing really no significant foraminal or central canal stenosis at any level.  I reviewed the films and agree.  The patient also had a bone scan 8/27/2018 which showed some uptake in the sacrum which was thought perhaps to represent an insufficiency fracture.     Patient has continued complaints of severe low back pain \"10/10\" as well as left groin pain.  Groin pain is relatively tolerable he states but it is very persistent.  Is simply never goes away.  It affects his gait.  He describes some atrophy of his left thigh compared to the right.  He has been seen by pain management and had several epidural steroid injections apparently and states that one of the first ones it helped his pain and he could  his knee much better but it wore off and a repeat attempts did nothing he states.   He also describes that occasionally the left knee will give out.  It seems to be while on the stairs but he is holding the railing and has not fallen.  He has assistive devices at home but chooses not to use them.     After I saw him last I obtained an MRI of the lumbar spine which showed miscellaneous degenerative disc disease but no high-grade foraminal or central canal stenosis was seen.  I then obtained another bone scan which showed some hotspots in the interphalangeal joints of the hands as well as in the cervical spine but nothing of any " significance in the low back.    The patient has a new primary care, Dr. Gardner who is in the [facility] system.  She sent him for repeat MRI of the cervical and thoracic spine as well as left hip.  I reviewed the reports but cannot see the films since they were done in the [facility] system.  He continues to demonstrate the 2 areas of transverse myelitis in the cervical spine.  The MRI of the thoracic spine demonstrated at approximately T7 syrinx which is not visible on the previous study from 2016.  There is no cord expansion but perhaps some slight cord atrophy at that level.  MRI of the left hip demonstrates chondromalacia with it being worse posteriorly.    The patient continues to describe his chronic back pain and left hip pain.  It is worse with weightbearing.  He notices some weakness in the left leg as well as in the left arm.  It is not substantially different.        Past Medical History:   Diagnosis Date   • AAA (abdominal aortic aneurysm) without rupture    • Arthritis     back   • Back pain    • Backache    • CAD (coronary atherosclerotic disease)    • Disease of thyroid gland    • Dyslipidemia    • Edema    • History of transfusion    • Hyperhomocystinemia    • Hyperlipidemia    • Stage 3 chronic kidney disease          Past Surgical History:   Procedure Laterality Date   • APPENDECTOMY     • CARDIAC SURGERY     • CORONARY ANGIOPLASTY WITH STENT PLACEMENT     • EPIDURAL Left 5/25/2022    Procedure: LUMBAR/SACRAL TRANSFORAMINAL EPIDURAL - left L4 and Left S1;  Surgeon: Deepak Mckeon MD;  Location: [facility];  Service: Pain Management;  Laterality: Left;   • EXTRACORPOREAL SHOCKWAVE LITHOTRIPSY (ESWL), STENT INSERTION/REMOVAL Right 2/24/2017    Procedure: CYSTO RETROGRADE WITH STENT PLACEMENT;  Surgeon: Janes López MD;  Location: [facility];  Service:    • LUMBAR EPIDURAL INJECTION N/A 11/16/2022    Procedure: LUMBAR EPIDURAL STEROID INJECTION INTRALAMINAR L5-S1 (LEFT PARAMEDIAN  APPROACH);  Surgeon: Carly Hairston MD;  Location: [facility];  Service: Pain Management;  Laterality: N/A;   • MEDIAL BRANCH BLOCK Bilateral 6/23/2022    Procedure: Lumbar medial branch block bilateral L2-L5;  Surgeon: Deepak Mckeon MD;  Location: [facility];  Service: Pain Management;  Laterality: Bilateral;   • MEDIAL BRANCH BLOCK Bilateral 7/12/2022    Procedure: lumbar medial branch block  bilateral L2-L5;  Surgeon: Deepak Mckeon MD;  Location: [facility];  Service: Pain Management;  Laterality: Bilateral;   • RADIOFREQUENCY ABLATION Bilateral 8/9/2022    Procedure: L2-L5 RADIOFREQUENCY ABLATION LUMBAR;  Surgeon: Deepak Mckeon MD;  Location: [facility];  Service: Pain Management;  Laterality: Bilateral;           Current Outpatient Medications:   •  allopurinol (ZYLOPRIM) 300 MG tablet, Take 1.5 tablets by mouth Daily., Disp: , Rfl:   •  aspirin 81 MG chewable tablet, Chew 1 tablet Daily., Disp: , Rfl:   •  folic acid (FOLVITE) 400 MCG tablet, Take 1 tablet by mouth Daily., Disp: , Rfl:   •  lactulose (CHRONULAC) 10 GM/15ML solution, Take 15ml by mouth one time daily, Disp: , Rfl:   •  Lovaza 1 g capsule, TAKE TWO CAPSULES BY MOUTH TWICE A DAY, Disp: 360 capsule, Rfl: 1  •  Plavix 75 MG tablet, TAKE ONE TABLET BY MOUTH DAILY, Disp: 90 tablet, Rfl: 1  •  traMADol (ULTRAM) 50 MG tablet, , Disp: , Rfl:   •  vitamin D (ERGOCALCIFEROL) 1.25 MG (55268 UT) capsule capsule, TAKE ONE CAPSULE BY MOUTH ONCE WEEKLY, Disp: 12 capsule, Rfl: 3      Family History   Problem Relation Age of Onset   • Heart disease Mother    • Stroke Father          Social History     Socioeconomic History   • Marital status: Single   • Number of children: 0   • Years of education: COLLEGE   Tobacco Use   • Smoking status: Never   • Smokeless tobacco: Never   Substance and Sexual Activity   • Alcohol use: No     Comment: occ caffeine use   • Drug use: No   • Sexual activity: Defer         Allergies   Allergen  "Reactions   • Statins Other (See Comments)     Muscle weakness in legs and arms one time only  Muscle weakness in legs and arms one time only  Other reaction(s): Other (See Comments)  Muscle weakness in legs and arms one time only    Muscle weakness in legs and arms one time only   • Adhesive Tape Other (See Comments)     Severe burn one time when left on too long   • Wound Dressing Adhesive Other (See Comments)     Severe burn one time when left on too long         Pain Scale: 10/10        ROS:  Review of Systems   Constitutional:  Positive for fatigue. Negative for unexpected weight change.   HENT:  Negative for ear pain, hearing loss, nosebleeds and tinnitus.    Eyes:  Negative for photophobia, pain and visual disturbance.   Respiratory:  Negative for chest tightness, shortness of breath and wheezing.    Cardiovascular:  Negative for chest pain and palpitations.   Musculoskeletal:  Positive for back pain.   Neurological:  Positive for weakness (left arm).         I have reviewed and agree with the above ROS completed by the medical assistant.      Physical Exam:  Vitals:    01/05/24 1057   BP: 120/74   Pulse: 99   SpO2: 93%   Weight: 68 kg (150 lb)   Height: 172.7 cm (68\")     Orthostatic BP:    Body mass index is 22.81 kg/m².    Physical Exam  General: Thin white male no acute distress  HEENT: Normocephalic no evidence of trauma  Neck: Supple  Heart: Regular rate and rhythm  Extremities: No pedal edema      Neurological Exam:   Mental Status: Awake, alert, oriented to person, place and time.  Conversant without evidence of an affective disorder, thought disorder, delusions or hallucinations.  Attention span and concentration are normal.  HCF: No aphasia, apraxia or dysarthria.  Recent and remote memory intact.  Knowledge  of recent events intact.  CN: I:   II: Visual fields full without left inattention   III, IV, VI: Eye movements intact without nystagmus or ptosis.  Pupils equal   round and reactive to " light.   V,VII: Light touch and pinprick intact all 3 divisions of V.  Facial muscles symmetrical.   VIII: Hearing intact to finger rub   IX,X: Soft palate elevates symmetrically   XI: Sternomastoid and trapezius are strong.   XII: Tongue midline without atrophy or fasciculations  Motor: Mild increased tone left arm and leg with normal bulk in the upper and lower extremities   Power testing: Mild weakness in the left arm and left leg.  Reflexes: Upper extremities: Mild reflex asymmetry brisker on the left        Lower extremities:        Toe signs:  Sensory: Light touch:        Pinprick:        Vibration:        Position:    Cerebellar: Finger-to-nose: Intact           Rapid movement: Slower movement with the left hand           Heel-to-shin:  Gait and Station: Antalgic.  Left leg is externally rotated and drags at some.    Results:      Lab Results   Component Value Date    GLUCOSE 120 (H) 06/01/2023    BUN 21 06/01/2023    CREATININE 1.43 (H) 06/01/2023    EGFRIFNONA 47 (L) 11/16/2021    EGFRIFAFRI >60 11/29/2022    BCR 14.7 06/01/2023    CO2 24.0 06/01/2023    CALCIUM 9.1 06/01/2023    PROTENTOTREF 6.7 12/12/2022    ALBUMIN 4.4 06/01/2023    LABIL2 2.5 12/12/2022    AST 17 08/07/2023    ALT 11 08/07/2023       Lab Results   Component Value Date    WBC 11.66 (H) 08/07/2023    HGB 15.1 08/07/2023    HCT 45.7 08/07/2023    MCV 98.1 08/07/2023     08/07/2023         .  Lab Results   Component Value Date    RPR Non-Reactive 08/15/2018         Lab Results   Component Value Date    TSH 8.110 (H) 12/12/2022         Lab Results   Component Value Date    OOMRYZBB92 379 08/15/2018         Lab Results   Component Value Date    FOLATE >20.00 08/15/2018         Lab Results   Component Value Date    HGBA1C 5.7 04/10/2018         Lab Results   Component Value Date    GLUCOSE 120 (H) 06/01/2023    BUN 21 06/01/2023    CREATININE 1.43 (H) 06/01/2023    EGFRIFNONA 47 (L) 11/16/2021    EGFRIFAFRI >60 11/29/2022    BCR 14.7  06/01/2023    K 4.1 06/01/2023    CO2 24.0 06/01/2023    CALCIUM 9.1 06/01/2023    PROTENTOTREF 6.7 12/12/2022    ALBUMIN 4.4 06/01/2023    LABIL2 2.5 12/12/2022    AST 17 08/07/2023    ALT 11 08/07/2023         Lab Results   Component Value Date    WBC 11.66 (H) 08/07/2023    HGB 15.1 08/07/2023    HCT 45.7 08/07/2023    MCV 98.1 08/07/2023     08/07/2023             Assessment:   1.  Transverse myelitis cervical spine x 2, age-indeterminate-exam evidence present.  Doubt it is significantly related to his painful symptoms.  2.  T7 syrinx-also not clearly related.  Seems to have developed since prior MRI 2016.  I again doubt it is significantly related to his painful symptoms  3.  Chronic low back and left groin pain-orthopedic in nature          Plan:  1.  I have asked him to collect his disc from bulletn. and have the films uploaded into [facility] system for simple access  2.  No further suggestions regarding pain treatment.  Last time I saw him we tried Cymbalta which caused some cognitive symptoms which she could not tolerate.  3.  Follow-up in about 8 months            Time: 30 minutes            Dictated utilizing Dragon dictation.

## 2024-02-16 NOTE — ED NOTES
Patient called the office today.     She reports she is s/p tongue + lymph node surgery 2 weeks ago at [facility] [place] 2[place] [facility] [place] 2[place] Provided pillow and warm blanket to patient.     Nicole Carmona  01/25/18 1952

## 2024-03-14 DIAGNOSIS — I65.23 BILATERAL CAROTID ARTERY STENOSIS: ICD-10-CM

## 2024-03-14 DIAGNOSIS — I72.4 ANEURYSM OF ARTERY OF LOWER EXTREMITY: Primary | ICD-10-CM

## 2024-03-14 NOTE — PROGRESS NOTES
Subjective   Patient ID: Mannie Fajardo is a 78 y.o. male is being seen for consultation today at the request of Beatrice Gardner MD  Degeneration of cervical intervertebral disc, MRI CERVICAL 11/27/23 @ [facility]     I saw this gentleman about 2 and half years ago.  There really was not much I can do for him then and I am afraid that the same is true now.  He was asked to be reevaluated by his new primary care physician Dr. Gardner.  Apparently he does have a history of transverse myelitis in the past but his main problem with regards to his visit here is his continued low back pain.  He says he does not have pain in his neck or his thoracic spine.  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  He has worked with Dr. Mckeon in the past but nothing that was done seem to help him and he was released.  His hip MRI ordered by his primary care physician did show some hip chondromalacia.  He has not seen orthopedic surgeon and we can certainly send him to see 1 in regards to recommendation about this problem but from a pain standpoint it is mainly his low back.  He has tried a brace and it does not help.  He is very unsteady today and we will give him a walker.  There is nothing surgical to offer this gentleman.  Given the failure of pain management to help him I am not sure I have any thing else to add.  Will send him to orthopedics for his hip and he will follow-up with his primary care physician.  Again surgery will not be helpful to him.    History of Present Illness    The following portions of the patient's history were reviewed and updated as appropriate: allergies, current medications, past family history, past medical history, past social history, past surgical history, and problem list.    Review of Systems   Constitutional:  Negative for fever.   Musculoskeletal:  Positive for back pain and gait problem.   Neurological:  Positive for weakness (left leg). Negative for  "numbness.   All other systems reviewed and are negative.          Objective     Vitals:    03/18/24 1429   BP: 126/80   BP Location: Left arm   Patient Position: Sitting   Cuff Size: Adult   Resp: 20   Weight: 68 kg (150 lb)   Height: 172.7 cm (67.99\")   PainSc: 10-Worst pain ever   PainLoc: Back     Body mass index is 22.81 kg/m².    Tobacco Use: Low Risk  (3/18/2024)    Patient History     Smoking Tobacco Use: Never     Smokeless Tobacco Use: Never     Passive Exposure: Not on file          Physical Exam  Constitutional:       Appearance: He is well-developed.   HENT:      Head: Normocephalic and atraumatic.   Eyes:      Extraocular Movements: EOM normal.      Conjunctiva/sclera: Conjunctivae normal.      Pupils: Pupils are equal, round, and reactive to light.   Neck:      Vascular: No carotid bruit.   Neurological:      Mental Status: He is oriented to person, place, and time.      Coordination: Finger-Nose-Finger Test and Heel to Shin Test normal.      Gait: Gait is intact.      Deep Tendon Reflexes:      Reflex Scores:       Tricep reflexes are 2+ on the right side and 2+ on the left side.       Bicep reflexes are 2+ on the right side and 2+ on the left side.       Brachioradialis reflexes are 2+ on the right side and 2+ on the left side.       Patellar reflexes are 2+ on the right side and 2+ on the left side.       Achilles reflexes are 2+ on the right side and 2+ on the left side.  Psychiatric:         Speech: Speech normal.       Neurologic Exam     Mental Status   Oriented to person, place, and time.   Registration of memory: Good recent and remote memory.   Attention: normal. Concentration: normal.   Speech: speech is normal   Level of consciousness: alert  Knowledge: consistent with education.     Cranial Nerves     CN II   Visual fields full to confrontation.   Visual acuity: normal    CN III, IV, VI   Pupils are equal, round, and reactive to light.  Extraocular motions are normal.     CN V   Facial " sensation intact.   Right corneal reflex: normal  Left corneal reflex: normal    CN VII   Facial expression full, symmetric.   Right facial weakness: none  Left facial weakness: none    CN VIII   Hearing: intact    CN IX, X   Palate: symmetric    CN XI   Right sternocleidomastoid strength: normal  Left sternocleidomastoid strength: normal    CN XII   Tongue: not atrophic  Tongue deviation: none    Motor Exam   Muscle bulk: normal  Right arm tone: normal  Left arm tone: normal  Right leg tone: normal  Left leg tone: normal    Strength   Strength 5/5 except as noted.   Right neck flexion: 4/5  Left neck flexion: 4/5  Right neck extension: 4/5  Left neck extension: 4/5  Right deltoid: 4/5  Left deltoid: 4/5  Right biceps: 4/5  Left biceps: 4/5  Right triceps: 4/5  Left triceps: 4/5  Right wrist flexion: 4/5  Left wrist flexion: 4/5  Right wrist extension: 4/5  Left wrist extension: 4/5  Right interossei: 4/5  Left interossei: 4/5  Right abdominals: 4/5  Left abdominals: 4/5  Right iliopsoas: 4/5  Left iliopsoas: 4/5  Right quadriceps: 4/5  Left quadriceps: 4/5  Right hamstrin/5  Left hamstrin/5  Right glutei: 4/5  Left glutei: 4/5  Right anterior tibial: 4/5  Left anterior tibial: 4/5  Right posterior tibial: 4/5  Left posterior tibial: 4/5  Right peroneal: 4/5  Left peroneal: 4/5  Right gastroc: 4/5  Left gastroc: 4/5    Sensory Exam   Light touch normal.     Gait, Coordination, and Reflexes     Gait  Gait: normal    Coordination   Finger to nose coordination: normal  Heel to shin coordination: normal    Reflexes   Right brachioradialis: 2+  Left brachioradialis: 2+  Right biceps: 2+  Left biceps: 2+  Right triceps: 2+  Left triceps: 2+  Right patellar: 2+  Left patellar: 2+  Right achilles: 2+  Left achilles: 2+  Right : 2+  Left : 2+          Assessment & Plan   Independent Review of Radiographic Studies:      I personally reviewed the images from the following studies.    His lumbar MRI done on  4-11/23 was reviewed.  Shows pretty much the same finding as his myelogram done 3 years ago with disc bulging and mild disc space narrowing at L4-L5 but no significant stenosis or herniated disc.  There may be some nerve clumping in the lumbar spine.  The cervical and thoracic spine MRIs done on 11/27/2023 were reviewed.  Again there is some cervical kyphosis and a small syrinx in the thoracic spine but no significant cord or root compression.  The hip MRI shows some chondromalacia.  Agree with the report.        Medical Decision Making:      He is agreed to see orthopedics about his left hip.  I am afraid is really not much I can do for this gentleman.  Certainly nothing surgical.  Will give him a walker which we had here in the office which will help prevent falling.  Otherwise we will keep it open-ended and he will follow-up with his primary care physician.    Diagnoses and all orders for this visit:    1. Chondromalacia of hip, left (Primary)  -     Ambulatory Referral to Orthopedic Surgery    2. DDD (degenerative disc disease), lumbar  -     Walker  Walker Folding with Wheels    3. Chronic bilateral low back pain without sciatica  -     Walker  Walker Folding with Wheels      Return if symptoms worsen or fail to improve.

## 2024-03-15 ENCOUNTER — TELEPHONE (OUTPATIENT)
Dept: NEUROSURGERY | Facility: CLINIC | Age: 79
End: 2024-03-15
Payer: MEDICARE

## 2024-03-15 NOTE — TELEPHONE ENCOUNTER
Contacted patient to remind him to bring his disc with for his appointment on Monday. He voiced he understands

## 2024-03-18 ENCOUNTER — OFFICE VISIT (OUTPATIENT)
Dept: NEUROSURGERY | Facility: CLINIC | Age: 79
End: 2024-03-18
Payer: MEDICARE

## 2024-03-18 VITALS
WEIGHT: 150 LBS | BODY MASS INDEX: 22.73 KG/M2 | RESPIRATION RATE: 20 BRPM | SYSTOLIC BLOOD PRESSURE: 126 MMHG | HEIGHT: 68 IN | DIASTOLIC BLOOD PRESSURE: 80 MMHG

## 2024-03-18 DIAGNOSIS — G89.29 CHRONIC BILATERAL LOW BACK PAIN WITHOUT SCIATICA: ICD-10-CM

## 2024-03-18 DIAGNOSIS — M94.252 CHONDROMALACIA OF HIP, LEFT: Primary | ICD-10-CM

## 2024-03-18 DIAGNOSIS — M54.50 CHRONIC BILATERAL LOW BACK PAIN WITHOUT SCIATICA: ICD-10-CM

## 2024-03-18 DIAGNOSIS — M51.36 DDD (DEGENERATIVE DISC DISEASE), LUMBAR: ICD-10-CM

## 2024-03-18 PROBLEM — M51.369 DDD (DEGENERATIVE DISC DISEASE), LUMBAR: Status: ACTIVE | Noted: 2024-03-18

## 2024-03-18 PROCEDURE — 1160F RVW MEDS BY RX/DR IN RCRD: CPT | Performed by: NEUROLOGICAL SURGERY

## 2024-03-18 PROCEDURE — 1159F MED LIST DOCD IN RCRD: CPT | Performed by: NEUROLOGICAL SURGERY

## 2024-03-18 PROCEDURE — 99213 OFFICE O/P EST LOW 20 MIN: CPT | Performed by: NEUROLOGICAL SURGERY

## 2024-03-18 NOTE — LETTER
March 18, 2024       No Recipients    Patient: Mannie Fajardo   YOB: 1945   Date of Visit: 3/18/2024     Dear Beatrice Gardner MD:       Thank you for referring Mannie Fajardo to me for evaluation. Below are the relevant portions of my assessment and plan of care.    If you have questions, please do not hesitate to call me. I look forward to following Mannie along with you.         Sincerely,        Nitin Olson MD        CC:   No Recipients    Nitin Olson MD  03/18/24 1553  Sign when Signing Visit  Subjective  Patient ID: Mannie Fajardo is a 78 y.o. male is being seen for consultation today at the request of Beatrice Gardner MD  Degeneration of cervical intervertebral disc, MRI CERVICAL 11/27/23 @ [facility]     I saw this gentleman about 2 and half years ago.  There really was not much I can do for him then and I am afraid that the same is true now.  He was asked to be reevaluated by his new primary care physician Dr. Gardner.  Apparently he does have a history of transverse myelitis in the past but his main problem with regards to his visit here is his continued low back pain.  He says he does not have pain in his neck or his thoracic spine.  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  He has worked with Dr. Mckeon in the past but nothing that was done seem to help him and he was released.  His hip MRI ordered by his primary care physician did show some hip chondromalacia.  He has not seen orthopedic surgeon and we can certainly send him to see 1 in regards to recommendation about this problem but from a pain standpoint it is mainly his low back.  He has tried a brace and it does not help.  He is very unsteady today and we will give him a walker.  There is nothing surgical to offer this gentleman.  Given the failure of pain management to help him I am not sure I have any thing else to add.  Will send him to orthopedics for his hip and he will follow-up  "with his primary care physician.  Again surgery will not be helpful to him.    History of Present Illness    The following portions of the patient's history were reviewed and updated as appropriate: allergies, current medications, past family history, past medical history, past social history, past surgical history, and problem list.    Review of Systems   Constitutional:  Negative for fever.   Musculoskeletal:  Positive for back pain and gait problem.   Neurological:  Positive for weakness (left leg). Negative for numbness.   All other systems reviewed and are negative.          Objective    Vitals:    03/18/24 1429   BP: 126/80   BP Location: Left arm   Patient Position: Sitting   Cuff Size: Adult   Resp: 20   Weight: 68 kg (150 lb)   Height: 172.7 cm (67.99\")   PainSc: 10-Worst pain ever   PainLoc: Back     Body mass index is 22.81 kg/m².    Tobacco Use: Low Risk  (3/18/2024)    Patient History    • Smoking Tobacco Use: Never    • Smokeless Tobacco Use: Never    • Passive Exposure: Not on file          Physical Exam  Constitutional:       Appearance: He is well-developed.   HENT:      Head: Normocephalic and atraumatic.   Eyes:      Extraocular Movements: EOM normal.      Conjunctiva/sclera: Conjunctivae normal.      Pupils: Pupils are equal, round, and reactive to light.   Neck:      Vascular: No carotid bruit.   Neurological:      Mental Status: He is oriented to person, place, and time.      Coordination: Finger-Nose-Finger Test and Heel to Shin Test normal.      Gait: Gait is intact.      Deep Tendon Reflexes:      Reflex Scores:       Tricep reflexes are 2+ on the right side and 2+ on the left side.       Bicep reflexes are 2+ on the right side and 2+ on the left side.       Brachioradialis reflexes are 2+ on the right side and 2+ on the left side.       Patellar reflexes are 2+ on the right side and 2+ on the left side.       Achilles reflexes are 2+ on the right side and 2+ on the left side.  Psychiatric:  "        Speech: Speech normal.       Neurologic Exam     Mental Status   Oriented to person, place, and time.   Registration of memory: Good recent and remote memory.   Attention: normal. Concentration: normal.   Speech: speech is normal   Level of consciousness: alert  Knowledge: consistent with education.     Cranial Nerves     CN II   Visual fields full to confrontation.   Visual acuity: normal    CN III, IV, VI   Pupils are equal, round, and reactive to light.  Extraocular motions are normal.     CN V   Facial sensation intact.   Right corneal reflex: normal  Left corneal reflex: normal    CN VII   Facial expression full, symmetric.   Right facial weakness: none  Left facial weakness: none    CN VIII   Hearing: intact    CN IX, X   Palate: symmetric    CN XI   Right sternocleidomastoid strength: normal  Left sternocleidomastoid strength: normal    CN XII   Tongue: not atrophic  Tongue deviation: none    Motor Exam   Muscle bulk: normal  Right arm tone: normal  Left arm tone: normal  Right leg tone: normal  Left leg tone: normal    Strength   Strength 5/5 except as noted.   Right neck flexion: 4/5  Left neck flexion: 4/5  Right neck extension: 4/5  Left neck extension: 4/5  Right deltoid: 4/5  Left deltoid: 4/5  Right biceps: 4/5  Left biceps: 4/5  Right triceps: 4/5  Left triceps: 4/5  Right wrist flexion: 4/5  Left wrist flexion: 4/5  Right wrist extension: 4/5  Left wrist extension: 4/5  Right interossei: 4/5  Left interossei: 4/5  Right abdominals: 4/5  Left abdominals: 4/5  Right iliopsoas: 4/5  Left iliopsoas: 4/5  Right quadriceps: 4/5  Left quadriceps: 4/5  Right hamstrin/5  Left hamstrin/5  Right glutei: 4/5  Left glutei: 4/5  Right anterior tibial: 4/5  Left anterior tibial: 4/5  Right posterior tibial: 4/5  Left posterior tibial: 4/5  Right peroneal: 4/5  Left peroneal: 4/5  Right gastroc: 4/5  Left gastroc: 4/5    Sensory Exam   Light touch normal.     Gait, Coordination, and Reflexes      Gait  Gait: normal    Coordination   Finger to nose coordination: normal  Heel to shin coordination: normal    Reflexes   Right brachioradialis: 2+  Left brachioradialis: 2+  Right biceps: 2+  Left biceps: 2+  Right triceps: 2+  Left triceps: 2+  Right patellar: 2+  Left patellar: 2+  Right achilles: 2+  Left achilles: 2+  Right : 2+  Left : 2+          Assessment & Plan  Independent Review of Radiographic Studies:      I personally reviewed the images from the following studies.    His lumbar MRI done on 4-11/23 was reviewed.  Shows pretty much the same finding as his myelogram done 3 years ago with disc bulging and mild disc space narrowing at L4-L5 but no significant stenosis or herniated disc.  There may be some nerve clumping in the lumbar spine.  The cervical and thoracic spine MRIs done on 11/27/2023 were reviewed.  Again there is some cervical kyphosis and a small syrinx in the thoracic spine but no significant cord or root compression.  The hip MRI shows some chondromalacia.  Agree with the report.        Medical Decision Making:      He is agreed to see orthopedics about his left hip.  I am afraid is really not much I can do for this gentleman.  Certainly nothing surgical.  Will give him a walker which we had here in the office which will help prevent falling.  Otherwise we will keep it open-ended and he will follow-up with his primary care physician.    Diagnoses and all orders for this visit:    1. Chondromalacia of hip, left (Primary)  -     Ambulatory Referral to Orthopedic Surgery    2. DDD (degenerative disc disease), lumbar  -     Walker  Walker Folding with Wheels    3. Chronic bilateral low back pain without sciatica  -     Walker  Walker Folding with Wheels      Return if symptoms worsen or fail to improve.

## 2024-03-20 ENCOUNTER — HOSPITAL ENCOUNTER (INPATIENT)
Facility: HOSPITAL | Age: 79
LOS: 5 days | Discharge: NURSING, REHAB OR LONG-TERM CARE | DRG: 558 | End: 2024-03-25
Attending: EMERGENCY MEDICINE | Admitting: STUDENT IN AN ORGANIZED HEALTH CARE EDUCATION/TRAINING PROGRAM
Payer: MEDICARE

## 2024-03-20 ENCOUNTER — APPOINTMENT (OUTPATIENT)
Dept: CT IMAGING | Facility: HOSPITAL | Age: 79
DRG: 558 | End: 2024-03-20
Payer: MEDICARE

## 2024-03-20 ENCOUNTER — APPOINTMENT (OUTPATIENT)
Dept: GENERAL RADIOLOGY | Facility: HOSPITAL | Age: 79
DRG: 558 | End: 2024-03-20
Payer: MEDICARE

## 2024-03-20 DIAGNOSIS — L89.91 PRESSURE INJURY, STAGE 1, UNSPECIFIED LOCATION: ICD-10-CM

## 2024-03-20 DIAGNOSIS — T79.6XXA TRAUMATIC RHABDOMYOLYSIS, INITIAL ENCOUNTER: Primary | ICD-10-CM

## 2024-03-20 DIAGNOSIS — S82.001A CLOSED NONDISPLACED FRACTURE OF RIGHT PATELLA, UNSPECIFIED FRACTURE MORPHOLOGY, INITIAL ENCOUNTER: ICD-10-CM

## 2024-03-20 DIAGNOSIS — N17.9 ACUTE KIDNEY INJURY: ICD-10-CM

## 2024-03-20 DIAGNOSIS — S00.03XA CONTUSION OF SCALP, INITIAL ENCOUNTER: ICD-10-CM

## 2024-03-20 PROBLEM — M62.82 RHABDOMYOLYSIS: Status: ACTIVE | Noted: 2024-03-20

## 2024-03-20 PROBLEM — R74.01 TRANSAMINITIS: Status: ACTIVE | Noted: 2024-03-20

## 2024-03-20 LAB
ALBUMIN SERPL-MCNC: 3.9 G/DL (ref 3.5–5.2)
ALBUMIN/GLOB SERPL: 1.5 G/DL
ALP SERPL-CCNC: 86 U/L (ref 39–117)
ALT SERPL W P-5'-P-CCNC: 133 U/L (ref 1–41)
ANION GAP SERPL CALCULATED.3IONS-SCNC: 14.6 MMOL/L (ref 5–15)
AST SERPL-CCNC: 796 U/L (ref 1–40)
BACTERIA UR QL AUTO: ABNORMAL /HPF
BASOPHILS # BLD AUTO: 0.02 10*3/MM3 (ref 0–0.2)
BASOPHILS NFR BLD AUTO: 0.2 % (ref 0–1.5)
BILIRUB SERPL-MCNC: 0.8 MG/DL (ref 0–1.2)
BILIRUB UR QL STRIP: ABNORMAL
BUN SERPL-MCNC: 26 MG/DL (ref 8–23)
BUN/CREAT SERPL: 18.3 (ref 7–25)
CALCIUM SPEC-SCNC: 9.2 MG/DL (ref 8.6–10.5)
CHLORIDE SERPL-SCNC: 100 MMOL/L (ref 98–107)
CK SERPL-CCNC: ABNORMAL U/L (ref 20–200)
CLARITY UR: ABNORMAL
CO2 SERPL-SCNC: 20.4 MMOL/L (ref 22–29)
COLOR UR: ABNORMAL
CREAT SERPL-MCNC: 1.42 MG/DL (ref 0.76–1.27)
DEPRECATED RDW RBC AUTO: 45.4 FL (ref 37–54)
EGFRCR SERPLBLD CKD-EPI 2021: 50.3 ML/MIN/1.73
EOSINOPHIL # BLD AUTO: 0.01 10*3/MM3 (ref 0–0.4)
EOSINOPHIL NFR BLD AUTO: 0.1 % (ref 0.3–6.2)
ERYTHROCYTE [DISTWIDTH] IN BLOOD BY AUTOMATED COUNT: 13.2 % (ref 12.3–15.4)
GEN 5 2HR TROPONIN T REFLEX: 36 NG/L
GLOBULIN UR ELPH-MCNC: 2.6 GM/DL
GLUCOSE SERPL-MCNC: 107 MG/DL (ref 65–99)
GLUCOSE UR STRIP-MCNC: NEGATIVE MG/DL
HCT VFR BLD AUTO: 43.2 % (ref 37.5–51)
HGB BLD-MCNC: 15.1 G/DL (ref 13–17.7)
HGB UR QL STRIP.AUTO: ABNORMAL
HYALINE CASTS UR QL AUTO: ABNORMAL /LPF
IMM GRANULOCYTES # BLD AUTO: 0.08 10*3/MM3 (ref 0–0.05)
IMM GRANULOCYTES NFR BLD AUTO: 0.7 % (ref 0–0.5)
INR PPP: 1.04 (ref 0.9–1.1)
KETONES UR QL STRIP: ABNORMAL
LEUKOCYTE ESTERASE UR QL STRIP.AUTO: ABNORMAL
LYMPHOCYTES # BLD AUTO: 0.64 10*3/MM3 (ref 0.7–3.1)
LYMPHOCYTES NFR BLD AUTO: 5.9 % (ref 19.6–45.3)
MAGNESIUM SERPL-MCNC: 2.2 MG/DL (ref 1.6–2.4)
MCH RBC QN AUTO: 32.6 PG (ref 26.6–33)
MCHC RBC AUTO-ENTMCNC: 35 G/DL (ref 31.5–35.7)
MCV RBC AUTO: 93.3 FL (ref 79–97)
MONOCYTES # BLD AUTO: 0.78 10*3/MM3 (ref 0.1–0.9)
MONOCYTES NFR BLD AUTO: 7.2 % (ref 5–12)
NEUTROPHILS NFR BLD AUTO: 85.9 % (ref 42.7–76)
NEUTROPHILS NFR BLD AUTO: 9.23 10*3/MM3 (ref 1.7–7)
NITRITE UR QL STRIP: NEGATIVE
NRBC BLD AUTO-RTO: 0 /100 WBC (ref 0–0.2)
PH UR STRIP.AUTO: 5.5 [PH] (ref 5–8)
PLATELET # BLD AUTO: 201 10*3/MM3 (ref 140–450)
PMV BLD AUTO: 11 FL (ref 6–12)
POTASSIUM SERPL-SCNC: 3.7 MMOL/L (ref 3.5–5.2)
PROT SERPL-MCNC: 6.5 G/DL (ref 6–8.5)
PROT UR QL STRIP: ABNORMAL
PROTHROMBIN TIME: 13.8 SECONDS (ref 11.7–14.2)
QT INTERVAL: 384 MS
QTC INTERVAL: 449 MS
RBC # BLD AUTO: 4.63 10*6/MM3 (ref 4.14–5.8)
RBC # UR STRIP: ABNORMAL /HPF
REF LAB TEST METHOD: ABNORMAL
SODIUM SERPL-SCNC: 135 MMOL/L (ref 136–145)
SP GR UR STRIP: 1.02 (ref 1–1.03)
SQUAMOUS #/AREA URNS HPF: ABNORMAL /HPF
TROPONIN T DELTA: -4 NG/L
TROPONIN T SERPL HS-MCNC: 40 NG/L
UROBILINOGEN UR QL STRIP: ABNORMAL
WBC # UR STRIP: ABNORMAL /HPF
WBC NRBC COR # BLD AUTO: 10.76 10*3/MM3 (ref 3.4–10.8)

## 2024-03-20 PROCEDURE — 80053 COMPREHEN METABOLIC PANEL: CPT | Performed by: EMERGENCY MEDICINE

## 2024-03-20 PROCEDURE — 71045 X-RAY EXAM CHEST 1 VIEW: CPT

## 2024-03-20 PROCEDURE — 25810000003 SODIUM CHLORIDE 0.9 % SOLUTION: Performed by: STUDENT IN AN ORGANIZED HEALTH CARE EDUCATION/TRAINING PROGRAM

## 2024-03-20 PROCEDURE — 70450 CT HEAD/BRAIN W/O DYE: CPT

## 2024-03-20 PROCEDURE — 99285 EMERGENCY DEPT VISIT HI MDM: CPT

## 2024-03-20 PROCEDURE — 25810000003 SODIUM CHLORIDE 0.9 % SOLUTION: Performed by: EMERGENCY MEDICINE

## 2024-03-20 PROCEDURE — 85610 PROTHROMBIN TIME: CPT | Performed by: EMERGENCY MEDICINE

## 2024-03-20 PROCEDURE — 90715 TDAP VACCINE 7 YRS/> IM: CPT | Performed by: EMERGENCY MEDICINE

## 2024-03-20 PROCEDURE — 73502 X-RAY EXAM HIP UNI 2-3 VIEWS: CPT

## 2024-03-20 PROCEDURE — 90471 IMMUNIZATION ADMIN: CPT | Performed by: EMERGENCY MEDICINE

## 2024-03-20 PROCEDURE — 84484 ASSAY OF TROPONIN QUANT: CPT | Performed by: EMERGENCY MEDICINE

## 2024-03-20 PROCEDURE — 85025 COMPLETE CBC W/AUTO DIFF WBC: CPT | Performed by: EMERGENCY MEDICINE

## 2024-03-20 PROCEDURE — 93010 ELECTROCARDIOGRAM REPORT: CPT | Performed by: INTERNAL MEDICINE

## 2024-03-20 PROCEDURE — 82550 ASSAY OF CK (CPK): CPT | Performed by: EMERGENCY MEDICINE

## 2024-03-20 PROCEDURE — 93005 ELECTROCARDIOGRAM TRACING: CPT | Performed by: EMERGENCY MEDICINE

## 2024-03-20 PROCEDURE — 83735 ASSAY OF MAGNESIUM: CPT | Performed by: EMERGENCY MEDICINE

## 2024-03-20 PROCEDURE — 81001 URINALYSIS AUTO W/SCOPE: CPT | Performed by: EMERGENCY MEDICINE

## 2024-03-20 PROCEDURE — 25010000002 TETANUS-DIPHTH-ACELL PERTUSSIS 5-2.5-18.5 LF-MCG/0.5 SUSPENSION PREFILLED SYRINGE: Performed by: EMERGENCY MEDICINE

## 2024-03-20 PROCEDURE — 73562 X-RAY EXAM OF KNEE 3: CPT

## 2024-03-20 RX ORDER — SODIUM CHLORIDE 0.9 % (FLUSH) 0.9 %
10 SYRINGE (ML) INJECTION EVERY 12 HOURS SCHEDULED
Status: DISCONTINUED | OUTPATIENT
Start: 2024-03-20 | End: 2024-03-25 | Stop reason: HOSPADM

## 2024-03-20 RX ORDER — EZETIMIBE 10 MG/1
10 TABLET ORAL DAILY
COMMUNITY

## 2024-03-20 RX ORDER — AMOXICILLIN 250 MG
2 CAPSULE ORAL 2 TIMES DAILY PRN
Status: DISCONTINUED | OUTPATIENT
Start: 2024-03-20 | End: 2024-03-25 | Stop reason: HOSPADM

## 2024-03-20 RX ORDER — SODIUM CHLORIDE 9 MG/ML
125 INJECTION, SOLUTION INTRAVENOUS CONTINUOUS
Status: DISCONTINUED | OUTPATIENT
Start: 2024-03-20 | End: 2024-03-20

## 2024-03-20 RX ORDER — SODIUM CHLORIDE 9 MG/ML
50 INJECTION, SOLUTION INTRAVENOUS CONTINUOUS
Status: DISCONTINUED | OUTPATIENT
Start: 2024-03-20 | End: 2024-03-22

## 2024-03-20 RX ORDER — ENOXAPARIN SODIUM 100 MG/ML
40 INJECTION SUBCUTANEOUS EVERY 24 HOURS
Status: DISCONTINUED | OUTPATIENT
Start: 2024-03-20 | End: 2024-03-21

## 2024-03-20 RX ORDER — SODIUM CHLORIDE 0.9 % (FLUSH) 0.9 %
10 SYRINGE (ML) INJECTION AS NEEDED
Status: DISCONTINUED | OUTPATIENT
Start: 2024-03-20 | End: 2024-03-25 | Stop reason: HOSPADM

## 2024-03-20 RX ORDER — BISACODYL 5 MG/1
5 TABLET, DELAYED RELEASE ORAL DAILY PRN
Status: DISCONTINUED | OUTPATIENT
Start: 2024-03-20 | End: 2024-03-25 | Stop reason: HOSPADM

## 2024-03-20 RX ORDER — BISACODYL 10 MG
10 SUPPOSITORY, RECTAL RECTAL DAILY PRN
Status: DISCONTINUED | OUTPATIENT
Start: 2024-03-20 | End: 2024-03-25 | Stop reason: HOSPADM

## 2024-03-20 RX ORDER — POLYETHYLENE GLYCOL 3350 17 G/17G
17 POWDER, FOR SOLUTION ORAL DAILY PRN
Status: DISCONTINUED | OUTPATIENT
Start: 2024-03-20 | End: 2024-03-25 | Stop reason: HOSPADM

## 2024-03-20 RX ORDER — SODIUM CHLORIDE 9 MG/ML
40 INJECTION, SOLUTION INTRAVENOUS AS NEEDED
Status: DISCONTINUED | OUTPATIENT
Start: 2024-03-20 | End: 2024-03-25 | Stop reason: HOSPADM

## 2024-03-20 RX ADMIN — SODIUM CHLORIDE 500 ML: 9 INJECTION, SOLUTION INTRAVENOUS at 17:33

## 2024-03-20 RX ADMIN — SODIUM CHLORIDE 1000 ML: 9 INJECTION, SOLUTION INTRAVENOUS at 19:08

## 2024-03-20 RX ADMIN — TETANUS TOXOID, REDUCED DIPHTHERIA TOXOID AND ACELLULAR PERTUSSIS VACCINE, ADSORBED 0.5 ML: 5; 2.5; 8; 8; 2.5 SUSPENSION INTRAMUSCULAR at 17:30

## 2024-03-20 RX ADMIN — SODIUM CHLORIDE 125 ML/HR: 9 INJECTION, SOLUTION INTRAVENOUS at 17:34

## 2024-03-20 RX ADMIN — SODIUM CHLORIDE 175 ML/HR: 9 INJECTION, SOLUTION INTRAVENOUS at 21:52

## 2024-03-20 NOTE — LETTER
EMS Transport Request  For use at [facility], [place], [place], [place], and [place] only   Patient Name: Mannie Fajardo : 1945   Weight:70.4 kg (155 lb 3.3 oz) Pick-up Location: [facility] BLS/ALS: BLS/ALS: BLS   Insurance: MEDICARE Auth End Date: na   Pre-Cert #:na D/C Summary complete:    Destination: Other [place] [facility]   Contact Precautions: None   Equipment (O2, Fluids, etc.): None   Arrive By Date/Time: 3/25/2024 afternoon Stretcher/WC: Wheelchair   CM Requesting: Zonia Merritt RN Ext: [phone]   Notes/Medical Necessity: wheelchair     ______________________________________________________________________    *Only 2 patient bags OR 1 carry-on size bag are permitted.  Wheelchairs and walkers CANNOT transported with the patient. Acknowledge: Yes

## 2024-03-20 NOTE — ED PROVIDER NOTES
EMERGENCY DEPARTMENT ENCOUNTER    Room Number:  36/36  Date of encounter:  3/20/2024  PCP: Beatrice Gardner MD  Historian: Patient and friend  Relevant information and history provided by sources other than the patient will be included below and in the ED Course.  Review of pertinent past medical records may also be included in record below and ED Course.    HPI:  Chief Complaint: Fall last night and unable to get up  A complete HPI/ROS/PMH/PSH/SH/FH are unobtainable due to: Not applicable  Context: Mannie Fajardo is a 78 y.o. male who presents to the ED c/o this is an elderly male that lives alone.  At about 11 PM or midnight he fell last night.  He has chronic severe weakness to his left lower extremity he is fallen in the past and has increased risk of falls.  He fell because his leg was weak.  This is not a new weakness.  He then was on the ground and was on the ground for over 12 hours until he was found by a neighbor and a friend and they called EMS and transported him here.  He denies hitting his head.  Does complain of some right knee pain on where he was laying on his leg.  He does have chronic back pain but that is chronic and no new pain.  He does have chronic hip pain but he does not believe that is new as well.  Denies chest pain, shortness of breath, abdominal pain.  Denied any syncope or near syncopal episode.  Currently he is not taking any narcotic or opiate pain medicine for his back.        Previous Episodes: Yes chronic weakness is severe to his left lower extremity and fall risk.  He is fallen before.  Current Symptoms: See above    MEDICAL HISTORY REVIEWED  This patient has a chondromalacia of the left hip chronic low back pain, degenerative disc disease in the lumbar spine.  He also does have hypertrophic glyceride EMEA coronary artery disease history of gout.  I did review his medicine list.  I can see that he is on Plavix.      PAST MEDICAL HISTORY  Active Ambulatory Problems      Diagnosis Date Noted    Abdominal aortic aneurysm 04/18/2016    Hypercholesterolemia 10/10/2011    Coronary artery disease due to calcified coronary lesion 11/22/2016    Chronic left-sided low back pain with left-sided sciatica 05/01/2017    Chronic midline low back pain without sciatica 05/01/2017    Degeneration of lumbar intervertebral disc 06/24/2014    Weakness of left leg 09/25/2017    Chronic renal insufficiency 07/27/2015    Opioid withdrawal 04/10/2018    Coronary artery disease involving native coronary artery of native heart with angina pectoris 10/09/2019    Other specified symptoms and signs involving the circulatory and respiratory systems  10/26/2020    Edema leg 06/21/2021    Foot pain 05/01/2014    Atherosclerosis of coronary artery 11/16/2021    Vitamin D deficiency 06/07/2018    Dermatitis, seborrheic 10/10/2011    Rash 11/24/2014    Primary lateral sclerosis 10/10/2011    Personal history of pneumonia (recurrent) 06/07/2013    Personal history of other drug therapy 05/23/2016    Osteopenia 04/09/2018    Myelopathy 01/11/2019    Muscle weakness of extremity 04/25/2018    Melanocytic nevus 09/26/2016    Insomnia 05/21/2018    Inguinal hernia 04/14/2016    Hypothyroidism 10/10/2011    History of osteopenia 04/30/2018    Gout 07/27/2015    Benign prostatic hyperplasia 02/22/2016    At risk for osteoporosis 04/03/2018    Anemia 06/07/2013    Acquired atrophy of thyroid 11/16/2021    Lumbosacral radiculopathy 05/18/2022    Lumbar facet arthropathy 06/10/2022    Disorder of bone and cartilage 04/09/2018    Left lumbar radiculopathy 11/09/2022    Constipation 01/05/2024    History of right coronary artery stent placement 01/05/2024    Kidney stone 06/07/2013    Monoplegia of lower limb 09/25/2017    Other muscle spasm 04/26/2023    Pain in left leg 02/14/2023    Hypertriglyceridemia 01/05/2024    Chondromalacia of hip, left 03/18/2024    DDD (degenerative disc disease), lumbar 03/18/2024    Chronic  bilateral low back pain without sciatica 03/18/2024     Resolved Ambulatory Problems     Diagnosis Date Noted    Precordial pain 11/22/2016    Ureteric stone 02/23/2017    Pain in left arm 11/14/2017    Chest pain 01/25/2018    Pain of left lower extremity 10/26/2020    Renal insufficiency 10/26/2020    Hip pain 04/25/2018    Weight decreasing 07/07/2016    Urinary tract infection 06/07/2013    Need for influenza vaccination 11/21/2016    Muscle strain 07/18/2012    Low back strain 08/08/2012    Acute kidney failure 06/07/2013     Past Medical History:   Diagnosis Date    AAA (abdominal aortic aneurysm) without rupture     Arthritis     Back pain     Backache     CAD (coronary atherosclerotic disease)     Disease of thyroid gland     Dyslipidemia     Edema     History of transfusion     Hyperhomocystinemia     Hyperlipidemia     Stage 3 chronic kidney disease          PAST SURGICAL HISTORY  Past Surgical History:   Procedure Laterality Date    APPENDECTOMY      CARDIAC SURGERY      CORONARY ANGIOPLASTY WITH STENT PLACEMENT      EPIDURAL Left 5/25/2022    Procedure: LUMBAR/SACRAL TRANSFORAMINAL EPIDURAL - left L4 and Left S1;  Surgeon: Deepak Mckeon MD;  Location: [facility] MAIN OR;  Service: Pain Management;  Laterality: Left;    EXTRACORPOREAL SHOCKWAVE LITHOTRIPSY (ESWL), STENT INSERTION/REMOVAL Right 2/24/2017    Procedure: CYSTO RETROGRADE WITH STENT PLACEMENT;  Surgeon: Janes López MD;  Location: [facility];  Service:     LUMBAR EPIDURAL INJECTION N/A 11/16/2022    Procedure: LUMBAR EPIDURAL STEROID INJECTION INTRALAMINAR L5-S1 (LEFT PARAMEDIAN APPROACH);  Surgeon: Carly Hairston MD;  Location: [facility] MAIN OR;  Service: Pain Management;  Laterality: N/A;    MEDIAL BRANCH BLOCK Bilateral 6/23/2022    Procedure: Lumbar medial branch block bilateral L2-L5;  Surgeon: Deepak Mckeon MD;  Location: [facility] MAIN OR;  Service: Pain Management;  Laterality: Bilateral;    MEDIAL BRANCH BLOCK Bilateral  7/12/2022    Procedure: lumbar medial branch block  bilateral L2-L5;  Surgeon: Deepak Mckeon MD;  Location: [facility];  Service: Pain Management;  Laterality: Bilateral;    RADIOFREQUENCY ABLATION Bilateral 8/9/2022    Procedure: L2-L5 RADIOFREQUENCY ABLATION LUMBAR;  Surgeon: Deepak Mckeon MD;  Location: [facility];  Service: Pain Management;  Laterality: Bilateral;         FAMILY HISTORY  Family History   Problem Relation Age of Onset    Heart disease Mother     Stroke Father          SOCIAL HISTORY  Social History     Socioeconomic History    Marital status: Single    Number of children: 0    Years of education: COLLEGE   Tobacco Use    Smoking status: Never    Smokeless tobacco: Never   Substance and Sexual Activity    Alcohol use: No     Comment: occ caffeine use    Drug use: No    Sexual activity: Defer         ALLERGIES  Statins, Adhesive tape, and Wound dressing adhesive        REVIEW OF SYSTEMS  Review of Systems     All systems reviewed and negative except for those discussed in HPI.       PHYSICAL EXAM    I have reviewed the triage vital signs and nursing notes.    ED Triage Vitals [03/20/24 1445]   Temp Heart Rate Resp BP SpO2   97.8 °F (36.6 °C) 88 18 136/92 98 %      Temp src Heart Rate Source Patient Position BP Location FiO2 (%)   Tympanic -- -- -- --       GENERAL: Elderly male.  No acute cardiovascular respiratory distress.Vital signs on my initial evaluation have been reviewed  HENT: nares patent  Head with a small area of prominence to his right forehead above his eyebrow which is suggestive of a mild contusion.  Otherwise/neck/ face are symmetric without gross deformity, signs of trauma, or swelling  EYES: no scleral icterus, no conjunctival pallor.  NECK: Supple, no meningismus.  No pain with palpation to his neck and no signs of trauma.  No pain with movement  CV: regular rhythm, regular rate with intact distal pulses.  No signs of trauma to his chest on inspection.  No pain  with palpation.  RESPIRATORY: normal effort and no respiratory distress.  Clear to auscultation bilaterally  ABDOMEN: soft and nontender.  Patient does have a chronic left inguinal hernia there is no color change or skin change.  There is no pain with palpation.  This is a mild hernia  MUSCULOSKELETAL: To his right upper extremity to his forearm and elbow as well as to his right lower extremity around the knee and the lateral aspect of the femur you can see some pressure sores from prolonged pressure and immobilization.  There is some pain with flexion extension at the knee no gross bony deformity.  There is no bleeding.  He has no pain with external or internal rotation to his right hip.  Does have some pain and elevation of that left hip but patient believes that it is chronic.  He has chronic pain in that left hip and chronic severe weakness to his left lower extremity.  Patient with intact distal pulses to all extremities.  There is no coolness or cyanosis.  To movement of all of his extremities other than what is mentioned above he has no bony deformity or no pain with passive and active range of motion.  NEURO: alert and appropriate, moves all extremities, follows commands.  Patient has chronic weakness to his left lower extremity.  He can barely raise his left lower extremity off the bed.  He also has weakness on flexion extension in the left leg.  Patient states this is chronic.  He has no other focal motor or sensory change.  SKIN: warm, dry    Vital signs and nursing notes reviewed.        LAB RESULTS  Recent Results (from the past 24 hour(s))   Urinalysis With Microscopic If Indicated (No Culture) - Urine, Clean Catch    Collection Time: 03/20/24  5:20 PM    Specimen: Urine, Clean Catch   Result Value Ref Range    Color, UA Dark Yellow (A) Yellow, Straw    Appearance, UA Cloudy (A) Clear    pH, UA 5.5 5.0 - 8.0    Specific Gravity, UA 1.022 1.005 - 1.030    Glucose, UA Negative Negative    Ketones, UA  Trace (A) Negative    Bilirubin, UA Small (1+) (A) Negative    Blood, UA Large (3+) (A) Negative    Protein, UA >=300 mg/dL (3+) (A) Negative    Leuk Esterase, UA Small (1+) (A) Negative    Nitrite, UA Negative Negative    Urobilinogen, UA 1.0 E.U./dL 0.2 - 1.0 E.U./dL   Urinalysis, Microscopic Only - Urine, Clean Catch    Collection Time: 03/20/24  5:20 PM    Specimen: Urine, Clean Catch   Result Value Ref Range    RBC, UA Too Numerous to Count (A) None Seen, 0-2 /HPF    WBC, UA 3-5 (A) None Seen, 0-2 /HPF    Bacteria, UA None Seen None Seen /HPF    Squamous Epithelial Cells, UA 0-2 None Seen, 0-2 /HPF    Hyaline Casts, UA 3-6 None Seen /LPF    Methodology Automated Microscopy    ECG 12 Lead Other; Fall    Collection Time: 03/20/24  5:25 PM   Result Value Ref Range    QT Interval 384 ms    QTC Interval 449 ms   Comprehensive Metabolic Panel    Collection Time: 03/20/24  5:30 PM    Specimen: Blood   Result Value Ref Range    Glucose 107 (H) 65 - 99 mg/dL    BUN 26 (H) 8 - 23 mg/dL    Creatinine 1.42 (H) 0.76 - 1.27 mg/dL    Sodium 135 (L) 136 - 145 mmol/L    Potassium 3.7 3.5 - 5.2 mmol/L    Chloride 100 98 - 107 mmol/L    CO2 20.4 (L) 22.0 - 29.0 mmol/L    Calcium 9.2 8.6 - 10.5 mg/dL    Total Protein 6.5 6.0 - 8.5 g/dL    Albumin 3.9 3.5 - 5.2 g/dL    ALT (SGPT) 133 (H) 1 - 41 U/L    AST (SGOT) 796 (H) 1 - 40 U/L    Alkaline Phosphatase 86 39 - 117 U/L    Total Bilirubin 0.8 0.0 - 1.2 mg/dL    Globulin 2.6 gm/dL    A/G Ratio 1.5 g/dL    BUN/Creatinine Ratio 18.3 7.0 - 25.0    Anion Gap 14.6 5.0 - 15.0 mmol/L    eGFR 50.3 (L) >60.0 mL/min/1.73   Protime-INR    Collection Time: 03/20/24  5:30 PM    Specimen: Blood   Result Value Ref Range    Protime 13.8 11.7 - 14.2 Seconds    INR 1.04 0.90 - 1.10   High Sensitivity Troponin T    Collection Time: 03/20/24  5:30 PM    Specimen: Blood   Result Value Ref Range    HS Troponin T 40 (H) <22 ng/L   Magnesium    Collection Time: 03/20/24  5:30 PM    Specimen: Blood   Result  Value Ref Range    Magnesium 2.2 1.6 - 2.4 mg/dL   CK    Collection Time: 03/20/24  5:30 PM    Specimen: Blood   Result Value Ref Range    Creatine Kinase >22,000 (H) 20 - 200 U/L   CBC Auto Differential    Collection Time: 03/20/24  5:30 PM    Specimen: Blood   Result Value Ref Range    WBC 10.76 3.40 - 10.80 10*3/mm3    RBC 4.63 4.14 - 5.80 10*6/mm3    Hemoglobin 15.1 13.0 - 17.7 g/dL    Hematocrit 43.2 37.5 - 51.0 %    MCV 93.3 79.0 - 97.0 fL    MCH 32.6 26.6 - 33.0 pg    MCHC 35.0 31.5 - 35.7 g/dL    RDW 13.2 12.3 - 15.4 %    RDW-SD 45.4 37.0 - 54.0 fl    MPV 11.0 6.0 - 12.0 fL    Platelets 201 140 - 450 10*3/mm3    Neutrophil % 85.9 (H) 42.7 - 76.0 %    Lymphocyte % 5.9 (L) 19.6 - 45.3 %    Monocyte % 7.2 5.0 - 12.0 %    Eosinophil % 0.1 (L) 0.3 - 6.2 %    Basophil % 0.2 0.0 - 1.5 %    Immature Grans % 0.7 (H) 0.0 - 0.5 %    Neutrophils, Absolute 9.23 (H) 1.70 - 7.00 10*3/mm3    Lymphocytes, Absolute 0.64 (L) 0.70 - 3.10 10*3/mm3    Monocytes, Absolute 0.78 0.10 - 0.90 10*3/mm3    Eosinophils, Absolute 0.01 0.00 - 0.40 10*3/mm3    Basophils, Absolute 0.02 0.00 - 0.20 10*3/mm3    Immature Grans, Absolute 0.08 (H) 0.00 - 0.05 10*3/mm3    nRBC 0.0 0.0 - 0.2 /100 WBC   High Sensitivity Troponin T 2Hr    Collection Time: 03/20/24  7:38 PM    Specimen: Arm, Left; Blood   Result Value Ref Range    HS Troponin T 36 (H) <22 ng/L    Troponin T Delta -4 (L) >=-4 - <+4 ng/L       Ordered the above labs and independently reviewed the results.        RADIOLOGY  CT Head Without Contrast    Result Date: 3/20/2024  EMERGENCY CT SCAN OF THE HEAD WITHOUT CONTRAST ON 03/20/2024  CLINICAL HISTORY: Patient fell, unsure whether or not he is on blood thinners.  TECHNIQUE: Spiral CT images were obtained from the base of the skull to the vertex without intravenous contrast. The images were reformatted and are submitted in 3 mm thick axial, sagittal and coronal CT sections with brain algorithm and 2 mm thick axial CT sections with  high-resolution bone algorithm.  COMPARISON: There are no prior head CTs for comparison. The study is correlated to a prior MRI of the brain from [facility] on 09/17/2018.  FINDINGS: There is minimal low-density in the periventricular white matter consistent with minimal small vessel disease. The remainder of the brain parenchyma is normal in attenuation. The ventricles are normal in size. I see no focal mass effect. There is no midline shift. No extra axial fluid collections are identified. There is a scalp hematoma over the anterior inferior right frontal bone from today's head trauma. No underlying acute skull fracture is identified. The calvarium and skull base are normal in appearance. The paranasal sinuses and the mastoid air cells and middle ear cavities are clear.      1. No acute intracranial abnormality is identified.  2. There is minimal small vessel disease in the frontal periventricular white matter and very mild cerebral atrophy. There is a scalp hematoma over the anterior inferior right frontal bone from today's head trauma. The remainder of the head CT is within normal limits with no acute skull fracture or intracranial hemorrhage identified.  Radiation dose reduction techniques were utilized, including automated exposure control and exposure modulation based on body size.       XR Knee 3 View Right, XR Hip With or Without Pelvis 2 - 3 View Left, XR Chest 1 View    Result Date: 3/20/2024  EMERGENCY PORTABLE VIEW OF THE CHEST AS WELL AS SINGLE VIEW OF THE PELVIS AND 2 VIEWS OF THE LEFT HIP AND 3 VIEWS OF THE RIGHT KNEE ON 03/20/2024  CLINICAL HISTORY: Fell, prolonged downtime.  CHEST: This is correlated to a PA and lateral chest x-ray on 04/11/2023. The cardiomediastinal silhouette and the pulmonary vasculature are within normal limits. There is a somewhat irregular airspace opacity projecting over the right lower lung zone. It overlaps the right fourth anterior rib and is between  the right 8th and 9th posterior rib is an indeterminate finding. The remainder of the lungs are clear. Costophrenic angles are sharp.      1. There is an irregular 15 to 20 mm airspace opacity projecting over the right lower lung zone that is nonspecific, could be an area of scar or infiltrate although I cannot exclude a small mass. Its new when compared to prior chest x-ray 04/11/2023 and I recommend CT of the chest to further evaluate. The remainder the chest x-ray is unremarkable.  RIGHT KNEE: 3 views of the right knee are submitted for interpretation. On the AP view of the right knee there is a vertical lucency projecting over the central aspect of the patella. Of course there is overlap of the femur and this is a nonspecific finding. It could be a very subtle vertical hairline fracture of the central aspect of the patella. Otherwise I see no additional acute fracture or malalignment of the bones of the right knee. There is a probable tiny right knee joint effusion seen on the crosstable lateral view.  IMPRESSION: 1. Vertical lucency projects over the central aspect of the patella on the AP view. The patella overlaps the femur at this level and this could be artifact or a hairline nondisplaced fracture of the central aspect of the patella and correlate clinically. If the patient has patellar tenderness I recommend a knee CT to further evaluate.  PELVIS AND LEFT HIP: Single view of the pelvis and 2 views including AP and frog-leg lateral views of the left hip are submitted for interpretation. I see no acute fracture or malalignment in the bones of the pelvis or the left hip. The left hip joint space is well-maintained.       I ordered the above noted radiological studies. Reviewed by me and discussed with radiologist.  See dictation for official radiology interpretation.      PROCEDURES    Critical Care    Performed by: Art Gould MD  Authorized by: Javier Charles MD    Critical care provider  statement:     Critical care was necessary to treat or prevent imminent or life-threatening deterioration of the following conditions:  Renal failure and metabolic crisis (Acute rhabdomyolysis)    Critical care was time spent personally by me on the following activities:  Blood draw for specimens, development of treatment plan with patient or surrogate, discussions with consultants, examination of patient, obtaining history from patient or surrogate, review of old charts, re-evaluation of patient's condition, pulse oximetry, ordering and review of radiographic studies, ordering and review of laboratory studies and ordering and performing treatments and interventions    I assumed direction of critical care for this patient from another provider in my specialty: no      Care discussed with: admitting provider          MEDICATIONS GIVEN IN ER    Medications   sodium chloride 0.9 % flush 10 mL (has no administration in time range)   sodium chloride 0.9 % infusion (has no administration in time range)   sodium chloride 0.9 % flush 10 mL (has no administration in time range)   sodium chloride 0.9 % flush 10 mL (has no administration in time range)   sodium chloride 0.9 % infusion 40 mL (has no administration in time range)   Pharmacy to Dose enoxaparin (LOVENOX) (has no administration in time range)   sennosides-docusate (PERICOLACE) 8.6-50 MG per tablet 2 tablet (has no administration in time range)     And   polyethylene glycol (MIRALAX) packet 17 g (has no administration in time range)     And   bisacodyl (DULCOLAX) EC tablet 5 mg (has no administration in time range)     And   bisacodyl (DULCOLAX) suppository 10 mg (has no administration in time range)   Enoxaparin Sodium (LOVENOX) syringe 40 mg (has no administration in time range)   sodium chloride 0.9 % bolus 500 mL (0 mL Intravenous Stopped 3/20/24 1908)   Tetanus-Diphth-Acell Pertussis (BOOSTRIX) injection 0.5 mL (0.5 mL Intramuscular Given 3/20/24 1730)   sodium  chloride 0.9 % bolus 1,000 mL (1,000 mL Intravenous New Bag 3/20/24 1908)         All labs have been independently reviewed by me.  All radiology studies have been reviewed by me and I discussed with radiologist dictating the report when indicated below.  All EKG's independently viewed and interpreted by me.  Discussion below represents my analysis of pertinent findings related to patient's condition, differential diagnosis, treatment plan and final disposition.        PROGRESS, DATA ANALYSIS, CONSULTS, AND MEDICAL DECISION MAKING    This is a gentleman that fell and had prolonged downtime.  He lives alone.  He fell because he has chronic weakness in his left lower extremity which is severe.  He was down for over 12 hours until a friend and neighbor found him and then ultimately brought him here.  Will check some blood work and x-rays and a CT of the head.  This patient will need to be admitted to the hospital.  Patient agrees with that and plans on being admitted.  Right now he does not want anything for pain.  All questions answered at this time.      ED Course as of 03/20/24 2144   Wed Mar 20, 2024   1818 I did discuss the case with the radiologist Dr. Harry concerning the chest x-ray and x-ray of the right knee.  He is concerned that there might be a subtle lucency fracture through the midportion of his patella.  There is also the presence of an opacification in his right lung zone.  This is new from chest x-ray in early part of last year.  Uncertain if this is actually a pneumonia or a mass.  Recommends CT scan and further imaging to further evaluate. [MM]   1822 My own independent or potation of the EKG that was done at 525 reveals a rate of 82 it is sinus rhythm it is narrow complex it is normal axis I see some nonspecific T wave changes but any definitive acute injury pattern  QT looks normal  I compared this to previous EKG that was done on January 26, 2018.  The QRS complexes and the inferior leads appear  to be different than previous but again I do not appreciate any acute injury pattern   [MM]   1842 I did discuss the case with the radiologist Dr. Parham.  The CT scan of the head shows the appearance of a scalp hematoma but no acute intracranial process no bleeding swelling fracture or hemorrhage or midline shift.  Please see complete dictated report from radiologist [MM]   1842 Creatine Kinase(!): >22,000 [MM]   1904 Creatinine(!): 1.42 [MM]   1904 BUN(!): 26  Patient's elevation in troponin can very likely be related to the bump in the BUN and creatinine.  Patient denies currently or prior to arrival here any chest pain. [MM]   1906 I have reevaluated this patient.  He does have focal area of tenderness right in his patella and I do suspect that he is got a patellar fracture.  I informed him of the results of his lab work and the fact that he is in acute rhabdomyolysis.  We are giving him fluid bolus.  This is a potential serious condition and can worsen his renal function especially at his age.  Will go ahead and admit him to the hospital.  All questions answered at this time. [MM]   1912 I have ordered fluid bolus of this gentleman.  With normal saline.  He clinically looks dry he does not look volume overloaded. [MM]   1953 I did discuss the case with Dr. Charles who is on for [facility].  Informed him of the patient's presenting symptoms and results of the test and clarified that this patient is in acute rhabdomyolysis with likely some acute kidney injury.  He agrees to admit the patient to the hospital.  He will continue to manage the fluid and I am giving the patient fluid bolus at this time. [MM]      ED Course User Index  [MM] Art Gould MD       AS OF 21:44 EDT VITALS:    BP - 154/65  HR - 76  TEMP - 97.8 °F (36.6 °C) (Tympanic)  02 SATS - 94%    SOCIAL DETERMINANTS OF HEALTH THAT IMPACT OR LIMIT CARE (For example..Homelessness,safe discharge, inability to obtain care, follow up, or  prescriptions):      DIAGNOSIS  Final diagnoses:   Traumatic rhabdomyolysis, initial encounter   Closed nondisplaced fracture of right patella, unspecified fracture morphology, initial encounter   Contusion of scalp, initial encounter   Acute kidney injury   Pressure injury, stage 1, unspecified location: 2 arm on the right and 2 right leg.         DISPOSITION  I have reviewed the test results with my patient and explained the current treatment plan.  I answered all of the patient's questions.  The patient will be admitted to monitor bed at this time.  The patient is not hypotensive and is tolerating their current disease condition well enough for a monitored bed at this time.  The patient's current condition does not require intensive care treatment at this time.              DICTATED UTILIZING DRAGON DICTATION    Note Disclaimer: At [facility], we believe that sharing information builds trust and better relationships. You are receiving this note because you recently visited [facility]. It is possible you will see health information before a provider has talked with you about it. This kind of information can be easy to misunderstand. To help you fully understand what it means for your health, we urge you to discuss this note with your provider.       Art Gould MD  03/20/24 1511

## 2024-03-20 NOTE — ED TRIAGE NOTES
[facility]. Reported the weakness in leg is getting worse which caused him to fall. Patient reported no injury with this fall. Patient alert x 4 to base line.

## 2024-03-21 PROBLEM — I67.9 SMALL VESSEL DISEASE, CEREBROVASCULAR: Status: ACTIVE | Noted: 2024-03-21

## 2024-03-21 PROBLEM — S09.90XA HEAD TRAUMA, INITIAL ENCOUNTER: Status: ACTIVE | Noted: 2024-03-21

## 2024-03-21 PROBLEM — S00.03XA SCALP HEMATOMA, INITIAL ENCOUNTER: Status: ACTIVE | Noted: 2024-03-21

## 2024-03-21 LAB
25(OH)D3 SERPL-MCNC: 35.8 NG/ML (ref 30–100)
ALBUMIN SERPL-MCNC: 3.1 G/DL (ref 3.5–5.2)
ALBUMIN/GLOB SERPL: 1.6 G/DL
ALP SERPL-CCNC: 63 U/L (ref 39–117)
ALT SERPL W P-5'-P-CCNC: 126 U/L (ref 1–41)
ANION GAP SERPL CALCULATED.3IONS-SCNC: 12.1 MMOL/L (ref 5–15)
AST SERPL-CCNC: 707 U/L (ref 1–40)
BILIRUB SERPL-MCNC: 0.6 MG/DL (ref 0–1.2)
BUN SERPL-MCNC: 21 MG/DL (ref 8–23)
BUN/CREAT SERPL: 17.6 (ref 7–25)
CALCIUM SPEC-SCNC: 8.1 MG/DL (ref 8.6–10.5)
CHLORIDE SERPL-SCNC: 108 MMOL/L (ref 98–107)
CK SERPL-CCNC: ABNORMAL U/L (ref 20–200)
CO2 SERPL-SCNC: 17.9 MMOL/L (ref 22–29)
CREAT SERPL-MCNC: 1.19 MG/DL (ref 0.76–1.27)
DEPRECATED RDW RBC AUTO: 48 FL (ref 37–54)
EGFRCR SERPLBLD CKD-EPI 2021: 62.1 ML/MIN/1.73
ERYTHROCYTE [DISTWIDTH] IN BLOOD BY AUTOMATED COUNT: 13.5 % (ref 12.3–15.4)
GLOBULIN UR ELPH-MCNC: 2 GM/DL
GLUCOSE SERPL-MCNC: 84 MG/DL (ref 65–99)
HCT VFR BLD AUTO: 36.7 % (ref 37.5–51)
HGB BLD-MCNC: 12.3 G/DL (ref 13–17.7)
MAGNESIUM SERPL-MCNC: 2 MG/DL (ref 1.6–2.4)
MCH RBC QN AUTO: 32.1 PG (ref 26.6–33)
MCHC RBC AUTO-ENTMCNC: 33.5 G/DL (ref 31.5–35.7)
MCV RBC AUTO: 95.8 FL (ref 79–97)
PHOSPHATE SERPL-MCNC: 2.7 MG/DL (ref 2.5–4.5)
PLATELET # BLD AUTO: 166 10*3/MM3 (ref 140–450)
PMV BLD AUTO: 10.9 FL (ref 6–12)
POTASSIUM SERPL-SCNC: 4 MMOL/L (ref 3.5–5.2)
PROT SERPL-MCNC: 5.1 G/DL (ref 6–8.5)
RBC # BLD AUTO: 3.83 10*6/MM3 (ref 4.14–5.8)
SODIUM SERPL-SCNC: 138 MMOL/L (ref 136–145)
TSH SERPL DL<=0.05 MIU/L-ACNC: 5.96 UIU/ML (ref 0.27–4.2)
VIT B12 BLD-MCNC: 445 PG/ML (ref 211–946)
WBC NRBC COR # BLD AUTO: 6.67 10*3/MM3 (ref 3.4–10.8)

## 2024-03-21 PROCEDURE — 82607 VITAMIN B-12: CPT | Performed by: STUDENT IN AN ORGANIZED HEALTH CARE EDUCATION/TRAINING PROGRAM

## 2024-03-21 PROCEDURE — 80053 COMPREHEN METABOLIC PANEL: CPT | Performed by: STUDENT IN AN ORGANIZED HEALTH CARE EDUCATION/TRAINING PROGRAM

## 2024-03-21 PROCEDURE — 85027 COMPLETE CBC AUTOMATED: CPT | Performed by: STUDENT IN AN ORGANIZED HEALTH CARE EDUCATION/TRAINING PROGRAM

## 2024-03-21 PROCEDURE — 25810000003 SODIUM CHLORIDE 0.9 % SOLUTION: Performed by: STUDENT IN AN ORGANIZED HEALTH CARE EDUCATION/TRAINING PROGRAM

## 2024-03-21 PROCEDURE — 82306 VITAMIN D 25 HYDROXY: CPT | Performed by: STUDENT IN AN ORGANIZED HEALTH CARE EDUCATION/TRAINING PROGRAM

## 2024-03-21 PROCEDURE — 83735 ASSAY OF MAGNESIUM: CPT | Performed by: STUDENT IN AN ORGANIZED HEALTH CARE EDUCATION/TRAINING PROGRAM

## 2024-03-21 PROCEDURE — 84443 ASSAY THYROID STIM HORMONE: CPT | Performed by: STUDENT IN AN ORGANIZED HEALTH CARE EDUCATION/TRAINING PROGRAM

## 2024-03-21 PROCEDURE — 82550 ASSAY OF CK (CPK): CPT | Performed by: STUDENT IN AN ORGANIZED HEALTH CARE EDUCATION/TRAINING PROGRAM

## 2024-03-21 PROCEDURE — 84100 ASSAY OF PHOSPHORUS: CPT | Performed by: STUDENT IN AN ORGANIZED HEALTH CARE EDUCATION/TRAINING PROGRAM

## 2024-03-21 PROCEDURE — 36415 COLL VENOUS BLD VENIPUNCTURE: CPT | Performed by: STUDENT IN AN ORGANIZED HEALTH CARE EDUCATION/TRAINING PROGRAM

## 2024-03-21 RX ORDER — LACTULOSE 10 G/15ML
15 SOLUTION ORAL DAILY
Status: DISCONTINUED | OUTPATIENT
Start: 2024-03-21 | End: 2024-03-22

## 2024-03-21 RX ORDER — LANOLIN ALCOHOL/MO/W.PET/CERES
400 CREAM (GRAM) TOPICAL DAILY
Status: DISCONTINUED | OUTPATIENT
Start: 2024-03-21 | End: 2024-03-25 | Stop reason: HOSPADM

## 2024-03-21 RX ORDER — TRAMADOL HYDROCHLORIDE 50 MG/1
50 TABLET ORAL EVERY 8 HOURS PRN
Status: DISCONTINUED | OUTPATIENT
Start: 2024-03-21 | End: 2024-03-22

## 2024-03-21 RX ORDER — CLOPIDOGREL BISULFATE 75 MG/1
75 TABLET ORAL DAILY
Status: DISCONTINUED | OUTPATIENT
Start: 2024-03-21 | End: 2024-03-25 | Stop reason: HOSPADM

## 2024-03-21 RX ORDER — ASPIRIN 81 MG/1
81 TABLET, CHEWABLE ORAL DAILY
Status: DISCONTINUED | OUTPATIENT
Start: 2024-03-21 | End: 2024-03-25 | Stop reason: HOSPADM

## 2024-03-21 RX ORDER — ALLOPURINOL 300 MG/1
300 TABLET ORAL DAILY
Status: DISCONTINUED | OUTPATIENT
Start: 2024-03-21 | End: 2024-03-22

## 2024-03-21 RX ADMIN — ASPIRIN 81 MG: 81 TABLET, CHEWABLE ORAL at 09:54

## 2024-03-21 RX ADMIN — Medication 400 MCG: at 09:54

## 2024-03-21 RX ADMIN — SODIUM CHLORIDE 175 ML/HR: 9 INJECTION, SOLUTION INTRAVENOUS at 06:47

## 2024-03-21 RX ADMIN — SODIUM CHLORIDE 175 ML/HR: 9 INJECTION, SOLUTION INTRAVENOUS at 00:41

## 2024-03-21 RX ADMIN — Medication 10 ML: at 09:54

## 2024-03-21 RX ADMIN — ALLOPURINOL 300 MG: 300 TABLET ORAL at 09:54

## 2024-03-21 NOTE — SIGNIFICANT NOTE
03/21/24 0816   OTHER   Discipline physical therapist   Rehab Time/Intention   Session Not Performed other (see comments)  (Pt with R patellar fx; ortho consult pending. Will await ortho recommendations prior to PT eval.)   Recommendation   PT - Next Appointment 03/21/24

## 2024-03-21 NOTE — SIGNIFICANT NOTE
03/21/24 1535   OTHER   Discipline occupational therapist   Rehab Time/Intention   Session Not Performed   (Ortho consult pending, OT to follow up after ortho consult.)   Therapy Assessment/Plan (PT)   Criteria for Skilled Interventions Met (PT) yes   Recommendation   OT - Next Appointment 03/22/24

## 2024-03-21 NOTE — H&P
Patient Name:  Mannie Fajardo  YOB: 1945  MRN:  [id number]  Admit Date:  3/20/2024  Patient Care Team:  Beatrice Gardner MD as PCP - General (Geriatric Medicine)  Marlene Giordano MD as Consulting Physician (Cardiology)  Alexis Reed MD as Consulting Physician (Nephrology)      Subjective   History Present Illness     Chief Complaint   Patient presents with    Fall     History of Present Illness  Mr. Fajardo is a 78 y.o. former smoker with a history of coronary artery disease status post stent placement, HLD, hypothyroidism, chronic bilateral low back pain, degenerative disc disease, and anemia that presents to [facility] complaining of fall.  Patient currently resides home alone.  He reports a history of chronic left leg weakness which caused him to fall last night around midnight.  He laid on the ground for 12 hours before friends found him on the ground.  Labs obtained emergency department shows a CK greater than 22,000, high sensitive troponin 40 and 36 respectively, creat 1.42, , .  CT of the head is negative for any acute hemorrhages but it does reveal a scalp hematoma.  He was noted to also have a right patella fracture.  Of note, he denies any dizziness or lightheadedness prior to his fall.  He also denies any chest pain, shortness of breath, nausea, vomit, or diarrhea.  He has been admitted to our service for further treatment.    Review of Systems   Constitutional:  Negative for chills and fever.   HENT:  Negative for congestion and rhinorrhea.    Eyes:  Negative for photophobia and visual disturbance.   Respiratory:  Negative for cough and shortness of breath.    Cardiovascular:  Negative for chest pain and palpitations.   Gastrointestinal:  Negative for constipation, diarrhea, nausea and vomiting.   Endocrine: Negative for cold intolerance and heat intolerance.   Genitourinary:  Negative for difficulty urinating and dysuria.   Musculoskeletal:   Positive for arthralgias, back pain (Chronic) and gait problem. Negative for joint swelling.   Skin:  Negative for rash and wound.   Neurological:  Negative for dizziness, light-headedness and headaches.   Psychiatric/Behavioral:  Negative for sleep disturbance and suicidal ideas.         Personal History     Past Medical History:   Diagnosis Date    AAA (abdominal aortic aneurysm) without rupture     Arthritis     back    Back pain     Backache     CAD (coronary atherosclerotic disease)     Disease of thyroid gland     Dyslipidemia     Edema     History of transfusion     Hyperhomocystinemia     Hyperlipidemia     Stage 3 chronic kidney disease      Past Surgical History:   Procedure Laterality Date    APPENDECTOMY      CARDIAC SURGERY      CORONARY ANGIOPLASTY WITH STENT PLACEMENT      EPIDURAL Left 5/25/2022    Procedure: LUMBAR/SACRAL TRANSFORAMINAL EPIDURAL - left L4 and Left S1;  Surgeon: Deepak Mckeon MD;  Location: [facility] [place]IN OR;  Service: Pain Management;  Laterality: Left;    EXTRACORPOREAL SHOCKWAVE LITHOTRIPSY (ESWL), STENT INSERTION/REMOVAL Right 2/24/2017    Procedure: CYSTO RETROGRADE WITH STENT PLACEMENT;  Surgeon: Janes López MD;  [place] [facility] [place] [facility] [place]  Service:     LUMBAR EPIDURAL INJECTION N/A 11/16/2022    Procedure: LUMBAR EPIDURAL STEROID INJECTION INTRALAMINAR L5-S1 (LEFT PARAMEDIAN APPROACH);  Surgeon: Carly Hairston MD;  Location: [facility] [place] MAIN OR;  Service: Pain Management;  Laterality: N/A;    MEDIAL BRANCH BLOCK Bilateral 6/23/2022    Procedure: Lumbar medial branch block bilateral L2-L5;  Surgeon: Deepak Mckeon MD;  Location: [facility] [place];  Service: Pain Management;  Laterality: Bilateral;    MEDIAL BRANCH BLOCK Bilateral 7/12/2022    Procedure: lumbar medial branch block  bilateral L2-L5;  Surgeon: Deepak Mckeon MD;  Location: [facility] [place] [place];  Service: Pain Management;  Laterality: Bilateral;    RADIOFREQUENCY ABLATION Bilateral 8/9/2022    Procedure: L2-L5  RADIOFREQUENCY ABLATION LUMBAR;  Surgeon: Deepak Mckeon MD;  Location: [facility] [place] OR;  Service: Pain Management;  Laterality: Bilateral;     Family History   Problem Relation Age of Onset    Heart disease Mother     Stroke Father      Social History     Tobacco Use    Smoking status: Never    Smokeless tobacco: Never   Substance Use Topics    Alcohol use: No     Comment: occ caffeine use    Drug use: No     No current facility-administered medications on file prior to encounter.     Current Outpatient Medications on File Prior to Encounter   Medication Sig Dispense Refill    allopurinol (ZYLOPRIM) 300 MG tablet Take 1.5 tablets by mouth Daily.      aspirin 81 MG chewable tablet Chew 1 tablet Daily.      folic acid (FOLVITE) 400 MCG tablet Take 1 tablet by mouth Daily.      lactulose (CHRONULAC) 10 GM/15ML solution Take 15ml by mouth one time daily      Lovaza 1 g capsule TAKE TWO CAPSULES BY MOUTH TWICE A  capsule 1    Plavix 75 MG tablet TAKE ONE TABLET BY MOUTH DAILY 90 tablet 1    traMADol (ULTRAM) 50 MG tablet       vitamin D (ERGOCALCIFEROL) 1.25 MG (03361 UT) capsule capsule TAKE ONE CAPSULE BY MOUTH ONCE WEEKLY 12 capsule 3     Allergies   Allergen Reactions    Statins Other (See Comments)     Muscle weakness in legs and arms one time only  Muscle weakness in legs and arms one time only  Other reaction(s): Other (See Comments)  Muscle weakness in legs and arms one time only    Muscle weakness in legs and arms one time only    Adhesive Tape Other (See Comments)     Severe burn one time when left on too long    Wound Dressing Adhesive Other (See Comments)     Severe burn one time when left on too long       Objective    Objective     Vital Signs  Temp:  [97.8 °F (36.6 °C)] 97.8 °F (36.6 °C)  Heart Rate:  [75-88] 84  Resp:  [18] 18  BP: (117-136)/(63-92) 123/65  SpO2:  [91 %-98 %] 91 %  on   ;   Device (Oxygen Therapy): room air  There is no height or weight on file to calculate BMI.    Physical  Exam  Vitals and nursing note reviewed.   Constitutional:       General: He is not in acute distress.     Appearance: He is well-developed. He is not toxic-appearing.      Comments: Chronically ill-appearing   HENT:      Head: Normocephalic and atraumatic.      Comments: Redness around his scalp region     Mouth/Throat:      Mouth: Mucous membranes are dry.   Eyes:      General: No scleral icterus.        Right eye: No discharge.         Left eye: No discharge.      Conjunctiva/sclera: Conjunctivae normal.   Neck:      Vascular: No JVD.   Cardiovascular:      Rate and Rhythm: Normal rate and regular rhythm.      Heart sounds: Normal heart sounds. No murmur heard.     No friction rub. No gallop.   Pulmonary:      Effort: Pulmonary effort is normal. No respiratory distress.      Breath sounds: Normal breath sounds. No wheezing or rales.   Abdominal:      General: Bowel sounds are normal. There is no distension.      Palpations: Abdomen is soft.      Tenderness: There is no abdominal tenderness. There is no guarding.   Musculoskeletal:         General: No tenderness or deformity. Normal range of motion.      Cervical back: Normal range of motion and neck supple.      Comments: Kyphosis noted   Skin:     General: Skin is warm and dry.      Capillary Refill: Capillary refill takes less than 2 seconds.   Neurological:      Mental Status: He is alert and oriented to person, place, and time.   Psychiatric:         Behavior: Behavior normal.     Results Review:  I reviewed the patient's new clinical results.  I reviewed the patient's new imaging results and agree with the interpretation.  I reviewed the patient's other test results and agree with the interpretation  I personally viewed and interpreted the patient's EKG/Telemetry data    Lab Results (last 24 hours)       Procedure Component Value Units Date/Time    Urinalysis With Microscopic If Indicated (No Culture) - Urine, Clean Catch [[id number]]  (Abnormal) Collected:  03/20/24 1720    Specimen: Urine, Clean Catch Updated: 03/20/24 1752     Color, UA Dark Yellow     Appearance, UA Cloudy     pH, UA 5.5     Specific Gravity, UA 1.022     Glucose, UA Negative     Ketones, UA Trace     Bilirubin, UA Small (1+)     Blood, UA Large (3+)     Protein, UA >=300 mg/dL (3+)     Leuk Esterase, UA Small (1+)     Nitrite, UA Negative     Urobilinogen, UA 1.0 E.U./dL    Urinalysis, Microscopic Only - Urine, Clean Catch [[id number]]  (Abnormal) Collected: 03/20/24 1720    Specimen: Urine, Clean Catch Updated: 03/20/24 1748     RBC, UA Too Numerous to Count /HPF      WBC, UA 3-5 /HPF      Bacteria, UA None Seen /HPF      Squamous Epithelial Cells, UA 0-2 /HPF      Hyaline Casts, UA 3-6 /LPF      Methodology Automated Microscopy    CBC & Differential [[id number]]  (Abnormal) Collected: 03/20/24 1730    Specimen: Blood Updated: 03/20/24 1748    Narrative:      The following orders were created for panel order CBC & Differential.  Procedure                               Abnormality         Status                     ---------                               -----------         ------                     CBC Auto Differential[[id number]]        Abnormal            Final result                 Please view results for these tests on the individual orders.    Comprehensive Metabolic Panel [[id number]]  (Abnormal) Collected: 03/20/24 1730    Specimen: Blood Updated: 03/20/24 1836     Glucose 107 mg/dL      BUN 26 mg/dL      Creatinine 1.42 mg/dL      Sodium 135 mmol/L      Potassium 3.7 mmol/L      Chloride 100 mmol/L      CO2 20.4 mmol/L      Calcium 9.2 mg/dL      Total Protein 6.5 g/dL      Albumin 3.9 g/dL      ALT (SGPT) 133 U/L      AST (SGOT) 796 U/L      Alkaline Phosphatase 86 U/L      Total Bilirubin 0.8 mg/dL      Globulin 2.6 gm/dL      A/G Ratio 1.5 g/dL      BUN/Creatinine Ratio 18.3     Anion Gap 14.6 mmol/L      eGFR 50.3 mL/min/1.73     Narrative:      GFR Normal >60  Chronic Kidney Disease  <60  Kidney Failure <15    The GFR formula is only valid for adults with stable renal function between ages 18 and 70.    Protime-INR [[id number]]  (Normal) Collected: 03/20/24 1730    Specimen: Blood Updated: 03/20/24 1803     Protime 13.8 Seconds      INR 1.04    High Sensitivity Troponin T [[id number]]  (Abnormal) Collected: 03/20/24 1730    Specimen: Blood Updated: 03/20/24 1812     HS Troponin T 40 ng/L     Narrative:      High Sensitive Troponin T Reference Range:  <14.0 ng/L- Negative Female for AMI  <22.0 ng/L- Negative Male for AMI  >=14 - Abnormal Female indicating possible myocardial injury.  >=22 - Abnormal Male indicating possible myocardial injury.   Clinicians would have to utilize clinical acumen, EKG, Troponin, and serial changes to determine if it is an Acute Myocardial Infarction or myocardial injury due to an underlying chronic condition.         Magnesium [[id number]]  (Normal) Collected: 03/20/24 1730    Specimen: Blood Updated: 03/20/24 1821     Magnesium 2.2 mg/dL     CK [[id number]]  (Abnormal) Collected: 03/20/24 1730    Specimen: Blood Updated: 03/20/24 1838     Creatine Kinase >22,000 U/L     CBC Auto Differential [[id number]]  (Abnormal) Collected: 03/20/24 1730    Specimen: Blood Updated: 03/20/24 1748     WBC 10.76 10*3/mm3      RBC 4.63 10*6/mm3      Hemoglobin 15.1 g/dL      Hematocrit 43.2 %      MCV 93.3 fL      MCH 32.6 pg      MCHC 35.0 g/dL      RDW 13.2 %      RDW-SD 45.4 fl      MPV 11.0 fL      Platelets 201 10*3/mm3      Neutrophil % 85.9 %      Lymphocyte % 5.9 %      Monocyte % 7.2 %      Eosinophil % 0.1 %      Basophil % 0.2 %      Immature Grans % 0.7 %      Neutrophils, Absolute 9.23 10*3/mm3      Lymphocytes, Absolute 0.64 10*3/mm3      Monocytes, Absolute 0.78 10*3/mm3      Eosinophils, Absolute 0.01 10*3/mm3      Basophils, Absolute 0.02 10*3/mm3      Immature Grans, Absolute 0.08 10*3/mm3      nRBC 0.0 /100 WBC     High Sensitivity Troponin T 2Hr [[id number]]  (Abnormal)  Collected: 03/20/24 1938    Specimen: Blood from Arm, Left Updated: 03/20/24 2005     HS Troponin T 36 ng/L      Troponin T Delta -4 ng/L     Narrative:      High Sensitive Troponin T Reference Range:  <14.0 ng/L- Negative Female for AMI  <22.0 ng/L- Negative Male for AMI  >=14 - Abnormal Female indicating possible myocardial injury.  >=22 - Abnormal Male indicating possible myocardial injury.   Clinicians would have to utilize clinical acumen, EKG, Troponin, and serial changes to determine if it is an Acute Myocardial Infarction or myocardial injury due to an underlying chronic condition.                 Imaging Results (Last 24 Hours)       Procedure Component Value Units Date/Time    CT Head Without Contrast [[id number]] Collected: 03/20/24 1928     Updated: 03/20/24 1928    Narrative:      EMERGENCY CT SCAN OF THE HEAD WITHOUT CONTRAST ON 03/20/2024     CLINICAL HISTORY: Patient fell, unsure whether or not he is on blood  thinners.     TECHNIQUE: Spiral CT images were obtained from the base of the skull to  the vertex without intravenous contrast. The images were reformatted and  are submitted in 3 mm thick axial, sagittal and coronal CT sections with  brain algorithm and 2 mm thick axial CT sections with high-resolution  bone algorithm.     COMPARISON: There are no prior head CTs for comparison. The study is  correlated to a prior MRI of the brain from [facility] on 09/17/2018.     FINDINGS: There is minimal low-density in the periventricular white  matter consistent with minimal small vessel disease. The remainder of  the brain parenchyma is normal in attenuation. The ventricles are normal  in size. I see no focal mass effect. There is no midline shift. No extra  axial fluid collections are identified. There is a scalp hematoma over  the anterior inferior right frontal bone from today's head trauma. No  underlying acute skull fracture is identified. The calvarium and skull  base are normal  in appearance. The paranasal sinuses and the mastoid air  cells and middle ear cavities are clear.       Impression:      1. No acute intracranial abnormality is identified.     2. There is minimal small vessel disease in the frontal periventricular  white matter and very mild cerebral atrophy. There is a scalp hematoma  over the anterior inferior right frontal bone from today's head trauma.  The remainder of the head CT is within normal limits with no acute skull  fracture or intracranial hemorrhage identified.     Radiation dose reduction techniques were utilized, including automated  exposure control and exposure modulation based on body size.          XR Knee 3 View Right [[id number]] Collected: 03/20/24 1907     Updated: 03/20/24 1907    Narrative:      EMERGENCY PORTABLE VIEW OF THE CHEST AS WELL AS SINGLE VIEW OF THE  PELVIS AND 2 VIEWS OF THE LEFT HIP AND 3 VIEWS OF THE RIGHT KNEE ON  03/20/2024     CLINICAL HISTORY: Fell, prolonged downtime.     CHEST: This is correlated to a PA and lateral chest x-ray on 04/11/2023.  The cardiomediastinal silhouette and the pulmonary vasculature are  within normal limits. There is a somewhat irregular airspace opacity  projecting over the right lower lung zone. It overlaps the right fourth  anterior rib and is between the right 8th and 9th posterior rib is an  indeterminate finding. The remainder of the lungs are clear.  Costophrenic angles are sharp.       Impression:      1. There is an irregular 15 to 20 mm airspace opacity projecting over  the right lower lung zone that is nonspecific, could be an area of scar  or infiltrate although I cannot exclude a small mass. Its new when  compared to prior chest x-ray 04/11/2023 and I recommend CT of the chest  to further evaluate. The remainder the chest x-ray is unremarkable.     RIGHT KNEE: 3 views of the right knee are submitted for interpretation.  On the AP view of the right knee there is a vertical lucency projecting  over  the central aspect of the patella. Of course there is overlap of  the femur and this is a nonspecific finding. It could be a very subtle  vertical hairline fracture of the central aspect of the patella.   Otherwise I see no additional acute fracture or malalignment of the  bones of the right knee. There is a probable tiny right knee joint  effusion seen on the crosstable lateral view.     IMPRESSION:  1. Vertical lucency projects over the central aspect of the patella on  the AP view. The patella overlaps the femur at this level and this could  be artifact or a hairline nondisplaced fracture of the central aspect of  the patella and correlate clinically. If the patient has patellar  tenderness I recommend a knee CT to further evaluate.     PELVIS AND LEFT HIP: Single view of the pelvis and 2 views including AP  and frog-leg lateral views of the left hip are submitted for  interpretation. I see no acute fracture or malalignment in the bones of  the pelvis or the left hip. The left hip joint space is well-maintained.       XR Hip With or Without Pelvis 2 - 3 View Left [[id number]] Collected: 03/20/24 1907     Updated: 03/20/24 1907    Narrative:      EMERGENCY PORTABLE VIEW OF THE CHEST AS WELL AS SINGLE VIEW OF THE  PELVIS AND 2 VIEWS OF THE LEFT HIP AND 3 VIEWS OF THE RIGHT KNEE ON  03/20/2024     CLINICAL HISTORY: Fell, prolonged downtime.     CHEST: This is correlated to a PA and lateral chest x-ray on 04/11/2023.  The cardiomediastinal silhouette and the pulmonary vasculature are  within normal limits. There is a somewhat irregular airspace opacity  projecting over the right lower lung zone. It overlaps the right fourth  anterior rib and is between the right 8th and 9th posterior rib is an  indeterminate finding. The remainder of the lungs are clear.  Costophrenic angles are sharp.       Impression:      1. There is an irregular 15 to 20 mm airspace opacity projecting over  the right lower lung zone that is  nonspecific, could be an area of scar  or infiltrate although I cannot exclude a small mass. Its new when  compared to prior chest x-ray 04/11/2023 and I recommend CT of the chest  to further evaluate. The remainder the chest x-ray is unremarkable.     RIGHT KNEE: 3 views of the right knee are submitted for interpretation.  On the AP view of the right knee there is a vertical lucency projecting  over the central aspect of the patella. Of course there is overlap of  the femur and this is a nonspecific finding. It could be a very subtle  vertical hairline fracture of the central aspect of the patella.   Otherwise I see no additional acute fracture or malalignment of the  bones of the right knee. There is a probable tiny right knee joint  effusion seen on the crosstable lateral view.     IMPRESSION:  1. Vertical lucency projects over the central aspect of the patella on  the AP view. The patella overlaps the femur at this level and this could  be artifact or a hairline nondisplaced fracture of the central aspect of  the patella and correlate clinically. If the patient has patellar  tenderness I recommend a knee CT to further evaluate.     PELVIS AND LEFT HIP: Single view of the pelvis and 2 views including AP  and frog-leg lateral views of the left hip are submitted for  interpretation. I see no acute fracture or malalignment in the bones of  the pelvis or the left hip. The left hip joint space is well-maintained.       XR Chest 1 View [[id number]] Collected: 03/20/24 1907     Updated: 03/20/24 1907    Narrative:      EMERGENCY PORTABLE VIEW OF THE CHEST AS WELL AS SINGLE VIEW OF THE  PELVIS AND 2 VIEWS OF THE LEFT HIP AND 3 VIEWS OF THE RIGHT KNEE ON  03/20/2024     CLINICAL HISTORY: Fell, prolonged downtime.     CHEST: This is correlated to a PA and lateral chest x-ray on 04/11/2023.  The cardiomediastinal silhouette and the pulmonary vasculature are  within normal limits. There is a somewhat irregular airspace  opacity  projecting over the right lower lung zone. It overlaps the right fourth  anterior rib and is between the right 8th and 9th posterior rib is an  indeterminate finding. The remainder of the lungs are clear.  Costophrenic angles are sharp.       Impression:      1. There is an irregular 15 to 20 mm airspace opacity projecting over  the right lower lung zone that is nonspecific, could be an area of scar  or infiltrate although I cannot exclude a small mass. Its new when  compared to prior chest x-ray 04/11/2023 and I recommend CT of the chest  to further evaluate. The remainder the chest x-ray is unremarkable.     RIGHT KNEE: 3 views of the right knee are submitted for interpretation.  On the AP view of the right knee there is a vertical lucency projecting  over the central aspect of the patella. Of course there is overlap of  the femur and this is a nonspecific finding. It could be a very subtle  vertical hairline fracture of the central aspect of the patella.   Otherwise I see no additional acute fracture or malalignment of the  bones of the right knee. There is a probable tiny right knee joint  effusion seen on the crosstable lateral view.     IMPRESSION:  1. Vertical lucency projects over the central aspect of the patella on  the AP view. The patella overlaps the femur at this level and this could  be artifact or a hairline nondisplaced fracture of the central aspect of  the patella and correlate clinically. If the patient has patellar  tenderness I recommend a knee CT to further evaluate.     PELVIS AND LEFT HIP: Single view of the pelvis and 2 views including AP  and frog-leg lateral views of the left hip are submitted for  interpretation. I see no acute fracture or malalignment in the bones of  the pelvis or the left hip. The left hip joint space is well-maintained.               Results for orders placed during the hospital encounter of 05/19/22    Adult Transthoracic Echo Complete W/ Cont if Necessary  Per Protocol    Interpretation Summary  · Calculated left ventricular EF = 56% Estimated left ventricular EF = 56% Estimated left ventricular EF was in agreement with the calculated left ventricular EF. Left ventricular systolic function is normal. Normal left ventricular cavity size noted. Left ventricular wall thickness is consistent with moderate concentric hypertrophy. All left ventricular wall segments contract normally. Left ventricular diastolic function is consistent with (grade I) impaired relaxation.  · No aortic valve regurgitation or stenosis is present. The aortic valve is abnormal in structure. There is mild thickening of the aortic valve.  · Trace mitral valve regurgitation is present.  · Trace tricuspid valve regurgitation is present. Estimated right ventricular systolic pressure from tricuspid regurgitation is normal (<35 mmHg). Calculated right ventricular systolic pressure from tricuspid regurgitation is 30.2 mmHg.      ECG 12 Lead Other; Fall   Final Result   HEART RATE= 82  bpm   RR Interval= 732  ms   TN Interval= 144  ms   P Horizontal Axis= 9  deg   P Front Axis= 48  deg   QRSD Interval= 84  ms   QT Interval= 384  ms   QTcB= 449  ms   QRS Axis= -13  deg   T Wave Axis= 21  deg   - NORMAL ECG -   Sinus rhythm   No change from previous tracing   Electronically Signed By: Radha Monroy ([facility]) 20-Mar-2024 17:58:03   Date and Time of Study: 2024-03-20 17:25:06           Assessment/Plan     Active Hospital Problems    Diagnosis  POA    **Rhabdomyolysis [M62.82]  Yes    Right patella fracture [S82.001A]  Yes    Chronic bilateral low back pain without sciatica [M54.50, G89.29]  Yes    DDD (degenerative disc disease), lumbar [M51.36]  Yes    History of right coronary artery stent placement [Z95.5]  Not Applicable    Coronary artery disease due to calcified coronary lesion [I25.10, I25.84]  Yes    Benign prostatic hyperplasia [N40.0]  Yes    Anemia [D64.9]  Yes    Hypercholesterolemia [E78.00]  Yes     Hypothyroidism [E03.9]  Yes      Resolved Hospital Problems   No resolved problems to display.       Mr. Fajardo is a 78 y.o. non-smoker with a history of coronary artery disease status post stent placement, HLD, hypothyroidism, chronic bilateral low back pain, degenerative disc disease, and anemia who presents with a mechanical fall was found to have rhabdomyolysis and right patellar fracture.    Nontraumatic rhabdomyolysis  Transaminitis  -CK greater than 22,000.  LFTs also elevated.  -Aggressive IV hydration overnight  -Repeat CK and CMP in a.m.    Right patella fracture  -Imaging is suspicious for a hairline nondisplaced fracture of the central aspect of the patella   -Orthopedic consultation  -PT to treat and eval    Elevated troponin  -Denies any chest pain, suspect this is secondary to rhabdomyolysis.    ? CKD  -Creat today 1.42, eGFR 50.  A year ago his creatinine was around the same.  -IV hydration overnight and repeat labs in a.m.    Chronic bilateral low back pain/chronic pain syndrome  Scoliosis  Difficulty walking, baseline  Degenerative disc disease  -Chronic issues, consult PT to treat and eval in a.m.    CAD  -Denies any chest pain  -Resume home regimen once MAR has been reconciled    Hypothyroidism  -Continue Synthroid therapy    BPH  -Will resume home regimen once MAR has been reconciled    HLD  -Hold statin in the setting of rhabdomyolysis      I discussed the patient's findings and my recommendations with patient and Dr. Charles .    VTE Prophylaxis - Lovenox 40 mg SC daily.  Code Status - Full code.       CONSUELO Najera  [facility]  03/20/24  20:53 EDT

## 2024-03-21 NOTE — PROGRESS NOTES
[facility] Medicine Services  PROGRESS NOTE    Patient Name: Mannie Fajardo  : 1945  MRN: [id number]    Date of Admission: 3/20/2024  Primary Care Physician: Beatrice Gardner MD    Subjective   Subjective     CC:  Follow-up recent fall    Subjective:  Patient notes some pain.  Patient mostly oriented but he is a somewhat poor historian.  Patient notes pain in the right leg.  He denies new complaints    Review of Systems  No current fevers or chills  No current shortness of breath or cough  No current nausea, vomiting, or diarrhea  No current chest pain or palpitations       Objective   Objective     Vital Signs:   Temp:  [97.7 °F (36.5 °C)-97.8 °F (36.6 °C)] 97.8 °F (36.6 °C)  Heart Rate:  [74-88] 79  Resp:  [16-18] 16  BP: (117-154)/(63-92) 125/67        Physical Exam:  Constitutional:Awake, alert, elderly appearing  HENT: NCAT, mucous membranes moist, neck supple  Respiratory: No cough or wheezes, normal respirations, nonlabored breathing   Cardiovascular: Pulse rate is normal, palpable radial pulses  Gastrointestinal:  soft, nontender, nondistended  Musculoskeletal: Frail and chronically debilitated in appearance, right patella is tender to palpation, BMI is 22, somewhat thin, no lower extremity edema  Psychiatric: Appropriate affect, cooperative, conversational  Neurologic: No slurred speech or facial droop, follows commands  Skin: No rashes or jaundice, warm      Results Reviewed:  Results from last 7 days   Lab Units 24  0748 24  1730   WBC 10*3/mm3 6.67 10.76   HEMOGLOBIN g/dL 12.3* 15.1   HEMATOCRIT % 36.7* 43.2   PLATELETS 10*3/mm3 166 201   INR   --  1.04     Results from last 7 days   Lab Units 24  0748 24  1938 24  1730   SODIUM mmol/L 138  --  135*   POTASSIUM mmol/L 4.0  --  3.7   CHLORIDE mmol/L 108*  --  100   CO2 mmol/L 17.9*  --  20.4*   BUN mg/dL 21  --  26*   CREATININE mg/dL 1.19  --  1.42*   GLUCOSE mg/dL 84  --  107*   CALCIUM mg/dL 8.1*  --  9.2    ALK PHOS U/L 63  --  86   ALT (SGPT) U/L 126*  --  133*   AST (SGOT) U/L 707*  --  796*   HSTROP T ng/L  --  36* 40*     Estimated Creatinine Clearance: 47.3 mL/min (by C-G formula based on SCr of 1.19 mg/dL).    Microbiology Results Abnormal       None            Imaging Results (Last 24 Hours)       Procedure Component Value Units Date/Time    CT Head Without Contrast [[id number]] Collected: 03/20/24 1928     Updated: 03/20/24 1928    Narrative:      EMERGENCY CT SCAN OF THE HEAD WITHOUT CONTRAST ON 03/20/2024     CLINICAL HISTORY: Patient fell, unsure whether or not he is on blood  thinners.     TECHNIQUE: Spiral CT images were obtained from the base of the skull to  the vertex without intravenous contrast. The images were reformatted and  are submitted in 3 mm thick axial, sagittal and coronal CT sections with  brain algorithm and 2 mm thick axial CT sections with high-resolution  bone algorithm.     COMPARISON: There are no prior head CTs for comparison. The study is  correlated to a prior MRI of the brain from [facility] on 09/17/2018.     FINDINGS: There is minimal low-density in the periventricular white  matter consistent with minimal small vessel disease. The remainder of  the brain parenchyma is normal in attenuation. The ventricles are normal  in size. I see no focal mass effect. There is no midline shift. No extra  axial fluid collections are identified. There is a scalp hematoma over  the anterior inferior right frontal bone from today's head trauma. No  underlying acute skull fracture is identified. The calvarium and skull  base are normal in appearance. The paranasal sinuses and the mastoid air  cells and middle ear cavities are clear.       Impression:      1. No acute intracranial abnormality is identified.     2. There is minimal small vessel disease in the frontal periventricular  white matter and very mild cerebral atrophy. There is a scalp hematoma  over the anterior  inferior right frontal bone from today's head trauma.  The remainder of the head CT is within normal limits with no acute skull  fracture or intracranial hemorrhage identified.     Radiation dose reduction techniques were utilized, including automated  exposure control and exposure modulation based on body size.          XR Knee 3 View Right [[id number]] Collected: 03/20/24 1907     Updated: 03/20/24 1907    Narrative:      EMERGENCY PORTABLE VIEW OF THE CHEST AS WELL AS SINGLE VIEW OF THE  PELVIS AND 2 VIEWS OF THE LEFT HIP AND 3 VIEWS OF THE RIGHT KNEE ON  03/20/2024     CLINICAL HISTORY: Fell, prolonged downtime.     CHEST: This is correlated to a PA and lateral chest x-ray on 04/11/2023.  The cardiomediastinal silhouette and the pulmonary vasculature are  within normal limits. There is a somewhat irregular airspace opacity  projecting over the right lower lung zone. It overlaps the right fourth  anterior rib and is between the right 8th and 9th posterior rib is an  indeterminate finding. The remainder of the lungs are clear.  Costophrenic angles are sharp.       Impression:      1. There is an irregular 15 to 20 mm airspace opacity projecting over  the right lower lung zone that is nonspecific, could be an area of scar  or infiltrate although I cannot exclude a small mass. Its new when  compared to prior chest x-ray 04/11/2023 and I recommend CT of the chest  to further evaluate. The remainder the chest x-ray is unremarkable.     RIGHT KNEE: 3 views of the right knee are submitted for interpretation.  On the AP view of the right knee there is a vertical lucency projecting  over the central aspect of the patella. Of course there is overlap of  the femur and this is a nonspecific finding. It could be a very subtle  vertical hairline fracture of the central aspect of the patella.   Otherwise I see no additional acute fracture or malalignment of the  bones of the right knee. There is a probable tiny right knee  joint  effusion seen on the crosstable lateral view.     IMPRESSION:  1. Vertical lucency projects over the central aspect of the patella on  the AP view. The patella overlaps the femur at this level and this could  be artifact or a hairline nondisplaced fracture of the central aspect of  the patella and correlate clinically. If the patient has patellar  tenderness I recommend a knee CT to further evaluate.     PELVIS AND LEFT HIP: Single view of the pelvis and 2 views including AP  and frog-leg lateral views of the left hip are submitted for  interpretation. I see no acute fracture or malalignment in the bones of  the pelvis or the left hip. The left hip joint space is well-maintained.       XR Hip With or Without Pelvis 2 - 3 View Left [[id number]] Collected: 03/20/24 1907     Updated: 03/20/24 1907    Narrative:      EMERGENCY PORTABLE VIEW OF THE CHEST AS WELL AS SINGLE VIEW OF THE  PELVIS AND 2 VIEWS OF THE LEFT HIP AND 3 VIEWS OF THE RIGHT KNEE ON  03/20/2024     CLINICAL HISTORY: Fell, prolonged downtime.     CHEST: This is correlated to a PA and lateral chest x-ray on 04/11/2023.  The cardiomediastinal silhouette and the pulmonary vasculature are  within normal limits. There is a somewhat irregular airspace opacity  projecting over the right lower lung zone. It overlaps the right fourth  anterior rib and is between the right 8th and 9th posterior rib is an  indeterminate finding. The remainder of the lungs are clear.  Costophrenic angles are sharp.       Impression:      1. There is an irregular 15 to 20 mm airspace opacity projecting over  the right lower lung zone that is nonspecific, could be an area of scar  or infiltrate although I cannot exclude a small mass. Its new when  compared to prior chest x-ray 04/11/2023 and I recommend CT of the chest  to further evaluate. The remainder the chest x-ray is unremarkable.     RIGHT KNEE: 3 views of the right knee are submitted for interpretation.  On the AP view  of the right knee there is a vertical lucency projecting  over the central aspect of the patella. Of course there is overlap of  the femur and this is a nonspecific finding. It could be a very subtle  vertical hairline fracture of the central aspect of the patella.   Otherwise I see no additional acute fracture or malalignment of the  bones of the right knee. There is a probable tiny right knee joint  effusion seen on the crosstable lateral view.     IMPRESSION:  1. Vertical lucency projects over the central aspect of the patella on  the AP view. The patella overlaps the femur at this level and this could  be artifact or a hairline nondisplaced fracture of the central aspect of  the patella and correlate clinically. If the patient has patellar  tenderness I recommend a knee CT to further evaluate.     PELVIS AND LEFT HIP: Single view of the pelvis and 2 views including AP  and frog-leg lateral views of the left hip are submitted for  interpretation. I see no acute fracture or malalignment in the bones of  the pelvis or the left hip. The left hip joint space is well-maintained.       XR Chest 1 View [[id number]] Collected: 03/20/24 1907     Updated: 03/20/24 1907    Narrative:      EMERGENCY PORTABLE VIEW OF THE CHEST AS WELL AS SINGLE VIEW OF THE  PELVIS AND 2 VIEWS OF THE LEFT HIP AND 3 VIEWS OF THE RIGHT KNEE ON  03/20/2024     CLINICAL HISTORY: Fell, prolonged downtime.     CHEST: This is correlated to a PA and lateral chest x-ray on 04/11/2023.  The cardiomediastinal silhouette and the pulmonary vasculature are  within normal limits. There is a somewhat irregular airspace opacity  projecting over the right lower lung zone. It overlaps the right fourth  anterior rib and is between the right 8th and 9th posterior rib is an  indeterminate finding. The remainder of the lungs are clear.  Costophrenic angles are sharp.       Impression:      1. There is an irregular 15 to 20 mm airspace opacity projecting over  the  right lower lung zone that is nonspecific, could be an area of scar  or infiltrate although I cannot exclude a small mass. Its new when  compared to prior chest x-ray 04/11/2023 and I recommend CT of the chest  to further evaluate. The remainder the chest x-ray is unremarkable.     RIGHT KNEE: 3 views of the right knee are submitted for interpretation.  On the AP view of the right knee there is a vertical lucency projecting  over the central aspect of the patella. Of course there is overlap of  the femur and this is a nonspecific finding. It could be a very subtle  vertical hairline fracture of the central aspect of the patella.   Otherwise I see no additional acute fracture or malalignment of the  bones of the right knee. There is a probable tiny right knee joint  effusion seen on the crosstable lateral view.     IMPRESSION:  1. Vertical lucency projects over the central aspect of the patella on  the AP view. The patella overlaps the femur at this level and this could  be artifact or a hairline nondisplaced fracture of the central aspect of  the patella and correlate clinically. If the patient has patellar  tenderness I recommend a knee CT to further evaluate.     PELVIS AND LEFT HIP: Single view of the pelvis and 2 views including AP  and frog-leg lateral views of the left hip are submitted for  interpretation. I see no acute fracture or malalignment in the bones of  the pelvis or the left hip. The left hip joint space is well-maintained.               Results for orders placed during the hospital encounter of 05/19/22    Adult Transthoracic Echo Complete W/ Cont if Necessary Per Protocol    Interpretation Summary  · Calculated left ventricular EF = 56% Estimated left ventricular EF = 56% Estimated left ventricular EF was in agreement with the calculated left ventricular EF. Left ventricular systolic function is normal. Normal left ventricular cavity size noted. Left ventricular wall thickness is consistent with  moderate concentric hypertrophy. All left ventricular wall segments contract normally. Left ventricular diastolic function is consistent with (grade I) impaired relaxation.  · No aortic valve regurgitation or stenosis is present. The aortic valve is abnormal in structure. There is mild thickening of the aortic valve.  · Trace mitral valve regurgitation is present.  · Trace tricuspid valve regurgitation is present. Estimated right ventricular systolic pressure from tricuspid regurgitation is normal (<35 mmHg). Calculated right ventricular systolic pressure from tricuspid regurgitation is 30.2 mmHg.      I have reviewed the medications:  Scheduled Meds:allopurinol, 300 mg, Oral, Daily  aspirin, 81 mg, Oral, Daily  clopidogrel, 75 mg, Oral, Daily  folic acid, 400 mcg, Oral, Daily  lactulose, 15 mL, Oral, Daily  sodium chloride, 10 mL, Intravenous, Q12H      Continuous Infusions:sodium chloride, 175 mL/hr, Last Rate: 175 mL/hr (03/21/24 0647)      PRN Meds:.  senna-docusate sodium **AND** polyethylene glycol **AND** bisacodyl **AND** bisacodyl    [COMPLETED] Insert Peripheral IV **AND** sodium chloride    sodium chloride    sodium chloride    traMADol    Assessment & Plan   Assessment & Plan     Active Hospital Problems    Diagnosis  POA    **Rhabdomyolysis [M62.82]  Yes    Right patella fracture [S82.001A]  Yes    Transaminitis [R74.01]  Yes    Chronic bilateral low back pain without sciatica [M54.50, G89.29]  Yes    DDD (degenerative disc disease), lumbar [M51.36]  Yes    History of right coronary artery stent placement [Z95.5]  Not Applicable    Coronary artery disease due to calcified coronary lesion [I25.10, I25.84]  Yes    Benign prostatic hyperplasia [N40.0]  Yes    Anemia [D64.9]  Yes    Hypercholesterolemia [E78.00]  Yes    Hypothyroidism [E03.9]  Yes      Resolved Hospital Problems   No resolved problems to display.        Brief Hospital Course to date:  Mannie Fajardo is a 78 y.o. male presents the hospital  with fall and head trauma with scalp hematoma and a nontraumatic rhabdomyolysis and possible right patellar fracture.    Discussion/plan for today:  CK still extremely elevated and currently off the scale.  Continue IV fluid.  Plan to adjust fluid rate today and monitor volume status carefully.    Orthopedic surgery consulted for possible patellar fracture.  Placed knee in immobilizer until orthopedic surgery can evaluate and will defer weightbearing status to their service.    CKD:3a: Baseline creatinine approximately 1.4 per previous records.  Dropped below previous baseline with IV fluids.    PT consult for debility.    Continue allopurinol for history of gout.  No current gout.    Currently on aspirin and Plavix for history of CAD.  He denies any chest pain.    Continue home lactulose for constipation.    Transaminitis is stable and trending down.  Likely related to rhabdomyolysis.    CT scan of the head images reviewed and shows small vessel disease and scalp contusion.  Supportive care for scalp contusion.    Treatment plan discussed with patient who is in agreement      DVT Prophylaxis: Mechanical      Disposition: Location pending PT recommendations possibly home in 1 to 2 days pending orthopedic recommendations    CODE STATUS:   Code Status and Medical Interventions:   Ordered at: 03/20/24 2040     Level Of Support Discussed With:    Patient     Code Status (Patient has no pulse and is not breathing):    CPR (Attempt to Resuscitate)     Medical Interventions (Patient has pulse or is breathing):    Full Support       Bo Alarcon MD  03/21/24

## 2024-03-21 NOTE — PROGRESS NOTES
Nutrition Services    Patient Name:  Mannie Fajardo  YOB: 1945  MRN: [id number]  Admit Date:  3/20/2024    Assessment Date:  03/21/24    Summary: Age, MST 2    Pt is a 78 y.o. male adm for rhabdomyolysis. H/o CAD s/p stent placement, BPH, HLD, former smoker. Nutrition assessment completed.  Reportedly has been experiencing chronic left leg weakness. Ate 0% of breakfast per EMR. Wt hx reviewed, 17 lb wt loss x 9 mo. Will add ONS to help support PO intake.   Labs: Cl 108,     REC:  Boost Plus once daily for additional calories/protein and to support PO intake  Will CTM PO/ONS intake and wt     RD to follow per protocol.    CLINICAL NUTRITION ASSESSMENT      Reason for Assessment Age, MST score 2+     Diagnosis/Problem   Rhabdomyolysis    Medical/Surgical History Past Medical History:   Diagnosis Date    AAA (abdominal aortic aneurysm) without rupture     Arthritis     back    Back pain     Backache     CAD (coronary atherosclerotic disease)     Disease of thyroid gland     Dyslipidemia     Edema     History of transfusion     Hyperhomocystinemia     Hyperlipidemia     Stage 3 chronic kidney disease        Past Surgical History:   Procedure Laterality Date    APPENDECTOMY      CARDIAC SURGERY      CORONARY ANGIOPLASTY WITH STENT PLACEMENT      EPIDURAL Left 5/25/2022    Procedure: LUMBAR/SACRAL TRANSFORAMINAL EPIDURAL - left L4 and Left S1;  Surgeon: Deepak Mckeon MD;  Location: [facility] [place] MAIN OR;  Service: Pain Management;  Laterality: Left;    EXTRACORPOREAL SHOCKWAVE LITHOTRIPSY (ESWL), STENT INSERTION/REMOVAL Right 2/24/2017    Procedure: CYSTO RETROGRADE WITH STENT PLACEMENT;  Surgeon: Janes López MD;  [facility] [place];  Service:     LUMBAR EPIDURAL INJECTION N/A 11/16/2022    Procedure: LUMBAR EPIDURAL STEROID INJECTION INTRALAMINAR L5-S1 (LEFT PARAMEDIAN APPROACH);  Surgeon: Carly Hairston MD;  Location: [facility] [place] MAIN OR;  Service: Pain Management;  Laterality: N/A;     "MEDIAL BRANCH BLOCK Bilateral 6/23/2022    Procedure: Lumbar medial branch block bilateral L2-L5;  Surgeon: Deepak Mckeon MD;  Location: [facility];  Service: Pain Management;  Laterality: Bilateral;    MEDIAL BRANCH BLOCK Bilateral 7/12/2022    Procedure: lumbar medial branch block  bilateral L2-L5;  Surgeon: Deepak Mckeon MD;  Location: [facility];  Service: Pain Management;  Laterality: Bilateral;    RADIOFREQUENCY ABLATION Bilateral 8/9/2022    Procedure: L2-L5 RADIOFREQUENCY ABLATION LUMBAR;  Surgeon: Deepak Mckeon MD;  Location: [facility];  Service: Pain Management;  Laterality: Bilateral;        Anthropometrics        Current Height  Current Weight  BMI kg/m2 Height: 172.7 cm (68\")  Weight: 66.4 kg (146 lb 6.2 oz) (03/20/24 2304)  Body mass index is 22.26 kg/m².   Adjusted BMI (if applicable)    BMI Category Normal/Healthy (18.4 - 24.9)   Ideal Body Weight (IBW) 68.4 kg   Usual Body Weight (UBW) 160 lbs    Weight Trend Loss, Amount/Timeframe: 17 lb wt loss (10.4%) x 9 mo    Weight History Wt Readings from Last 30 Encounters:   03/20/24 2304 66.4 kg (146 lb 6.2 oz)   03/18/24 1429 68 kg (150 lb)   01/05/24 1057 68 kg (150 lb)   09/25/23 1103 68 kg (150 lb)   06/06/23 1510 73.9 kg (163 lb)   04/20/23 1011 73.9 kg (163 lb)   04/11/23 1125 71.7 kg (158 lb)   02/20/23 1109 74.8 kg (164 lb 12.8 oz)   02/13/23 1601 74.4 kg (164 lb)   12/22/22 0946 74.4 kg (164 lb)   11/21/22 1508 73.9 kg (163 lb)   11/16/22 1022 72.1 kg (159 lb)   11/07/22 1020 72.1 kg (159 lb)   10/17/22 1127 71.2 kg (157 lb)   09/06/22 1033 71.6 kg (157 lb 12.8 oz)   08/03/22 1146 72.1 kg (159 lb)   07/19/22 1008 72.1 kg (159 lb)   07/12/22 1236 70.3 kg (155 lb)   07/07/22 1444 71.2 kg (157 lb)   06/23/22 1333 70.3 kg (155 lb)   06/20/22 0936 70.3 kg (155 lb)   06/09/22 1332 70.8 kg (156 lb)   05/24/22 1024 72.6 kg (160 lb)   05/19/22 1026 73.5 kg (162 lb)   05/18/22 1301 73.6 kg (162 lb 3.2 oz)   04/22/22 1149 72.1 kg " (159 lb)   11/16/21 1102 73.9 kg (163 lb)   11/10/21 1638 73.6 kg (162 lb 3.2 oz)   10/07/21 1112 73.9 kg (163 lb)   08/19/21 1203 72.6 kg (160 lb)        Estimated Requirements         Weight used  66.4 kg     Calories  1722-9508 (25 kcal/kg, 30 kcal/kg)    Protein  66-80 (1.0 - 1.2 gm/kg)    Fluid  2626-5949 (1 mL/kcal)     Labs       Pertinent Labs    Results from last 7 days   Lab Units 03/21/24  0748 03/20/24  1730   SODIUM mmol/L 138 135*   POTASSIUM mmol/L 4.0 3.7   CHLORIDE mmol/L 108* 100   CO2 mmol/L 17.9* 20.4*   BUN mg/dL 21 26*   CREATININE mg/dL 1.19 1.42*   CALCIUM mg/dL 8.1* 9.2   BILIRUBIN mg/dL 0.6 0.8   ALK PHOS U/L 63 86   ALT (SGPT) U/L 126* 133*   AST (SGOT) U/L 707* 796*   GLUCOSE mg/dL 84 107*     Results from last 7 days   Lab Units 03/21/24  0748 03/20/24  1730   MAGNESIUM mg/dL 2.0 2.2   PHOSPHORUS mg/dL 2.7  --    HEMOGLOBIN g/dL 12.3* 15.1   HEMATOCRIT % 36.7* 43.2   WBC 10*3/mm3 6.67 10.76   ALBUMIN g/dL 3.1* 3.9     Results from last 7 days   Lab Units 03/21/24  0748 03/20/24  1730   INR   --  1.04   PLATELETS 10*3/mm3 166 201     COVID19   Date Value Ref Range Status   08/06/2022 Not Detected Not Detected - Ref. Range Final     Lab Results   Component Value Date    HGBA1C 5.7 04/10/2018          Medications           Scheduled Medications allopurinol, 300 mg, Oral, Daily  aspirin, 81 mg, Oral, Daily  clopidogrel, 75 mg, Oral, Daily  folic acid, 400 mcg, Oral, Daily  lactulose, 15 mL, Oral, Daily  sodium chloride, 10 mL, Intravenous, Q12H       Infusions Pharmacy to Dose enoxaparin (LOVENOX),   sodium chloride, 175 mL/hr, Last Rate: 175 mL/hr (03/21/24 0647)       PRN Medications   senna-docusate sodium **AND** polyethylene glycol **AND** bisacodyl **AND** bisacodyl    Pharmacy to Dose enoxaparin (LOVENOX)    [COMPLETED] Insert Peripheral IV **AND** sodium chloride    sodium chloride    sodium chloride    traMADol     Physical Findings          General Findings Alert, oriented, room  air    Oral/Mouth Cavity Not assessed   Edema  no edema   Gastrointestinal WDL   Skin  bruising, abrasion, scab    Tubes/Drains/Lines none   NFPE Not indicated at this time   --  Current Nutrition Orders & Evaluation of Intake       Oral Nutrition     Food Allergies NKFA   Current PO Diet Diet: Cardiac, Gastrointestinal; Healthy Heart (2-3 Na+); Low Irritant, Lactose-Controlled; Fluid Consistency: Thin (IDDSI 0)   Supplement n/a   PO Evaluation     % PO Intake 0% breakfast     Factors Affecting Intake: weakness   --  PES STATEMENT / NUTRITION DIAGNOSIS      Nutrition Dx Problem  Problem: Predicted Suboptimal Intake  Etiology: Medical Diagnosis - rhabdomyolysis and Factors Affecting Nutrition - weakness    Signs/Symptoms: Report of Minimal PO Intake and Unintended Weight Change     NUTRITION INTERVENTION / PLAN OF CARE      Intervention Goal(s) Maintain nutrition status, Reduce/improve symptoms, Meet estimated needs, Increase intake, Maintain weight, No significant weight loss, and PO intake goal %: 75%         RD Intervention/Action Encourage intake, Continue to monitor, Care plan reviewed, and Recommend/order: ONS   --      Prescription/Orders:       PO Diet       Supplements Boost Plus once daily       Enteral Nutrition       Parenteral Nutrition    New Prescription Ordered? Yes   --      Monitor/Evaluation Per protocol   Discharge Plan/Needs Pending clinical course   --    RD to follow per protocol.      Electronically signed by:  Nhi Saleem RDN, LD  03/21/24 09:54 EDT

## 2024-03-21 NOTE — PROGRESS NOTES
[facility] Clinical Pharmacy Services: Enoxaparin Consult    Mannie Fajardo has a pharmacy consult to dose prophylactic enoxaparin per Javier Charles MD 's request.     Indication: VTE Prophylaxis  Home Anticoagulation: none     Relevant clinical data and objective history reviewed:  78 y.o. male       There is no height or weight on file to calculate BMI.   Results from last 7 days   Lab Units 03/20/24  1730   PLATELETS 10*3/mm3 201     Estimated Creatinine Clearance: 40.6 mL/min (A) (by C-G formula based on SCr of 1.42 mg/dL (H)).    Assessment/Plan    Will start patient on 40mg subcutaneous every 24 hours, adjusted for renal function. Consult order will be discontinued but pharmacy will continue to follow.     Dequan Paul [facility]  Clinical Pharmacist

## 2024-03-21 NOTE — PLAN OF CARE
Goal Outcome Evaluation:  Plan of Care Reviewed With: patient           Outcome Evaluation: new admission from ED overnight with fall,  rhabdomyolysis, R patellar fx. R knee immobilizer in place-ortho consulted to see this am. IVF infusing per MD order (NS @175ml/hr). pt very pleasant, A&Ox4. pt states he has chronic back pain that he no longer takes pain medication for because it was not ever effective in pain relief, and chronic LLE weakness that led to fall at home. scattered bruises/scabs noted. voiding per urinal. am labs ordered. VSS. Dsg intact to R knee under immobilizer and R forearm that were placed in ED.

## 2024-03-21 NOTE — CONSULTS
Orthopaedic Surgery  Consult Note   Nnamdi Richards MD  [phone]    HPI:  Patient is a 78 y.o. Not  or  male who was admitted on 3/20/2024 after he sustained a fall at home around midnight.  He lives alone.  He was on the ground for 12 hours and was found by a neighbor and they called EMS and transported him to [facility] emergency room.  He denied loss of consciousness or hitting his head.  He said he said chronic left lower extremity weakness.  He has pain over the right knee and thigh.  I was asked to see him for a nondisplaced patellar fracture that was diagnosed on x-rays.    MEDICAL HISTORY  Past Medical History:   Diagnosis Date    AAA (abdominal aortic aneurysm) without rupture     Arthritis     back    Back pain     Backache     CAD (coronary atherosclerotic disease)     Disease of thyroid gland     Dyslipidemia     Edema     History of transfusion     Hyperhomocystinemia     Hyperlipidemia     Stage 3 chronic kidney disease      Past Surgical History:   Procedure Laterality Date    APPENDECTOMY      CARDIAC SURGERY      CORONARY ANGIOPLASTY WITH STENT PLACEMENT      EPIDURAL Left 5/25/2022    Procedure: LUMBAR/SACRAL TRANSFORAMINAL EPIDURAL - left L4 and Left S1;  Surgeon: Deepak Mckeon MD;  Location: [facility] MAIN OR;  Service: Pain Management;  Laterality: Left;    EXTRACORPOREAL SHOCKWAVE LITHOTRIPSY (ESWL), STENT INSERTION/REMOVAL Right 2/24/2017    Procedure: CYSTO RETROGRADE WITH STENT PLACEMENT;  Surgeon: Janes López MD;  Location: [facility];  Service:     LUMBAR EPIDURAL INJECTION N/A 11/16/2022    Procedure: LUMBAR EPIDURAL STEROID INJECTION INTRALAMINAR L5-S1 (LEFT PARAMEDIAN APPROACH);  Surgeon: Carly Hairston MD;  Location: [facility] MAIN OR;  Service: Pain Management;  Laterality: N/A;    MEDIAL BRANCH BLOCK Bilateral 6/23/2022    Procedure: Lumbar medial branch block bilateral L2-L5;  Surgeon: Deepak Mckeon MD;  Location: [facility] MAIN OR;  Service: Pain  Management;  Laterality: Bilateral;    MEDIAL BRANCH BLOCK Bilateral 7/12/2022    Procedure: lumbar medial branch block  bilateral L2-L5;  Surgeon: Deepak Mckeon MD;  [place] [facility] [place] [place];  Service: Pain Management;  Laterality: Bilateral;    RADIOFREQUENCY ABLATION Bilateral 8/9/2022    Procedure: L2-L5 RADIOFREQUENCY ABLATION LUMBAR;  Surgeon: Deepak Mckeon MD;  [place] [facility] [place];  Service: Pain Management;  Laterality: Bilateral;     Prior to Admission medications    Medication Sig Start Date End Date Taking? Authorizing Provider   allopurinol (ZYLOPRIM) 300 MG tablet Take 1.5 tablets by mouth Daily. 12/3/22  Yes Jeannie Jaramillo MD   aspirin 81 MG chewable tablet Chew 1 tablet Daily.   Yes Jeannie Jaramillo MD   ezetimibe (Zetia) 10 MG tablet Take 1 tablet by mouth Daily. Name brand only   Yes Jeannie Jaramillo MD   folic acid (FOLVITE) 400 MCG tablet Take 1 tablet by mouth Daily.   Yes Jeannie Jaramillo MD   Lovaza 1 g capsule TAKE TWO CAPSULES BY MOUTH TWICE A DAY  Patient taking differently: Name brand only 1/26/23  Yes Marlene Giordano MD   Plavix 75 MG tablet TAKE ONE TABLET BY MOUTH DAILY  Patient taking differently: Brand name only 1/26/23  Yes Marlene Giordano MD   vitamin D (ERGOCALCIFEROL) 1.25 MG (32066 UT) capsule capsule TAKE ONE CAPSULE BY MOUTH ONCE WEEKLY 8/28/23   Lili Sanders APRN   lactulose (CHRONULAC) 10 GM/15ML solution Take 15ml by mouth one time daily 10/11/23 3/21/24  Jeannie Jaramillo MD   traMADol (ULTRAM) 50 MG tablet  12/15/23 3/21/24  Jeannie Jaramillo MD     Allergies   Allergen Reactions    Statins Other (See Comments)     Muscle weakness in legs and arms one time only  Muscle weakness in legs and arms one time only  Other reaction(s): Other (See Comments)  Muscle weakness in legs and arms one time only    Muscle weakness in legs and arms one time only    Adhesive Tape Other (See Comments)     Severe burn one time when left on too long     "Wound Dressing Adhesive Other (See Comments)     Severe burn one time when left on too long     Most Recent Immunizations   Administered Date(s) Administered    Tdap 03/20/2024     Social History     Tobacco Use    Smoking status: Never    Smokeless tobacco: Never   Substance Use Topics    Alcohol use: No     Comment: occ caffeine use      Social History     Substance and Sexual Activity   Drug Use No       VITALS: /57 (BP Location: Right arm, Patient Position: Sitting)   Pulse 81   Temp 98.6 °F (37 °C) (Oral)   Resp 16   Ht 172.7 cm (68\")   Wt 66.4 kg (146 lb 6.2 oz)   SpO2 94%   BMI 22.26 kg/m²  Body mass index is 22.26 kg/m².    PHYSICAL EXAM:   CONSTITUTIONAL: No acute distress  LUNGS: Equal chest rise, no shortness of air  CARDIOVASCULAR: palpable peripheral pulses  SKIN: no skin lesions in the area examined  LYMPH: no lymphadenopathy in the area examined  EXTREMITY: Right Lower Extremity  Tenderness to Palpation: Tenderness to palpation at the anterior knee and patella.  I can gently flex and extend the knee.  This was only about 0 to 30 degrees.  I did not detect varus valgus instability nor was there a positive drawer or Lachman sign.  He has multiple abrasions over the skin.  These are all about 3 x 4 cm or so in size.  They all appeared generally clean and dry.  Gross Deformity: No Gross Deformity  Pulses:  Palpable Doralis Pedis and Posterior Tibial Arteries   Sensation: Intact to Saphenous, Sural, Deep Peroneal, Superficial Peroneal, and Tibial Nerves and grossly throughout extremity  Motor: 5/5 EHL/FHL/TA/GS motor complexes    RADIOLOGY REVIEW:   XR Knee 3 View Right    Result Date: 3/21/2024  1. There is an irregular 15 to 20 mm airspace opacity projecting over the right lower lung zone that is nonspecific, could be an area of scar or infiltrate although I cannot exclude a small mass. Its new when compared to prior chest x-ray 04/11/2023 and I recommend CT of the chest to further evaluate. " The remainder the chest x-ray is unremarkable.  RIGHT KNEE: 3 views of the right knee are submitted for interpretation. On the AP view of the right knee there is a vertical lucency projecting over the central aspect of the patella. Of course there is overlap of the femur and this is a nonspecific finding. It could be a very subtle vertical hairline fracture of the central aspect of the patella. Otherwise I see no additional acute fracture or malalignment of the bones of the right knee. There is a probable tiny right knee joint effusion seen on the crosstable lateral view.  IMPRESSION: 1. Vertical lucency projects over the central aspect of the patella on the AP view. The patella overlaps the femur at this level and this could be artifact or a hairline nondisplaced fracture of the central aspect of the patella and correlate clinically. If the patient has patellar tenderness, I recommend a knee CT to further evaluate.  PELVIS AND LEFT HIP: Single view of the pelvis and 2 views including AP and frog-leg lateral views of the left hip are submitted for interpretation. I see no acute fracture or malalignment in the bones of the pelvis or the left hip. The left hip joint space is well-maintained.  This report was finalized on 3/21/2024 1:02 PM by Dr. Doroteo Parham M.D on Workstation: [id number]      XR Hip With or Without Pelvis 2 - 3 View Left    Result Date: 3/21/2024  1. There is an irregular 15 to 20 mm airspace opacity projecting over the right lower lung zone that is nonspecific, could be an area of scar or infiltrate although I cannot exclude a small mass. Its new when compared to prior chest x-ray 04/11/2023 and I recommend CT of the chest to further evaluate. The remainder the chest x-ray is unremarkable.  RIGHT KNEE: 3 views of the right knee are submitted for interpretation. On the AP view of the right knee there is a vertical lucency projecting over the central aspect of the patella. Of course there is overlap of  the femur and this is a nonspecific finding. It could be a very subtle vertical hairline fracture of the central aspect of the patella. Otherwise I see no additional acute fracture or malalignment of the bones of the right knee. There is a probable tiny right knee joint effusion seen on the crosstable lateral view.  IMPRESSION: 1. Vertical lucency projects over the central aspect of the patella on the AP view. The patella overlaps the femur at this level and this could be artifact or a hairline nondisplaced fracture of the central aspect of the patella and correlate clinically. If the patient has patellar tenderness, I recommend a knee CT to further evaluate.  PELVIS AND LEFT HIP: Single view of the pelvis and 2 views including AP and frog-leg lateral views of the left hip are submitted for interpretation. I see no acute fracture or malalignment in the bones of the pelvis or the left hip. The left hip joint space is well-maintained.  This report was finalized on 3/21/2024 1:02 PM by Dr. Doroteo Parham M.D on Workstation: [id number]      XR Chest 1 View    Result Date: 3/21/2024  1. There is an irregular 15 to 20 mm airspace opacity projecting over the right lower lung zone that is nonspecific, could be an area of scar or infiltrate although I cannot exclude a small mass. Its new when compared to prior chest x-ray 04/11/2023 and I recommend CT of the chest to further evaluate. The remainder the chest x-ray is unremarkable.  RIGHT KNEE: 3 views of the right knee are submitted for interpretation. On the AP view of the right knee there is a vertical lucency projecting over the central aspect of the patella. Of course there is overlap of the femur and this is a nonspecific finding. It could be a very subtle vertical hairline fracture of the central aspect of the patella. Otherwise I see no additional acute fracture or malalignment of the bones of the right knee. There is a probable tiny right knee joint effusion seen on the  crosstable lateral view.  IMPRESSION: 1. Vertical lucency projects over the central aspect of the patella on the AP view. The patella overlaps the femur at this level and this could be artifact or a hairline nondisplaced fracture of the central aspect of the patella and correlate clinically. If the patient has patellar tenderness, I recommend a knee CT to further evaluate.  PELVIS AND LEFT HIP: Single view of the pelvis and 2 views including AP and frog-leg lateral views of the left hip are submitted for interpretation. I see no acute fracture or malalignment in the bones of the pelvis or the left hip. The left hip joint space is well-maintained.  This report was finalized on 3/21/2024 1:02 PM by Dr. Doroteo Parham M.D on Workstation: [email or web address]      CT Head Without Contrast    Result Date: 3/21/2024  1. No acute intracranial abnormality is identified.  2. There is minimal small vessel disease in the frontal periventricular white matter and very mild cerebral atrophy. There is a scalp hematoma over the anterior inferior right frontal bone from today's head trauma. The remainder of the head CT is within normal limits with no acute skull fracture or intracranial hemorrhage identified.  Radiation dose reduction techniques were utilized, including automated exposure control and exposure modulation based on body size.   This report was finalized on 3/21/2024 12:59 PM by Dr. Doroteo Parham M.D on Workstation: [email or web address]       LABS:   Results for the past 24 hours:   Recent Results (from the past 24 hour(s))   High Sensitivity Troponin T 2Hr    Collection Time: 03/20/24  7:38 PM    Specimen: Arm, Left; Blood   Result Value Ref Range    HS Troponin T 36 (H) <22 ng/L    Troponin T Delta -4 (L) >=-4 - <+4 ng/L   CK    Collection Time: 03/21/24  7:48 AM    Specimen: Blood   Result Value Ref Range    Creatine Kinase >22,000 (H) 20 - 200 U/L   Comprehensive Metabolic Panel    Collection Time: 03/21/24  7:48 AM    Specimen: Blood    Result Value Ref Range    Glucose 84 65 - 99 mg/dL    BUN 21 8 - 23 mg/dL    Creatinine 1.19 0.76 - 1.27 mg/dL    Sodium 138 136 - 145 mmol/L    Potassium 4.0 3.5 - 5.2 mmol/L    Chloride 108 (H) 98 - 107 mmol/L    CO2 17.9 (L) 22.0 - 29.0 mmol/L    Calcium 8.1 (L) 8.6 - 10.5 mg/dL    Total Protein 5.1 (L) 6.0 - 8.5 g/dL    Albumin 3.1 (L) 3.5 - 5.2 g/dL    ALT (SGPT) 126 (H) 1 - 41 U/L    AST (SGOT) 707 (H) 1 - 40 U/L    Alkaline Phosphatase 63 39 - 117 U/L    Total Bilirubin 0.6 0.0 - 1.2 mg/dL    Globulin 2.0 gm/dL    A/G Ratio 1.6 g/dL    BUN/Creatinine Ratio 17.6 7.0 - 25.0    Anion Gap 12.1 5.0 - 15.0 mmol/L    eGFR 62.1 >60.0 mL/min/1.73   CBC (No Diff)    Collection Time: 03/21/24  7:48 AM    Specimen: Blood   Result Value Ref Range    WBC 6.67 3.40 - 10.80 10*3/mm3    RBC 3.83 (L) 4.14 - 5.80 10*6/mm3    Hemoglobin 12.3 (L) 13.0 - 17.7 g/dL    Hematocrit 36.7 (L) 37.5 - 51.0 %    MCV 95.8 79.0 - 97.0 fL    MCH 32.1 26.6 - 33.0 pg    MCHC 33.5 31.5 - 35.7 g/dL    RDW 13.5 12.3 - 15.4 %    RDW-SD 48.0 37.0 - 54.0 fl    MPV 10.9 6.0 - 12.0 fL    Platelets 166 140 - 450 10*3/mm3   Magnesium    Collection Time: 03/21/24  7:48 AM    Specimen: Blood   Result Value Ref Range    Magnesium 2.0 1.6 - 2.4 mg/dL   Phosphorus    Collection Time: 03/21/24  7:48 AM    Specimen: Blood   Result Value Ref Range    Phosphorus 2.7 2.5 - 4.5 mg/dL   TSH    Collection Time: 03/21/24  7:48 AM    Specimen: Blood   Result Value Ref Range    TSH 5.960 (H) 0.270 - 4.200 uIU/mL   Vitamin B12    Collection Time: 03/21/24  7:48 AM    Specimen: Blood   Result Value Ref Range    Vitamin B-12 445 211 - 946 pg/mL   Vitamin D,25-Hydroxy    Collection Time: 03/21/24  7:48 AM    Specimen: Blood   Result Value Ref Range    25 Hydroxy, Vitamin D 35.8 30.0 - 100.0 ng/ml       IMPRESSION:  Patient is a 78 y.o. Not  or  male status post fall at home with nondisplaced patellar fracture vertical right knee.    PLAN:   Admited to:  Javier Charles MD  Diet: Regular  Weight Bearing:Right Lower Extremity Weight Bearing As Tolerated with knee immobilizer on and appropriate assistive devices.  Labs: None additional needed  Imaging: None additional needed  Surgery: No surgery indicated for this injury  Recommend outpatient follow-up in 2 weeks and at that time he may be able to be converted to a hinged range of motion brace.  Physical therapy for mobilization and ambulation.      Nnamdi Richards MD  Orthopaedic Surgery  [facility]  [phone] - [place] [facility]  [phone] - [place] [facility]

## 2024-03-21 NOTE — SIGNIFICANT NOTE
03/21/24 1433   OTHER   Discipline physical therapist   Rehab Time/Intention   Session Not Performed other (see comments)  (Ortho consult still pending for R patellar fx; will follow up tomorrow with activity recommendations.)   Recommendation   PT - Next Appointment 03/22/24

## 2024-03-22 LAB
ALBUMIN SERPL-MCNC: 3 G/DL (ref 3.5–5.2)
ALBUMIN/GLOB SERPL: 1.5 G/DL
ALP SERPL-CCNC: 63 U/L (ref 39–117)
ALT SERPL W P-5'-P-CCNC: 113 U/L (ref 1–41)
ANION GAP SERPL CALCULATED.3IONS-SCNC: 11.7 MMOL/L (ref 5–15)
AST SERPL-CCNC: 490 U/L (ref 1–40)
BILIRUB SERPL-MCNC: 0.4 MG/DL (ref 0–1.2)
BUN SERPL-MCNC: 19 MG/DL (ref 8–23)
BUN/CREAT SERPL: 18.4 (ref 7–25)
CALCIUM SPEC-SCNC: 8 MG/DL (ref 8.6–10.5)
CHLORIDE SERPL-SCNC: 108 MMOL/L (ref 98–107)
CK SERPL-CCNC: ABNORMAL U/L (ref 20–200)
CO2 SERPL-SCNC: 17.3 MMOL/L (ref 22–29)
CREAT SERPL-MCNC: 1.03 MG/DL (ref 0.76–1.27)
DEPRECATED RDW RBC AUTO: 44.3 FL (ref 37–54)
EGFRCR SERPLBLD CKD-EPI 2021: 73.9 ML/MIN/1.73
ERYTHROCYTE [DISTWIDTH] IN BLOOD BY AUTOMATED COUNT: 13.2 % (ref 12.3–15.4)
GLOBULIN UR ELPH-MCNC: 2 GM/DL
GLUCOSE SERPL-MCNC: 91 MG/DL (ref 65–99)
HCT VFR BLD AUTO: 33.9 % (ref 37.5–51)
HGB BLD-MCNC: 11.7 G/DL (ref 13–17.7)
MCH RBC QN AUTO: 31.9 PG (ref 26.6–33)
MCHC RBC AUTO-ENTMCNC: 34.5 G/DL (ref 31.5–35.7)
MCV RBC AUTO: 92.4 FL (ref 79–97)
PLATELET # BLD AUTO: 183 10*3/MM3 (ref 140–450)
PMV BLD AUTO: 10.9 FL (ref 6–12)
POTASSIUM SERPL-SCNC: 3.9 MMOL/L (ref 3.5–5.2)
PROT SERPL-MCNC: 5 G/DL (ref 6–8.5)
RBC # BLD AUTO: 3.67 10*6/MM3 (ref 4.14–5.8)
SODIUM SERPL-SCNC: 137 MMOL/L (ref 136–145)
WBC NRBC COR # BLD AUTO: 6.98 10*3/MM3 (ref 3.4–10.8)

## 2024-03-22 PROCEDURE — 97530 THERAPEUTIC ACTIVITIES: CPT

## 2024-03-22 PROCEDURE — 25810000003 SODIUM CHLORIDE 0.9 % SOLUTION: Performed by: INTERNAL MEDICINE

## 2024-03-22 PROCEDURE — 82550 ASSAY OF CK (CPK): CPT | Performed by: STUDENT IN AN ORGANIZED HEALTH CARE EDUCATION/TRAINING PROGRAM

## 2024-03-22 PROCEDURE — 97162 PT EVAL MOD COMPLEX 30 MIN: CPT

## 2024-03-22 PROCEDURE — 85027 COMPLETE CBC AUTOMATED: CPT | Performed by: INTERNAL MEDICINE

## 2024-03-22 PROCEDURE — 97535 SELF CARE MNGMENT TRAINING: CPT

## 2024-03-22 PROCEDURE — 80053 COMPREHEN METABOLIC PANEL: CPT | Performed by: STUDENT IN AN ORGANIZED HEALTH CARE EDUCATION/TRAINING PROGRAM

## 2024-03-22 PROCEDURE — 36415 COLL VENOUS BLD VENIPUNCTURE: CPT | Performed by: STUDENT IN AN ORGANIZED HEALTH CARE EDUCATION/TRAINING PROGRAM

## 2024-03-22 PROCEDURE — 97166 OT EVAL MOD COMPLEX 45 MIN: CPT

## 2024-03-22 RX ORDER — SODIUM CHLORIDE 9 MG/ML
50 INJECTION, SOLUTION INTRAVENOUS CONTINUOUS
Status: DISCONTINUED | OUTPATIENT
Start: 2024-03-22 | End: 2024-03-22

## 2024-03-22 RX ORDER — ALLOPURINOL 300 MG/1
450 TABLET ORAL DAILY
Status: DISCONTINUED | OUTPATIENT
Start: 2024-03-23 | End: 2024-03-25 | Stop reason: HOSPADM

## 2024-03-22 RX ORDER — LIDOCAINE 4 G/G
1 PATCH TOPICAL
Status: DISCONTINUED | OUTPATIENT
Start: 2024-03-22 | End: 2024-03-25 | Stop reason: HOSPADM

## 2024-03-22 RX ORDER — ACETAMINOPHEN 325 MG/1
650 TABLET ORAL EVERY 6 HOURS PRN
Status: DISCONTINUED | OUTPATIENT
Start: 2024-03-22 | End: 2024-03-25 | Stop reason: HOSPADM

## 2024-03-22 RX ORDER — SODIUM CHLORIDE 9 MG/ML
50 INJECTION, SOLUTION INTRAVENOUS CONTINUOUS
Status: ACTIVE | OUTPATIENT
Start: 2024-03-22 | End: 2024-03-25

## 2024-03-22 RX ADMIN — SODIUM CHLORIDE 75 ML/HR: 9 INJECTION, SOLUTION INTRAVENOUS at 00:25

## 2024-03-22 RX ADMIN — ALLOPURINOL 300 MG: 300 TABLET ORAL at 10:26

## 2024-03-22 RX ADMIN — LIDOCAINE 1 PATCH: 4 PATCH TOPICAL at 10:27

## 2024-03-22 RX ADMIN — CLOPIDOGREL BISULFATE 75 MG: 75 TABLET, FILM COATED ORAL at 10:41

## 2024-03-22 RX ADMIN — Medication 10 ML: at 10:27

## 2024-03-22 RX ADMIN — ASPIRIN 81 MG: 81 TABLET, CHEWABLE ORAL at 10:26

## 2024-03-22 RX ADMIN — Medication 10 ML: at 21:39

## 2024-03-22 RX ADMIN — SODIUM CHLORIDE 50 ML/HR: 9 INJECTION, SOLUTION INTRAVENOUS at 14:19

## 2024-03-22 RX ADMIN — Medication 400 MCG: at 10:26

## 2024-03-22 NOTE — PLAN OF CARE
Goal Outcome Evaluation:  Plan of Care Reviewed With: patient           Outcome Evaluation: Pt is a 77 yo male who presents following fall at home with R patellar fx, rhabdomyolysis. Pt with hx of chronic back pain. Per ortho, pt WBAT RLE with R KI brace. Prior to admission, pt was living at home alone and independent with mobility. Upon exam, pt demonstrates generalized weakness, impaired balance, impaired posture, and decreased endurance limiting mobility. Pt required mod A x2 with bed mobility and mod/max A x2 for transfers. Pt able to stand and take several steps from bed to chair with rwx and mod A x2. Pt is at increased risk of falls and will continue to benefit from PT to address impairments and increase independence with mobility. Rec DC to SNF.      Anticipated Discharge Disposition (PT): skilled nursing facility

## 2024-03-22 NOTE — CASE MANAGEMENT/SOCIAL WORK
Discharge Planning Assessment  [facility]     Patient Name: Mannie Fajardo  MRN: [id number]  Today's Date: 3/22/2024    Admit Date: 3/20/2024    Plan: SNF pending acceptance   Discharge Needs Assessment       Row Name 03/22/24 1111       Living Environment    People in Home alone    Current Living Arrangements home    Potentially Unsafe Housing Conditions none    Primary Care Provided by self    Family Caregiver if Needed none    Able to Return to Prior Arrangements no    Living Arrangement Comments too weak       Resource/Environmental Concerns    Resource/Environmental Concerns none       Transition Planning    Patient/Family Anticipates Transition to inpatient rehabilitation facility    Patient/Family Anticipated Services at Transition skilled nursing    Transportation Anticipated --  [facility]       Discharge Needs Assessment    Readmission Within the Last 30 Days no previous admission in last 30 days    Equipment Currently Used at Home cane, straight;walker, standard    Concerns to be Addressed adjustment to diagnosis/illness    Anticipated Changes Related to Illness inability to care for self    Equipment Needed After Discharge none                   Discharge Plan       Row Name 03/22/24 1112       Plan    Plan SNF pending acceptance    Patient/Family in Agreement with Plan yes    Plan Comments Spoke with pt at bedside, introduced self and explained CCP role, and confirmed pharmacy and face sheet information verified.  Pt lives alone in home, with 4 steps to enter and 4 steps inside to bedroom, he is normally IADL's, uses no medical equipment, but has a walker, no HH, or SNF history. He needs SNF prior to returning home, agreeable to [facility], spoke with [facility], they are on an admission hold right now, can take at [facility], pt agreeable, referral sent. Spoke with Dr. Alarcon, plan for d/c potentially Monday. Pt will need w/c transport, will not need pre-cert. CCP will follow -  Zonia JUNG CCP will follow - Zonia S.                  Continued Care and Services - Admitted Since 3/20/2024       Destination       Service Provider Request Status Selected Services Address Phone Fax Patient Preferred    [facility] - Request Sent N/A [address] [address] [phone] --    [facility] Pending - Request Sent N/A [address] [address] [facility] [id number] [phone] --    [facility] Pending - Request Sent N/A [address] [address] [phone] --                  Expected Discharge Date and Time       Expected Discharge Date Expected Discharge Time    Mar 22, 2024            Demographic Summary       Row Name 03/22/24 1111       General Information    Admission Type inpatient                   Functional Status       Row Name 03/22/24 1111       Functional Status    Usual Activity Tolerance excellent    Current Activity Tolerance fair       Assessment of Health Literacy    Health Literacy Good       Functional Status, IADL    Medications independent    Meal Preparation independent    Housekeeping independent    Laundry independent    Shopping independent       Mental Status    General Appearance WDL WDL       Mental Status Summary    Recent Changes in Mental Status/Cognitive Functioning no changes                   Psychosocial    No documentation.                  Abuse/Neglect    No documentation.                  Legal       Row Name 03/22/24 1111       Financial/Legal    Who Manages Finances if Patient Unable unknown                   Substance Abuse    No documentation.                  Patient Forms    No documentation.                     Zonia Merritt RN

## 2024-03-22 NOTE — PROGRESS NOTES
[facility] Medicine Services  PROGRESS NOTE    Patient Name: Mannie Fajardo  : 1945  MRN: [id number]    Date of Admission: 3/20/2024  Primary Care Physician: Beatrice Gardner MD    Subjective   Subjective     CC:  Follow-up recent fall    Subjective:  Patient still reports having some muscular pain.  He feels a little better today.  He denies any gout issues.  He is generally frustrated by his illness and situation.  He does want to go to rehab.    Review of Systems  No current fevers or chills  No current shortness of breath or cough  No current nausea, vomiting, or diarrhea  No current chest pain or palpitations       Objective   Objective     Vital Signs:   Temp:  [97.9 °F (36.6 °C)-99.5 °F (37.5 °C)] 98.4 °F (36.9 °C)  Heart Rate:  [64-98] 80  Resp:  [16-18] 18  BP: (118-122)/(61-74) 118/61        Physical Exam:  Constitutional:Awake, alert, elderly appearing  HENT: NCAT, mucous membranes moist, neck supple  Respiratory: No cough or wheezes, normal respirations, nonlabored breathing   Cardiovascular: Pulse rate is normal, palpable radial pulses  Gastrointestinal:  soft, nontender, nondistended  Musculoskeletal: Frail and chronically debilitated in appearance, right patella is tender to palpation, BMI is 22, somewhat thin, no lower extremity edema  Psychiatric: Appropriate affect, cooperative, conversational  Neurologic: No slurred speech or facial droop, follows commands  Skin: No rashes or jaundice, warm      Results Reviewed:  Results from last 7 days   Lab Units 24  0636 24  0748 24  1730   WBC 10*3/mm3 6.98 6.67 10.76   HEMOGLOBIN g/dL 11.7* 12.3* 15.1   HEMATOCRIT % 33.9* 36.7* 43.2   PLATELETS 10*3/mm3 183 166 201   INR   --   --  1.04     Results from last 7 days   Lab Units 24  0636 24  0748 24  1938 24  1730   SODIUM mmol/L 137 138  --  135*   POTASSIUM mmol/L 3.9 4.0  --  3.7   CHLORIDE mmol/L 108* 108*  --  100   CO2 mmol/L 17.3* 17.9*  --   20.4*   BUN mg/dL 19 21  --  26*   CREATININE mg/dL 1.03 1.19  --  1.42*   GLUCOSE mg/dL 91 84  --  107*   CALCIUM mg/dL 8.0* 8.1*  --  9.2   ALK PHOS U/L 63 63  --  86   ALT (SGPT) U/L 113* 126*  --  133*   AST (SGOT) U/L 490* 707*  --  796*   HSTROP T ng/L  --   --  36* 40*     Estimated Creatinine Clearance: 53.3 mL/min (by C-G formula based on SCr of 1.03 mg/dL).    Microbiology Results Abnormal       None            Imaging Results (Last 24 Hours)       Procedure Component Value Units Date/Time    XR Knee 3 View Right [[id number]] Collected: 03/20/24 1907     Updated: 03/21/24 1305    Narrative:      EMERGENCY PORTABLE VIEW OF THE CHEST AS WELL AS SINGLE VIEW OF THE  PELVIS AND 2 VIEWS OF THE LEFT HIP AND 3 VIEWS OF THE RIGHT KNEE ON  03/20/2024     CLINICAL HISTORY: Fell, prolonged downtime.     CHEST: This is correlated to a PA and lateral chest x-ray on 04/11/2023.  The cardiomediastinal silhouette and the pulmonary vasculature are  within normal limits. There is a somewhat irregular airspace opacity  projecting over the right lower lung zone. It overlaps the right fourth  anterior rib and is between the right 8th and 9th posterior rib is an  indeterminate finding. The remainder of the lungs are clear.  Costophrenic angles are sharp.       Impression:      1. There is an irregular 15 to 20 mm airspace opacity projecting over  the right lower lung zone that is nonspecific, could be an area of scar  or infiltrate although I cannot exclude a small mass. Its new when  compared to prior chest x-ray 04/11/2023 and I recommend CT of the chest  to further evaluate. The remainder the chest x-ray is unremarkable.     RIGHT KNEE: 3 views of the right knee are submitted for interpretation.  On the AP view of the right knee there is a vertical lucency projecting  over the central aspect of the patella. Of course there is overlap of  the femur and this is a nonspecific finding. It could be a very subtle  vertical hairline  fracture of the central aspect of the patella.   Otherwise I see no additional acute fracture or malalignment of the  bones of the right knee. There is a probable tiny right knee joint  effusion seen on the crosstable lateral view.     IMPRESSION:  1. Vertical lucency projects over the central aspect of the patella on  the AP view. The patella overlaps the femur at this level and this could  be artifact or a hairline nondisplaced fracture of the central aspect of  the patella and correlate clinically. If the patient has patellar  tenderness, I recommend a knee CT to further evaluate.     PELVIS AND LEFT HIP: Single view of the pelvis and 2 views including AP  and frog-leg lateral views of the left hip are submitted for  interpretation. I see no acute fracture or malalignment in the bones of  the pelvis or the left hip. The left hip joint space is well-maintained.     This report was finalized on 3/21/2024 1:02 PM by Dr. Doroteo Parham M.D  on Workstation: [id number]       XR Hip With or Without Pelvis 2 - 3 View Left [[id number]] Collected: 03/20/24 1907     Updated: 03/21/24 1305    Narrative:      EMERGENCY PORTABLE VIEW OF THE CHEST AS WELL AS SINGLE VIEW OF THE  PELVIS AND 2 VIEWS OF THE LEFT HIP AND 3 VIEWS OF THE RIGHT KNEE ON  03/20/2024     CLINICAL HISTORY: Fell, prolonged downtime.     CHEST: This is correlated to a PA and lateral chest x-ray on 04/11/2023.  The cardiomediastinal silhouette and the pulmonary vasculature are  within normal limits. There is a somewhat irregular airspace opacity  projecting over the right lower lung zone. It overlaps the right fourth  anterior rib and is between the right 8th and 9th posterior rib is an  indeterminate finding. The remainder of the lungs are clear.  Costophrenic angles are sharp.       Impression:      1. There is an irregular 15 to 20 mm airspace opacity projecting over  the right lower lung zone that is nonspecific, could be an area of scar  or infiltrate  although I cannot exclude a small mass. Its new when  compared to prior chest x-ray 04/11/2023 and I recommend CT of the chest  to further evaluate. The remainder the chest x-ray is unremarkable.     RIGHT KNEE: 3 views of the right knee are submitted for interpretation.  On the AP view of the right knee there is a vertical lucency projecting  over the central aspect of the patella. Of course there is overlap of  the femur and this is a nonspecific finding. It could be a very subtle  vertical hairline fracture of the central aspect of the patella.   Otherwise I see no additional acute fracture or malalignment of the  bones of the right knee. There is a probable tiny right knee joint  effusion seen on the crosstable lateral view.     IMPRESSION:  1. Vertical lucency projects over the central aspect of the patella on  the AP view. The patella overlaps the femur at this level and this could  be artifact or a hairline nondisplaced fracture of the central aspect of  the patella and correlate clinically. If the patient has patellar  tenderness, I recommend a knee CT to further evaluate.     PELVIS AND LEFT HIP: Single view of the pelvis and 2 views including AP  and frog-leg lateral views of the left hip are submitted for  interpretation. I see no acute fracture or malalignment in the bones of  the pelvis or the left hip. The left hip joint space is well-maintained.     This report was finalized on 3/21/2024 1:02 PM by Dr. Doroteo Parham M.D  on Workstation: [id number]       XR Chest 1 View [[id number]] Collected: 03/20/24 1907     Updated: 03/21/24 1305    Narrative:      EMERGENCY PORTABLE VIEW OF THE CHEST AS WELL AS SINGLE VIEW OF THE  PELVIS AND 2 VIEWS OF THE LEFT HIP AND 3 VIEWS OF THE RIGHT KNEE ON  03/20/2024     CLINICAL HISTORY: Fell, prolonged downtime.     CHEST: This is correlated to a PA and lateral chest x-ray on 04/11/2023.  The cardiomediastinal silhouette and the pulmonary vasculature are  within normal  limits. There is a somewhat irregular airspace opacity  projecting over the right lower lung zone. It overlaps the right fourth  anterior rib and is between the right 8th and 9th posterior rib is an  indeterminate finding. The remainder of the lungs are clear.  Costophrenic angles are sharp.       Impression:      1. There is an irregular 15 to 20 mm airspace opacity projecting over  the right lower lung zone that is nonspecific, could be an area of scar  or infiltrate although I cannot exclude a small mass. Its new when  compared to prior chest x-ray 04/11/2023 and I recommend CT of the chest  to further evaluate. The remainder the chest x-ray is unremarkable.     RIGHT KNEE: 3 views of the right knee are submitted for interpretation.  On the AP view of the right knee there is a vertical lucency projecting  over the central aspect of the patella. Of course there is overlap of  the femur and this is a nonspecific finding. It could be a very subtle  vertical hairline fracture of the central aspect of the patella.   Otherwise I see no additional acute fracture or malalignment of the  bones of the right knee. There is a probable tiny right knee joint  effusion seen on the crosstable lateral view.     IMPRESSION:  1. Vertical lucency projects over the central aspect of the patella on  the AP view. The patella overlaps the femur at this level and this could  be artifact or a hairline nondisplaced fracture of the central aspect of  the patella and correlate clinically. If the patient has patellar  tenderness, I recommend a knee CT to further evaluate.     PELVIS AND LEFT HIP: Single view of the pelvis and 2 views including AP  and frog-leg lateral views of the left hip are submitted for  interpretation. I see no acute fracture or malalignment in the bones of  the pelvis or the left hip. The left hip joint space is well-maintained.     This report was finalized on 3/21/2024 1:02 PM by Dr. Doroteo Parham M.D  on Workstation:  [id number]               Results for orders placed during the hospital encounter of 05/19/22    Adult Transthoracic Echo Complete W/ Cont if Necessary Per Protocol    Interpretation Summary  · Calculated left ventricular EF = 56% Estimated left ventricular EF = 56% Estimated left ventricular EF was in agreement with the calculated left ventricular EF. Left ventricular systolic function is normal. Normal left ventricular cavity size noted. Left ventricular wall thickness is consistent with moderate concentric hypertrophy. All left ventricular wall segments contract normally. Left ventricular diastolic function is consistent with (grade I) impaired relaxation.  · No aortic valve regurgitation or stenosis is present. The aortic valve is abnormal in structure. There is mild thickening of the aortic valve.  · Trace mitral valve regurgitation is present.  · Trace tricuspid valve regurgitation is present. Estimated right ventricular systolic pressure from tricuspid regurgitation is normal (<35 mmHg). Calculated right ventricular systolic pressure from tricuspid regurgitation is 30.2 mmHg.      I have reviewed the medications:  Scheduled Meds:[START ON 3/23/2024] allopurinol, 450 mg, Oral, Daily  aspirin, 81 mg, Oral, Daily  clopidogrel, 75 mg, Oral, Daily  folic acid, 400 mcg, Oral, Daily  Lidocaine, 1 patch, Transdermal, Q24H  sodium chloride, 10 mL, Intravenous, Q12H      Continuous Infusions:sodium chloride, 50 mL/hr, Last Rate: 50 mL/hr (03/22/24 1041)      PRN Meds:.  acetaminophen    senna-docusate sodium **AND** polyethylene glycol **AND** bisacodyl **AND** bisacodyl    [COMPLETED] Insert Peripheral IV **AND** sodium chloride    sodium chloride    sodium chloride    Assessment & Plan   Assessment & Plan     Active Hospital Problems    Diagnosis  POA    **Rhabdomyolysis [M62.82]  Yes    Scalp hematoma, initial encounter [S00.03XA]  Yes    Head trauma, initial encounter [S09.90XA]  Yes    Small vessel disease,  cerebrovascular [I67.9]  Yes    Right patella fracture [S82.001A]  Yes    Transaminitis [R74.01]  Yes    Chronic bilateral low back pain without sciatica [M54.50, G89.29]  Yes    DDD (degenerative disc disease), lumbar [M51.36]  Yes    History of right coronary artery stent placement [Z95.5]  Not Applicable    Coronary artery disease due to calcified coronary lesion [I25.10, I25.84]  Yes    Benign prostatic hyperplasia [N40.0]  Yes    Anemia [D64.9]  Yes    Hypercholesterolemia [E78.00]  Yes    Hypothyroidism [E03.9]  Yes      Resolved Hospital Problems   No resolved problems to display.        Brief Hospital Course to date:  Mannie Fajardo is a 78 y.o. male presents the hospital with fall and head trauma with scalp hematoma and a nontraumatic rhabdomyolysis and possible right patellar fracture.    Discussion/plan for today:  CK is now finally trending down.  IV fluid rate further adjusted.  Continue IV fluid for now while trending CK down.    PT OT evaluated and recommending SNF.  This seems very appropriate while patient is recovering.    Case discussed with orthopedic surgery and they have left recommendations as per below.  Plan to try for Tylenol with pain to minimize risk of confusion.    CKD:3a: Baseline creatinine approximately 1.4 per previous records.  Dropped below previous baseline with IV fluids.    PT consult for debility.    Continue allopurinol for history of gout.  No current gout.  Allopurinol dosed further adjusted as he reports now that he is taking 450 mg at home.    Currently on aspirin and Plavix for history of CAD.  He denies any chest pain.  Patient states he prefers name brand Plavix but is unable to bring his from home and unfortunately we only have the generic.  I discussed and he reports he has never been on generic but is willing to try it.    Continue home lactulose for constipation.    Transaminitis is stable and trending down.  Likely related to rhabdomyolysis.  Check viral hepatitis  panel    CT scan of the head images reviewed and shows small vessel disease and scalp contusion.  Supportive care for scalp contusion.    Treatment plan discussed with patient who is in agreement    Orthopedic recommendations:  Weight Bearing:Right Lower Extremity Weight Bearing As Tolerated with knee immobilizer on and appropriate assistive devices.  Labs: None additional needed  Imaging: None additional needed  Surgery: No surgery indicated for this injury  Recommend outpatient follow-up in 2 weeks and at that time he may be able to be converted to a hinged range of motion brace.  Physical therapy for mobilization and ambulation.       DVT Prophylaxis: Mechanical      Disposition: Discharge to SNF in 1 to 3 days pending clinical course    CODE STATUS:   Code Status and Medical Interventions:   Ordered at: 03/20/24 2040     Level Of Support Discussed With:    Patient     Code Status (Patient has no pulse and is not breathing):    CPR (Attempt to Resuscitate)     Medical Interventions (Patient has pulse or is breathing):    Full Support       Bo Alarcon MD  03/22/24

## 2024-03-22 NOTE — DISCHARGE PLACEMENT REQUEST
"Edgar Temple (78 y.o. Male)       Date of Birth   1945    Social Security Number       Address   [address] [place] [id number]    Home Phone   [phone]    MRN   [id number]       Zoroastrianism   None    Marital Status   Single                            Admission Date   3/20/24    Admission Type   Emergency    Admitting Provider   Javier Charles MD    Attending Provider   Bo Alarcon MD    Department, Room/Bed   [address], [address]       Discharge Date       Discharge Disposition       Discharge Destination                                 Attending Provider: Bo Alarcon MD    Allergies: Statins, Adhesive Tape, Wound Dressing Adhesive    Isolation: None   Infection: None   Code Status: CPR    Ht: 172.7 cm (68\")   Wt: 64.8 kg (142 lb 13.7 oz)    Admission Cmt: None   Principal Problem: Rhabdomyolysis [M62.82]                   Active Insurance as of 3/20/2024       Primary Coverage       Payor Plan Insurance Group Employer/Plan Group    MEDICARE MEDICARE A & B        Payor Plan Address Payor Plan Phone Number Payor Plan Fax Number Effective Dates    [address] [phone]  5/1/2009 - None Entered    [facility] [id number]         Subscriber Name Subscriber Birth Date Member ID       EDGAR TEMPLE 1945 [id number]               Secondary Coverage       Payor Plan Insurance Group Employer/Plan Group    [place]        Payor Plan Address Payor Plan Phone Number Payor Plan Fax Number Effective Dates    [address] [phone]  4/18/2016 - None Entered    [place] [address]         Subscriber Name Subscriber Birth Date Member ID       EDGAR TEMPLE 1945 [id number]                     Emergency Contacts        (Rel.) Home Phone Work Phone Mobile Phone    Danisha Rodriguez (Friend) -- -- [phone]    Isael Wilkerson (Friend) -- -- [phone]    Eladio Noel (Friend) -- -- [phone]      "

## 2024-03-22 NOTE — THERAPY EVALUATION
Patient Name: Mannie Fajardo  : 1945    MRN: [id number]                              Today's Date: 3/22/2024       Admit Date: 3/20/2024    Visit Dx:     ICD-10-CM ICD-9-CM   1. Traumatic rhabdomyolysis, initial encounter  T79.6XXA 958.6   2. Closed nondisplaced fracture of right patella, unspecified fracture morphology, initial encounter  S82.001A 822.0   3. Contusion of scalp, initial encounter  S00.03XA 920   4. Acute kidney injury  N17.9 584.9   5. Pressure injury, stage 1, unspecified location: 2 arm on the right and 2 right leg.  L89.91 707.00     707.21     Patient Active Problem List   Diagnosis    Abdominal aortic aneurysm    Hypercholesterolemia    Coronary artery disease due to calcified coronary lesion    Chronic left-sided low back pain with left-sided sciatica    Chronic midline low back pain without sciatica    Degeneration of lumbar intervertebral disc    Weakness of left leg    Chronic renal insufficiency    Opioid withdrawal    Coronary artery disease involving native coronary artery of native heart with angina pectoris    Other specified symptoms and signs involving the circulatory and respiratory systems     Edema leg    Foot pain    Atherosclerosis of coronary artery    Vitamin D deficiency    Dermatitis, seborrheic    Rash    Primary lateral sclerosis    Personal history of pneumonia (recurrent)    Personal history of other drug therapy    Osteopenia    Myelopathy    Muscle weakness of extremity    Melanocytic nevus    Insomnia    Inguinal hernia    Hypothyroidism    History of osteopenia    Gout    Benign prostatic hyperplasia    At risk for osteoporosis    Anemia    Acquired atrophy of thyroid    Lumbosacral radiculopathy    Lumbar facet arthropathy    Disorder of bone and cartilage    Left lumbar radiculopathy    Constipation    History of right coronary artery stent placement    Kidney stone    Monoplegia of lower limb    Other muscle spasm    Pain in left leg     Hypertriglyceridemia    Chondromalacia of hip, left    DDD (degenerative disc disease), lumbar    Chronic bilateral low back pain without sciatica    Rhabdomyolysis    Right patella fracture    Transaminitis    Scalp hematoma, initial encounter    Head trauma, initial encounter    Small vessel disease, cerebrovascular     Past Medical History:   Diagnosis Date    AAA (abdominal aortic aneurysm) without rupture     Arthritis     back    Back pain     Backache     CAD (coronary atherosclerotic disease)     Disease of thyroid gland     Dyslipidemia     Edema     History of transfusion     Hyperhomocystinemia     Hyperlipidemia     Stage 3 chronic kidney disease      Past Surgical History:   Procedure Laterality Date    APPENDECTOMY      CARDIAC SURGERY      CORONARY ANGIOPLASTY WITH STENT PLACEMENT      EPIDURAL Left 5/25/2022    Procedure: LUMBAR/SACRAL TRANSFORAMINAL EPIDURAL - left L4 and Left S1;  Surgeon: Deepak Mckeon MD;  [place] [facility] [place]  Service: Pain Management;  Laterality: Left;    EXTRACORPOREAL SHOCKWAVE LITHOTRIPSY (ESWL), STENT INSERTION/REMOVAL Right 2/24/2017    Procedure: CYSTO RETROGRADE WITH STENT PLACEMENT;  Surgeon: Janes López MD;  Location: [facility] [place];  Service:     LUMBAR EPIDURAL INJECTION N/A 11/16/2022    Procedure: LUMBAR EPIDURAL STEROID INJECTION INTRALAMINAR L5-S1 (LEFT PARAMEDIAN APPROACH);  Surgeon: Carly Hairston MD;  Location: [facility];  Service: Pain Management;  Laterality: N/A;    MEDIAL BRANCH BLOCK Bilateral 6/23/2022    Procedure: Lumbar medial branch block bilateral L2-L5;  Surgeon: Deepak Mckeon MD;  Location: [facility];  Service: Pain Management;  Laterality: Bilateral;    MEDIAL BRANCH BLOCK Bilateral 7/12/2022    Procedure: lumbar medial branch block  bilateral L2-L5;  Surgeon: Deepak Mckeon MD;  [place] [facility] [place] [facility] [place]  Service: Pain Management;  Laterality: Bilateral;    RADIOFREQUENCY ABLATION Bilateral 8/9/2022     Procedure: L2-L5 RADIOFREQUENCY ABLATION LUMBAR;  Surgeon: Deepak Mckeon MD;  [place] [facility] [place];  Service: Pain Management;  Laterality: Bilateral;      General Information       Row Name 03/22/24 1017          Physical Therapy Time and Intention    Document Type evaluation  -EE     Mode of Treatment co-treatment;physical therapy;occupational therapy;other (see comments)  -EE       Row Name 03/22/24 1017          General Information    Prior Level of Function independent:;all household mobility  -EE     Existing Precautions/Restrictions fall  R KI brace; R LE WBAT  -EE     Barriers to Rehab previous functional deficit  -EE       Row Name 03/22/24 1017          Living Environment    People in Home alone  -EE       Row Name 03/22/24 1017          Home Main Entrance    Number of Stairs, Main Entrance four  -EE       Row Name 03/22/24 1017          Cognition    Orientation Status (Cognition) oriented x 4  -EE       Row Name 03/22/24 1017          Safety Issues, Functional Mobility    Impairments Affecting Function (Mobility) balance;strength;pain;postural/trunk control;endurance/activity tolerance  -EE     Comment, Safety Issues/Impairments (Mobility) Co treatment medically appropriate and necessary due to patient acuity level, activity tolerance and safety of patient and staff. Evaluation established to achieve all goals in POC.  -EE               User Key  (r) = Recorded By, (t) = Taken By, (c) = Cosigned By      Initials Name Provider Type    EE Nciole Babcock, ANATOLY Physical Therapist                   Mobility       Row Name 03/22/24 1018          Bed Mobility    Bed Mobility supine-sit;sit-supine  -EE     Supine-Sit [place] (Bed Mobility) moderate assist (50% patient effort);2 person assist;verbal cues  -EE     Assistive Device (Bed Mobility) head of bed elevated;bed rails  -EE       Row Name 03/22/24 1018          Sit-Stand Transfer    Sit-Stand [place] (Transfers) moderate assist (50% patient  effort);maximum assist (25% patient effort);2 person assist;verbal cues  -EE     Assistive Device (Sit-Stand Transfers) walker, front-wheeled  -EE     Comment, (Sit-Stand Transfer) 2 attempts required to stand from elevated bed surface; cues for hand placement  -EE       Row Name 03/22/24 1018          Gait/Stairs (Locomotion)    [place] Level (Gait) moderate assist (50% patient effort);2 person assist;verbal cues  -EE     Assistive Device (Gait) walker, front-wheeled  -EE     Distance in Feet (Gait) 2  -EE     Pattern (Gait) step-to  -EE     Deviations/Abnormal Patterns (Gait) alok decreased;stride length decreased;weight shifting decreased  -EE     Gait Assessment/Intervention Several shuffled steps from bed to chair; cues for gait sequencing with rwx  -EE       Row Name 03/22/24 1018          Mobility    Extremity Weight-bearing Status right lower extremity  -EE     Right Lower Extremity (Weight-bearing Status) weight-bearing as tolerated (WBAT)  ALIZA HOLMAN brace  -EE               User Key  (r) = Recorded By, (t) = Taken By, (c) = Cosigned By      Initials Name Provider Type    Nicole Norman, PT Physical Therapist                   Obj/Interventions       Row Name 03/22/24 1020          Range of Motion Comprehensive    General Range of Motion other (see comments)  -EE     Comment, General Range of Motion LLE AROM grossly WFL for age; R LE deferred due to R KI brace  -EE       Row Name 03/22/24 1020          Balance    Balance Assessment sitting static balance;standing static balance;standing dynamic balance  -EE     Static Sitting Balance standby assist  -EE     Position, Sitting Balance unsupported;sitting edge of bed  -EE     Static Standing Balance minimal assist;2-person assist  -EE     Dynamic Standing Balance moderate assist;2-person assist  -EE     Position/Device Used, Standing Balance supported;walker, front-wheeled  -EE       Row Name 03/22/24 1020          Sensory Assessment (Somatosensory)     Sensory Assessment (Somatosensory) LE sensation intact  -EE               User Key  (r) = Recorded By, (t) = Taken By, (c) = Cosigned By      Initials Name Provider Type    EE Nicole Babcock PT Physical Therapist                   Goals/Plan       Row Name 03/22/24 1023          Bed Mobility Goal 1 (PT)    Activity/Assistive Device (Bed Mobility Goal 1, PT) sit to supine/supine to sit  -EE     [place] Level/Cues Needed (Bed Mobility Goal 1, PT) minimum assist (75% or more patient effort)  -EE     Time Frame (Bed Mobility Goal 1, PT) 2 weeks  -EE     Progress/Outcomes (Bed Mobility Goal 1, PT) goal ongoing  -EE       Row Name 03/22/24 1023          Transfer Goal 1 (PT)    Activity/Assistive Device (Transfer Goal 1, PT) sit-to-stand/stand-to-sit;bed-to-chair/chair-to-bed;walker, rolling  -EE     [place] Level/Cues Needed (Transfer Goal 1, PT) minimum assist (75% or more patient effort);contact guard required  -EE     Time Frame (Transfer Goal 1, PT) 2 weeks  -EE     Progress/Outcome (Transfer Goal 1, PT) goal ongoing  -EE       Row Name 03/22/24 1023          Gait Training Goal 1 (PT)    Activity/Assistive Device (Gait Training Goal 1, PT) gait (walking locomotion);walker, rolling  -EE     [place] Level (Gait Training Goal 1, PT) contact guard required  -EE     Distance (Gait Training Goal 1, PT) 50'  -EE     Time Frame (Gait Training Goal 1, PT) 2 weeks  -EE     Progress/Outcome (Gait Training Goal 1, PT) goal ongoing  -EE       Row Name 03/22/24 1023          Therapy Assessment/Plan (PT)    Planned Therapy Interventions (PT) balance training;bed mobility training;gait training;home exercise program;transfer training;postural re-education;patient/family education;strengthening  -EE               User Key  (r) = Recorded By, (t) = Taken By, (c) = Cosigned By      Initials Name Provider Type    Nicole Norman PT Physical Therapist                   Clinical Impression       Row Name 03/22/24 1021           Pain    Pretreatment Pain Rating 8/10  -EE     Posttreatment Pain Rating 8/10  -EE     Pain Location - back  -EE     Pain Intervention(s) Repositioned;Ambulation/increased activity;Nursing Notified  -EE       Row Name 03/22/24 1021          Plan of Care Review    Plan of Care Reviewed With patient  -EE     Outcome Evaluation Pt is a 77 yo male who presents following fall at home with R patellar fx, rhabdomyolysis. Pt with hx of chronic back pain. Per ortho, pt WBAT RLE with R KI brace. Prior to admission, pt was living at home alone and independent with mobility. Upon exam, pt demonstrates generalized weakness, impaired balance, impaired posture, and decreased endurance limiting mobility. Pt required mod A x2 with bed mobility and mod/max A x2 for transfers. Pt able to stand and take several steps from bed to chair with rwx and mod A x2. Pt is at increased risk of falls and will continue to benefit from PT to address impairments and increase independence with mobility. Rec DC to SNF.  -EE       Row Name 03/22/24 1021          Therapy Assessment/Plan (PT)    Rehab Potential (PT) good, to achieve stated therapy goals  -EE     Criteria for Skilled Interventions Met (PT) yes;meets criteria  -EE     Therapy Frequency (PT) 5 times/wk  -EE       Row Name 03/22/24 1021          Positioning and Restraints    Pre-Treatment Position in bed  -EE     Post Treatment Position chair  -EE     In Chair reclined;encouraged to call for assist;call light within reach;legs elevated;exit alarm on;notified nsg;R knee immobilizer  -EE               User Key  (r) = Recorded By, (t) = Taken By, (c) = Cosigned By      Initials Name Provider Type    Nicole Norman, PT Physical Therapist                   Outcome Measures       Row Name 03/22/24 1023          How much help from another person do you currently need...    Turning from your back to your side while in flat bed without using bedrails? 2  -EE     Moving from lying on back to  sitting on the side of a flat bed without bedrails? 2  -EE     Moving to and from a bed to a chair (including a wheelchair)? 2  -EE     Standing up from a chair using your arms (e.g., wheelchair, bedside chair)? 2  -EE     Climbing 3-5 steps with a railing? 1  -EE     To walk in hospital room? 2  -EE     AM-PAC 6 Clicks Score (PT) 11  -EE     Highest Level of Mobility Goal 4 --> Transfer to chair/commode  -EE               User Key  (r) = Recorded By, (t) = Taken By, (c) = Cosigned By      Initials Name Provider Type    EE Nicole Babcock, PT Physical Therapist                                 Physical Therapy Education       Title: PT OT SLP Therapies (In Progress)       Topic: Physical Therapy (In Progress)       Point: Mobility training (Done)       Learning Progress Summary             Patient Acceptance, E, VU,NR by EE at 3/22/2024 1024                         Point: Home exercise program (Not Started)       Learner Progress:  Not documented in this visit.              Point: Body mechanics (Not Started)       Learner Progress:  Not documented in this visit.              Point: Precautions (Done)       Learning Progress Summary             Patient Acceptance, E, VU,NR by EE at 3/22/2024 1024                                         User Key       Initials Effective Dates Name Provider Type Discipline    EE 06/16/21 -  Nicole Babcock, ANATOLY Physical Therapist PT                  PT Recommendation and Plan  Planned Therapy Interventions (PT): balance training, bed mobility training, gait training, home exercise program, transfer training, postural re-education, patient/family education, strengthening  Plan of Care Reviewed With: patient  Outcome Evaluation: Pt is a 79 yo male who presents following fall at home with R patellar fx, rhabdomyolysis. Pt with hx of chronic back pain. Per ortho, pt WBAT RLE with R KI brace. Prior to admission, pt was living at home alone and independent with mobility. Upon exam, pt demonstrates  generalized weakness, impaired balance, impaired posture, and decreased endurance limiting mobility. Pt required mod A x2 with bed mobility and mod/max A x2 for transfers. Pt able to stand and take several steps from bed to chair with rwx and mod A x2. Pt is at increased risk of falls and will continue to benefit from PT to address impairments and increase independence with mobility. Rec DC to SNF.     Time Calculation:         PT Charges       Row Name 03/22/24 1024             Time Calculation    Start Time 0847  -EE      Stop Time 0916  -EE      Time Calculation (min) 29 min  -EE      PT Received On 03/22/24  -EE      PT - Next Appointment 03/25/24  -EE      PT Goal Re-Cert Due Date 03/29/24  -EE         Time Calculation- PT    Total Timed Code Minutes- PT 15 minute(s)  -EE         Timed Charges    70658 - PT Therapeutic Activity Minutes 15  -EE         Total Minutes    Timed Charges Total Minutes 15  -EE       Total Minutes 15  -EE                User Key  (r) = Recorded By, (t) = Taken By, (c) = Cosigned By      Initials Name Provider Type    EE Nicole Babcock, PT Physical Therapist                  Therapy Charges for Today       Code Description Service Date Service Provider Modifiers Qty    86462783309 HC PT THERAPEUTIC ACT EA 15 MIN 3/22/2024 Nicole Babcock, PT GP 1    30755784122 HC PT EVAL MOD COMPLEXITY 2 3/22/2024 Nicole Babcock, PT GP 1            PT G-Codes  AM-PAC 6 Clicks Score (PT): 11  PT Discharge Summary  Anticipated Discharge Disposition (PT): skilled nursing facility    Nicole Babcock PT  3/22/2024

## 2024-03-22 NOTE — PLAN OF CARE
Goal Outcome Evaluation:  Plan of Care Reviewed With: patient           Outcome Evaluation: [facility] 78 [facility] where he was on the ground for approx. 12 hours, resulting in R patellar fx, with rhabdomyolysis. Per ortho, patient is WBAT RLE with knee immobilizer. Upon arrival, patient not wearing brace in bed, states it is too uncomfortable. Patient states he lives at home alone with 4 steps to enter, was independent with ADLs, IADLs, and functional mobility within the home without AD. Patient presents today with generalized weakness, pain and decreased endurance which limits his ability to fully participate today. Patient requires max A with donning knee immobilizer in bed, mod Ax2 with bed mobility, and mod/max Ax2 with sit to stand and bed to chair transfers today. Patient will continue to benefit from skilled OT to address current functional deficits, recommend SNF at acute dc due to generalized deconditioning and limited mobility, participation in self care.      Anticipated Discharge Disposition (OT): skilled nursing facility

## 2024-03-22 NOTE — THERAPY EVALUATION
Patient Name: Mannie Fajardo  : 1945    MRN: [id number]                              Today's Date: 3/22/2024       Admit Date: 3/20/2024    Visit Dx:     ICD-10-CM ICD-9-CM   1. Traumatic rhabdomyolysis, initial encounter  T79.6XXA 958.6   2. Closed nondisplaced fracture of right patella, unspecified fracture morphology, initial encounter  S82.001A 822.0   3. Contusion of scalp, initial encounter  S00.03XA 920   4. Acute kidney injury  N17.9 584.9   5. Pressure injury, stage 1, unspecified location: 2 arm on the right and 2 right leg.  L89.91 707.00     707.21     Patient Active Problem List   Diagnosis    Abdominal aortic aneurysm    Hypercholesterolemia    Coronary artery disease due to calcified coronary lesion    Chronic left-sided low back pain with left-sided sciatica    Chronic midline low back pain without sciatica    Degeneration of lumbar intervertebral disc    Weakness of left leg    Chronic renal insufficiency    Opioid withdrawal    Coronary artery disease involving native coronary artery of native heart with angina pectoris    Other specified symptoms and signs involving the circulatory and respiratory systems     Edema leg    Foot pain    Atherosclerosis of coronary artery    Vitamin D deficiency    Dermatitis, seborrheic    Rash    Primary lateral sclerosis    Personal history of pneumonia (recurrent)    Personal history of other drug therapy    Osteopenia    Myelopathy    Muscle weakness of extremity    Melanocytic nevus    Insomnia    Inguinal hernia    Hypothyroidism    History of osteopenia    Gout    Benign prostatic hyperplasia    At risk for osteoporosis    Anemia    Acquired atrophy of thyroid    Lumbosacral radiculopathy    Lumbar facet arthropathy    Disorder of bone and cartilage    Left lumbar radiculopathy    Constipation    History of right coronary artery stent placement    Kidney stone    Monoplegia of lower limb    Other muscle spasm    Pain in left leg     Hypertriglyceridemia    Chondromalacia of hip, left    DDD (degenerative disc disease), lumbar    Chronic bilateral low back pain without sciatica    Rhabdomyolysis    Right patella fracture    Transaminitis    Scalp hematoma, initial encounter    Head trauma, initial encounter    Small vessel disease, cerebrovascular     Past Medical History:   Diagnosis Date    AAA (abdominal aortic aneurysm) without rupture     Arthritis     back    Back pain     Backache     CAD (coronary atherosclerotic disease)     Disease of thyroid gland     Dyslipidemia     Edema     History of transfusion     Hyperhomocystinemia     Hyperlipidemia     Stage 3 chronic kidney disease      Past Surgical History:   Procedure Laterality Date    APPENDECTOMY      CARDIAC SURGERY      CORONARY ANGIOPLASTY WITH STENT PLACEMENT      EPIDURAL Left 5/25/2022    Procedure: LUMBAR/SACRAL TRANSFORAMINAL EPIDURAL - left L4 and Left S1;  Surgeon: Deepak Mckeon MD;  [place] [facility] [place];  Service: Pain Management;  Laterality: Left;    EXTRACORPOREAL SHOCKWAVE LITHOTRIPSY (ESWL), STENT INSERTION/REMOVAL Right 2/24/2017    Procedure: CYSTO RETROGRADE WITH STENT PLACEMENT;  Surgeon: Janes López MD;  Location: [facility] [place];  Service:     LUMBAR EPIDURAL INJECTION N/A 11/16/2022    Procedure: LUMBAR EPIDURAL STEROID INJECTION INTRALAMINAR L5-S1 (LEFT PARAMEDIAN APPROACH);  Surgeon: Carly Hairston MD;  Location: [facility];  Service: Pain Management;  Laterality: N/A;    MEDIAL BRANCH BLOCK Bilateral 6/23/2022    Procedure: Lumbar medial branch block bilateral L2-L5;  Surgeon: Deepak Mckeon MD;  Location: [facility];  Service: Pain Management;  Laterality: Bilateral;    MEDIAL BRANCH BLOCK Bilateral 7/12/2022    Procedure: lumbar medial branch block  bilateral L2-L5;  Surgeon: Deepak Mckeon MD;  [place] [facility] [place];  Service: Pain Management;  Laterality: Bilateral;    RADIOFREQUENCY ABLATION Bilateral 8/9/2022     Procedure: L2-L5 RADIOFREQUENCY ABLATION LUMBAR;  Surgeon: Deepak Mckeon MD;  Location[place] [facility] [place];  Service: Pain Management;  Laterality: Bilateral;      General Information       Row Name 03/22/24 1250          OT Time and Intention    Document Type evaluation  -     Mode of Treatment co-treatment;physical therapy;occupational therapy  -       Row Name 03/22/24 1250          General Information    Patient Profile Reviewed yes  -     Prior Level of Function independent:  -     Existing Precautions/Restrictions fall;weight bearing  RLE WBAT with knee immobilizer  -     Barriers to Rehab previous functional deficit  -       Row Name 03/22/24 1250          Living Environment    People in Home alone  -       Row Name 03/22/24 1250          Home Main Entrance    Number of Stairs, Main Entrance four  -       Row Name 03/22/24 1250          Cognition    Orientation Status (Cognition) oriented x 4  -       Row Name 03/22/24 1250          Safety Issues, Functional Mobility    Safety Issues Affecting Function (Mobility) awareness of need for assistance;safety precautions follow-through/compliance  -     Impairments Affecting Function (Mobility) balance;strength;pain;postural/trunk control;endurance/activity tolerance  -     Comment, Safety Issues/Impairments (Mobility) PT/OT cotreatment medically appropriate and necessary due to patient acuity level, to maximize therapeutic benefit due to impaired act tolerance, and for safety of patient and staff. Treatment focused on progression of care and goals established in POC.  -               User Key  (r) = Recorded By, (t) = Taken By, (c) = Cosigned By      Initials Name Provider Type     Chichi Menchaca OT Occupational Therapist                     Mobility/ADL's       Row Name 03/22/24 1250          Bed Mobility    Bed Mobility supine-sit;sit-supine  -     Supine-Sit [place] (Bed Mobility) moderate assist (50% patient effort);2 person  assist;verbal cues  -     Assistive Device (Bed Mobility) head of bed elevated;bed rails  -[facility] Name 03/22/24 1250          Sit-Stand Transfer    Sit-Stand [place] (Transfers) moderate assist (50% patient effort);maximum assist (25% patient effort);2 person assist;verbal cues  -     Assistive Device (Sit-Stand Transfers) walker, front-wheeled  -[facility] Name 03/22/24 1250          Activities of Daily Living    BADL Assessment/Intervention lower body dressing  -[facility] Name 03/22/24 1250          Mobility    Extremity Weight-bearing Status right lower extremity  -     Right Lower Extremity (Weight-bearing Status) weight-bearing as tolerated (WBAT)  With knee immobilizer  -[facility] Name 03/22/24 1250          Lower Body Dressing Assessment/Training    Position (Lower Body Dressing) sitting up in bed  -     Comment, (Lower Body Dressing) Requires max A with donning knee immobilizer  -               User Key  (r) = Recorded By, (t) = Taken By, (c) = Cosigned By      Initials Name Provider Type     Chichi Menchaca OT Occupational Therapist                   Obj/Interventions       [facility] Name 03/22/24 1253          Sensory Assessment (Somatosensory)    Sensory Assessment (Somatosensory) LE sensation intact  -[facility] Name 03/22/24 1253          Vision Assessment/Intervention    Visual Impairment/Limitations WFL  -[facility] Name 03/22/24 1253          Range of Motion Comprehensive    General Range of Motion bilateral upper extremity ROM WFL  -[facility] Name 03/22/24 1253          Strength Comprehensive (MMT)    General Manual Muscle Testing (MMT) Assessment upper extremity strength deficits identified  -     Comment, General Manual Muscle Testing (MMT) Assessment UB and core strengthening deconditioning, 3/5  -[facility] Name 03/22/24 1253          Motor Skills    Motor Skills functional endurance  -     Functional Endurance Fair  -[facility] Name 03/22/24 1253           Balance    Balance Assessment sitting static balance;sitting dynamic balance;sit to stand dynamic balance;standing static balance;standing dynamic balance  -     Static Sitting Balance standby assist  -     Dynamic Sitting Balance standby assist  -     Position, Sitting Balance unsupported;sitting edge of bed  -     Sit to Stand Dynamic Balance moderate assist;2-person assist  -     Static Standing Balance minimal assist;2-person assist  -     Dynamic Standing Balance moderate assist;2-person assist  -     Position/Device Used, Standing Balance supported;walker, front-wheeled  -     Balance Interventions sitting;standing;occupation based/functional task  -               User Key  (r) = Recorded By, (t) = Taken By, (c) = Cosigned By      Initials Name Provider Type     Chichi Menchaca, OT Occupational Therapist                   Goals/Plan       Row Name 03/22/24 1258          Bed Mobility Goal 1 (OT)    Activity/Assistive Device (Bed Mobility Goal 1, OT) bed mobility activities, all  -     [place] Level/Cues Needed (Bed Mobility Goal 1, OT) modified independence  -     Time Frame (Bed Mobility Goal 1, OT) short term goal (STG);2 weeks  -[facility] Name 03/22/24 1258          Transfer Goal 1 (OT)    Activity/Assistive Device (Transfer Goal 1, OT) transfers, all  -     [place] Level/Cues Needed (Transfer Goal 1, OT) modified independence  -     Time Frame (Transfer Goal 1, OT) short term goal (STG);2 weeks  -     Progress/Outcome (Transfer Goal 1, OT) new goal  -[facility] Name 03/22/24 1258          Dressing Goal 1 (OT)    Activity/Device (Dressing Goal 1, OT) dressing skills, all;upper body dressing;lower body dressing  -     [place]/Cues Needed (Dressing Goal 1, OT) modified independence  -     Time Frame (Dressing Goal 1, OT) short term goal (STG);2 weeks  -     Progress/Outcome (Dressing Goal 1, OT) new goal  -       Row Name 03/22/24 1258          Toileting  Goal 1 (OT)    Activity/Device (Toileting Goal 1, OT) toileting skills, all  -     [place] Level/Cues Needed (Toileting Goal 1, OT) modified independence  -     Time Frame (Toileting Goal 1, OT) short term goal (STG);2 weeks  -     Progress/Outcome (Toileting Goal 1, OT) new goal  -       Row Name 03/22/24 1259          Grooming Goal 1 (OT)    Activity/Device (Grooming Goal 1, OT) grooming skills, all  -     [place] (Grooming Goal 1, OT) modified independence  -     Time Frame (Grooming Goal 1, OT) short term goal (STG);2 weeks  -     Progress/Outcome (Grooming Goal 1, OT) new goal  -       Row Name 03/22/24 125          Problem Specific Goal 1 (OT)    Problem Specific Goal 1 (OT) Patient will implement energy conservation and work simplification strategies in order to complete ADL routine independently.  -     Time Frame (Problem Specific Goal 1, OT) short term goal (STG);2 weeks  -     Progress/Outcome (Problem Specific Goal 1, OT) new goal  -       Row Name 03/22/24 1259          Therapy Assessment/Plan (OT)    Planned Therapy Interventions (OT) activity tolerance training;BADL retraining;functional balance retraining;occupation/activity based interventions;patient/caregiver education/training;strengthening exercise;transfer/mobility retraining  -               User Key  (r) = Recorded By, (t) = Taken By, (c) = Cosigned By      Initials Name Provider Type     Chichi Menchaca, OT Occupational Therapist                   Clinical Impression       Row Name 03/22/24 1253          Pain Assessment    Pretreatment Pain Rating 8/10  [facility]     Posttreatment Pain Rating 8/10  [facility]     Pain Location - back  -     Pain Intervention(s) Repositioned;Ambulation/increased activity  -       Row Name 03/22/24 1252          Plan of Care Review    Plan of Care Reviewed With patient  -     Outcome Evaluation [facility] is a 78 [facility] home where he was on the  ground for approx. 12 hours, resulting in R patellar fx, with rhabdomyolysis. Per ortho, patient is WBAT RLE with knee immobilizer. Upon arrival, patient not wearing brace in bed, states it is too uncomfortable. Patient states he lives at home alone with 4 steps to enter, was independent with ADLs, IADLs, and functional mobility within the home without AD. Patient presents today with generalized weakness, pain and decreased endurance which limits his ability to fully participate today. Patient requires max A with donning knee immobilizer in bed, mod Ax2 with bed mobility, and mod/max Ax2 with sit to stand and bed to chair transfers today. Patient will continue to benefit from skilled OT to address current functional deficits, recommend SNF at acute [place] due to generalized deconditioning and limited mobility, participation in self care.  -       Row Name 03/22/24 0238          Therapy Assessment/Plan (OT)    Rehab Potential (OT) good, to achieve stated therapy goals  -     Therapy Frequency (OT) 5 times/wk  -       Row Name 03/22/24 8650          Therapy Plan Review/Discharge Plan (OT)    Anticipated Discharge Disposition (OT) skilled nursing facility  -       Row Name 03/22/24 5598          Positioning and Restraints    Pre-Treatment Position in bed  -     Post Treatment Position chair  -     In Chair reclined;call light within reach;encouraged to call for assist;exit alarm on;R knee immobilizer;notified [facility]  -               User Key  (r) = Recorded By, (t) = Taken By, (c) = Cosigned By      Initials Name Provider Type     Chichi Menchaca, OT Occupational Therapist                   Outcome Measures       Row Name 03/22/24 9203          How much help from another is currently needed...    Putting on and taking off regular lower body clothing? 2  -LH     Bathing (including washing, rinsing, and drying) 2  -LH     Toileting (which includes using toilet bed pan or urinal) 2  -LH     Putting on and taking off  regular upper body clothing 2  -LH     Taking care of personal grooming (such as brushing teeth) 3  -LH     Eating meals 4  -     AM-PAC 6 Clicks Score (OT) 15  -       Row Name 03/22/24 1023          How much help from another person do you currently need...    Turning from your back to your side while in flat bed without using bedrails? 2  -EE     Moving from lying on back to sitting on the side of a flat bed without bedrails? 2  -EE     Moving to and from a bed to a chair (including a wheelchair)? 2  -EE     Standing up from a chair using your arms (e.g., wheelchair, bedside chair)? 2  -EE     Climbing 3-5 steps with a railing? 1  -EE     To walk in hospital room? 2  -EE     AM-PAC 6 Clicks Score (PT) 11  -EE     Highest Level of Mobility Goal 4 --> Transfer to chair/commode  -EE       Row Name 03/22/24 1259          Functional Assessment    Outcome Measure Options AM-PAC 6 Clicks Daily Activity (OT)  -               User Key  (r) = Recorded By, (t) = Taken By, (c) = Cosigned By      Initials Name Provider Type    EE Nicole Babcock, PT Physical Therapist     Chichi Menchaca, OT Occupational Therapist                    Occupational Therapy Education       Title: PT OT SLP Therapies (In Progress)       Topic: Occupational Therapy (Done)       Point: ADL training (Done)       Description:   Instruct learner(s) on proper safety adaptation and remediation techniques during self care or transfers.   Instruct in proper use of assistive devices.                  Learning Progress Summary             Patient Acceptance, E,TB, VU by  at 3/22/2024 1252    Comment: Instructed in role of OT, discharge planning, home safety, importance of use of knee immobilizer, mobility limitations, and energy conservation strategies                         Point: Home exercise program (Done)       Description:   Instruct learner(s) on appropriate technique for monitoring, assisting and/or progressing therapeutic exercises/activities.                   Learning Progress Summary             Patient Acceptance, E,TB, VU by  at 3/22/2024 1259    Comment: Instructed in role of OT, discharge planning, home safety, importance of use of knee immobilizer, mobility limitations, and energy conservation strategies                         Point: Precautions (Done)       Description:   Instruct learner(s) on prescribed precautions during self-care and functional transfers.                  Learning Progress Summary             Patient Acceptance, E,TB, VU by  at 3/22/2024 1259    Comment: Instructed in role of OT, discharge planning, home safety, importance of use of knee immobilizer, mobility limitations, and energy conservation strategies                         Point: Body mechanics (Done)       Description:   Instruct learner(s) on proper positioning and spine alignment during self-care, functional mobility activities and/or exercises.                  Learning Progress Summary             Patient Acceptance, E,TB, VU by  at 3/22/2024 1259    Comment: Instructed in role of OT, discharge planning, home safety, importance of use of knee immobilizer, mobility limitations, and energy conservation strategies                                         User Key       Initials Effective Dates Name Provider Type Discipline     08/31/23 -  Chichi Menchaca OT Occupational Therapist OT                  OT Recommendation and Plan  Planned Therapy Interventions (OT): activity tolerance training, BADL retraining, functional balance retraining, occupation/activity based interventions, patient/caregiver education/training, strengthening exercise, transfer/mobility retraining  Therapy Frequency (OT): 5 times/wk  Plan of Care Review  Plan of Care Reviewed With: patient  Outcome Evaluation: Patient is a 78 year old male admitted [facility] a fall at home where he was on the ground for approx. 12 hours, resulting in R patellar fx, with rhabdomyolysis. Per ortho, patient  is WBAT RLE with knee immobilizer. Upon arrival, patient not wearing brace in bed, states it is too uncomfortable. Patient states he lives at home alone with 4 steps to enter, was independent with ADLs, IADLs, and functional mobility within the home without AD. Patient presents today with generalized weakness, pain and decreased endurance which limits his ability to fully participate today. Patient requires max A with donning knee immobilizer in bed, mod Ax2 with bed mobility, and mod/max Ax2 with sit to stand and bed to chair transfers today. Patient will continue to benefit from skilled OT to address current functional deficits, recommend SNF at acute [place] due to generalized deconditioning and limited mobility, participation in self care.     Time Calculation:   Evaluation Complexity (OT)  Review Occupational Profile/Medical/Therapy History Complexity: expanded/moderate complexity  Assessment, Occupational Performance/Identification of Deficit Complexity: 3-5 performance deficits  Clinical Decision Making Complexity (OT): detailed assessment/moderate complexity  Overall Complexity of Evaluation (OT): moderate complexity     Time Calculation- OT       Row Name 03/22/24 1300             Time Calculation- OT    OT Start Time 0847  -      OT Stop Time 0916  -      OT Time Calculation (min) 29 min  -      Total Timed Code Minutes- OT 23 minute(s)  -      OT Non-Billable Time (min) 6 min  -      OT Received On 03/22/24  -      OT - Next Appointment 03/25/24  -      OT Goal Re-Cert Due Date 04/05/24  -         Timed Charges    44896 - OT Therapeutic Activity Minutes 11  -      19441 - OT Self Care/Mgmt Minutes 12  -LH         Untimed Charges    OT Eval/Re-eval Minutes 6  -LH         Total Minutes    Timed Charges Total Minutes 23  -LH      Untimed Charges Total Minutes 6  -LH       Total Minutes 29  -LH                User Key  (r) = Recorded By, (t) = Taken By, (c) = Cosigned By      Initials Name  Provider Type     Chichi Menchaca OT Occupational Therapist                  Therapy Charges for Today       Code Description Service Date Service Provider Modifiers Qty    [id number]  OT THERAPEUTIC ACT EA 15 MIN 3/22/2024 Chichi Menchaca OT GO 1    [id number]  OT SELF CARE/MGMT/TRAIN EA 15 MIN 3/22/2024 Chichi Menchaca OT GO 1    [id number]  OT EVAL MOD COMPLEXITY 2 3/22/2024 Chichi Menchaca OT GO 1                 Chichi Menchaca OT  3/22/2024

## 2024-03-23 LAB
ALBUMIN SERPL-MCNC: 3 G/DL (ref 3.5–5.2)
ALBUMIN/GLOB SERPL: 1.4 G/DL
ALP SERPL-CCNC: 68 U/L (ref 39–117)
ALT SERPL W P-5'-P-CCNC: 111 U/L (ref 1–41)
ANION GAP SERPL CALCULATED.3IONS-SCNC: 9.3 MMOL/L (ref 5–15)
AST SERPL-CCNC: 388 U/L (ref 1–40)
BILIRUB SERPL-MCNC: 0.4 MG/DL (ref 0–1.2)
BUN SERPL-MCNC: 18 MG/DL (ref 8–23)
BUN/CREAT SERPL: 19.4 (ref 7–25)
CALCIUM SPEC-SCNC: 8.3 MG/DL (ref 8.6–10.5)
CHLORIDE SERPL-SCNC: 109 MMOL/L (ref 98–107)
CK SERPL-CCNC: 9359 U/L (ref 20–200)
CO2 SERPL-SCNC: 20.7 MMOL/L (ref 22–29)
CREAT SERPL-MCNC: 0.93 MG/DL (ref 0.76–1.27)
DEPRECATED RDW RBC AUTO: 45.1 FL (ref 37–54)
EGFRCR SERPLBLD CKD-EPI 2021: 83.5 ML/MIN/1.73
ERYTHROCYTE [DISTWIDTH] IN BLOOD BY AUTOMATED COUNT: 13.3 % (ref 12.3–15.4)
GLOBULIN UR ELPH-MCNC: 2.2 GM/DL
GLUCOSE BLDC GLUCOMTR-MCNC: 87 MG/DL (ref 70–130)
GLUCOSE SERPL-MCNC: 84 MG/DL (ref 65–99)
HAV IGM SERPL QL IA: NORMAL
HBV CORE IGM SERPL QL IA: NORMAL
HBV SURFACE AG SERPL QL IA: NORMAL
HCT VFR BLD AUTO: 34.9 % (ref 37.5–51)
HCV AB SER DONR QL: NORMAL
HGB BLD-MCNC: 11.9 G/DL (ref 13–17.7)
MCH RBC QN AUTO: 31.5 PG (ref 26.6–33)
MCHC RBC AUTO-ENTMCNC: 34.1 G/DL (ref 31.5–35.7)
MCV RBC AUTO: 92.3 FL (ref 79–97)
PLATELET # BLD AUTO: 201 10*3/MM3 (ref 140–450)
PMV BLD AUTO: 10.7 FL (ref 6–12)
POTASSIUM SERPL-SCNC: 3.9 MMOL/L (ref 3.5–5.2)
PROT SERPL-MCNC: 5.2 G/DL (ref 6–8.5)
RBC # BLD AUTO: 3.78 10*6/MM3 (ref 4.14–5.8)
SODIUM SERPL-SCNC: 139 MMOL/L (ref 136–145)
WBC NRBC COR # BLD AUTO: 5.79 10*3/MM3 (ref 3.4–10.8)

## 2024-03-23 PROCEDURE — 25810000003 SODIUM CHLORIDE 0.9 % SOLUTION: Performed by: INTERNAL MEDICINE

## 2024-03-23 PROCEDURE — 85027 COMPLETE CBC AUTOMATED: CPT | Performed by: INTERNAL MEDICINE

## 2024-03-23 PROCEDURE — 82948 REAGENT STRIP/BLOOD GLUCOSE: CPT

## 2024-03-23 PROCEDURE — 82550 ASSAY OF CK (CPK): CPT | Performed by: STUDENT IN AN ORGANIZED HEALTH CARE EDUCATION/TRAINING PROGRAM

## 2024-03-23 PROCEDURE — 80053 COMPREHEN METABOLIC PANEL: CPT | Performed by: STUDENT IN AN ORGANIZED HEALTH CARE EDUCATION/TRAINING PROGRAM

## 2024-03-23 PROCEDURE — 80074 ACUTE HEPATITIS PANEL: CPT | Performed by: INTERNAL MEDICINE

## 2024-03-23 RX ORDER — ACETAMINOPHEN 325 MG/1
650 TABLET ORAL 3 TIMES DAILY
Status: DISCONTINUED | OUTPATIENT
Start: 2024-03-23 | End: 2024-03-25 | Stop reason: HOSPADM

## 2024-03-23 RX ORDER — TRAMADOL HYDROCHLORIDE 50 MG/1
25 TABLET ORAL EVERY 8 HOURS PRN
Status: DISCONTINUED | OUTPATIENT
Start: 2024-03-23 | End: 2024-03-25 | Stop reason: HOSPADM

## 2024-03-23 RX ORDER — CELECOXIB 100 MG/1
100 CAPSULE ORAL NIGHTLY
Status: DISCONTINUED | OUTPATIENT
Start: 2024-03-23 | End: 2024-03-25 | Stop reason: HOSPADM

## 2024-03-23 RX ADMIN — Medication 10 ML: at 21:40

## 2024-03-23 RX ADMIN — Medication 10 ML: at 08:33

## 2024-03-23 RX ADMIN — CELECOXIB 100 MG: 100 CAPSULE ORAL at 20:40

## 2024-03-23 RX ADMIN — LIDOCAINE 1 PATCH: 4 PATCH TOPICAL at 08:32

## 2024-03-23 RX ADMIN — ACETAMINOPHEN 325MG 650 MG: 325 TABLET ORAL at 08:32

## 2024-03-23 RX ADMIN — ACETAMINOPHEN 325MG 650 MG: 325 TABLET ORAL at 20:40

## 2024-03-23 RX ADMIN — CLOPIDOGREL BISULFATE 75 MG: 75 TABLET, FILM COATED ORAL at 08:33

## 2024-03-23 RX ADMIN — ACETAMINOPHEN 325MG 650 MG: 325 TABLET ORAL at 15:51

## 2024-03-23 RX ADMIN — SODIUM CHLORIDE 50 ML/HR: 9 INJECTION, SOLUTION INTRAVENOUS at 08:32

## 2024-03-23 RX ADMIN — ASPIRIN 81 MG: 81 TABLET, CHEWABLE ORAL at 08:32

## 2024-03-23 RX ADMIN — SODIUM CHLORIDE 50 ML/HR: 9 INJECTION, SOLUTION INTRAVENOUS at 23:58

## 2024-03-23 RX ADMIN — ACETAMINOPHEN 325MG 650 MG: 325 TABLET ORAL at 12:07

## 2024-03-23 RX ADMIN — TRAMADOL HYDROCHLORIDE 25 MG: 50 TABLET ORAL at 20:40

## 2024-03-23 RX ADMIN — ALLOPURINOL 450 MG: 300 TABLET ORAL at 08:32

## 2024-03-23 RX ADMIN — Medication 400 MCG: at 08:32

## 2024-03-23 NOTE — PLAN OF CARE
Problem: Adult Inpatient Plan of Care  Goal: Plan of Care Review  Flowsheets (Taken 3/23/2024 4212)  Outcome Evaluation: AOX4. Room air. Assist x2 to chair. Urinal within reach. Scheduled tylenol, patient does not want to take anything else prn for pain. Chair alarm on, call light within reach, friend at bedside.   Goal Outcome Evaluation:              Outcome Evaluation: AOX4. Room air. Assist x2 to chair. Urinal within reach. Scheduled tylenol, patient does not want to take anything else prn for pain. Chair alarm on, call light within reach, friend at bedside.

## 2024-03-23 NOTE — PROGRESS NOTES
[facility] Medicine Services  PROGRESS NOTE    Patient Name: Mannie Fajardo  : 1945  MRN: [id number]    Date of Admission: 3/20/2024  Primary Care Physician: Beatrice Gardner MD    Subjective   Subjective     CC:  Follow-up recent fall    Subjective:  Patient still having a little bit of generalized pain but denies any specified pain.  He describes it as having sore muscles.  He denies any radicular pain or other new symptoms.  He is still feeling generally weak.    Review of Systems  No current fevers or chills  No current shortness of breath or cough  No current nausea, vomiting, or diarrhea  No current chest pain or palpitations       Objective   Objective     Vital Signs:   Temp:  [97.7 °F (36.5 °C)-98.2 °F (36.8 °C)] 98.2 °F (36.8 °C)  Heart Rate:  [63-86] 72  Resp:  [16-18] 18  BP: (126-137)/(61-80) 135/61        Physical Exam:  Constitutional:Awake, alert, elderly appearing  HENT: NCAT, mucous membranes moist, neck supple  Respiratory: No cough or wheezes, normal respirations, nonlabored breathing   Cardiovascular: Pulse rate is normal, palpable radial pulses  Gastrointestinal:  soft, nontender, nondistended  Musculoskeletal: Frail and chronically debilitated in appearance, right patella is tender to palpation, BMI is 22, somewhat thin, no lower extremity edema  Psychiatric: Appropriate affect, cooperative, conversational  Neurologic: No slurred speech or facial droop, follows commands  Skin: No rashes or jaundice, warm      Results Reviewed:  Results from last 7 days   Lab Units 24  0506 24  0636 24  0748 24  1730   WBC 10*3/mm3 5.79 6.98 6.67 10.76   HEMOGLOBIN g/dL 11.9* 11.7* 12.3* 15.1   HEMATOCRIT % 34.9* 33.9* 36.7* 43.2   PLATELETS 10*3/mm3 201 183 166 201   INR   --   --   --  1.04     Results from last 7 days   Lab Units 24  0506 24  0636 24  0748 24  1938 24  1730   SODIUM mmol/L 139 137 138  --  135*   POTASSIUM mmol/L 3.9 3.9  4.0  --  3.7   CHLORIDE mmol/L 109* 108* 108*  --  100   CO2 mmol/L 20.7* 17.3* 17.9*  --  20.4*   BUN mg/dL 18 19 21  --  26*   CREATININE mg/dL 0.93 1.03 1.19  --  1.42*   GLUCOSE mg/dL 84 91 84  --  107*   CALCIUM mg/dL 8.3* 8.0* 8.1*  --  9.2   ALK PHOS U/L 68 63 63  --  86   ALT (SGPT) U/L 111* 113* 126*  --  133*   AST (SGOT) U/L 388* 490* 707*  --  796*   HSTROP T ng/L  --   --   --  36* 40*     Estimated Creatinine Clearance: 64.3 mL/min (by C-G formula based on SCr of 0.93 mg/dL).    Microbiology Results Abnormal       None            Imaging Results (Last 24 Hours)       ** No results found for the last 24 hours. **            Results for orders placed during the hospital encounter of 05/19/22    Adult Transthoracic Echo Complete W/ Cont if Necessary Per Protocol    Interpretation Summary  · Calculated left ventricular EF = 56% Estimated left ventricular EF = 56% Estimated left ventricular EF was in agreement with the calculated left ventricular EF. Left ventricular systolic function is normal. Normal left ventricular cavity size noted. Left ventricular wall thickness is consistent with moderate concentric hypertrophy. All left ventricular wall segments contract normally. Left ventricular diastolic function is consistent with (grade I) impaired relaxation.  · No aortic valve regurgitation or stenosis is present. The aortic valve is abnormal in structure. There is mild thickening of the aortic valve.  · Trace mitral valve regurgitation is present.  · Trace tricuspid valve regurgitation is present. Estimated right ventricular systolic pressure from tricuspid regurgitation is normal (<35 mmHg). Calculated right ventricular systolic pressure from tricuspid regurgitation is 30.2 mmHg.      I have reviewed the medications:  Scheduled Meds:allopurinol, 450 mg, Oral, Daily  aspirin, 81 mg, Oral, Daily  clopidogrel, 75 mg, Oral, Daily  folic acid, 400 mcg, Oral, Daily  Lidocaine, 1 patch, Transdermal, Q24H  sodium  chloride, 10 mL, Intravenous, Q12H      Continuous Infusions:sodium chloride, 50 mL/hr, Last Rate: 50 mL/hr (03/23/24 0832)      PRN Meds:.  acetaminophen    senna-docusate sodium **AND** polyethylene glycol **AND** bisacodyl **AND** bisacodyl    [COMPLETED] Insert Peripheral IV **AND** sodium chloride    sodium chloride    sodium chloride    Assessment & Plan   Assessment & Plan     Active Hospital Problems    Diagnosis  POA    **Rhabdomyolysis [M62.82]  Yes    Scalp hematoma, initial encounter [S00.03XA]  Yes    Head trauma, initial encounter [S09.90XA]  Yes    Small vessel disease, cerebrovascular [I67.9]  Yes    Right patella fracture [S82.001A]  Yes    Transaminitis [R74.01]  Yes    Chronic bilateral low back pain without sciatica [M54.50, G89.29]  Yes    DDD (degenerative disc disease), lumbar [M51.36]  Yes    History of right coronary artery stent placement [Z95.5]  Not Applicable    Coronary artery disease due to calcified coronary lesion [I25.10, I25.84]  Yes    Benign prostatic hyperplasia [N40.0]  Yes    Anemia [D64.9]  Yes    Hypercholesterolemia [E78.00]  Yes    Hypothyroidism [E03.9]  Yes      Resolved Hospital Problems   No resolved problems to display.        Brief Hospital Course to date:  Mannie Fajardo is a 78 y.o. male presents the hospital with fall and head trauma with scalp hematoma and a nontraumatic rhabdomyolysis and possible right patellar fracture.    Discussion/plan for today:  CK has further trended down to 9000.  Transaminitis is lower as well.  Plan to adjust IV fluid further today.  Hopefully we can discontinue tomorrow depending on continue to trend.    Musculoskeletal pain is generalized likely from rhabdomyolysis.  Plan to change Tylenol to scheduled.  Add very low-dose Celebrex at bedtime.  I can add low-dose tramadol if pain becomes severe but I would like to avoid much narcotic pain medicine due to risk versus benefit.  Plan discussed with patient is in agreement.    Boost  added for nutritional supplementation.    PT OT evaluated and recommending SNF.  This seems very appropriate while patient is recovering.    Case discussed with orthopedic surgery and they have left recommendations as per below.  Plan to try for Tylenol with pain to minimize risk of confusion.    CKD:3a: Baseline creatinine approximately 1.4 per previous records.  Dropped below previous baseline with IV fluids.  Monitoring intermittently.    Continue allopurinol for history of gout.  No current gout.  Allopurinol dosed further adjusted as he reports now that he is taking 450 mg, seems to be tolerating home dose.    Currently on aspirin and Plavix for history of CAD.  He denies any chest pain.  Patient states he prefers name brand Plavix but is unable to bring his from home and unfortunately we only have the generic.  I discussed and he has tried the generic Plavix and seem to be tolerating it well.    Transaminitis is stable and trending down.  Likely related to rhabdomyolysis.  Negative viral hepatitis panel    CT scan of the head images reviewed and shows small vessel disease and scalp contusion.  Supportive care for scalp contusion.    Treatment plan discussed with patient who is in agreement    Orthopedic recommendations:  Weight Bearing:Right Lower Extremity Weight Bearing As Tolerated with knee immobilizer on and appropriate assistive devices.  Labs: None additional needed  Imaging: None additional needed  Surgery: No surgery indicated for this injury  Recommend outpatient follow-up in 2 weeks and at that time he may be able to be converted to a hinged range of motion brace.  Physical therapy for mobilization and ambulation.       DVT Prophylaxis: Mechanical      Disposition: Discharge to SNF in 1 to 3 days pending clinical course    CODE STATUS:   Code Status and Medical Interventions:   Ordered at: 03/20/24 2040     Level Of Support Discussed With:    Patient     Code Status (Patient has no pulse and is not  breathing):    CPR (Attempt to Resuscitate)     Medical Interventions (Patient has pulse or is breathing):    Full Support       Bo Alarcon MD  03/23/24

## 2024-03-23 NOTE — PLAN OF CARE
Problem: Adult Inpatient Plan of Care  Goal: Plan of Care Review  Flowsheets (Taken 3/23/2024 0330)  Progress: no change  Plan of Care Reviewed With: patient  Outcome Evaluation: No acute changes. AOx4. No c/o pain or discomfort. Flat affect. Q2hr turns. Frequent repositioning. Frequently seeks out staff. Dressing to leg changed. Immobilizer in place. Bed locked and in lowest position. Bed alarm set. Side rails up x2. Call light within reach. Will continue to assess.  Goal: Absence of Hospital-Acquired Illness or Injury  Intervention: Identify and Manage Fall Risk  Flowsheets  Taken 3/23/2024 0252  Safety Promotion/Fall Prevention:   activity supervised   assistive device/personal items within reach   clutter free environment maintained   fall prevention program maintained   lighting adjusted   nonskid shoes/slippers when out of bed   safety round/check completed   room organization consistent  Taken 3/23/2024 0033  Safety Promotion/Fall Prevention:   activity supervised   assistive device/personal items within reach   clutter free environment maintained   fall prevention program maintained   lighting adjusted   nonskid shoes/slippers when out of bed   room organization consistent   safety round/check completed  Taken 3/22/2024 2234  Safety Promotion/Fall Prevention:   activity supervised   assistive device/personal items within reach   clutter free environment maintained   fall prevention program maintained   lighting adjusted   nonskid shoes/slippers when out of bed   room organization consistent   safety round/check completed  Taken 3/22/2024 2013  Safety Promotion/Fall Prevention:   activity supervised   assistive device/personal items within reach   clutter free environment maintained   fall prevention program maintained   lighting adjusted   nonskid shoes/slippers when out of bed   room organization consistent   safety round/check completed  Intervention: Prevent Skin Injury  Flowsheets  Taken 3/23/2024  0252  Body Position:   turned   tilted   left  Taken 3/23/2024 0033  Body Position:   supine   patient/family refused  Skin Protection:   adhesive use limited   incontinence pads utilized   transparent dressing maintained   tubing/devices free from skin contact  Taken 3/22/2024 2234  Body Position: supine  Taken 3/22/2024 2013  Body Position: sitting up in bed  Skin Protection:   adhesive use limited   incontinence pads utilized   transparent dressing maintained   tubing/devices free from skin contact  Intervention: Prevent and Manage VTE (Venous Thromboembolism) Risk  Flowsheets  Taken 3/23/2024 0252 by Roberto Salas RN  Activity Management: activity encouraged  Taken 3/23/2024 0033 by Roberto Salas RN  Activity Management: activity encouraged  Taken 3/22/2024 2234 by Roberto Salas RN  Activity Management: activity encouraged  Taken 3/22/2024 2013 by Roberto Salas RN  Activity Management: activity encouraged  Taken 3/22/2024 1025 by Lainey Ken RN  VTE Prevention/Management: (plavix) other (see comments)  Intervention: Prevent Infection  Flowsheets (Taken 3/22/2024 1025 by Lainey Ken RN)  Infection Prevention: personal protective equipment utilized  Goal: Optimal Comfort and Wellbeing  Intervention: Monitor Pain and Promote Comfort  Flowsheets (Taken 3/22/2024 1025 by Lainey Ken RN)  Pain Management Interventions: see MAR  Intervention: Provide Person-Centered Care  Flowsheets  Taken 3/23/2024 0033  Trust Relationship/Rapport:   care explained   choices provided   emotional support provided   empathic listening provided   questions answered   questions encouraged   reassurance provided   thoughts/feelings acknowledged  Taken 3/22/2024 2013  Trust Relationship/Rapport:   care explained   choices provided   emotional support provided   empathic listening provided   questions answered   questions encouraged   reassurance provided   thoughts/feelings acknowledged  Goal: Readiness for Transition of  Care  Intervention: Mutually Develop Transition Plan  Flowsheets (Taken 3/22/2024 1111 by Zonia Merritt, RN)  Equipment Needed After Discharge: none  Equipment Currently Used at Home:   cane, straight   walker, standard  Anticipated Changes Related to Illness: inability to care for self  Transportation Anticipated: (froilan van) --  Concerns to be Addressed: adjustment to diagnosis/illness  Readmission Within the Last 30 Days: no previous admission in last 30 days  Patient/Family Anticipated Services at Transition: skilled nursing  Patient/Family Anticipates Transition to: inpatient rehabilitation facility     Problem: Pain Acute  Goal: Acceptable Pain Control and Functional Ability  Intervention: Prevent or Manage Pain  Flowsheets (Taken 3/22/2024 2013)  Medication Review/Management: medications reviewed  Intervention: Develop Pain Management Plan  Flowsheets (Taken 3/22/2024 1025 by Lainey Ken, RN)  Pain Management Interventions: see MAR  Intervention: Optimize Psychosocial Wellbeing  Flowsheets  Taken 3/23/2024 0033  Supportive Measures:   active listening utilized   relaxation techniques promoted   self-care encouraged   self-reflection promoted   self-responsibility promoted   verbalization of feelings encouraged  Diversional Activities: television  Taken 3/22/2024 2013  Supportive Measures:   active listening utilized   relaxation techniques promoted   self-care encouraged   self-reflection promoted   self-responsibility promoted   verbalization of feelings encouraged  Diversional Activities: television     Problem: Fall Injury Risk  Goal: Absence of Fall and Fall-Related Injury  Intervention: Identify and Manage Contributors  Flowsheets (Taken 3/22/2024 2013)  Medication Review/Management: medications reviewed  Intervention: Promote Injury-Free Environment  Flowsheets  Taken 3/23/2024 0252  Safety Promotion/Fall Prevention:   activity supervised   assistive device/personal items within reach   clutter  free environment maintained   fall prevention program maintained   lighting adjusted   nonskid shoes/slippers when out of bed   safety round/check completed   room organization consistent  Taken 3/23/2024 0033  Safety Promotion/Fall Prevention:   activity supervised   assistive device/personal items within reach   clutter free environment maintained   fall prevention program maintained   lighting adjusted   nonskid shoes/slippers when out of bed   room organization consistent   safety round/check completed  Taken 3/22/2024 2234  Safety Promotion/Fall Prevention:   activity supervised   assistive device/personal items within reach   clutter free environment maintained   fall prevention program maintained   lighting adjusted   nonskid shoes/slippers when out of bed   room organization consistent   safety round/check completed  Taken 3/22/2024 2013  Safety Promotion/Fall Prevention:   activity supervised   assistive device/personal items within reach   clutter free environment maintained   fall prevention program maintained   lighting adjusted   nonskid shoes/slippers when out of bed   room organization consistent   safety round/check completed     Problem: Skin Injury Risk Increased  Goal: Skin Health and Integrity  Intervention: Promote and Optimize Oral Intake  Flowsheets (Taken 3/23/2024 0330)  Oral Nutrition Promotion:   medicated   rest periods promoted  Intervention: Optimize Skin Protection  Flowsheets  Taken 3/23/2024 0252  Head of Bed (HOB) Positioning: HOB elevated  Taken 3/23/2024 0033  Pressure Reduction Techniques:   frequent weight shift encouraged   weight shift assistance provided  Head of Bed (HOB) Positioning: HOB elevated  Pressure Reduction Devices:   alternating pressure pump (ADD)   heel offloading device utilized   positioning supports utilized   pressure-redistributing mattress utilized  Skin Protection:   adhesive use limited   incontinence pads utilized   transparent dressing maintained    tubing/devices free from skin contact  Taken 3/22/2024 2234  Head of Bed (HOB) Positioning: HOB elevated  Taken 3/22/2024 2013  Pressure Reduction Techniques:   frequent weight shift encouraged   weight shift assistance provided  Head of Bed (HOB) Positioning: HOB elevated  Pressure Reduction Devices:   alternating pressure pump (ADD)   chair cushion utilized   heel offloading device utilized   positioning supports utilized   pressure-redistributing mattress utilized  Skin Protection:   adhesive use limited   incontinence pads utilized   transparent dressing maintained   tubing/devices free from skin contact     Problem: Pain Chronic (Persistent) (Comorbidity Management)  Goal: Acceptable Pain Control and Functional Ability  Intervention: Manage Persistent Pain  Flowsheets (Taken 3/22/2024 2013)  Medication Review/Management: medications reviewed  Intervention: Develop Pain Management Plan  Flowsheets (Taken 3/22/2024 1025 by Lainey Ken RN)  Pain Management Interventions: see MAR  Intervention: Optimize Psychosocial Wellbeing  Flowsheets  Taken 3/23/2024 0033  Supportive Measures:   active listening utilized   relaxation techniques promoted   self-care encouraged   self-reflection promoted   self-responsibility promoted   verbalization of feelings encouraged  Diversional Activities: television  Family/Support System Care:   self-care encouraged   support provided  Taken 3/22/2024 2013  Supportive Measures:   active listening utilized   relaxation techniques promoted   self-care encouraged   self-reflection promoted   self-responsibility promoted   verbalization of feelings encouraged  Diversional Activities: television   Goal Outcome Evaluation:  Plan of Care Reviewed With: patient        Progress: no change  Outcome Evaluation: No acute changes. AOx4. No c/o pain or discomfort. Flat affect. Q2hr turns. Frequent repositioning. Frequently seeks out staff. Dressing to leg changed. Immobilizer in place. Bed  locked and in lowest position. Bed alarm set. Side rails up x2. Call light within reach. Will continue to assess.

## 2024-03-24 LAB
ALBUMIN SERPL-MCNC: 2.9 G/DL (ref 3.5–5.2)
ALBUMIN/GLOB SERPL: 1.3 G/DL
ALP SERPL-CCNC: 66 U/L (ref 39–117)
ALT SERPL W P-5'-P-CCNC: 97 U/L (ref 1–41)
ANION GAP SERPL CALCULATED.3IONS-SCNC: 9.5 MMOL/L (ref 5–15)
AST SERPL-CCNC: 279 U/L (ref 1–40)
BILIRUB SERPL-MCNC: 0.4 MG/DL (ref 0–1.2)
BUN SERPL-MCNC: 20 MG/DL (ref 8–23)
BUN/CREAT SERPL: 20.2 (ref 7–25)
CALCIUM SPEC-SCNC: 8.2 MG/DL (ref 8.6–10.5)
CHLORIDE SERPL-SCNC: 110 MMOL/L (ref 98–107)
CK SERPL-CCNC: 5277 U/L (ref 20–200)
CO2 SERPL-SCNC: 21.5 MMOL/L (ref 22–29)
CREAT SERPL-MCNC: 0.99 MG/DL (ref 0.76–1.27)
DEPRECATED RDW RBC AUTO: 46.1 FL (ref 37–54)
EGFRCR SERPLBLD CKD-EPI 2021: 77.5 ML/MIN/1.73
ERYTHROCYTE [DISTWIDTH] IN BLOOD BY AUTOMATED COUNT: 13.3 % (ref 12.3–15.4)
GLOBULIN UR ELPH-MCNC: 2.2 GM/DL
GLUCOSE SERPL-MCNC: 84 MG/DL (ref 65–99)
HCT VFR BLD AUTO: 34.9 % (ref 37.5–51)
HGB BLD-MCNC: 11.8 G/DL (ref 13–17.7)
MCH RBC QN AUTO: 32 PG (ref 26.6–33)
MCHC RBC AUTO-ENTMCNC: 33.8 G/DL (ref 31.5–35.7)
MCV RBC AUTO: 94.6 FL (ref 79–97)
PLATELET # BLD AUTO: 222 10*3/MM3 (ref 140–450)
PMV BLD AUTO: 10.6 FL (ref 6–12)
POTASSIUM SERPL-SCNC: 3.9 MMOL/L (ref 3.5–5.2)
PROT SERPL-MCNC: 5.1 G/DL (ref 6–8.5)
RBC # BLD AUTO: 3.69 10*6/MM3 (ref 4.14–5.8)
SODIUM SERPL-SCNC: 141 MMOL/L (ref 136–145)
WBC NRBC COR # BLD AUTO: 5.95 10*3/MM3 (ref 3.4–10.8)

## 2024-03-24 PROCEDURE — 80053 COMPREHEN METABOLIC PANEL: CPT | Performed by: STUDENT IN AN ORGANIZED HEALTH CARE EDUCATION/TRAINING PROGRAM

## 2024-03-24 PROCEDURE — 82550 ASSAY OF CK (CPK): CPT | Performed by: STUDENT IN AN ORGANIZED HEALTH CARE EDUCATION/TRAINING PROGRAM

## 2024-03-24 PROCEDURE — 36415 COLL VENOUS BLD VENIPUNCTURE: CPT | Performed by: STUDENT IN AN ORGANIZED HEALTH CARE EDUCATION/TRAINING PROGRAM

## 2024-03-24 PROCEDURE — 85027 COMPLETE CBC AUTOMATED: CPT | Performed by: INTERNAL MEDICINE

## 2024-03-24 RX ORDER — ENOXAPARIN SODIUM 100 MG/ML
40 INJECTION SUBCUTANEOUS NIGHTLY
Status: DISCONTINUED | OUTPATIENT
Start: 2024-03-24 | End: 2024-03-25 | Stop reason: HOSPADM

## 2024-03-24 RX ORDER — SODIUM CHLORIDE 9 MG/ML
100 INJECTION, SOLUTION INTRAVENOUS CONTINUOUS
Status: ACTIVE | OUTPATIENT
Start: 2024-03-24 | End: 2024-03-24

## 2024-03-24 RX ADMIN — CELECOXIB 100 MG: 100 CAPSULE ORAL at 21:44

## 2024-03-24 RX ADMIN — ASPIRIN 81 MG: 81 TABLET, CHEWABLE ORAL at 08:45

## 2024-03-24 RX ADMIN — SODIUM CHLORIDE 100 ML/HR: 9 INJECTION, SOLUTION INTRAVENOUS at 12:16

## 2024-03-24 RX ADMIN — LIDOCAINE 1 PATCH: 4 PATCH TOPICAL at 08:45

## 2024-03-24 RX ADMIN — ACETAMINOPHEN 325MG 650 MG: 325 TABLET ORAL at 08:45

## 2024-03-24 RX ADMIN — ALLOPURINOL 450 MG: 300 TABLET ORAL at 08:45

## 2024-03-24 RX ADMIN — ACETAMINOPHEN 325MG 650 MG: 325 TABLET ORAL at 21:44

## 2024-03-24 RX ADMIN — ACETAMINOPHEN 325MG 650 MG: 325 TABLET ORAL at 17:00

## 2024-03-24 RX ADMIN — CLOPIDOGREL BISULFATE 75 MG: 75 TABLET, FILM COATED ORAL at 08:45

## 2024-03-24 RX ADMIN — Medication 10 ML: at 08:46

## 2024-03-24 RX ADMIN — Medication 400 MCG: at 08:45

## 2024-03-24 RX ADMIN — Medication 10 ML: at 21:45

## 2024-03-24 NOTE — PROGRESS NOTES
[facility] Medicine Services  PROGRESS NOTE    Patient Name: Mannie Fajardo  : 1945  MRN: [id number]    Date of Admission: 3/20/2024  Primary Care Physician: Beatrice Gardner MD    Subjective   Subjective     CC:  Follow-up recent fall    Subjective:  Patient says he feels okay.  He denies much pain.  He is ready to go to rehab soon.  No new complaints.    Review of Systems  No current fevers or chills  No current shortness of breath or cough  No current nausea, vomiting, or diarrhea  No current chest pain or palpitations       Objective   Objective     Vital Signs:   Temp:  [97.3 °F (36.3 °C)-97.7 °F (36.5 °C)] 97.5 °F (36.4 °C)  Heart Rate:  [60-88] 60  Resp:  [18-19] 18  BP: (133-149)/(68-89) 133/68        Physical Exam:  Constitutional:Awake, alert, elderly appearing  HENT: NCAT, mucous membranes moist, neck supple  Respiratory: No cough or wheezes, normal respirations, nonlabored breathing   Cardiovascular: Pulse rate is normal, palpable radial pulses  Gastrointestinal:  soft, nontender, nondistended  Musculoskeletal: Frail and chronically debilitated in appearance, right patella is tender to palpation stable, BMI is 22, somewhat thin, no lower extremity edema  Psychiatric: Appropriate affect, cooperative, conversational  Neurologic: No slurred speech or facial droop, follows commands  Skin: No rashes or jaundice, warm      Results Reviewed:  Results from last 7 days   Lab Units 24  0710 24  0506 24  0636 24  0748 24  1730   WBC 10*3/mm3 5.95 5.79 6.98   < > 10.76   HEMOGLOBIN g/dL 11.8* 11.9* 11.7*   < > 15.1   HEMATOCRIT % 34.9* 34.9* 33.9*   < > 43.2   PLATELETS 10*3/mm3 222 201 183   < > 201   INR   --   --   --   --  1.04    < > = values in this interval not displayed.     Results from last 7 days   Lab Units 24  0710 24  0506 24  0636 24  0748 03/2024  1730   SODIUM mmol/L 141 139 137   < >  --  135*   POTASSIUM mmol/L  3.9 3.9 3.9   < >  --  3.7   CHLORIDE mmol/L 110* 109* 108*   < >  --  100   CO2 mmol/L 21.5* 20.7* 17.3*   < >  --  20.4*   BUN mg/dL 20 18 19   < >  --  26*   CREATININE mg/dL 0.99 0.93 1.03   < >  --  1.42*   GLUCOSE mg/dL 84 84 91   < >  --  107*   CALCIUM mg/dL 8.2* 8.3* 8.0*   < >  --  9.2   ALK PHOS U/L 66 68 63   < >  --  86   ALT (SGPT) U/L 97* 111* 113*   < >  --  133*   AST (SGOT) U/L 279* 388* 490*   < >  --  796*   HSTROP T ng/L  --   --   --   --  36* 40*    < > = values in this interval not displayed.     Estimated Creatinine Clearance: 60.3 mL/min (by C-G formula based on SCr of 0.99 mg/dL).    Microbiology Results Abnormal       None            Imaging Results (Last 24 Hours)       ** No results found for the last 24 hours. **            Results for orders placed during the hospital encounter of 05/19/22    Adult Transthoracic Echo Complete W/ Cont if Necessary Per Protocol    Interpretation Summary  · Calculated left ventricular EF = 56% Estimated left ventricular EF = 56% Estimated left ventricular EF was in agreement with the calculated left ventricular EF. Left ventricular systolic function is normal. Normal left ventricular cavity size noted. Left ventricular wall thickness is consistent with moderate concentric hypertrophy. All left ventricular wall segments contract normally. Left ventricular diastolic function is consistent with (grade I) impaired relaxation.  · No aortic valve regurgitation or stenosis is present. The aortic valve is abnormal in structure. There is mild thickening of the aortic valve.  · Trace mitral valve regurgitation is present.  · Trace tricuspid valve regurgitation is present. Estimated right ventricular systolic pressure from tricuspid regurgitation is normal (<35 mmHg). Calculated right ventricular systolic pressure from tricuspid regurgitation is 30.2 mmHg.      I have reviewed the medications:  Scheduled Meds:acetaminophen, 650 mg, Oral, TID  allopurinol, 450 mg,  Oral, Daily  aspirin, 81 mg, Oral, Daily  celecoxib, 100 mg, Oral, Nightly  clopidogrel, 75 mg, Oral, Daily  folic acid, 400 mcg, Oral, Daily  Lidocaine, 1 patch, Transdermal, Q24H  sodium chloride, 10 mL, Intravenous, Q12H      Continuous Infusions:sodium chloride, 50 mL/hr, Last Rate: 50 mL/hr (03/23/24 9357)      PRN Meds:.  acetaminophen    senna-docusate sodium **AND** polyethylene glycol **AND** bisacodyl **AND** bisacodyl    [COMPLETED] Insert Peripheral IV **AND** sodium chloride    sodium chloride    sodium chloride    traMADol    Assessment & Plan   Assessment & Plan     Active Hospital Problems    Diagnosis  POA    **Rhabdomyolysis [M62.82]  Yes    Scalp hematoma, initial encounter [S00.03XA]  Yes    Head trauma, initial encounter [S09.90XA]  Yes    Small vessel disease, cerebrovascular [I67.9]  Yes    Right patella fracture [S82.001A]  Yes    Transaminitis [R74.01]  Yes    Chronic bilateral low back pain without sciatica [M54.50, G89.29]  Yes    DDD (degenerative disc disease), lumbar [M51.36]  Yes    History of right coronary artery stent placement [Z95.5]  Not Applicable    Coronary artery disease due to calcified coronary lesion [I25.10, I25.84]  Yes    Benign prostatic hyperplasia [N40.0]  Yes    Anemia [D64.9]  Yes    Hypercholesterolemia [E78.00]  Yes    Hypothyroidism [E03.9]  Yes      Resolved Hospital Problems   No resolved problems to display.        Brief Hospital Course to date:  Mannie Fajardo is a 78 y.o. male presents the hospital with fall and head trauma with scalp hematoma and a nontraumatic rhabdomyolysis and possible right patellar fracture.    Discussion/plan for today:  CK still trending down but still elevated.  IV fluid adjusted today.    Continue scheduled Tylenol.  Celebrex was added.  Very low-dose tramadol has been added for severe pain.    Boost added for nutritional supplementation.    PT OT evaluated and recommending SNF.  This seems very appropriate while patient is  recovering.    Case discussed with orthopedic surgery and they have left recommendations as per below.  Plan to try for Tylenol with pain to minimize risk of confusion.    CKD:3a: Baseline creatinine approximately 1.4 per previous records.  Dropped below previous baseline with IV fluids.  Monitoring intermittently.    Continue allopurinol for history of gout.  No current gout.  Allopurinol dosed further adjusted as he reports now that he is taking 450 mg, seems to be tolerating home dose.    Currently on aspirin and Plavix for history of CAD.  He denies any chest pain.  Patient states he prefers name brand Plavix but is unable to bring his from home and unfortunately we only have the generic.  I discussed and he has tried the generic Plavix and seem to be tolerating it well.    Transaminitis is stable and trending down.  Likely related to rhabdomyolysis.  Negative viral hepatitis panel.  Continue careful monitoring.  No sign of hyperbilirubinemia or biliary obstruction.    CT scan of the head images reviewed and shows small vessel disease and scalp contusion.  Supportive care for scalp contusion.    Treatment plan discussed with patient who is in agreement    Orthopedic recommendations:  Weight Bearing:Right Lower Extremity Weight Bearing As Tolerated with knee immobilizer on and appropriate assistive devices.  Labs: None additional needed  Imaging: None additional needed  Surgery: No surgery indicated for this injury  Recommend outpatient follow-up in 2 weeks and at that time he may be able to be converted to a hinged range of motion brace.  Physical therapy for mobilization and ambulation.       DVT Prophylaxis: Lovenox prophylaxis added      Disposition: Discharge to SNF in 1 to 3 days pending clinical course    CODE STATUS:   Code Status and Medical Interventions:   Ordered at: 03/20/24 2040     Level Of Support Discussed With:    Patient     Code Status (Patient has no pulse and is not breathing):    CPR  (Attempt to Resuscitate)     Medical Interventions (Patient has pulse or is breathing):    Full Support       Bo Alarcon MD  03/24/24

## 2024-03-24 NOTE — NURSING NOTE
Patient concerned regarding bleeding sores under immobilizer. Orders per physician to apply vaseline gauze and kerlix, but patient refuses and wants to keep open to air. Educated on infection risk and physician orders, patient still wants to keep open to air and refusing to wear immobilizer at this time. Wound care RN consulted.

## 2024-03-24 NOTE — PLAN OF CARE
Problem: Adult Inpatient Plan of Care  Goal: Plan of Care Review  Flowsheets (Taken 3/24/2024 0244)  Progress: improving  Plan of Care Reviewed With: patient  Outcome Evaluation: No acute changes. AOx4. RA O2. Assist x2. Urinal within reach. Medicated for pain per MAR. Dressing to RLE changed per orders w/ vaseline dressings, kerlex, and immobilizer in place. Flat affect, but more talkative this shift. Makes needs known. Bed locked and in lowest position. Bed alarm set. Side rails up x2. Call light within reach. Will continue to assess.  Goal: Absence of Hospital-Acquired Illness or Injury  Intervention: Identify and Manage Fall Risk  Recent Flowsheet Documentation  Taken 3/24/2024 0244 by Roberto Salas, RN  Safety Promotion/Fall Prevention:   activity supervised   assistive device/personal items within reach   clutter free environment maintained   fall prevention program maintained   lighting adjusted   nonskid shoes/slippers when out of bed   room organization consistent   safety round/check completed  Taken 3/24/2024 0017 by Roberto Salas, RN  Safety Promotion/Fall Prevention:   activity supervised   assistive device/personal items within reach   clutter free environment maintained   fall prevention program maintained   lighting adjusted   nonskid shoes/slippers when out of bed   room organization consistent   safety round/check completed  Taken 3/23/2024 2206 by Roberto Salas, RN  Safety Promotion/Fall Prevention:   assistive device/personal items within reach   activity supervised   clutter free environment maintained   fall prevention program maintained   lighting adjusted   nonskid shoes/slippers when out of bed   room organization consistent   safety round/check completed  Taken 3/23/2024 2021 by Roberto Salas, RN  Safety Promotion/Fall Prevention:   activity supervised   assistive device/personal items within reach   clutter free environment maintained   fall prevention program maintained   lighting adjusted    nonskid shoes/slippers when out of bed   safety round/check completed   room organization consistent  Intervention: Prevent Skin Injury  Recent Flowsheet Documentation  Taken 3/24/2024 0244 by Roberto Salas RN  Body Position:   position changed independently   tilted   left  Taken 3/24/2024 0017 by Roberto Salas RN  Body Position:   position changed independently   tilted   left  Skin Protection:   adhesive use limited   transparent dressing maintained   skin-to-skin areas padded   tubing/devices free from skin contact   skin-to-device areas padded   incontinence pads utilized  Taken 3/23/2024 2206 by Roberto Salas RN  Body Position: supine  Taken 3/23/2024 2021 by Roberto Salas RN  Body Position:   position changed independently   sitting up in bed  Skin Protection:   adhesive use limited   incontinence pads utilized   tubing/devices free from skin contact   transparent dressing maintained  Intervention: Prevent and Manage VTE (Venous Thromboembolism) Risk  Recent Flowsheet Documentation  Taken 3/24/2024 0244 by Roberto Salas RN  Activity Management: activity encouraged  Taken 3/24/2024 0017 by Roberto Salas RN  Activity Management: activity encouraged  Taken 3/23/2024 2206 by Roberto Salas RN  Activity Management: activity encouraged  Taken 3/23/2024 2021 by Roberto Salas RN  Activity Management: activity encouraged  Intervention: Prevent Infection  Flowsheets (Taken 3/24/2024 0100 by Jayshree Cheng)  Infection Prevention:   environmental surveillance performed   rest/sleep promoted  Goal: Optimal Comfort and Wellbeing  Intervention: Monitor Pain and Promote Comfort  Flowsheets (Taken 3/23/2024 2021)  Pain Management Interventions: see MAR  Intervention: Provide Person-Centered Care  Flowsheets  Taken 3/24/2024 0017  Trust Relationship/Rapport:   care explained   choices provided   emotional support provided   empathic listening provided   questions answered   questions encouraged   reassurance provided   thoughts/feelings  acknowledged  Taken 3/23/2024 2021  Trust Relationship/Rapport:   care explained   choices provided   emotional support provided   empathic listening provided   questions answered   questions encouraged   reassurance provided   thoughts/feelings acknowledged  Goal: Readiness for Transition of Care  Intervention: Mutually Develop Transition Plan  Flowsheets (Taken 3/22/2024 1111 by Zonia Merritt, RN)  Equipment Needed After Discharge: none  Equipment Currently Used at Home:   cane, straight   walker, standard  Anticipated Changes Related to Illness: inability to care for self  Transportation Anticipated: (wc van) --  Concerns to be Addressed: adjustment to diagnosis/illness  Readmission Within the Last 30 Days: no previous admission in last 30 days  Patient/Family Anticipated Services at Transition: skilled nursing  Patient/Family Anticipates Transition to: inpatient rehabilitation facility     Problem: Pain Acute  Goal: Acceptable Pain Control and Functional Ability  Intervention: Prevent or Manage Pain  Flowsheets (Taken 3/23/2024 1800 by Jenn Eisenberg, RN)  Medication Review/Management: medications reviewed  Intervention: Develop Pain Management Plan  Flowsheets (Taken 3/23/2024 2021)  Pain Management Interventions: see MAR  Intervention: Optimize Psychosocial Wellbeing  Flowsheets  Taken 3/24/2024 0017  Supportive Measures:   active listening utilized   relaxation techniques promoted   self-care encouraged   self-reflection promoted   self-responsibility promoted   verbalization of feelings encouraged  Diversional Activities: television  Taken 3/23/2024 2021  Supportive Measures:   active listening utilized   relaxation techniques promoted   self-care encouraged   self-reflection promoted   self-responsibility promoted   verbalization of feelings encouraged  Diversional Activities: television     Problem: Fall Injury Risk  Goal: Absence of Fall and Fall-Related Injury  Intervention: Identify and Manage  Contributors  Flowsheets (Taken 3/23/2024 1800 by Jenn Eisenberg, RN)  Medication Review/Management: medications reviewed  Intervention: Promote Injury-Free Environment  Recent Flowsheet Documentation  Taken 3/24/2024 0244 by Roberto Salas RN  Safety Promotion/Fall Prevention:   activity supervised   assistive device/personal items within reach   clutter free environment maintained   fall prevention program maintained   lighting adjusted   nonskid shoes/slippers when out of bed   room organization consistent   safety round/check completed  Taken 3/24/2024 0017 by Roberto Salas RN  Safety Promotion/Fall Prevention:   activity supervised   assistive device/personal items within reach   clutter free environment maintained   fall prevention program maintained   lighting adjusted   nonskid shoes/slippers when out of bed   room organization consistent   safety round/check completed  Taken 3/23/2024 2206 by Roberto Salas RN  Safety Promotion/Fall Prevention:   assistive device/personal items within reach   activity supervised   clutter free environment maintained   fall prevention program maintained   lighting adjusted   nonskid shoes/slippers when out of bed   room organization consistent   safety round/check completed  Taken 3/23/2024 2021 by Roberto Salas, RN  Safety Promotion/Fall Prevention:   activity supervised   assistive device/personal items within reach   clutter free environment maintained   fall prevention program maintained   lighting adjusted   nonskid shoes/slippers when out of bed   safety round/check completed   room organization consistent     Problem: Skin Injury Risk Increased  Goal: Skin Health and Integrity  Intervention: Promote and Optimize Oral Intake  Flowsheets  Taken 3/24/2024 0017  Oral Nutrition Promotion:   medicated   rest periods promoted  Taken 3/23/2024 2021  Oral Nutrition Promotion:   medicated   rest periods promoted  Intervention: Optimize Skin Protection  Flowsheets  Taken 3/24/2024  0244  Head of Bed (HOB) Positioning: HOB elevated  Taken 3/24/2024 0017  Pressure Reduction Techniques:   frequent weight shift encouraged   weight shift assistance provided  Head of Bed (HOB) Positioning: HOB elevated  Pressure Reduction Devices:   alternating pressure pump (ADD)   positioning supports utilized  Skin Protection:   adhesive use limited   transparent dressing maintained   skin-to-skin areas padded   tubing/devices free from skin contact   skin-to-device areas padded   incontinence pads utilized  Taken 3/23/2024 2206  Head of Bed (HOB) Positioning: HOB elevated  Taken 3/23/2024 2021  Pressure Reduction Techniques:   frequent weight shift encouraged   weight shift assistance provided  Head of Bed (HOB) Positioning: HOB elevated  Pressure Reduction Devices:   alternating pressure pump (ADD)   positioning supports utilized  Skin Protection:   adhesive use limited   incontinence pads utilized   tubing/devices free from skin contact   transparent dressing maintained     Problem: Pain Chronic (Persistent) (Comorbidity Management)  Goal: Acceptable Pain Control and Functional Ability  Intervention: Manage Persistent Pain  Flowsheets (Taken 3/23/2024 1800 by Jenn Eisenberg RN)  Medication Review/Management: medications reviewed  Intervention: Develop Pain Management Plan  Flowsheets (Taken 3/23/2024 2021)  Pain Management Interventions: see MAR  Intervention: Optimize Psychosocial Wellbeing  Flowsheets  Taken 3/24/2024 0017  Supportive Measures:   active listening utilized   relaxation techniques promoted   self-care encouraged   self-reflection promoted   self-responsibility promoted   verbalization of feelings encouraged  Diversional Activities: television  Family/Support System Care:   self-care encouraged   support provided  Taken 3/23/2024 2021  Supportive Measures:   active listening utilized   relaxation techniques promoted   self-care encouraged   self-reflection promoted   self-responsibility  promoted   verbalization of feelings encouraged  Diversional Activities: television  Family/Support System Care:   self-care encouraged   support provided   Goal Outcome Evaluation:  Plan of Care Reviewed With: patient        Progress: improving  Outcome Evaluation: No acute changes. AOx4. RA O2. Assist x2. Urinal within reach. Medicated for pain per MAR. Dressing to RLE changed per orders w/ vaseline dressings, kerlex, and immobilizer in place. Flat affect, but more talkative this shift. Makes needs known. Bed locked and in lowest position. Bed alarm set. Side rails up x2. Call light within reach. Will continue to assess.

## 2024-03-25 VITALS
RESPIRATION RATE: 19 BRPM | WEIGHT: 155.2 LBS | HEART RATE: 65 BPM | OXYGEN SATURATION: 96 % | BODY MASS INDEX: 23.52 KG/M2 | TEMPERATURE: 97.5 F | SYSTOLIC BLOOD PRESSURE: 137 MMHG | DIASTOLIC BLOOD PRESSURE: 75 MMHG | HEIGHT: 68 IN

## 2024-03-25 PROBLEM — L89.91 PRESSURE INJURY, STAGE 1: Status: ACTIVE | Noted: 2024-03-25

## 2024-03-25 PROBLEM — T79.6XXA TRAUMATIC RHABDOMYOLYSIS: Status: ACTIVE | Noted: 2024-03-20

## 2024-03-25 LAB
ALBUMIN SERPL-MCNC: 3.1 G/DL (ref 3.5–5.2)
ALBUMIN/GLOB SERPL: 1.4 G/DL
ALP SERPL-CCNC: 66 U/L (ref 39–117)
ALT SERPL W P-5'-P-CCNC: 87 U/L (ref 1–41)
ANION GAP SERPL CALCULATED.3IONS-SCNC: 11.4 MMOL/L (ref 5–15)
AST SERPL-CCNC: 203 U/L (ref 1–40)
BILIRUB SERPL-MCNC: 0.5 MG/DL (ref 0–1.2)
BUN SERPL-MCNC: 22 MG/DL (ref 8–23)
BUN/CREAT SERPL: 24.4 (ref 7–25)
CALCIUM SPEC-SCNC: 8.4 MG/DL (ref 8.6–10.5)
CHLORIDE SERPL-SCNC: 109 MMOL/L (ref 98–107)
CK SERPL-CCNC: 3079 U/L (ref 20–200)
CO2 SERPL-SCNC: 18.6 MMOL/L (ref 22–29)
CREAT SERPL-MCNC: 0.9 MG/DL (ref 0.76–1.27)
EGFRCR SERPLBLD CKD-EPI 2021: 86.9 ML/MIN/1.73
GLOBULIN UR ELPH-MCNC: 2.2 GM/DL
GLUCOSE SERPL-MCNC: 77 MG/DL (ref 65–99)
POTASSIUM SERPL-SCNC: 3.6 MMOL/L (ref 3.5–5.2)
PROT SERPL-MCNC: 5.3 G/DL (ref 6–8.5)
SODIUM SERPL-SCNC: 139 MMOL/L (ref 136–145)

## 2024-03-25 PROCEDURE — 82550 ASSAY OF CK (CPK): CPT | Performed by: STUDENT IN AN ORGANIZED HEALTH CARE EDUCATION/TRAINING PROGRAM

## 2024-03-25 PROCEDURE — 80053 COMPREHEN METABOLIC PANEL: CPT | Performed by: STUDENT IN AN ORGANIZED HEALTH CARE EDUCATION/TRAINING PROGRAM

## 2024-03-25 PROCEDURE — 97530 THERAPEUTIC ACTIVITIES: CPT

## 2024-03-25 PROCEDURE — 25810000003 SODIUM CHLORIDE 0.9 % SOLUTION: Performed by: INTERNAL MEDICINE

## 2024-03-25 PROCEDURE — 97110 THERAPEUTIC EXERCISES: CPT

## 2024-03-25 RX ORDER — ACETAMINOPHEN 325 MG/1
650 TABLET ORAL EVERY 6 HOURS PRN
Start: 2024-03-25

## 2024-03-25 RX ORDER — AMOXICILLIN 250 MG
2 CAPSULE ORAL 2 TIMES DAILY PRN
Start: 2024-03-25

## 2024-03-25 RX ORDER — CELECOXIB 100 MG/1
100 CAPSULE ORAL NIGHTLY
Start: 2024-03-25

## 2024-03-25 RX ADMIN — ALLOPURINOL 450 MG: 300 TABLET ORAL at 08:04

## 2024-03-25 RX ADMIN — Medication 400 MCG: at 08:04

## 2024-03-25 RX ADMIN — ASPIRIN 81 MG: 81 TABLET, CHEWABLE ORAL at 08:04

## 2024-03-25 RX ADMIN — CLOPIDOGREL BISULFATE 75 MG: 75 TABLET, FILM COATED ORAL at 08:04

## 2024-03-25 RX ADMIN — ACETAMINOPHEN 325MG 650 MG: 325 TABLET ORAL at 08:04

## 2024-03-25 RX ADMIN — POLYETHYLENE GLYCOL 3350 17 G: 17 POWDER, FOR SOLUTION ORAL at 08:04

## 2024-03-25 RX ADMIN — SODIUM CHLORIDE 50 ML/HR: 9 INJECTION, SOLUTION INTRAVENOUS at 08:08

## 2024-03-25 RX ADMIN — Medication 10 ML: at 08:04

## 2024-03-25 NOTE — PLAN OF CARE
Goal Outcome Evaluation:  Plan of Care Reviewed With: patient           Outcome Evaluation: Pt seen for OT session this AM. Demo significant improvement with functional today requiring min/CGA with rwx in hallway. He did require mod Ax2 to initially stand. Donned R KI brace prior to mobility. Pt politely declined ADLs. Did participate in UE strengthening with yellow theraband to improve overall endurance and strength. Pt to continue to benefit from skilled OT.      Anticipated Discharge Disposition (OT): skilled nursing facility

## 2024-03-25 NOTE — NURSING NOTE
WOCN consult wounds on left knee due to fall. Initially vaseline gauze and roll gauze applied. Patient states this made wound moist when it had developed scabs. He is sitting in chair with knee immobilizer off currently.  He is requesting no dressing applied to wounds. He will wear his immobilizer when up ambulating. No drainage noted at this time.

## 2024-03-25 NOTE — CASE MANAGEMENT/SOCIAL WORK
Continued Stay Note  [facility]     Patient Name: Mannie Fajardo  MRN: [id number]  Today's Date: 3/25/2024    Admit Date: 3/20/2024    Plan: SNF at [facility]   Discharge Plan       Row Name 03/25/24 1138       Plan    Plan SNF at [facility]    Plan Comments Spoke with Regino/Signature, pt requests referral to [facility], accepted, bed available today. Spoke with patient and [facility] Danisha at bedside, they are agreeable to discharge and pt will need transport. No pre-cert needed, pharmacy correct, pkt to RN. Updated MD, RN, pt, HCS and Regino of transport time with [facility] w/c van today at 1300. Based on interdisciplinary assessments, the recommended discharge plan is SNF. -Zonia JUNG                   Discharge Codes    No documentation.                 Expected Discharge Date and Time       Expected Discharge Date Expected Discharge Time    Mar 25, 2024               Zonia Merritt RN

## 2024-03-25 NOTE — CASE MANAGEMENT/SOCIAL WORK
Case Management Discharge Note      Final Note: SNF via  w/c debra         Selected Continued Care - Discharged on 3/25/2024 Admission date: 3/20/2024 - Discharge disposition: Skilled Nursing Facility (DC - External)      Destination Coordination complete.      Service Provider Selected Services Address Phone Fax Patient Preferred    [facility] [address] [place], [place] [id number] [phone] --              Durable Medical Equipment    No services have been selected for the patient.                Dialysis/Infusion    No services have been selected for the patient.                Home Medical Care    No services have been selected for the patient.                Therapy    No services have been selected for the patient.                Community Resources    No services have been selected for the patient.                Community & DME    No services have been selected for the patient.                    Transportation Services  W/C Van: Kristina Hernandez    Final Discharge Disposition Code: 03 - skilled nursing facility (SNF)

## 2024-03-25 NOTE — THERAPY TREATMENT NOTE
Patient Name: Mannie Fajardo  : 1945    MRN: [id number]                              Today's Date: 3/25/2024       Admit Date: 3/20/2024    Visit Dx:     ICD-10-CM ICD-9-CM   1. Traumatic rhabdomyolysis, initial encounter  T79.6XXA 958.6   2. Closed nondisplaced fracture of right patella, unspecified fracture morphology, initial encounter  S82.001A 822.0   3. Contusion of scalp, initial encounter  S00.03XA 920   4. Acute kidney injury  N17.9 584.9   5. Pressure injury, stage 1, unspecified location: 2 arm on the right and 2 right leg.  L89.91 707.00     707.21     Patient Active Problem List   Diagnosis    Abdominal aortic aneurysm    Hypercholesterolemia    Coronary artery disease due to calcified coronary lesion    Chronic left-sided low back pain with left-sided sciatica    Chronic midline low back pain without sciatica    Degeneration of lumbar intervertebral disc    Weakness of left leg    Chronic renal insufficiency    Opioid withdrawal    Coronary artery disease involving native coronary artery of native heart with angina pectoris    Other specified symptoms and signs involving the circulatory and respiratory systems     Edema leg    Foot pain    Atherosclerosis of coronary artery    Vitamin D deficiency    Dermatitis, seborrheic    Rash    Primary lateral sclerosis    Personal history of pneumonia (recurrent)    Personal history of other drug therapy    Osteopenia    Myelopathy    Muscle weakness of extremity    Melanocytic nevus    Insomnia    Inguinal hernia    Hypothyroidism    History of osteopenia    Gout    Benign prostatic hyperplasia    At risk for osteoporosis    Anemia    Acquired atrophy of thyroid    Lumbosacral radiculopathy    Lumbar facet arthropathy    Disorder of bone and cartilage    Left lumbar radiculopathy    Constipation    History of right coronary artery stent placement    Kidney stone    Monoplegia of lower limb    Other muscle spasm    Pain in left leg     Hypertriglyceridemia    Chondromalacia of hip, left    DDD (degenerative disc disease), lumbar    Chronic bilateral low back pain without sciatica    Traumatic rhabdomyolysis    Right patella fracture    Transaminitis    Scalp hematoma, initial encounter    Head trauma, initial encounter    Small vessel disease, cerebrovascular    Pressure injury, stage 1     Past Medical History:   Diagnosis Date    AAA (abdominal aortic aneurysm) without rupture     Arthritis     back    Back pain     Backache     CAD (coronary atherosclerotic disease)     Disease of thyroid gland     Dyslipidemia     Edema     History of transfusion     Hyperhomocystinemia     Hyperlipidemia     Stage 3 chronic kidney disease      Past Surgical History:   Procedure Laterality Date    APPENDECTOMY      CARDIAC SURGERY      CORONARY ANGIOPLASTY WITH STENT PLACEMENT      EPIDURAL Left 5/25/2022    Procedure: LUMBAR/SACRAL TRANSFORAMINAL EPIDURAL - left L4 and Left S1;  Surgeon: Deepak Mckeon MD;  Location: [facility] [place] MAIN OR;  Service: Pain Management;  Laterality: Left;    EXTRACORPOREAL SHOCKWAVE LITHOTRIPSY (ESWL), STENT INSERTION/REMOVAL Right 2/24/2017    Procedure: CYSTO RETROGRADE WITH STENT PLACEMENT;  Surgeon: Janes López MD;  Location: [facility] OR [facility] [place];  Service:     LUMBAR EPIDURAL INJECTION N/A 11/16/2022    Procedure: LUMBAR EPIDURAL STEROID INJECTION INTRALAMINAR L5-S1 (LEFT PARAMEDIAN APPROACH);  Surgeon: Carly Hairston MD;  Location: [facility] MA[facility];  Service: Pain Management;  Laterality: N/A;    MEDIAL BRANCH BLOCK Bilateral 6/23/2022    Procedure: Lumbar medial branch block bilateral L2-L5;  Surgeon: Deepak Mckeon MD;  Location: [facility] [place] MAIN OR;  Service: Pain Management;  Laterality: Bilateral;    MEDIAL BRANCH BLOCK Bilateral 7/12/2022    Procedure: lumbar medial branch block  bilateral L2-L5;  Surgeon: Deepak Mckeon MD;  Location: [facility] [place] MAIN OR;  Service: Pain Management;  Laterality: Bilateral;     RADIOFREQUENCY ABLATION Bilateral 8/9/2022    Procedure: L2-L5 RADIOFREQUENCY ABLATION LUMBAR;  Surgeon: Deepak Mckeon MD;  Location: [facility] [place] MAIN OR;  Service: Pain Management;  Laterality: Bilateral;      General Information       Row Name 03/25/24 1556          OT Time and Intention    Document Type therapy note (daily note)  -     Mode of Treatment co-treatment;physical therapy;occupational therapy  -       Row Name 03/25/24 6525          General Information    Patient Profile Reviewed yes  -     Existing Precautions/Restrictions fall;weight bearing;brace on at all times  RLE WBAT; KI immobilizer  -       Row Name 03/25/24 1008          Cognition    Orientation Status (Cognition) oriented x 4  -       Row Name 03/25/24 7528          Safety Issues, Functional Mobility    Impairments Affecting Function (Mobility) balance;endurance/activity tolerance;strength;range of motion (ROM);postural/trunk control  -     Comment, Safety Issues/Impairments (Mobility) [place] medically appropriate and necessary due to pt acuity, activity tolerance, to maximize mobility efforts and safety of pt and staff. Focus on progression of care and goals established in plan of care.  -               User Key  (r) = Recorded By, (t) = Taken By, (c) = Cosigned By      Initials Name Provider Type    Allyn Granados, DANIELA Occupational Therapist                     Mobility/ADL's       Row Name 03/25/24 8322          Bed Mobility    Supine-Sit [place] (Bed Mobility) minimum assist (75% patient effort);verbal cues  -     Assistive Device (Bed Mobility) bed rails;head of bed elevated  -     Comment, (Bed Mobility) UIC at the end  -       Row Name 03/25/24 2781          Sit-Stand Transfer    Sit-Stand [place] (Transfers) moderate assist (50% patient effort);2 person assist;verbal cues  -     Assistive Device (Sit-Stand Transfers) walker, front-wheeled  -       Row Name 03/25/24 7042          Functional  Mobility    Functional Mobility- Ind. Level contact guard assist;minimum assist (75% patient effort);1 person  -KA     Functional Mobility- Device walker, front-wheeled  -KA     Functional Mobility- Comment Performed functional mobility within room and hallway to simulate household distances with use of rwx  -       Row Name 03/25/24 1559          Mobility    Extremity Weight-bearing Status right lower extremity  -     Right Lower Extremity (Weight-bearing Status) weight-bearing as tolerated (WBAT)  with R KI  -       Row Name 03/25/24 1559          Lower Body Dressing Assessment/Training    Comment, (Lower Body Dressing) Max A with KI  -               User Key  (r) = Recorded By, (t) = Taken By, (c) = Cosigned By      Initials Name Provider Type    Allyn Granados OT Occupational Therapist                   Obj/Interventions       Row Name 03/25/24 1559          Shoulder (Therapeutic Exercise)    Shoulder (Therapeutic Exercise) strengthening exercise  -     Shoulder Strengthening (Therapeutic Exercise) bilateral;flexion;extension;resistance band;yellow;10 repetitions  -       Row Name 03/25/24 1559          Elbow/Forearm (Therapeutic Exercise)    Elbow/Forearm (Therapeutic Exercise) strengthening exercise  -     Elbow/Forearm Strengthening (Therapeutic Exercise) bilateral;flexion;extension;10 repetitions;yellow;resistance band  -       Row Name 03/25/24 1559          Motor Skills    Therapeutic Exercise shoulder;elbow/forearm  -       Row Name 03/25/24 1559          Balance    Static Sitting Balance standby assist  -KA     Dynamic Sitting Balance standby assist  -KA     Position, Sitting Balance sitting edge of bed  -     Static Standing Balance contact guard  -KA     Dynamic Standing Balance minimal assist  -KA     Position/Device Used, Standing Balance walker, front-wheeled  -KA     Balance Interventions sitting;standing  -               User Key  (r) = Recorded By, (t) = Taken By, (c)  = Cosigned By      Initials Name Provider Type    Allyn Granados OT Occupational Therapist                   Goals/Plan    No documentation.                  Clinical Impression       Row Name 03/25/24 1601          Pain Assessment    Pretreatment Pain Rating 0/10 - no pain  -     Posttreatment Pain Rating 0/10 - no pain  -       Row Name 03/25/24 1601          Plan of Care Review    Plan of Care Reviewed With patient  -     Outcome Evaluation Pt seen for OT session this AM. Demo significant improvement with functional today requiring min/CGA with rwx in hallway. He did require mod Ax2 to initially stand. Donned R KI brace prior to mobility. Pt politely declined ADLs. Did participate in UE strengthening with yellow theraband to improve overall endurance and strength. Pt to continue to benefit from skilled OT.  -[facility] Name 03/25/24 1601          Therapy Plan Review/Discharge Plan (OT)    Anticipated Discharge Disposition (OT) skilled nursing facility  -[facility] Name 03/25/24 1601          Vital Signs    Pre Patient Position Supine  -     Intra Patient Position Standing  -KA     Post Patient Position Sitting  -KA       Row Name 03/25/24 1601          Positioning and Restraints    Pre-Treatment Position in bed  -KA     Post Treatment Position chair  -KA     In Chair notified nsg;reclined;call light within reach;encouraged to call for assist;exit alarm on  -               User Key  (r) = Recorded By, (t) = Taken By, (c) = Cosigned By      Initials Name Provider Type    Allyn Granados OT Occupational Therapist                   Outcome Measures       Row Name 03/25/24 1602          How much help from another is currently needed...    Putting on and taking off regular lower body clothing? 2  -KA     Bathing (including washing, rinsing, and drying) 2  -KA     Toileting (which includes using toilet bed pan or urinal) 2  -KA     Putting on and taking off regular upper body clothing 2  -KA      Taking care of personal grooming (such as brushing teeth) 3  -KA     Eating meals 4  -KA     AM-PAC 6 Clicks Score (OT) 15  -KA       Row Name 03/25/24 1049          How much help from another person do you currently need...    Turning from your back to your side while in flat bed without using bedrails? 3  -EE     Moving from lying on back to sitting on the side of a flat bed without bedrails? 2  -EE     Moving to and from a bed to a chair (including a wheelchair)? 2  -EE     Standing up from a chair using your arms (e.g., wheelchair, bedside chair)? 2  -EE     Climbing 3-5 steps with a railing? 2  -EE     To walk in hospital room? 3  -EE     AM-PAC 6 Clicks Score (PT) 14  -EE     Highest Level of Mobility Goal 4 --> Transfer to chair/commode  -EE       Row Name 03/25/24 1602          Functional Assessment    Outcome Measure Options AM-PAC 6 Clicks Daily Activity (OT)  -               User Key  (r) = Recorded By, (t) = Taken By, (c) = Cosigned By      Initials Name Provider Type    EE Nicole Babcock, PT Physical Therapist    Allyn Granados, OT Occupational Therapist                    Occupational Therapy Education       Title: PT OT SLP Therapies (Done)       Topic: Occupational Therapy (Done)       Point: ADL training (Done)       Description:   Instruct learner(s) on proper safety adaptation and remediation techniques during self care or transfers.   Instruct in proper use of assistive devices.                  Learning Progress Summary             Patient Acceptance, E,TB, VU,NR by  at 3/23/2024 2208    Acceptance, E,TB, VU by CB at 3/23/2024 0032    Acceptance, E,TB, VU by  at 3/22/2024 1259    Comment: Instructed in role of OT, discharge planning, home safety, importance of use of knee immobilizer, mobility limitations, and energy conservation strategies                         Point: Home exercise program (Done)       Description:   Instruct learner(s) on appropriate technique for monitoring,  assisting and/or progressing therapeutic exercises/activities.                  Learning Progress Summary             Patient Acceptance, E,TB, VU,NR by  at 3/23/2024 2208    Acceptance, E,TB, VU by  at 3/23/2024 0032    Acceptance, E,TB, VU by  at 3/22/2024 1259    Comment: Instructed in role of OT, discharge planning, home safety, importance of use of knee immobilizer, mobility limitations, and energy conservation strategies                         Point: Precautions (Done)       Description:   Instruct learner(s) on prescribed precautions during self-care and functional transfers.                  Learning Progress Summary             Patient Acceptance, E,TB, VU,NR by  at 3/23/2024 2208    Acceptance, E,TB, VU by  at 3/23/2024 0032    Acceptance, E,TB, VU by  at 3/22/2024 1259    Comment: Instructed in role of OT, discharge planning, home safety, importance of use of knee immobilizer, mobility limitations, and energy conservation strategies                         Point: Body mechanics (Done)       Description:   Instruct learner(s) on proper positioning and spine alignment during self-care, functional mobility activities and/or exercises.                  Learning Progress Summary             Patient Acceptance, E,TB, VU,NR by  at 3/23/2024 2208    Acceptance, E,TB, VU by  at 3/23/2024 0032    Acceptance, E,TB, VU by  at 3/22/2024 1259    Comment: Instructed in role of OT, discharge planning, home safety, importance of use of knee immobilizer, mobility limitations, and energy conservation strategies                                         User Key       Initials Effective Dates Name Provider Type Discipline     02/21/24 -  Roberto Salas, JIMMY Registered Nurse Nurse     08/31/23 -  Chichi Menchaca OT Occupational Therapist OT                  OT Recommendation and Plan     Plan of Care Review  Plan of Care Reviewed With: patient  Outcome Evaluation: Pt seen for OT session this AM. Demo significant  improvement with functional today requiring min/CGA with rwx in hallway. He did require mod Ax2 to initially stand. Donned R KI brace prior to mobility. Pt politely declined ADLs. Did participate in UE strengthening with yellow theraband to improve overall endurance and strength. Pt to continue to benefit from skilled OT.     Time Calculation:         Time Calculation- OT       Row Name 03/25/24 1602             Time Calculation- OT    OT Start Time 0959  -KA      OT Stop Time 1027  -KA      OT Time Calculation (min) 28 min  -KA      Total Timed Code Minutes- OT 28 minute(s)  -KA      OT Received On 03/25/24  -KA      OT - Next Appointment 03/26/24  -KA         Timed Charges    75102 - OT Therapeutic Exercise Minutes 8  -KA      73698 - OT Therapeutic Activity Minutes 20  -KA         Total Minutes    Timed Charges Total Minutes 28  -KA       Total Minutes 28  -KA                User Key  (r) = Recorded By, (t) = Taken By, (c) = Cosigned By      Initials Name Provider Type    KA Allyn Welch OT Occupational Therapist                  Therapy Charges for Today       Code Description Service Date Service Provider Modifiers Qty    95025962413 HC OT THER PROC EA 15 MIN 3/25/2024 Allyn Welch OT GO 1    71369343709 HC OT THERAPEUTIC ACT EA 15 MIN 3/25/2024 Allyn Welch OT GO 1                 Allyn Welch OT  3/25/2024

## 2024-03-25 NOTE — THERAPY TREATMENT NOTE
Patient Name: Mannie Fajardo  : 1945    MRN: [id number]                              Today's Date: 3/25/2024       Admit Date: 3/20/2024    Visit Dx:     ICD-10-CM ICD-9-CM   1. Traumatic rhabdomyolysis, initial encounter  T79.6XXA 958.6   2. Closed nondisplaced fracture of right patella, unspecified fracture morphology, initial encounter  S82.001A 822.0   3. Contusion of scalp, initial encounter  S00.03XA 920   4. Acute kidney injury  N17.9 584.9   5. Pressure injury, stage 1, unspecified location: 2 arm on the right and 2 right leg.  L89.91 707.00     707.21     Patient Active Problem List   Diagnosis    Abdominal aortic aneurysm    Hypercholesterolemia    Coronary artery disease due to calcified coronary lesion    Chronic left-sided low back pain with left-sided sciatica    Chronic midline low back pain without sciatica    Degeneration of lumbar intervertebral disc    Weakness of left leg    Chronic renal insufficiency    Opioid withdrawal    Coronary artery disease involving native coronary artery of native heart with angina pectoris    Other specified symptoms and signs involving the circulatory and respiratory systems     Edema leg    Foot pain    Atherosclerosis of coronary artery    Vitamin D deficiency    Dermatitis, seborrheic    Rash    Primary lateral sclerosis    Personal history of pneumonia (recurrent)    Personal history of other drug therapy    Osteopenia    Myelopathy    Muscle weakness of extremity    Melanocytic nevus    Insomnia    Inguinal hernia    Hypothyroidism    History of osteopenia    Gout    Benign prostatic hyperplasia    At risk for osteoporosis    Anemia    Acquired atrophy of thyroid    Lumbosacral radiculopathy    Lumbar facet arthropathy    Disorder of bone and cartilage    Left lumbar radiculopathy    Constipation    History of right coronary artery stent placement    Kidney stone    Monoplegia of lower limb    Other muscle spasm    Pain in left leg     Hypertriglyceridemia    Chondromalacia of hip, left    DDD (degenerative disc disease), lumbar    Chronic bilateral low back pain without sciatica    Rhabdomyolysis    Right patella fracture    Transaminitis    Scalp hematoma, initial encounter    Head trauma, initial encounter    Small vessel disease, cerebrovascular     Past Medical History:   Diagnosis Date    AAA (abdominal aortic aneurysm) without rupture     Arthritis     back    Back pain     Backache     CAD (coronary atherosclerotic disease)     Disease of thyroid gland     Dyslipidemia     Edema     History of transfusion     Hyperhomocystinemia     Hyperlipidemia     Stage 3 chronic kidney disease      Past Surgical History:   Procedure Laterality Date    APPENDECTOMY      CARDIAC SURGERY      CORONARY ANGIOPLASTY WITH STENT PLACEMENT      EPIDURAL Left 5/25/2022    Procedure: LUMBAR/SACRAL TRANSFORAMINAL EPIDURAL - left L4 and Left S1;  Surgeon: Deepak Mckeon MD;  [place] [facility] [place]  Service: Pain Management;  Laterality: Left;    EXTRACORPOREAL SHOCKWAVE LITHOTRIPSY (ESWL), STENT INSERTION/REMOVAL Right 2/24/2017    Procedure: CYSTO RETROGRADE WITH STENT PLACEMENT;  Surgeon: Janes López MD;  Location: [facility] [place];  Service:     LUMBAR EPIDURAL INJECTION N/A 11/16/2022    Procedure: LUMBAR EPIDURAL STEROID INJECTION INTRALAMINAR L5-S1 (LEFT PARAMEDIAN APPROACH);  Surgeon: Carly Hairston MD;  Location: [facility];  Service: Pain Management;  Laterality: N/A;    MEDIAL BRANCH BLOCK Bilateral 6/23/2022    Procedure: Lumbar medial branch block bilateral L2-L5;  Surgeon: Deepak Mckeon MD;  Location: [facility];  Service: Pain Management;  Laterality: Bilateral;    MEDIAL BRANCH BLOCK Bilateral 7/12/2022    Procedure: lumbar medial branch block  bilateral L2-L5;  Surgeon: Deepak Mckeon MD;  [place] [facility] [place]  Service: Pain Management;  Laterality: Bilateral;    RADIOFREQUENCY ABLATION Bilateral 8/9/2022     Procedure: L2-L5 RADIOFREQUENCY ABLATION LUMBAR;  Surgeon: Deepak Mckeon MD;  [place] [facility] [place];  Service: Pain Management;  Laterality: Bilateral;      General Information       Row Name 03/25/24 1044          Physical Therapy Time and Intention    Document Type therapy note (daily note)  -EE     Mode of Treatment co-treatment;occupational therapy;physical therapy;other (see comments)  -EE       Row Name 03/25/24 1044          General Information    Existing Precautions/Restrictions fall;weight bearing;brace on at all times  R LE WBAT with R KI brace  -EE     Barriers to Rehab previous functional deficit  -EE       Row Name 03/25/24 1044          Cognition    Orientation Status (Cognition) oriented x 4  -EE       Row Name 03/25/24 1044          Safety Issues, Functional Mobility    Impairments Affecting Function (Mobility) balance;endurance/activity tolerance;strength;range of motion (ROM);postural/trunk control  -EE     Comment, Safety Issues/Impairments (Mobility) Co treatment medically appropriate and necessary due to patient acuity level, activity tolerance and safety of patient and staff. Treatment is focusing on progression of care and goals established in the POC.  -EE               User Key  (r) = Recorded By, (t) = Taken By, (c) = Cosigned By      Initials Name Provider Type    EE Nicole Babcock PT Physical Therapist                   Mobility       Row Name 03/25/24 1045          Bed Mobility    Supine-Sit Schurz (Bed Mobility) minimum assist (75% patient effort);verbal cues  -EE     Assistive Device (Bed Mobility) bed rails;head of bed elevated  -EE     Comment, (Bed Mobility) increased time required to complete  -EE       Row Name 03/25/24 1045          Sit-Stand Transfer    Sit-Stand Schurz (Transfers) moderate assist (50% patient effort);2 person assist;verbal cues  -EE     Assistive Device (Sit-Stand Transfers) walker, front-wheeled  -EE     Comment, (Sit-Stand Transfer) STS x1  from EOB; x1 from chair. Cues for hand placement.  -EE       Row Name 03/25/24 1045          Gait/Stairs (Locomotion)    Garber Level (Gait) minimum assist (75% patient effort);contact guard;1 person to manage equipment  2nd person following with chair for safety  -EE     Assistive Device (Gait) walker, front-wheeled  -EE     Distance in Feet (Gait) 50  -EE     Pattern (Gait) step-through  -EE     Deviations/Abnormal Patterns (Gait) alok decreased  -EE     Bilateral Gait Deviations forward flexed posture;heel strike decreased  -EE     Gait Assessment/Intervention Chair following for safety during ambulation. Cues for upright posture. Pt performed 3 trials of ambulation with rest between trials to adjust KI brace as needed.  -EE       Row Name 03/25/24 1045          Mobility    Extremity Weight-bearing Status right lower extremity  -EE     Right Lower Extremity (Weight-bearing Status) weight-bearing as tolerated (WBAT)  with R KI brace  -EE               User Key  (r) = Recorded By, (t) = Taken By, (c) = Cosigned By      Initials Name Provider Type    Nicole Norman PT Physical Therapist                   Obj/Interventions       Row Name 03/25/24 1046          Balance    Static Sitting Balance standby assist  -EE     Position, Sitting Balance unsupported;sitting edge of bed  -EE     Static Standing Balance contact guard  -EE     Dynamic Standing Balance minimal assist  -EE     Position/Device Used, Standing Balance supported;walker, front-wheeled  -EE               User Key  (r) = Recorded By, (t) = Taken By, (c) = Cosigned By      Initials Name Provider Type    Nicole Norman PT Physical Therapist                   Goals/Plan    No documentation.                  Clinical Impression       Row Name 03/25/24 1047          Pain    Pretreatment Pain Rating 0/10 - no pain  -EE     Posttreatment Pain Rating 0/10 - no pain  -EE     Pre/Posttreatment Pain Comment Pt denies back pain today  -EE       Row Name  03/25/24 1047          Plan of Care Review    Plan of Care Reviewed With patient  -EE     Progress improving  -EE     Outcome Evaluation Pt demonstrates excellent progress with strength and endurance, as evidenced by tolerance of increased activity as well as increased independence with functional mobility. Pt with no pain during therapy session. Pt able to perform bed mobility with min A, stood with mod A x2, and ambulated 50' with min/CGA and rwx. Pt continues to require assist x2 for STS due to chronic L LE weakness and limited ability to use R LE for transfer with R KI brace donned. Pt lives alone and has stairs to enter home; currently unsafe to DC home alone. Would recommend DC to SNF to address strength, balance, and endurance in order to assist with return to PLOF.  -EE       Row Name 03/25/24 1047          Positioning and Restraints    Pre-Treatment Position in bed  -EE     Post Treatment Position chair  -EE     In Chair reclined;call light within reach;encouraged to call for assist;exit alarm on;notified nsg;legs elevated;with brace  -EE               User Key  (r) = Recorded By, (t) = Taken By, (c) = Cosigned By      Initials Name Provider Type    EE Nicole Babcock, ANATOLY Physical Therapist                   Outcome Measures       Row Name 03/25/24 1049          How much help from another person do you currently need...    Turning from your back to your side while in flat bed without using bedrails? 3  -EE     Moving from lying on back to sitting on the side of a flat bed without bedrails? 2  -EE     Moving to and from a bed to a chair (including a wheelchair)? 2  -EE     Standing up from a chair using your arms (e.g., wheelchair, bedside chair)? 2  -EE     Climbing 3-5 steps with a railing? 2  -EE     To walk in hospital room? 3  -EE     AM-PAC 6 Clicks Score (PT) 14  -EE     Highest Level of Mobility Goal 4 --> Transfer to chair/commode  -EE               User Key  (r) = Recorded By, (t) = Taken By, (c) =  Cosigned By      Initials Name Provider Type    EE Nicole Babcock, ANATOLY Physical Therapist                                 Physical Therapy Education       Title: PT OT SLP Therapies (Done)       Topic: Physical Therapy (Done)       Point: Mobility training (Done)       Learning Progress Summary             Patient Acceptance, E, VU,NR by EE at 3/25/2024 1049    Acceptance, E,TB, VU,NR by CB at 3/23/2024 2208    Acceptance, E,TB, VU by CB at 3/23/2024 0032    Acceptance, E, VU,NR by EE at 3/22/2024 1024                         Point: Home exercise program (Done)       Learning Progress Summary             Patient Acceptance, E,TB, VU,NR by CB at 3/23/2024 2208    Acceptance, E,TB, VU by CB at 3/23/2024 0032                         Point: Body mechanics (Done)       Learning Progress Summary             Patient Acceptance, E, VU,NR by EE at 3/25/2024 1049    Acceptance, E,TB, VU,NR by CB at 3/23/2024 2208    Acceptance, E,TB, VU by CB at 3/23/2024 0032                         Point: Precautions (Done)       Learning Progress Summary             Patient Acceptance, E, VU,NR by EE at 3/25/2024 1049    Acceptance, E,TB, VU,NR by CB at 3/23/2024 2208    Acceptance, E,TB, VU by CB at 3/23/2024 0032    Acceptance, E, VU,NR by EE at 3/22/2024 1024                                         User Key       Initials Effective Dates Name Provider Type Discipline     06/16/21 -  Nicole Babcock PT Physical Therapist PT    CB 02/21/24 -  Roberto Salas, RN Registered Nurse Nurse                  PT Recommendation and Plan  Planned Therapy Interventions (PT): balance training, bed mobility training, gait training, home exercise program, transfer training, postural re-education, patient/family education, strengthening  Plan of Care Reviewed With: patient  Progress: improving  Outcome Evaluation: Pt demonstrates excellent progress with strength and endurance, as evidenced by tolerance of increased activity as well as increased independence  with functional mobility. Pt with no pain during therapy session. Pt able to perform bed mobility with min A, stood with mod A x2, and ambulated 50' with min/CGA and rwx. Pt continues to require assist x2 for STS due to chronic L LE weakness and limited ability to use R LE for transfer with R KI brace donned. Pt lives alone and has stairs to enter home; currently unsafe to DC home alone. Would recommend DC to SNF to address strength, balance, and endurance in order to assist with return to PLOF.     Time Calculation:         PT Charges       Row Name 03/25/24 1050             Time Calculation    Start Time 1000  -EE      Stop Time 1025  -EE      Time Calculation (min) 25 min  -EE      PT Received On 03/25/24  -EE      PT - Next Appointment 03/26/24  -EE         Time Calculation- PT    Total Timed Code Minutes- PT 25 minute(s)  -EE         Timed Charges    08992 - PT Therapeutic Activity Minutes 25  -EE         Total Minutes    Timed Charges Total Minutes 25  -EE       Total Minutes 25  -EE                User Key  (r) = Recorded By, (t) = Taken By, (c) = Cosigned By      Initials Name Provider Type    EE Nicole Babcock, PT Physical Therapist                  Therapy Charges for Today       Code Description Service Date Service Provider Modifiers Qty    [id number]  PT THERAPEUTIC ACT EA 15 MIN 3/25/2024 Nicole Babcock, PT GP 2            PT G-Codes  Outcome Measure Options: AM-PAC 6 Clicks Daily Activity (OT)  AM-PAC 6 Clicks Score (PT): 14  AM-PAC 6 Clicks Score (OT): 15  PT Discharge Summary  Anticipated Discharge Disposition (PT): skilled nursing facility    Nicole Babcock PT  3/25/2024

## 2024-03-25 NOTE — PLAN OF CARE
Problem: Adult Inpatient Plan of Care  Goal: Plan of Care Review  Flowsheets (Taken 3/25/2024 2818)  Outcome Evaluation: AOX4. Room air. IV removed. Discharge instructions discussed at bedside with patient. Discussed new medications and when to make follow up appointments. Patient verbalized understanding and verbalized that he had all his belongings. Discharged to [facility] via wheelchair van.   Goal Outcome Evaluation:              Outcome Evaluation: AOX4. Room air. IV removed. Discharge instructions discussed at bedside with patient. Discussed new medications and when to make follow up appointments. Patient verbalized understanding and verbalized that he had all his belongings. Discharged to [facility] via wheelchair van.

## 2024-03-25 NOTE — DISCHARGE SUMMARY
[facility] Medicine Services  DISCHARGE SUMMARY    Patient Name: Mannie Fajardo  : 1945  MRN: [id number]    Date of Admission: 3/20/2024  3:16 PM  Date of Discharge: 3/25/2024  Primary Care Physician: Beatrice Gardner MD    Consults       Date and Time Order Name Status Description    3/21/2024  2:05 AM Inpatient Orthopedic Surgery Consult      3/20/2024  7:39 PM [facility] (on-call MD unless specified) Details              Hospital Course       Active Hospital Problems    Diagnosis  POA    **Traumatic rhabdomyolysis [T79.6XXA]  Yes    Pressure injury, stage 1 [L89.91]  Yes    Scalp hematoma, initial encounter [S00.03XA]  Yes    Head trauma, initial encounter [S09.90XA]  Yes    Small vessel disease, cerebrovascular [I67.9]  Yes    Right patella fracture [S82.001A]  Yes    Transaminitis [R74.01]  Yes    Chronic bilateral low back pain without sciatica [M54.50, G89.29]  Yes    DDD (degenerative disc disease), lumbar [M51.36]  Yes    History of right coronary artery stent placement [Z95.5]  Not Applicable    Coronary artery disease due to calcified coronary lesion [I25.10, I25.84]  Yes    Benign prostatic hyperplasia [N40.0]  Yes    Anemia [D64.9]  Yes    Hypercholesterolemia [E78.00]  Yes    Hypothyroidism [E03.9]  Yes      Resolved Hospital Problems   No resolved problems to display.          Hospital Course:  Mannie Fajardo is a 78 y.o. male presents the hospital with fall and head trauma with scalp hematoma and a nontraumatic rhabdomyolysis and possible right patellar fracture.    Patient received IV fluid for rhabdomyolysis.  His CK was severely elevated admission is gradually trended down.  He is doing very well currently and his IV fluid has now been discontinued.  He is eating and drinking adequately.  For pain control from his trauma he has been taking Tylenol and very low-dose Celebrex and tolerating this well.    Patient with nutritional needs and we have been giving him boost for extra nutritional  supplements while he heals from his injuries.    PT OT evaluated and recommending SNF.  This seems very appropriate while patient is recovering.     Case discussed with orthopedic surgery and they have left recommendations as per below.  Plan to try for Tylenol with pain to minimize risk of confusion.     CKD:3a: Baseline creatinine approximately 1.4 per previous records.  Dropped below previous baseline with IV fluids.  Monitoring intermittently.  Recommend CMP at primary care follow-up     Continue allopurinol for history of gout.  No current gout.  Allopurinol dosed further adjusted as he reports now that he is taking 450 mg, seems to be tolerating home dose.     Currently on aspirin and Plavix for history of CAD.  He denies any chest pain.  Patient states he prefers name brand Plavix but is unable to bring his from home and unfortunately we only have the generic.  I discussed and he has tried the generic Plavix and seem to be tolerating it well.     Transaminitis is stable and trending down.  Likely related to rhabdomyolysis.  Negative viral hepatitis panel.  Continue careful monitoring.  No sign of hyperbilirubinemia or biliary obstruction.  Recommend CMP at primary care follow-up to make sure this has normalized.  If it remains elevated could consider further outpatient workup if felt to be indicated     CT scan of the head images reviewed and shows small vessel disease and scalp contusion.  Supportive care for scalp contusion.     Treatment plan discussed with patient who is in agreement and he is eager to transfer to SNF.  At the time of discharge patient was told to take all medications as prescribed, keep all follow-up appointments, and call their doctor or return to the hospital with any worsening or concerning symptoms.    Please note that this note was made using Dragon voice recognition software         Orthopedic recommendations:  Weight Bearing:Right Lower Extremity Weight Bearing As Tolerated with  knee immobilizer on and appropriate assistive devices.  Labs: None additional needed  Imaging: None additional needed  Surgery: No surgery indicated for this injury  Recommend outpatient follow-up in 2 weeks and at that time he may be able to be converted to a hinged range of motion brace.  Physical therapy for mobilization and ambulation.        Day of Discharge     Subjective: Patient was ambulating some with his brace on with physical therapy help.  He feels much better.  He still has his chronic back pain but notes it is at his long-term baseline.  He denies any back trauma or other new injuries.  He is eager to go to SNF when possible.    Vital Signs:   Temp:  [97.5 °F (36.4 °C)-97.7 °F (36.5 °C)] 97.5 °F (36.4 °C)  Heart Rate:  [64-76] 65  Resp:  [19] 19  BP: (127-151)/(60-83) 137/75     Physical Exam:  Constitutional:Awake, alert, elderly appearing  HENT: NCAT, mucous membranes moist, neck supple  Respiratory: No cough or wheezes, normal respirations, nonlabored breathing   Cardiovascular: Pulse rate is normal, palpable radial pulses  Gastrointestinal:  soft, nontender, nondistended  Musculoskeletal: Frail and chronically debilitated in appearance, right patella is tender to palpation stable, BMI is 22, somewhat thin, no lower extremity edema  Psychiatric: Appropriate affect, cooperative, conversational  Neurologic: No slurred speech or facial droop, follows commands  Skin: No rashes or jaundice, warm    Pertinent  and/or Most Recent Results     Results from last 7 days   Lab Units 03/25/24  0541 03/24/24  0710 03/23/24  0506 03/22/24  0636 03/21/24  0748 03/20/24  1730   WBC 10*3/mm3  --  5.95 5.79 6.98 6.67 10.76   HEMOGLOBIN g/dL  --  11.8* 11.9* 11.7* 12.3* 15.1   HEMATOCRIT %  --  34.9* 34.9* 33.9* 36.7* 43.2   PLATELETS 10*3/mm3  --  222 201 183 166 201   SODIUM mmol/L 139 141 139 137 138 135*   POTASSIUM mmol/L 3.6 3.9 3.9 3.9 4.0 3.7   CHLORIDE mmol/L 109* 110* 109* 108* 108* 100   CO2 mmol/L 18.6*  21.5* 20.7* 17.3* 17.9* 20.4*   BUN mg/dL 22 20 18 19 21 26*   CREATININE mg/dL 0.90 0.99 0.93 1.03 1.19 1.42*   GLUCOSE mg/dL 77 84 84 91 84 107*   CALCIUM mg/dL 8.4* 8.2* 8.3* 8.0* 8.1* 9.2     Results from last 7 days   Lab Units 03/25/24  0541 03/24/24  0710 03/23/24  0506 03/22/24  0636 03/21/24  0748 03/20/24  1730   BILIRUBIN mg/dL 0.5 0.4 0.4 0.4 0.6 0.8   ALK PHOS U/L 66 66 68 63 63 86   ALT (SGPT) U/L 87* 97* 111* 113* 126* 133*   AST (SGOT) U/L 203* 279* 388* 490* 707* 796*   PROTIME Seconds  --   --   --   --   --  13.8   INR   --   --   --   --   --  1.04           Results for orders placed during the hospital encounter of 10/28/20    Doppler Ankle Brachial Index Single Level CAR    Interpretation Summary  · Right Conclusion: The right ANDREW is normal. Normal digital pressures.  · Left Conclusion: The left ANDREW is normal. Normal digital pressures.      Results for orders placed during the hospital encounter of 10/28/20    Doppler Ankle Brachial Index Single Level CAR    Interpretation Summary  · Right Conclusion: The right ANDREW is normal. Normal digital pressures.  · Left Conclusion: The left ANDREW is normal. Normal digital pressures.      Results for orders placed during the hospital encounter of 05/19/22    Adult Transthoracic Echo Complete W/ Cont if Necessary Per Protocol    Interpretation Summary  · Calculated left ventricular EF = 56% Estimated left ventricular EF = 56% Estimated left ventricular EF was in agreement with the calculated left ventricular EF. Left ventricular systolic function is normal. Normal left ventricular cavity size noted. Left ventricular wall thickness is consistent with moderate concentric hypertrophy. All left ventricular wall segments contract normally. Left ventricular diastolic function is consistent with (grade I) impaired relaxation.  · No aortic valve regurgitation or stenosis is present. The aortic valve is abnormal in structure. There is mild thickening of the aortic  valve.  · Trace mitral valve regurgitation is present.  · Trace tricuspid valve regurgitation is present. Estimated right ventricular systolic pressure from tricuspid regurgitation is normal (<35 mmHg). Calculated right ventricular systolic pressure from tricuspid regurgitation is 30.2 mmHg.        Discharge Details        Discharge Medications        New Medications        Instructions Start Date   acetaminophen 325 MG tablet  Commonly known as: TYLENOL   650 mg, Oral, Every 6 Hours PRN      celecoxib 100 MG capsule  Commonly known as: CeleBREX   100 mg, Oral, Nightly      cholecalciferol 250 MCG (13992 UT) capsule  Generic drug: Cholecalciferol   1,000 Int'l Units, Oral, Daily      sennosides-docusate 8.6-50 MG per tablet  Commonly known as: PERICOLACE   2 tablets, Oral, 2 Times Daily PRN             Changes to Medications        Instructions Start Date   Lovaza 1 g capsule  Generic drug: omega-3 acid ethyl esters  What changed:   how much to take  how to take this  when to take this  additional instructions   TAKE TWO CAPSULES BY MOUTH TWICE A DAY      Plavix 75 MG tablet  Generic drug: clopidogrel  What changed:   how much to take  how to take this  when to take this  additional instructions   TAKE ONE TABLET BY MOUTH DAILY             Continue These Medications        Instructions Start Date   allopurinol 300 MG tablet  Commonly known as: ZYLOPRIM   450 mg, Oral, Daily      aspirin 81 MG chewable tablet   81 mg, Oral, Daily      folic acid 400 MCG tablet  Commonly known as: FOLVITE   400 mcg, Oral, Daily      Zetia 10 MG tablet  Generic drug: ezetimibe   10 mg, Oral, Daily, Name brand only              Stop These Medications      vitamin D 1.25 MG (47593 UT) capsule capsule  Commonly known as: ERGOCALCIFEROL              Allergies   Allergen Reactions    Statins Other (See Comments)     Muscle weakness in legs and arms one time only  Muscle weakness in legs and arms one time only  Other reaction(s): Other (See  Comments)  Muscle weakness in legs and arms one time only    Muscle weakness in legs and arms one time only    Adhesive Tape Other (See Comments)     Severe burn one time when left on too long    Wound Dressing Adhesive Other (See Comments)     Severe burn one time when left on too long         Discharge Disposition:  Skilled Nursing Facility (DC - External)    Diet:  Hospital:  Diet Order   Procedures    Diet: Cardiac, Gastrointestinal; Healthy Heart (2-3 Na+); Low Irritant, Lactose-Controlled; Fluid Consistency: Thin (IDDSI 0)       Activity:  Activity Instructions       Activity as Tolerated      Other Activity Instructions      Activity Instructions: · Weight Bearing:Right Lower Extremity Weight Bearing As Tolerated with knee immobilizer on and appropriate assistive devices.                 CODE STATUS:    Code Status and Medical Interventions:   Ordered at: 03/20/24 2040     Level Of Support Discussed With:    Patient     Code Status (Patient has no pulse and is not breathing):    CPR (Attempt to Resuscitate)     Medical Interventions (Patient has pulse or is breathing):    Full Support       Future Appointments   Date Time Provider Department Center   4/8/2024 10:30 AM Jenna Tillman APRN [facility]   9/3/2024 10:30 AM [facility] OP VAS RM 4 BH [facility] None   9/3/2024 11:00 AM [facility] VAS RM 4 BH [facility] None   9/3/2024 12:15 PM Nicole Page APRN [facility]   9/5/2024 10:00 AM Beto Brizuela MD [facility]   9/12/2024 10:40 AM Marlene Giordano MD [facility]           Additional Instructions for the Follow-ups that You Need to Schedule       Discharge Follow-up with PCP   As directed       Currently Documented PCP:    Beatrice Gardner MD    PCP Phone Number:    [phone]     Follow Up Details: Recommend primary care provider follow-up within 1 week for general hospital follow-up.  Please call the day you are discharged from the skilled facility to schedule        Discharge  Follow-up with Specified Provider: Follow-up with orthopedic surgery as instructed.  Please call the clinic with any question   As directed      To: Follow-up with orthopedic surgery as instructed.  Please call the clinic with any question               Contact information for follow-up providers       Nnamdi Richards MD Follow up.    Specialty: Orthopedic Surgery  Why: 2 weeks  Contact information:  [address]  [address]  [facility] [address]  [phone]               Beatrice Gardner MD .    Specialty: Geriatric Medicine  Why: Recommend primary care provider follow-up within 1 week for general hospital follow-up.  Please call the day you are discharged from the skilled facility to schedule  Contact information:  [address]  [address]  [facility] [address]  [phone]                       Contact information for after-discharge care       Destination       [facility] .    Service: Skilled Nursing  Contact information:  [address]  [facility] [address]  [phone]                                       Bo Alarcon MD  03/25/24      Time Spent on Discharge:  I spent greater than 40 minutes on this discharge activity which included: face-to-face encounter with the patient, reviewing the data in the system, coordination of the care with the nursing staff as well as consultants, documentation, and entering orders.

## 2024-03-25 NOTE — PLAN OF CARE
Goal Outcome Evaluation:  Plan of Care Reviewed With: patient        Progress: improving  Outcome Evaluation: Pt demonstrates excellent progress with strength and endurance, as evidenced by tolerance of increased activity as well as increased independence with functional mobility. Pt with no pain during therapy session. Pt able to perform bed mobility with min A, stood with mod A x2, and ambulated 50' with min/CGA and rwx. Pt continues to require assist x2 for STS due to chronic L LE weakness and limited ability to use R LE for transfer with R KI brace donned. Pt lives alone and has stairs to enter home; currently unsafe to DC home alone. Would recommend DC to SNF to address strength, balance, and endurance in order to assist with return to PLOF.      Anticipated Discharge Disposition (PT): skilled nursing facility

## 2024-03-25 NOTE — DISCHARGE PLACEMENT REQUEST
"Edgar Temple (78 y.o. Male)       Date of Birth   1945    Social Security Number       Address   [address] [place] [id number]    Home Phone   [phone]    MRN   [id number]       Quaker   None    Marital Status   Single                            Admission Date   3/20/24    Admission Type   Emergency    Admitting Provider   Javier Charles MD    Attending Provider   Bo Alarcon MD    Department, Room/Bed   [address], [address]/1       Discharge Date       Discharge Disposition   Skilled Nursing Facility (DC - External)    Discharge Destination                                 Attending Provider: Bo Alarcon MD    Allergies: Statins, Adhesive Tape, Wound Dressing Adhesive    Isolation: None   Infection: None   Code Status: CPR    Ht: 172.7 cm (68\")   Wt: 70.4 kg (155 lb 3.3 oz)    Admission Cmt: None   Principal Problem: Traumatic rhabdomyolysis [T79.6XXA]                   Active Insurance as of 3/20/2024       Primary Coverage       Payor Plan Insurance Group Employer/Plan Group    MEDICARE MEDICARE A & B        Payor Plan Address Payor Plan Phone Number Payor Plan Fax Number Effective Dates    [address] [phone]  5/1/2009 - None Entered    [facility] [id number]         Subscriber Name Subscriber Birth Date Member ID       EDGAR TEMPLE 1945 [id number]               Secondary Coverage       Payor Plan Insurance Group Employer/Plan Group    [place]        Payor Plan Address Payor Plan Phone Number Payor Plan Fax Number Effective Dates    [address] [phone]  4/18/2016 - None Entered    [place] [address]         Subscriber Name Subscriber Birth Date Member ID       EDGAR TEMPLE 1945 [id number]                     Emergency Contacts        (Rel.) Home Phone Work Phone Mobile Phone    Danisha Rodriguez (HCS) (Other) -- -- [phone]    Isael Wilkerson (Friend) -- -- " [phone]    Eladio Noel (Friend) -- -- [phone]                 Discharge Summary        Bo Alarcon MD at 24 1133              [facility]  DISCHARGE SUMMARY    Patient Name: Mannie Fajardo  : 1945  MRN: [id number]    Date of Admission: 3/20/2024  3:16 PM  Date of Discharge: 3/25/2024  Primary Care Physician: Beatrice Gardner MD    Consults       Date and Time Order Name Status Description    3/21/2024  2:05 AM Inpatient Orthopedic Surgery Consult      3/20/2024  7:39 PM A (on-call MD unless specified) Details              Hospital Course       Active Hospital Problems    Diagnosis  POA    **Traumatic rhabdomyolysis [T79.6XXA]  Yes    Pressure injury, stage 1 [L89.91]  Yes    Scalp hematoma, initial encounter [S00.03XA]  Yes    Head trauma, initial encounter [S09.90XA]  Yes    Small vessel disease, cerebrovascular [I67.9]  Yes    Right patella fracture [S82.001A]  Yes    Transaminitis [R74.01]  Yes    Chronic bilateral low back pain without sciatica [M54.50, G89.29]  Yes    DDD (degenerative disc disease), lumbar [M51.36]  Yes    History of right coronary artery stent placement [Z95.5]  Not Applicable    Coronary artery disease due to calcified coronary lesion [I25.10, I25.84]  Yes    Benign prostatic hyperplasia [N40.0]  Yes    Anemia [D64.9]  Yes    Hypercholesterolemia [E78.00]  Yes    Hypothyroidism [E03.9]  Yes      Resolved Hospital Problems   No resolved problems to display.          Hospital Course:  Mannie Fajardo is a 78 y.o. male presents the hospital with fall and head trauma with scalp hematoma and a nontraumatic rhabdomyolysis and possible right patellar fracture.    Patient received IV fluid for rhabdomyolysis.  His CK was severely elevated admission is gradually trended down.  He is doing very well currently and his IV fluid has now been discontinued.  He is eating and drinking adequately.  For pain control from his trauma he has been  taking Tylenol and very low-dose Celebrex and tolerating this well.    Patient with nutritional needs and we have been giving him boost for extra nutritional supplements while he heals from his injuries.    PT OT evaluated and recommending SNF.  This seems very appropriate while patient is recovering.     Case discussed with orthopedic surgery and they have left recommendations as per below.  Plan to try for Tylenol with pain to minimize risk of confusion.     CKD:3a: Baseline creatinine approximately 1.4 per previous records.  Dropped below previous baseline with IV fluids.  Monitoring intermittently.  Recommend CMP at primary care follow-up     Continue allopurinol for history of gout.  No current gout.  Allopurinol dosed further adjusted as he reports now that he is taking 450 mg, seems to be tolerating home dose.     Currently on aspirin and Plavix for history of CAD.  He denies any chest pain.  Patient states he prefers name brand Plavix but is unable to bring his from home and unfortunately we only have the generic.  I discussed and he has tried the generic Plavix and seem to be tolerating it well.     Transaminitis is stable and trending down.  Likely related to rhabdomyolysis.  Negative viral hepatitis panel.  Continue careful monitoring.  No sign of hyperbilirubinemia or biliary obstruction.  Recommend CMP at primary care follow-up to make sure this has normalized.  If it remains elevated could consider further outpatient workup if felt to be indicated     CT scan of the head images reviewed and shows small vessel disease and scalp contusion.  Supportive care for scalp contusion.     Treatment plan discussed with patient who is in agreement and he is eager to transfer to SNF.  At the time of discharge patient was told to take all medications as prescribed, keep all follow-up appointments, and call their doctor or return to the hospital with any worsening or concerning symptoms.    Please note that this note  was made using Dragon voice recognition software         Orthopedic recommendations:  Weight Bearing:Right Lower Extremity Weight Bearing As Tolerated with knee immobilizer on and appropriate assistive devices.  Labs: None additional needed  Imaging: None additional needed  Surgery: No surgery indicated for this injury  Recommend outpatient follow-up in 2 weeks and at that time he may be able to be converted to a hinged range of motion brace.  Physical therapy for mobilization and ambulation.        Day of Discharge     Subjective: Patient was ambulating some with his brace on with physical therapy help.  He feels much better.  He still has his chronic back pain but notes it is at his long-term baseline.  He denies any back trauma or other new injuries.  He is eager to go to SNF when possible.    Vital Signs:   Temp:  [97.5 °F (36.4 °C)-97.7 °F (36.5 °C)] 97.5 °F (36.4 °C)  Heart Rate:  [64-76] 65  Resp:  [19] 19  BP: (127-151)/(60-83) 137/75     Physical Exam:  Constitutional:Awake, alert, elderly appearing  HENT: NCAT, mucous membranes moist, neck supple  Respiratory: No cough or wheezes, normal respirations, nonlabored breathing   Cardiovascular: Pulse rate is normal, palpable radial pulses  Gastrointestinal:  soft, nontender, nondistended  Musculoskeletal: Frail and chronically debilitated in appearance, right patella is tender to palpation stable, BMI is 22, somewhat thin, no lower extremity edema  Psychiatric: Appropriate affect, cooperative, conversational  Neurologic: No slurred speech or facial droop, follows commands  Skin: No rashes or jaundice, warm    Pertinent  and/or Most Recent Results     Results from last 7 days   Lab Units 03/25/24  0541 03/24/24  0710 03/23/24  0506 03/22/24  0636 03/21/24  0748 03/20/24  1730   WBC 10*3/mm3  --  5.95 5.79 6.98 6.67 10.76   HEMOGLOBIN g/dL  --  11.8* 11.9* 11.7* 12.3* 15.1   HEMATOCRIT %  --  34.9* 34.9* 33.9* 36.7* 43.2   PLATELETS 10*3/mm3  --  222 201 183 166  201   SODIUM mmol/L 139 141 139 137 138 135*   POTASSIUM mmol/L 3.6 3.9 3.9 3.9 4.0 3.7   CHLORIDE mmol/L 109* 110* 109* 108* 108* 100   CO2 mmol/L 18.6* 21.5* 20.7* 17.3* 17.9* 20.4*   BUN mg/dL 22 20 18 19 21 26*   CREATININE mg/dL 0.90 0.99 0.93 1.03 1.19 1.42*   GLUCOSE mg/dL 77 84 84 91 84 107*   CALCIUM mg/dL 8.4* 8.2* 8.3* 8.0* 8.1* 9.2     Results from last 7 days   Lab Units 03/25/24  0541 03/24/24  0710 03/23/24  0506 03/22/24  0636 03/21/24  0748 03/20/24  1730   BILIRUBIN mg/dL 0.5 0.4 0.4 0.4 0.6 0.8   ALK PHOS U/L 66 66 68 63 63 86   ALT (SGPT) U/L 87* 97* 111* 113* 126* 133*   AST (SGOT) U/L 203* 279* 388* 490* 707* 796*   PROTIME Seconds  --   --   --   --   --  13.8   INR   --   --   --   --   --  1.04           Results for orders placed during the hospital encounter of 10/28/20    Doppler Ankle Brachial Index Single Level CAR    Interpretation Summary  · Right Conclusion: The right ANDREW is normal. Normal digital pressures.  · Left Conclusion: The left ANDREW is normal. Normal digital pressures.      Results for orders placed during the hospital encounter of 10/28/20    Doppler Ankle Brachial Index Single Level CAR    Interpretation Summary  · Right Conclusion: The right ANDREW is normal. Normal digital pressures.  · Left Conclusion: The left ANDREW is normal. Normal digital pressures.      Results for orders placed during the hospital encounter of 05/19/22    Adult Transthoracic Echo Complete W/ Cont if Necessary Per Protocol    Interpretation Summary  · Calculated left ventricular EF = 56% Estimated left ventricular EF = 56% Estimated left ventricular EF was in agreement with the calculated left ventricular EF. Left ventricular systolic function is normal. Normal left ventricular cavity size noted. Left ventricular wall thickness is consistent with moderate concentric hypertrophy. All left ventricular wall segments contract normally. Left ventricular diastolic function is consistent with (grade I) impaired  relaxation.  · No aortic valve regurgitation or stenosis is present. The aortic valve is abnormal in structure. There is mild thickening of the aortic valve.  · Trace mitral valve regurgitation is present.  · Trace tricuspid valve regurgitation is present. Estimated right ventricular systolic pressure from tricuspid regurgitation is normal (<35 mmHg). Calculated right ventricular systolic pressure from tricuspid regurgitation is 30.2 mmHg.        Discharge Details        Discharge Medications        New Medications        Instructions Start Date   acetaminophen 325 MG tablet  Commonly known as: TYLENOL   650 mg, Oral, Every 6 Hours PRN      celecoxib 100 MG capsule  Commonly known as: CeleBREX   100 mg, Oral, Nightly      cholecalciferol 250 MCG (79026 UT) capsule  Generic drug: Cholecalciferol   1,000 Int'l Units, Oral, Daily      sennosides-docusate 8.6-50 MG per tablet  Commonly known as: PERICOLACE   2 tablets, Oral, 2 Times Daily PRN             Changes to Medications        Instructions Start Date   Lovaza 1 g capsule  Generic drug: omega-3 acid ethyl esters  What changed:   how much to take  how to take this  when to take this  additional instructions   TAKE TWO CAPSULES BY MOUTH TWICE A DAY      Plavix 75 MG tablet  Generic drug: clopidogrel  What changed:   how much to take  how to take this  when to take this  additional instructions   TAKE ONE TABLET BY MOUTH DAILY             Continue These Medications        Instructions Start Date   allopurinol 300 MG tablet  Commonly known as: ZYLOPRIM   450 mg, Oral, Daily      aspirin 81 MG chewable tablet   81 mg, Oral, Daily      folic acid 400 MCG tablet  Commonly known as: FOLVITE   400 mcg, Oral, Daily      Zetia 10 MG tablet  Generic drug: ezetimibe   10 mg, Oral, Daily, Name brand only              Stop These Medications      vitamin D 1.25 MG (83521 UT) capsule capsule  Commonly known as: ERGOCALCIFEROL              Allergies   Allergen Reactions    Statins  Other (See Comments)     Muscle weakness in legs and arms one time only  Muscle weakness in legs and arms one time only  Other reaction(s): Other (See Comments)  Muscle weakness in legs and arms one time only    Muscle weakness in legs and arms one time only    Adhesive Tape Other (See Comments)     Severe burn one time when left on too long    Wound Dressing Adhesive Other (See Comments)     Severe burn one time when left on too long         Discharge Disposition:  Skilled Nursing Facility (DC - External)    Diet:  Hospital:  Diet Order   Procedures    Diet: Cardiac, Gastrointestinal; Healthy Heart (2-3 Na+); Low Irritant, Lactose-Controlled; Fluid Consistency: Thin (IDDSI 0)       Activity:  Activity Instructions       Activity as Tolerated      Other Activity Instructions      Activity Instructions: · Weight Bearing:Right Lower Extremity Weight Bearing As Tolerated with knee immobilizer on and appropriate assistive devices.                 CODE STATUS:    Code Status and Medical Interventions:   Ordered at: 03/20/24 2040     Level Of Support Discussed With:    Patient     Code Status (Patient has no pulse and is not breathing):    CPR (Attempt to Resuscitate)     Medical Interventions (Patient has pulse or is breathing):    Full Support       Future Appointments   Date Time Provider Department Center   4/8/2024 10:30 AM Jenna Tillman APRN [facility]   9/3/2024 10:30 AM [facility] OP VAS RM 4 BH [facility] None   9/3/2024 11:00 AM [facility] OP VAS RM 4 BH [facility] None   9/3/2024 12:15 PM Nicole Page APRN [facility] VS [facility]   9/5/2024 10:00 AM Beto Brizuela MD [facility] N KRESGE [facility]   9/12/2024 10:40 AM Marlene Giordano MD [facility]           Additional Instructions for the Follow-ups that You Need to Schedule       Discharge Follow-up with PCP   As directed       Currently Documented PCP:    Beatrice Gardner MD    PCP Phone Number:    [phone]     Follow Up Details: Recommend primary care provider  follow-up within 1 week for general hospital follow-up.  Please call the day you are discharged from the skilled facility to schedule        Discharge Follow-up with Specified Provider: Follow-up with orthopedic surgery as instructed.  Please call the clinic with any question   As directed      To: Follow-up with orthopedic surgery as instructed.  Please call the clinic with any question               Contact information for follow-up providers       Nnamdi Richards MD Follow up.    Specialty: Orthopedic Surgery  Why: 2 weeks  Contact information:  [address]  [address]  [facility] [address]  [phone]               Beatrice Gardner MD .    Specialty: Geriatric Medicine  Why: Recommend primary care provider follow-up within 1 week for general hospital follow-up.  Please call the day you are discharged from the skilled facility to schedule  Contact information:  [address]  [address]  [place] [id number]  [phone]                       Contact information for after-discharge care       Destination       [facility] [address] .    Service: Skilled Nursing  Contact information:  [address] [facility] [address]  [phone]                                       Bo Alarcon MD  03/25/24      Time Spent on Discharge:  I spent greater than 40 minutes on this discharge activity which included: face-to-face encounter with the patient, reviewing the data in the system, coordination of the care with the nursing staff as well as consultants, documentation, and entering orders.        Electronically signed by Bo Alarcon MD at 03/25/24 0636

## 2024-03-25 NOTE — CONSULTS
Advance Directive completed, copy in chart, faxed to HIM, original & copies provided to Pt and Healthcare Surrogate (Danisha Prince). Pt indicated no further need for  services at this time. Pastoral Care Office remains available.

## 2024-05-16 ENCOUNTER — LAB (OUTPATIENT)
Dept: LAB | Facility: HOSPITAL | Age: 79
End: 2024-05-16
Payer: MEDICARE

## 2024-05-16 ENCOUNTER — TRANSCRIBE ORDERS (OUTPATIENT)
Dept: ADMINISTRATIVE | Facility: HOSPITAL | Age: 79
End: 2024-05-16
Payer: MEDICARE

## 2024-05-16 DIAGNOSIS — E55.9 AVITAMINOSIS D: ICD-10-CM

## 2024-05-16 DIAGNOSIS — N18.31 CHRONIC KIDNEY DISEASE (CKD) STAGE G3A/A1, MODERATELY DECREASED GLOMERULAR FILTRATION RATE (GFR) BETWEEN 45-59 ML/MIN/1.73 SQUARE METER AND ALBUMINURIA CREATININE RATIO LESS THAN 30 MG/G (CMS/H*: Primary | ICD-10-CM

## 2024-05-16 DIAGNOSIS — I12.9 HYPERTENSIVE NEPHROPATHY: ICD-10-CM

## 2024-05-16 DIAGNOSIS — N18.31 CHRONIC KIDNEY DISEASE (CKD) STAGE G3A/A1, MODERATELY DECREASED GLOMERULAR FILTRATION RATE (GFR) BETWEEN 45-59 ML/MIN/1.73 SQUARE METER AND ALBUMINURIA CREATININE RATIO LESS THAN 30 MG/G (CMS/H*: ICD-10-CM

## 2024-05-16 LAB
25(OH)D3 SERPL-MCNC: 43.1 NG/ML (ref 30–100)
ALBUMIN SERPL-MCNC: 4.6 G/DL (ref 3.5–5.2)
ANION GAP SERPL CALCULATED.3IONS-SCNC: 8.5 MMOL/L (ref 5–15)
BILIRUB UR QL STRIP: NEGATIVE
BUN SERPL-MCNC: 35 MG/DL (ref 8–23)
BUN/CREAT SERPL: 28 (ref 7–25)
CALCIUM SPEC-SCNC: 10.2 MG/DL (ref 8.6–10.5)
CHLORIDE SERPL-SCNC: 103 MMOL/L (ref 98–107)
CLARITY UR: CLEAR
CO2 SERPL-SCNC: 25.5 MMOL/L (ref 22–29)
COLOR UR: YELLOW
CREAT SERPL-MCNC: 1.25 MG/DL (ref 0.76–1.27)
CREAT UR-MCNC: 133.6 MG/DL
EGFRCR SERPLBLD CKD-EPI 2021: 58.6 ML/MIN/1.73
GLUCOSE SERPL-MCNC: 102 MG/DL (ref 65–99)
GLUCOSE UR STRIP-MCNC: NEGATIVE MG/DL
HGB UR QL STRIP.AUTO: NEGATIVE
KETONES UR QL STRIP: NEGATIVE
LEUKOCYTE ESTERASE UR QL STRIP.AUTO: NEGATIVE
NITRITE UR QL STRIP: NEGATIVE
PH UR STRIP.AUTO: 5.5 [PH] (ref 5–8)
PHOSPHATE SERPL-MCNC: 3.1 MG/DL (ref 2.5–4.5)
POTASSIUM SERPL-SCNC: 4.2 MMOL/L (ref 3.5–5.2)
PROT ?TM UR-MCNC: 8.3 MG/DL
PROT UR QL STRIP: NEGATIVE
PROT/CREAT UR: 62.1 MG/G CREA (ref 0–200)
SODIUM SERPL-SCNC: 137 MMOL/L (ref 136–145)
SP GR UR STRIP: 1.02 (ref 1–1.03)
UROBILINOGEN UR QL STRIP: NORMAL

## 2024-05-16 PROCEDURE — 36415 COLL VENOUS BLD VENIPUNCTURE: CPT

## 2024-05-16 PROCEDURE — 82306 VITAMIN D 25 HYDROXY: CPT

## 2024-05-16 PROCEDURE — 84156 ASSAY OF PROTEIN URINE: CPT

## 2024-05-16 PROCEDURE — 81003 URINALYSIS AUTO W/O SCOPE: CPT

## 2024-05-16 PROCEDURE — 82570 ASSAY OF URINE CREATININE: CPT

## 2024-05-16 PROCEDURE — 80069 RENAL FUNCTION PANEL: CPT

## 2024-09-03 ENCOUNTER — OFFICE VISIT (OUTPATIENT)
Age: 79
End: 2024-09-03
Payer: MEDICARE

## 2024-09-03 ENCOUNTER — HOSPITAL ENCOUNTER (OUTPATIENT)
Facility: HOSPITAL | Age: 79
Discharge: HOME | End: 2024-09-03
Payer: MEDICARE

## 2024-09-03 VITALS
WEIGHT: 141 LBS | DIASTOLIC BLOOD PRESSURE: 86 MMHG | SYSTOLIC BLOOD PRESSURE: 150 MMHG | HEIGHT: 68 IN | BODY MASS INDEX: 21.37 KG/M2

## 2024-09-03 DIAGNOSIS — I65.23 BILATERAL CAROTID ARTERY STENOSIS: ICD-10-CM

## 2024-09-03 DIAGNOSIS — I71.43 INFRARENAL ABDOMINAL AORTIC ANEURYSM (AAA) WITHOUT RUPTURE: Primary | ICD-10-CM

## 2024-09-03 DIAGNOSIS — I72.4 ANEURYSM OF ARTERY OF LOWER EXTREMITY: ICD-10-CM

## 2024-09-03 DIAGNOSIS — I73.9 PERIPHERAL VASCULAR DISEASE, UNSPECIFIED: ICD-10-CM

## 2024-09-03 DIAGNOSIS — M79.605 PAIN IN LEFT LEG: ICD-10-CM

## 2024-09-03 LAB
ABDOMINAL DIST AORTA AP: 1.9 CM
ABDOMINAL DIST AORTA TRANS: 1.9 CM
ABDOMINAL DIST AORTA VEL: 83.4 CM/S
ABDOMINAL LT COM ILIAC AP: 1.4 CM
ABDOMINAL LT COM ILIAC TRANS: 1.3 CM
ABDOMINAL LT COM ILIAC VEL: 163.4 CM/S
ABDOMINAL LT EXT ILIAC VEL: 168 CM/S
ABDOMINAL MID AORTA AP: 3.8 CM
ABDOMINAL MID AORTA TRANS: 3.8 CM
ABDOMINAL MID AORTA VEL: 74.8 CM/S
ABDOMINAL PROX AORTA AP: 2.2 CM
ABDOMINAL PROX AORTA TRANS: 2.2 CM
ABDOMINAL PROX AORTA VEL: 76.6 CM/S
ABDOMINAL RT COM ILIAC AP: 1.6 CM
ABDOMINAL RT COM ILIAC TRANS: 1.4 CM
ABDOMINAL RT COM ILIAC VEL: 146.2 CM/S
ABDOMINAL RT EXT ILIAC VEL: 114.9 CM/S
BH CV VAS ABD AO LT EXTERNAL ILIAC AP: 1 CM
BH CV VAS ABD AO RT EXTERNAL ILIAC AP: 1 CM
BH CV XLRA MEAS LEFT CAROTID BULB EDV: -22.1 CM/SEC
BH CV XLRA MEAS LEFT CAROTID BULB PSV: -67.8 CM/SEC
BH CV XLRA MEAS LEFT DIST CCA EDV: -25.5 CM/SEC
BH CV XLRA MEAS LEFT DIST CCA PSV: -85.1 CM/SEC
BH CV XLRA MEAS LEFT DIST ICA EDV: -34 CM/SEC
BH CV XLRA MEAS LEFT DIST ICA PSV: -99.3 CM/SEC
BH CV XLRA MEAS LEFT ICA/CCA RATIO: 1.17
BH CV XLRA MEAS LEFT MID CCA EDV: 18.6 CM/SEC
BH CV XLRA MEAS LEFT MID CCA PSV: 79.5 CM/SEC
BH CV XLRA MEAS LEFT MID ICA EDV: -31 CM/SEC
BH CV XLRA MEAS LEFT MID ICA PSV: -86.5 CM/SEC
BH CV XLRA MEAS LEFT PROX CCA EDV: 19.6 CM/SEC
BH CV XLRA MEAS LEFT PROX CCA PSV: 94.1 CM/SEC
BH CV XLRA MEAS LEFT PROX ECA EDV: 12.1 CM/SEC
BH CV XLRA MEAS LEFT PROX ECA PSV: 58.2 CM/SEC
BH CV XLRA MEAS LEFT PROX ICA EDV: -20.6 CM/SEC
BH CV XLRA MEAS LEFT PROX ICA PSV: -68.8 CM/SEC
BH CV XLRA MEAS LEFT PROX SCLA PSV: 221.8 CM/SEC
BH CV XLRA MEAS LEFT VERTEBRAL A EDV: -20.3 CM/SEC
BH CV XLRA MEAS LEFT VERTEBRAL A PSV: -76.8 CM/SEC
BH CV XLRA MEAS RIGHT CAROTID BULB EDV: -15.5 CM/SEC
BH CV XLRA MEAS RIGHT CAROTID BULB PSV: -88.2 CM/SEC
BH CV XLRA MEAS RIGHT DIST CCA EDV: -18 CM/SEC
BH CV XLRA MEAS RIGHT DIST CCA PSV: -100 CM/SEC
BH CV XLRA MEAS RIGHT DIST ICA EDV: -25.9 CM/SEC
BH CV XLRA MEAS RIGHT DIST ICA PSV: -101.9 CM/SEC
BH CV XLRA MEAS RIGHT ICA/CCA RATIO: 1.02
BH CV XLRA MEAS RIGHT MID CCA EDV: -16.1 CM/SEC
BH CV XLRA MEAS RIGHT MID CCA PSV: -98.1 CM/SEC
BH CV XLRA MEAS RIGHT MID ICA EDV: 29 CM/SEC
BH CV XLRA MEAS RIGHT MID ICA PSV: 95.6 CM/SEC
BH CV XLRA MEAS RIGHT PROX CCA EDV: -14.7 CM/SEC
BH CV XLRA MEAS RIGHT PROX CCA PSV: -132.6 CM/SEC
BH CV XLRA MEAS RIGHT PROX ECA EDV: 16.8 CM/SEC
BH CV XLRA MEAS RIGHT PROX ECA PSV: 100 CM/SEC
BH CV XLRA MEAS RIGHT PROX ICA EDV: -18 CM/SEC
BH CV XLRA MEAS RIGHT PROX ICA PSV: -90.9 CM/SEC
BH CV XLRA MEAS RIGHT PROX SCLA PSV: 106.6 CM/SEC
LEFT ARM BP: NORMAL MMHG
RIGHT ARM BP: NORMAL MMHG

## 2024-09-03 PROCEDURE — 93880 EXTRACRANIAL BILAT STUDY: CPT

## 2024-09-03 PROCEDURE — 1160F RVW MEDS BY RX/DR IN RCRD: CPT | Performed by: NURSE PRACTITIONER

## 2024-09-03 PROCEDURE — 1159F MED LIST DOCD IN RCRD: CPT | Performed by: NURSE PRACTITIONER

## 2024-09-03 PROCEDURE — 93978 VASCULAR STUDY: CPT

## 2024-09-03 PROCEDURE — 99213 OFFICE O/P EST LOW 20 MIN: CPT | Performed by: NURSE PRACTITIONER

## 2024-09-03 PROCEDURE — 93880 EXTRACRANIAL BILAT STUDY: CPT | Performed by: SURGERY

## 2024-09-03 PROCEDURE — 93978 VASCULAR STUDY: CPT | Performed by: SURGERY

## 2024-09-03 NOTE — PROGRESS NOTES
Chief Complaint  Aortic Aneurysm and Carotid Artery Disease    Subjective        Mannie Fajardo presents to [facility] VASCULAR SURGERY  HPI   Mannie Fajardo is a 79 y.o. male that has been followed in our office by Dr. Sanders for an abdominal aortic aneurysm and carotid artery stenosis. He returns today in follow-up along with an aortic duplex. He   reports he  has been doing well without hospitalizations or surgeries. He  denies any worsening abdominal pain or pain that radiates to his groin. He denies any claudication symptoms, rest pain, or tissue loss. He denies any symptoms consistent with CVA, TIA, or amaurosis fugax.  He does have chronic back pain and left leg pain and weakness that has been going on for several years.    Review of Systems   Constitutional:  Negative for fever.   Eyes:  Negative for visual disturbance.   Cardiovascular:  Negative for leg swelling.   Gastrointestinal:  Negative for abdominal pain.   Musculoskeletal:  Negative for back pain.   Skin:  Negative for color change, pallor and wound.   Neurological:  Negative for dizziness, facial asymmetry, speech difficulty and weakness.        Mannie Fajardo  reports that he has never smoked. He has never used smokeless tobacco..        Objective   Vital Signs:  Vitals:    09/03/24 1117   BP: 150/86      Body mass index is 21.44 kg/m².   BMI is within normal parameters. No other follow-up for BMI required.       Physical Exam  Vitals reviewed.   Constitutional:       Appearance: Normal appearance.   HENT:      Head: Normocephalic.   Cardiovascular:      Rate and Rhythm: Normal rate and regular rhythm.      Pulses: Normal pulses.           Dorsalis pedis pulses are 3+ on the right side and 3+ on the left side.        Posterior tibial pulses are 3+ on the right side and 3+ on the left side.   Pulmonary:      Effort: Pulmonary effort is normal.   Skin:     General: Skin is warm.   Neurological:      General: No focal deficit  present.      Mental Status: He is alert and oriented to person, place, and time.   Psychiatric:         Mood and Affect: Mood normal.          Result Review :      Previous aortic duplex: 3.63 cm    Previous carotid duplex: Less than 50% stenosis bilaterally    Aortic duplex done today: Duplex Aorta IVC Iliac Graft Complete CAR (09/03/2024 11:13)     Carotid duplex done today: Duplex Carotid Ultrasound CAR (09/03/2024 10:52)                     Assessment and Plan     Diagnoses and all orders for this visit:    1. Infrarenal abdominal aortic aneurysm (AAA) without rupture (Primary)  -     Duplex Aorta IVC Iliac Graft Complete CAR; Future    2. Bilateral carotid artery stenosis    3. Pain in left leg  -     Doppler Ankle Brachial Index Single Level CAR; Future    4. Peripheral vascular disease, unspecified  -     Doppler Ankle Brachial Index Single Level CAR; Future        Patient presents today for follow up of his abdominal aortic aneurysm and carotid stenosis.  He has mild carotid stenosis bilaterally.  It aneurysm is measuring 3.8 cm.  We discussed indications for surgical intervention once it reaches 5.5 cm.  We discussed adequate blood pressure control.  He is on a statin for cholesterol control.  He  will return in 1 year along with aortic duplex.  I would also like to get ABIs for baseline.  We will recheck his carotid duplex in 2 years.            Follow Up     Return in about 1 year (around 9/3/2025) for aortic duplex.  Patient was given instructions and counseling regarding his condition or for health maintenance advice. Please see specific information pulled into the AVS if appropriate.     CONSUELO Mcguire

## 2024-09-05 ENCOUNTER — OFFICE VISIT (OUTPATIENT)
Dept: NEUROLOGY | Facility: CLINIC | Age: 79
End: 2024-09-05
Payer: MEDICARE

## 2024-09-05 VITALS
DIASTOLIC BLOOD PRESSURE: 86 MMHG | HEART RATE: 81 BPM | OXYGEN SATURATION: 100 % | SYSTOLIC BLOOD PRESSURE: 120 MMHG | WEIGHT: 142 LBS | BODY MASS INDEX: 21.59 KG/M2

## 2024-09-05 DIAGNOSIS — R29.898 MUSCULAR DECONDITIONING: ICD-10-CM

## 2024-09-05 DIAGNOSIS — G89.29 CHRONIC LEFT-SIDED LOW BACK PAIN WITHOUT SCIATICA: ICD-10-CM

## 2024-09-05 DIAGNOSIS — M54.50 CHRONIC LEFT-SIDED LOW BACK PAIN WITHOUT SCIATICA: ICD-10-CM

## 2024-09-05 DIAGNOSIS — G37.3 TRANSVERSE MYELITIS: Primary | ICD-10-CM

## 2024-09-05 PROBLEM — R53.81 DEBILITY: Status: ACTIVE | Noted: 2024-01-25

## 2024-09-05 PROBLEM — K59.09 CHRONIC CONSTIPATION: Status: ACTIVE | Noted: 2024-01-05

## 2024-09-05 PROBLEM — R74.8 ELEVATED LIVER ENZYMES: Status: ACTIVE | Noted: 2024-04-25

## 2024-09-05 PROBLEM — R31.29 MICROSCOPIC HEMATURIA: Status: ACTIVE | Noted: 2024-04-25

## 2024-09-05 PROBLEM — M94.252 CHONDROMALACIA OF LEFT HIP: Status: ACTIVE | Noted: 2024-01-25

## 2024-09-05 NOTE — PROGRESS NOTES
CC: Transverse myelitis; chronic low back pain with associated left hip pain    HPI:  Mannie Fajardo is a  79 y.o.  right-handed male who I am seeing in follow-up with a previous history of transverse myelitis who has chronic severe low back pain associated with left hip and groin pain.  I saw him last in January with the history from that note with additions/modifications as indicated:    Expand All Collapse All    CC: Cervical transverse myelitis; chronic low back and left groin pain     HPI:  Mannie Fajardo is a  78 y.o.  right-handed [place] male who I am seeing in follow-up with a history of cervical transverse myelitis of undetermined age and chronic low back pain and left groin pain.  I saw him last 6/16/2023 with the following history taken from that note with additions/modifications as indicated:     His back pain is predominantly in the midline of the lumbar spine.  It has been present for many years.  He has failed physical therapy and he has failed pain management that tried ablations and epidural steroid injections.  He believes he has had dry needling also which was not helpful.  The patient was evaluated extensively in 2018.      He had been sent to me for an EMG which was done on the left leg 8/14/2018 showing normal nerve conductions and needle examination.  Brief clinical exam demonstrated upper motor neuron findings in the left arm and left leg and he was sent to me for a neurologic consultation.  His exam was suspicious for a left sided cervical spine lesion.  An MRI of the brain and cervical spine were obtained.  The MRI of the brain showed minor microvascular changes consistent with age.  The MRI of the cervical spine showed myelomalacia x2 with left-sided localization consistent with transverse myelitis in the area of the pyramidal tract on the left.     The patient states that he notes some specific functional problems and mild weakness in the the left arm.  He had some previous evaluation by  "Dr. Rhodes at [facility] in 2000 and 2005.  Actually had 2 spinal taps apparently and he may have had an MRI of his neck.  The patient had been on narcotics and went through narcotic withdrawal at some point in time.     He has had 4 episodes of gout and has several swollen toes and stiff joints.  He had not seen a rheumatologist until seeing Dr. Gonzales.  The patient has been followed up recently by Dr. Olson and a myelogram with post myelogram CT was done on the lumbar spine showing really no significant foraminal or central canal stenosis at any level.  I reviewed the films and agree.  The patient also had a bone scan 8/27/2018 which showed some uptake in the sacrum which was thought perhaps to represent an insufficiency fracture.     Patient has continued complaints of severe low back pain \"10/10\" as well as left groin pain.  Groin pain is relatively tolerable he states but it is very persistent.  Is simply never goes away.  It affects his gait.  He describes some atrophy of his left thigh compared to the right.  He has been seen by pain management and had several epidural steroid injections apparently and states that one of the first ones it helped his pain and he could  his knee much better but it wore off and a repeat attempts did nothing he states.   He also describes that occasionally the left knee will give out.  It seems to be while on the stairs but he is holding the railing and has not fallen.  He has assistive devices at home but chooses not to use them.     After I saw him last I obtained an MRI of the lumbar spine which showed miscellaneous degenerative disc disease but no high-grade foraminal or central canal stenosis was seen.  I then obtained another bone scan which showed some hotspots in the interphalangeal joints of the hands as well as in the cervical spine but nothing of any significance in the low back.     The patient has a new primary care, [facility]  She sent him for repeat MRI of the cervical and thoracic spine as well as left hip.  I reviewed the reports but cannot see the films since they were done in the [facility] system.  He continues to demonstrate the 2 areas of transverse myelitis in the cervical spine.  The MRI of the thoracic spine demonstrated at approximately T7 syrinx which is not visible on the previous study from 2016.  There is no cord expansion but perhaps some slight cord atrophy at that level.  MRI of the left hip demonstrates chondromalacia with it being worse posteriorly.     The patient continues to describe his chronic back pain and left hip pain.  It is worse with weightbearing.  He notices some weakness in the left leg as well as in the left arm.       The patient states since he was last seen he has more trouble walking and he always has to use the walker.  He feels weaker.  Weightbearing increases his back and hip pain      Past Medical History:   Diagnosis Date    AAA (abdominal aortic aneurysm) without rupture     AAA (abdominal aortic aneurysm) without rupture 04/08/2016    Abdominal aortic aneurysm, without rupture, unspecified 09/01/2023    Arthritis     back    Back pain     Backache     CAD (coronary artery disease) 03/02/2018    unspecified    CAD (coronary atherosclerotic disease)     Disease of thyroid gland     Dyslipidemia     Edema     Heart disease     History of transfusion     Hyperhomocystinemia     Hyperlipidemia     Other specified symptoms and signs involving the circulatory and respiratory systems 09/17/2020    Stage 3 chronic kidney disease          Past Surgical History:   Procedure Laterality Date    APPENDECTOMY  1952    CARDIAC SURGERY      CORONARY ANGIOPLASTY WITH STENT PLACEMENT      EPIDURAL Left 05/25/2022    Procedure: LUMBAR/SACRAL TRANSFORAMINAL EPIDURAL - left L4 and Left S1;  Surgeon: Deepak Mckeon MD;  Location: [facility] OR;  Service: Pain Management;  Laterality: Left;     EXTRACORPOREAL SHOCKWAVE LITHOTRIPSY (ESWL), STENT INSERTION/REMOVAL Right 02/24/2017    Procedure: CYSTO RETROGRADE WITH STENT PLACEMENT;  Surgeon: Janes López MD;  Location: [facility];  Service:     KIDNEY STONE SURGERY  2013    LUMBAR EPIDURAL INJECTION N/A 11/16/2022    Procedure: LUMBAR EPIDURAL STEROID INJECTION INTRALAMINAR L5-S1 (LEFT PARAMEDIAN APPROACH);  Surgeon: Carly Hairston MD;  Location: [facility];  Service: Pain Management;  Laterality: N/A;    MEDIAL BRANCH BLOCK Bilateral 06/23/2022    Procedure: Lumbar medial branch block bilateral L2-L5;  Surgeon: Deepak Mckeon MD;  Location: [facility];  Service: Pain Management;  Laterality: Bilateral;    MEDIAL BRANCH BLOCK Bilateral 07/12/2022    Procedure: lumbar medial branch block  bilateral L2-L5;  Surgeon: Deepak Mckeon MD;  Location: [facility];  Service: Pain Management;  Laterality: Bilateral;    OTHER SURGICAL HISTORY  2007    Cardiac stents    RADIOFREQUENCY ABLATION Bilateral 08/09/2022    Procedure: L2-L5 RADIOFREQUENCY ABLATION LUMBAR;  Surgeon: Deepak Mckeon MD;  Location: [facility];  Service: Pain Management;  Laterality: Bilateral;           Current Outpatient Medications:     allopurinol (ZYLOPRIM) 300 MG tablet, Take 1.5 tablets by mouth Daily., Disp: , Rfl:     aspirin 81 MG chewable tablet, Chew 1 tablet Daily., Disp: , Rfl:     ezetimibe (Zetia) 10 MG tablet, Take 1 tablet by mouth Daily. Name brand only, Disp: , Rfl:     folic acid (FOLVITE) 400 MCG tablet, Take 1 tablet by mouth Daily., Disp: , Rfl:     levothyroxine (SYNTHROID, LEVOTHROID) 50 MCG tablet, Take 1 tablet by mouth Daily., Disp: , Rfl:     Lovaza 1 g capsule, TAKE TWO CAPSULES BY MOUTH TWICE A DAY (Patient taking differently: Name brand only), Disp: 360 capsule, Rfl: 1    Plavix 75 MG tablet, TAKE ONE TABLET BY MOUTH DAILY (Patient taking differently: Brand name only), Disp: 90 tablet, Rfl: 1    sennosides-docusate (PERICOLACE)  8.6-50 MG per tablet, Take 2 tablets by mouth 2 (Two) Times a Day As Needed for Constipation., Disp: , Rfl:     acetaminophen (TYLENOL) 325 MG tablet, Take 2 tablets by mouth Every 6 (Six) Hours As Needed for Mild Pain., Disp: , Rfl:     celecoxib (CeleBREX) 100 MG capsule, Take 1 capsule by mouth Every Night. (Patient not taking: Reported on 9/5/2024), Disp: , Rfl:     cholecalciferol 250 MCG (39705 UT) capsule, Take 1,000 Int'l Units by mouth Daily. (Patient not taking: Reported on 9/5/2024), Disp: , Rfl:       Family History   Problem Relation Age of Onset    Heart disease Mother     Stroke Father     Heart disease Other     Stroke Other          Social History     Socioeconomic History    Marital status: Single    Number of children: 0    Years of education: COLLEGE   Tobacco Use    Smoking status: Never    Smokeless tobacco: Never    Tobacco comments:     Patient does not smoke.   Vaping Use    Vaping status: Never Used   Substance and Sexual Activity    Alcohol use: No     Comment: occ caffeine use; Patient is non drinker.    Drug use: No     Comment: Drug Abuse: No drug abuse    Sexual activity: Defer         Allergies   Allergen Reactions    Statins Other (See Comments)     Muscle weakness in legs and arms one time only  Muscle weakness in legs and arms one time only  Other reaction(s): Other (See Comments)  Muscle weakness in legs and arms one time only    Muscle weakness in legs and arms one time only    Adhesive Tape Other (See Comments)     Severe burn one time when left on too long    Duloxetine Hcl Other (See Comments)     Cognitive changes    Wound Dressing Adhesive Other (See Comments)     Severe burn one time when left on too long         Pain Scale: 7/10        ROS:  Review of Systems   Musculoskeletal:  Positive for gait problem.   Neurological:  Positive for weakness. Negative for dizziness, tremors, seizures, syncope, facial asymmetry, speech difficulty, light-headedness, numbness and headaches.    Psychiatric/Behavioral: Negative.         I have reviewed and agree with the above ROS completed by the medical assistant.      Physical Exam:  Vitals:    09/05/24 0956   Weight: 64.4 kg (142 lb)     Orthostatic BP:    Body mass index is 21.59 kg/m².    Physical Exam  General: Thin elderly white male no acute distress  HEENT: Normocephalic no evidence of trauma  Neck: Supple  Heart: Regular rate and rhythm  Extremities: No pedal edema Dr. Brizuela      Neurological Exam:   Mental Status: Awake, alert, oriented to person, place and time.  Conversant without evidence of an affective disorder, thought disorder, delusions or hallucinations.  Attention span and concentration are normal.  HCF: No aphasia, apraxia or dysarthria.  Recent and remote memory intact.  Knowledge of recent events intact.  CN: I:   II: Visual fields full without left inattention   III, IV, VI: Eye movements intact without nystagmus or ptosis.  Pupils equal round and reactive to light.   V,VII: Light touch and pinprick intact all 3 divisions of V.  Facial muscles symmetrical.   VIII: Hearing intact to finger rub   IX,X: Soft palate elevates symmetrically   XI: Sternomastoid and trapezius are strong.   XII: Tongue midline without atrophy or fasciculations  Motor: Increased tone left arm and leg.  Bulk somewhat reduced in the upper and lower extremities   Power testing: Right arm and leg show good power diffusely.  Left arm shows mild weakness diffusely with right leg showing somewhat greater weakness than the arm.  Reflexes: Upper extremities: Brisker on the left        Lower extremities: Brisker on the left        Toe signs: Left Babinski with downgoing right toe sign  Sensory: Light touch:        Pinprick:        Vibration:        Position:    Cerebellar: Finger-to-nose:           Rapid movement:           Heel-to-shin:  Gait and Station: The patient ambulates hunched forward.  He uses his rolling walker and his gait is antalgic.  He has some left  hemiparetic characteristics of the gait.    Results:      Lab Results   Component Value Date    GLUCOSE 102 (H) 05/16/2024    BUN 35 (H) 05/16/2024    CREATININE 1.25 05/16/2024    EGFRIFNONA 47 (L) 11/16/2021    EGFRIFAFRI >60 11/29/2022    BCR 28.0 (H) 05/16/2024    CO2 25.5 05/16/2024    CALCIUM 10.2 05/16/2024    PROTENTOTREF 6.7 12/12/2022    ALBUMIN 4.6 05/16/2024    LABIL2 2.5 12/12/2022    AST 21 09/04/2024    ALT 16 09/04/2024       Lab Results   Component Value Date    WBC 11.09 (H) 09/04/2024    HGB 14.0 09/04/2024    HCT 45.2 09/04/2024    .4 (H) 09/04/2024     09/04/2024         .  Lab Results   Component Value Date    RPR Non-Reactive 08/15/2018         Lab Results   Component Value Date    TSH 5.960 (H) 03/21/2024         Lab Results   Component Value Date    OMMCSWVB44 445 03/21/2024         Lab Results   Component Value Date    FOLATE >20.00 08/15/2018         Lab Results   Component Value Date    HGBA1C 5.7 04/10/2018         Lab Results   Component Value Date    GLUCOSE 102 (H) 05/16/2024    BUN 35 (H) 05/16/2024    CREATININE 1.25 05/16/2024    EGFRIFNONA 47 (L) 11/16/2021    EGFRIFAFRI >60 11/29/2022    BCR 28.0 (H) 05/16/2024    K 4.2 05/16/2024    CO2 25.5 05/16/2024    CALCIUM 10.2 05/16/2024    PROTENTOTREF 6.7 12/12/2022    ALBUMIN 4.6 05/16/2024    LABIL2 2.5 12/12/2022    AST 21 09/04/2024    ALT 16 09/04/2024         Lab Results   Component Value Date    WBC 11.09 (H) 09/04/2024    HGB 14.0 09/04/2024    HCT 45.2 09/04/2024    .4 (H) 09/04/2024     09/04/2024             Assessment:   1.  Transverse myelitis with left-sided pyramidal tract findings pointing at the cervical spine to be the origin.  Evaluations have demonstrated signal changes in the cord without spinal stenosis of significance.  I believe his perception of worsening is based on deconditioning since ambulating is painful for him he does not walk much.  2.  Chronic low back pain with left-sided  hip pain-he has been evaluated extensively.  No surgical findings identified.  The patient has been through pain management and physical therapy.  He has tried multiple drugs and injections all of which either caused trouble or did not work he states.          Plan:  1.  No further testing at this point indicated  2.  Discussed another trial of physical therapy which she states he had earlier this year and was not helpful.  3.  Follow-up with Hiral Mcmahon NP in 6 months to continue following his weakness.  Of note the patient had some chin tremor today to follow-up on          Time: 45 minutes              Dictated utilizing Dragon dictation.

## 2024-09-12 ENCOUNTER — OFFICE VISIT (OUTPATIENT)
Dept: CARDIOLOGY | Facility: CLINIC | Age: 79
End: 2024-09-12
Payer: MEDICARE

## 2024-09-12 VITALS
WEIGHT: 141 LBS | DIASTOLIC BLOOD PRESSURE: 70 MMHG | HEIGHT: 68 IN | BODY MASS INDEX: 21.37 KG/M2 | OXYGEN SATURATION: 95 % | SYSTOLIC BLOOD PRESSURE: 140 MMHG | HEART RATE: 105 BPM

## 2024-09-12 DIAGNOSIS — E78.00 HYPERCHOLESTEROLEMIA: ICD-10-CM

## 2024-09-12 DIAGNOSIS — I25.119 CORONARY ARTERY DISEASE INVOLVING NATIVE CORONARY ARTERY OF NATIVE HEART WITH ANGINA PECTORIS: Primary | ICD-10-CM

## 2024-09-12 DIAGNOSIS — N18.31 STAGE 3A CHRONIC KIDNEY DISEASE: ICD-10-CM

## 2024-09-12 DIAGNOSIS — E03.9 HYPOTHYROIDISM, UNSPECIFIED TYPE: ICD-10-CM

## 2024-09-12 PROCEDURE — 99213 OFFICE O/P EST LOW 20 MIN: CPT | Performed by: INTERNAL MEDICINE

## 2024-09-12 PROCEDURE — 1159F MED LIST DOCD IN RCRD: CPT | Performed by: INTERNAL MEDICINE

## 2024-09-12 PROCEDURE — 1160F RVW MEDS BY RX/DR IN RCRD: CPT | Performed by: INTERNAL MEDICINE

## 2024-09-12 PROCEDURE — 93000 ELECTROCARDIOGRAM COMPLETE: CPT | Performed by: INTERNAL MEDICINE

## 2025-02-28 DIAGNOSIS — I73.9 PERIPHERAL VASCULAR DISEASE, UNSPECIFIED: ICD-10-CM

## 2025-02-28 DIAGNOSIS — I65.23 BILATERAL CAROTID ARTERY STENOSIS: ICD-10-CM

## 2025-02-28 DIAGNOSIS — I71.43 INFRARENAL ABDOMINAL AORTIC ANEURYSM (AAA) WITHOUT RUPTURE: Primary | ICD-10-CM

## 2025-04-14 ENCOUNTER — OFFICE VISIT (OUTPATIENT)
Dept: CARDIOLOGY | Age: 80
End: 2025-04-14
Payer: MEDICARE

## 2025-04-14 VITALS
DIASTOLIC BLOOD PRESSURE: 88 MMHG | HEIGHT: 68 IN | BODY MASS INDEX: 22.73 KG/M2 | HEART RATE: 83 BPM | SYSTOLIC BLOOD PRESSURE: 134 MMHG | WEIGHT: 150 LBS

## 2025-04-14 DIAGNOSIS — E03.9 HYPOTHYROIDISM, UNSPECIFIED TYPE: ICD-10-CM

## 2025-04-14 DIAGNOSIS — I65.23 BILATERAL CAROTID ARTERY STENOSIS: ICD-10-CM

## 2025-04-14 DIAGNOSIS — E78.5 HYPERLIPIDEMIA, UNSPECIFIED HYPERLIPIDEMIA TYPE: ICD-10-CM

## 2025-04-14 DIAGNOSIS — I72.4 ANEURYSM OF ARTERY OF LOWER EXTREMITY: ICD-10-CM

## 2025-04-14 DIAGNOSIS — N18.31 STAGE 3A CHRONIC KIDNEY DISEASE: ICD-10-CM

## 2025-04-14 DIAGNOSIS — I25.10 CORONARY ARTERY DISEASE INVOLVING NATIVE CORONARY ARTERY OF NATIVE HEART WITHOUT ANGINA PECTORIS: Primary | ICD-10-CM

## 2025-04-14 PROCEDURE — 93000 ELECTROCARDIOGRAM COMPLETE: CPT | Performed by: NURSE PRACTITIONER

## 2025-04-14 PROCEDURE — 99214 OFFICE O/P EST MOD 30 MIN: CPT | Performed by: NURSE PRACTITIONER

## 2025-04-14 RX ORDER — CLOPIDOGREL BISULFATE 75 MG
TABLET ORAL
Qty: 90 TABLET | Refills: 3 | Status: SHIPPED | OUTPATIENT
Start: 2025-04-14

## 2025-04-14 RX ORDER — OMEGA-3-ACID ETHYL ESTERS 1 G/1
CAPSULE, LIQUID FILLED ORAL
Qty: 360 CAPSULE | Refills: 3 | Status: SHIPPED | OUTPATIENT
Start: 2025-04-14

## 2025-04-14 RX ORDER — EZETIMIBE 10 MG
10 TABLET ORAL DAILY
Qty: 90 TABLET | Refills: 3 | Status: SHIPPED | OUTPATIENT
Start: 2025-04-14

## 2025-04-14 NOTE — PROGRESS NOTES
"Date of Office Visit: 2025  Encounter Provider: CONSUELO Steve  Place of Service: [facility] CARDIOLOGY  Patient Name: Mannie Fajardo  :1945    Chief complaint  Coronary artery disease     History of Present Illness  Patient is a 79 y.o. year old male  patient of Dr. Giordano. Past medical history includes hyperlipidemia, abdominal aortic aneurysm, hyperhomocysteinemia, and coronary artery disease.  In , after an abnormal stress test, he underwent cardiac catheterization that revealed severe disease of the circumflex artery for which he received a drug-coated stent.  He has not had any intervening events since then, though he has maintained aspirin and Plavix therapy.  In 2016 with complaints of chest pain Lexiscan perfusion study was negative for ischemia.  Stress screening study in  showed a small abdominal aortic aneurysm for his vascular surgeons felt to be stable.  On 2022 with PVCs and shortness of breath and echocardiogram showed an ejection fraction of 56%, moderate left ventricular hypertrophy, grade 1 diastolic function, trivial valve regurgitation normal right-sided pressures.  Stress perfusion study showed a small area of ischemia in the distal septum which was new from prior nuclear stress test in 2016.  It was felt to be an intermediate risk study.  He was treated medically with relatively few symptoms.  In 2024 patient was admitted with rhabdomyolysis with markedly elevated CPK but improved with IV fluids.  Troponin was slightly elevated in the situation.  On 2024 carotid Doppler showed bilateral disease with stenosis less than 50%. Abdominal Doppler imaging showed 3.8 cm abdominal aortic aneurysm.     Interval history  Patient presents today for routine follow-up.  I will visit with him today and have reviewed his medical record.  Since last visit patient discontinued levothyroxine on his own as \"it did not make me feel any " "better\".  He did not discuss this with PCP or other provider prior to discontinuing this medication.  He continues to have balance issues and significant fatigue.  He denies palpitations, shortness of breath, edema, dizziness, chest pain or chest pressure, syncope or presyncope.  Blood pressure in office today is slightly elevated.  He has not been checking blood pressure at home.    Past Medical History:   Diagnosis Date    AAA (abdominal aortic aneurysm) without rupture     AAA (abdominal aortic aneurysm) without rupture 04/08/2016    Abdominal aortic aneurysm, without rupture, unspecified 09/01/2023    Arthritis     back    Back pain     Backache     CAD (coronary artery disease) 03/02/2018    unspecified    CAD (coronary atherosclerotic disease)     Disease of thyroid gland     Dyslipidemia     Edema     Heart disease     History of transfusion     Hyperhomocystinemia     Hyperlipidemia     Other specified symptoms and signs involving the circulatory and respiratory systems 09/17/2020    Stage 3 chronic kidney disease      Past Surgical History:   Procedure Laterality Date    APPENDECTOMY  1952    CARDIAC SURGERY      CORONARY ANGIOPLASTY WITH STENT PLACEMENT      EPIDURAL Left 05/25/2022    Procedure: LUMBAR/SACRAL TRANSFORAMINAL EPIDURAL - left L4 and Left S1;  Surgeon: Deepak Mckeon MD;  [place] [facility] [place]  Service: Pain Management;  Laterality: Left;    EXTRACORPOREAL SHOCKWAVE LITHOTRIPSY (ESWL), STENT INSERTION/REMOVAL Right 02/24/2017    Procedure: CYSTO RETROGRADE WITH STENT PLACEMENT;  Surgeon: Janes López MD;  Location: [facility];  Service:     KIDNEY STONE SURGERY  2013    LUMBAR EPIDURAL INJECTION N/A 11/16/2022    Procedure: LUMBAR EPIDURAL STEROID INJECTION INTRALAMINAR L5-S1 (LEFT PARAMEDIAN APPROACH);  Surgeon: Carly Hairston MD;  [place] [facility] [place];  Service: Pain Management;  Laterality: N/A;    MEDIAL BRANCH BLOCK Bilateral 06/23/2022    Procedure: Lumbar " medial branch block bilateral L2-L5;  Surgeon: Deepak Mckeon MD;  Location: [facility];  Service: Pain Management;  Laterality: Bilateral;    MEDIAL BRANCH BLOCK Bilateral 07/12/2022    Procedure: lumbar medial branch block  bilateral L2-L5;  Surgeon: Deepak Mckeon MD;  Location: [facility];  Service: Pain Management;  Laterality: Bilateral;    OTHER SURGICAL HISTORY  2007    Cardiac stents    RADIOFREQUENCY ABLATION Bilateral 08/09/2022    Procedure: L2-L5 RADIOFREQUENCY ABLATION LUMBAR;  Surgeon: Deepak Mckeon MD;  [place] [facility] [place] [facility] [place];  Service: Pain Management;  Laterality: Bilateral;     Outpatient Medications Prior to Visit   Medication Sig Dispense Refill    allopurinol (ZYLOPRIM) 300 MG tablet Take 1.5 tablets by mouth Daily.      aspirin 81 MG chewable tablet Chew 1 tablet Daily.      folic acid (FOLVITE) 400 MCG tablet Take 1 tablet by mouth Daily.      ezetimibe (Zetia) 10 MG tablet Take 1 tablet by mouth Daily. Name brand only      Lovaza 1 g capsule TAKE TWO CAPSULES BY MOUTH TWICE A DAY (Patient taking differently: Name brand only) 360 capsule 1    Plavix 75 MG tablet TAKE ONE TABLET BY MOUTH DAILY (Patient taking differently: Brand name only) 90 tablet 1    levothyroxine (SYNTHROID, LEVOTHROID) 50 MCG tablet Take 1 tablet by mouth Daily. (Patient not taking: Reported on 4/14/2025)      sennosides-docusate (PERICOLACE) 8.6-50 MG per tablet Take 2 tablets by mouth 2 (Two) Times a Day As Needed for Constipation. (Patient not taking: Reported on 4/14/2025)      acetaminophen (TYLENOL) 325 MG tablet Take 2 tablets by mouth Every 6 (Six) Hours As Needed for Mild Pain. (Patient not taking: Reported on 4/14/2025)       No facility-administered medications prior to visit.       Allergies as of 04/14/2025 - Reviewed 04/14/2025   Allergen Reaction Noted    Statins Other (See Comments) 07/18/2011    Adhesive tape Other (See Comments) 04/14/2016    Duloxetine hcl Other (See Comments)  "01/25/2024    Wound dressing adhesive Other (See Comments) 10/10/2011     Social History     Socioeconomic History    Marital status: Single    Number of children: 0    Years of education: COLLEGE   Tobacco Use    Smoking status: Never    Smokeless tobacco: Never    Tobacco comments:     Patient does not smoke.   Vaping Use    Vaping status: Never Used   Substance and Sexual Activity    Alcohol use: No     Comment: occ caffeine use; Patient is non drinker.    Drug use: No     Comment: Drug Abuse: No drug abuse    Sexual activity: Defer     Family History   Problem Relation Age of Onset    Heart disease Mother     Stroke Father     Heart disease Other     Stroke Other      Review of Systems   Constitutional: Positive for malaise/fatigue.   Cardiovascular:  Negative for chest pain, claudication, dyspnea on exertion, leg swelling, near-syncope, orthopnea, palpitations, paroxysmal nocturnal dyspnea and syncope.   Respiratory:  Negative for shortness of breath.    Neurological:  Positive for loss of balance. Negative for brief paralysis, dizziness, headaches and light-headedness.   All other systems reviewed and are negative.       Objective:     Vitals:    04/14/25 1006   BP: 134/88   BP Location: Right arm   Patient Position: Sitting   Cuff Size: Small Adult   Pulse: 83   Weight: 68 kg (150 lb)   Height: 172.7 cm (68\")     Body mass index is 22.81 kg/m².    Vitals reviewed.   Constitutional:       General: Not in acute distress.     Appearance: Well-developed and not in distress. Frail. Not diaphoretic.   HENT:      Head: Normocephalic.   Pulmonary:      Effort: Pulmonary effort is normal. No respiratory distress.      Breath sounds: Normal breath sounds. No wheezing. No rhonchi. No rales.   Cardiovascular:      Normal rate. Regular rhythm.      Murmurs: There is no murmur.   Pulses:     Radial: 2+ bilaterally.  Edema:     Peripheral edema absent.   Skin:     General: Skin is warm and dry. There is no cyanosis.      " Findings: No rash.   Neurological:      Mental Status: Alert and oriented to person, place, and time.   Psychiatric:         Behavior: Behavior normal. Behavior is cooperative.         Thought Content: Thought content normal.         Judgment: Judgment normal.       Lab Review:     Lab Results   Component Value Date     05/16/2024     03/25/2024    K 4.2 05/16/2024    K 3.6 03/25/2024     05/16/2024     (H) 03/25/2024    CO2 25.5 05/16/2024    CO2 18.6 (L) 03/25/2024    BUN 35 (H) 05/16/2024    BUN 22 03/25/2024    CREATININE 1.25 05/16/2024    CREATININE 0.90 03/25/2024    EGFRIFNONA 47 (L) 11/16/2021    EGFRIFNONA 45 (L) 05/27/2021    EGFRIFAFRI >60 11/29/2022    EGFRIFAFRI >60 08/29/2022    GLUCOSE 102 (H) 05/16/2024    GLUCOSE NEGATIVE 04/25/2024    CALCIUM 10.2 05/16/2024    CALCIUM 8.4 (L) 03/25/2024    ALBUMIN 4.6 05/16/2024    ALBUMIN 3.1 (L) 03/25/2024    BILITOT 0.5 03/25/2024    BILITOT 0.4 03/24/2024    AST 21 09/04/2024     (H) 03/25/2024    ALT 16 09/04/2024    ALT 87 (H) 03/25/2024     Lab Results   Component Value Date    WBC 11.09 (H) 09/04/2024    WBC 6-10 (A) 04/25/2024    HGB 14.0 09/04/2024    HGB 11.8 (L) 03/24/2024    HCT 45.2 09/04/2024    HCT 34.9 (L) 03/24/2024    .4 (H) 09/04/2024    MCV 94.6 03/24/2024     09/04/2024     03/24/2024     Lab Results   Component Value Date    PROBNP 124.5 01/25/2018    .0 04/10/2018     Lab Results   Component Value Date    CKTOTAL 166 04/25/2024    TROPONINT 36 (H) 03/20/2024     Lab Results   Component Value Date    TSH 5.960 (H) 03/21/2024    TSH 8.110 (H) 12/12/2022             ECG 12 Lead    Date/Time: 4/14/2025 10:52 AM  Performed by: Jenna Tillman APRN    Authorized by: Jenna Tillman APRN  Comparison: compared with previous ECG   Similar to previous ECG  Rhythm: sinus rhythm  Rate: normal  BPM: 83  QRS axis: normal  Comments: Similar to prior        Assessment:       Diagnosis Plan    1. Stage 3a chronic kidney disease        2. Coronary artery disease involving native coronary artery of native heart without angina pectoris  Plavix 75 MG tablet    Lovaza 1 g capsule      3. Hyperlipidemia, unspecified hyperlipidemia type  Lovaza 1 g capsule      4. Bilateral carotid artery stenosis        5. Hypothyroidism, unspecified type        6. Aneurysm of artery of lower extremity          Plan:       1.  Chest pain and worsening dyspnea.  Cardiac cath had been previously recommended due to abnormal stress test however he continues to decline this.  He is doing relatively well from a cardiac standpoint and denies chest pain or shortness of breath.  He does have persistent fatigue.  2.  Coronary artery disease with history of prior drug-coated stent placement.  Stress test 2022 showed ischemia and Dr. Giordano recommended cardiac cath.  He continues to decline cardiac cath or other coronary evaluation.  3.  Chronic back pain.  Unfortunately still troubled with this and sciatica.  He is fairly limited in his activities due to this.  4.  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  History of renal insufficiency, followed by Dr. Reed.  This was felt to be stable at last check  6.  Abdominal aortic aneurysm followed by vascular surgery.    7.  Hyperlipidemia.  Intolerant of statins.  On Lovaza and Zetia.  He is due for repeat labs with PCP.  He will call to have this completed.  8.  Leg pain.  Normal ANDREW 10/2020.  He has been told this is due to his back issues.  9.  Carotid artery plaque.  Followed by vascular surgery and it is felt to be stable.  10.  Hypothyroidism.  He stopped Synthroid on his own as he did not feel it helped his symptoms.  He has not touched base with PCP or had repeat labs.  I asked him to call PCP for follow-up as untreated hypothyroidism can have significant cardiovascular complications.  He states he will do so    Time Spent: I spent 30 minutes caring for Mannie on this date of  service. This time includes time spent by me in the following activities: preparing for the visit, reviewing tests, performing a medically appropriate examination and/or evaluations, counseling and educating the patient/family/caregiver, ordering medications, tests, or procedures, documenting information in the medical record, and independently interpreting results and communicating that information with the patient/family/caregiver.   I spent 1 minutes on the separately reported service of ECG. This time is not included in the time used to support the E/M service also reported today.        Your medication list            Accurate as of April 14, 2025 10:56 AM. If you have any questions, ask your nurse or doctor.                CONTINUE taking these medications        Instructions Last Dose Given Next Dose Due   allopurinol 300 MG tablet  Commonly known as: ZYLOPRIM      Take 1.5 tablets by mouth Daily.       aspirin 81 MG chewable tablet      Chew 1 tablet Daily.       folic acid 400 MCG tablet  Commonly known as: FOLVITE      Take 1 tablet by mouth Daily.       levothyroxine 50 MCG tablet  Commonly known as: SYNTHROID, LEVOTHROID      Take 1 tablet by mouth Daily.       Lovaza 1 g capsule  Generic drug: omega-3 acid ethyl esters      Name brand only       Plavix 75 MG tablet  Generic drug: clopidogrel      Brand name only       sennosides-docusate 8.6-50 MG per tablet  Commonly known as: PERICOLACE      Take 2 tablets by mouth 2 (Two) Times a Day As Needed for Constipation.       Zetia 10 MG tablet  Generic drug: ezetimibe      Take 1 tablet by mouth Daily. Name brand only                 Where to Get Your Medications        These medications were sent to [facility] [id number] - [facility] [address] [place] [address] [facility] - [phone] [facility] [phone] [place] [address]      Phone: [phone]   Lovaza 1 g capsule  Plavix 75 MG tablet  Zetia 10 MG tablet          Patient is no longer taking -.  I corrected the med list to reflect this.  I did not stop these medications.    Return in about 6 months (around 10/14/2025) for with Dr. Giordano.      Dictated utilizing Dragon dictation

## 2025-04-23 ENCOUNTER — TELEPHONE (OUTPATIENT)
Facility: HOSPITAL | Age: 80
End: 2025-04-23
Payer: MEDICARE

## 2025-04-23 NOTE — TELEPHONE ENCOUNTER
Called and left voicemail for patient to call our office back to schedule 1 YR Follow up with ANDREW and AAA. Last seen Tuesday 09/03/24

## 2025-04-28 NOTE — PROGRESS NOTES
NAME: Mannie Fajardo   : 1945    Chief Complaint   Patient presents with    Transverse myelitis        HPI:  Mannie Fajardo is a 79 y.o. 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  He was last seen by Dr. Brizuela on 2024, with the following history taken from that note with additions/modifications as indicated:    Patient was initially seen by Dr. Brizuela in 2018 for an EMG on the left leg which showed normal nerve conductions with some temperature correction needed and a normal needle examination of extensive muscles of the left leg including paraspinals. However brief examination disclosed weakness in the left leg and some in the left hand with some upper motor neuron findings suggestive of a cervical myelopathy and we asked to see him for further investigation.  The patient had a fairly extensive evaluation and was also seen by Dr. Olson, neurosurgery.      An MRI of the brain and cervical spine were obtained.  The MRI of the brain showed minor microvascular changes consistent with age.  The MRI of the cervical spine showed myelomalacia x2 with left-sided localization consistent with transverse myelitis in the area of the pyramidal tract on the left.     The patient notes some specific functional problems and mild weakness in the the left arm.  He had some previous evaluation by Dr. Rhodes at [facility] in  and .  Actually, he apparently had 2 spinal taps and he may have had an MRI of his neck.       He has had 4 episodes of gout and has several swollen toes and stiff joints.  He had not seen a rheumatologist until seeing Dr. Gonzales.  The patient has followed up with Dr. Olson and a myelogram with post myelogram CT was done on the lumbar spine showing really no significant foraminal or central canal stenosis at any level.  The patient also had a  "bone scan 8/27/2018 which showed some uptake in the sacrum which was thought perhaps to represent an insufficiency fracture.    Patient has continued complaints of severe low back pain \"10/10\" as well as left groin pain.  Left groin pain is tolerable he states but it is very persistent.  It simply never goes away and it affects his gait.  He describes some atrophy of his left thigh compared to the right.  He has been seen by pain management and had several epidural steroid injections and states that one of the first ones it helped his pain and he could  his knee much better but it wore off and a repeat attempts did nothing he states.  He also describes that occasionally the left knee will give out and it seems to be while on the stairs but he is holding the railing so he has not fallen.  He has assistive devices at home but chooses not to use them.     Dr. Brizuela obtained another MRI of the lumbar spine which showed miscellaneous degenerative disc disease but no high-grade foraminal or central canal stenosis was seen.  He also obtained another bone scan which showed some hotspots in the interphalangeal joints of the hands as well as in the cervical spine but nothing of any significance in the low back.     The patient has a new primary care, Dr. Gardner who is in the [facility] system.  She sent him for repeat MRI of the cervical and thoracic spine as well as left hip.  Dr. Brizuela reviewed the reports but cannot see the films since [facility].  He continues to demonstrate the 2 areas of transverse myelitis in the cervical spine.  The MRI of the thoracic spine demonstrated at approximately T7 syrinx which is not visible on the previous study from 2016.  There is no cord expansion but perhaps some slight cord atrophy at that level.  MRI of the left hip demonstrates chondromalacia with it being worse posteriorly.    History interim    Patient states his low back pain is getting worse and " "rates it a constant 10 out of 10 on the pain scale.  He denies any radicular pain down either leg.  He denies any incontinence of bowel or bladder.  He states that his left arm and left leg are getting weaker.  He states he is dragging that left leg when he is walking.  He also states that his left bicep hurts like a deep ache.  He denies any numbness or tingling in his hands or feet but states that his feet feel \"funny\" but not numb.  He denies any weakness in the right arm or right leg.  He denies any recent falls but states that he is very unsteady on his feet.  He denies any dizziness.  He has been ambulating with a rollator.  He states he just completed physical therapy about a month ago and it was not helpful.  He states he was only able to complete 3 out of 6 sessions because the pain is exacerbated with exercise.  He states he does not notice any change in his weakness.    As for his chin tremor, he denies any tremors in his hands or chin.  Reviewing chart labs his TSH was elevated at 7.52 on 4/25/2024.  Patient states he stopped taking his thyroid medication 1 year ago because he did not notice a change in his symptoms.  He states since being off of the thyroid medication he still has not noticed any change in his symptoms.      Past Medical History:   Diagnosis Date    AAA (abdominal aortic aneurysm) without rupture     AAA (abdominal aortic aneurysm) without rupture 04/08/2016    Abdominal aortic aneurysm, without rupture, unspecified 09/01/2023    Arthritis     back    Back pain     Backache     CAD (coronary artery disease) 03/02/2018    unspecified    CAD (coronary atherosclerotic disease)     Disease of thyroid gland     Dyslipidemia     Edema     Heart disease     History of transfusion     Hyperhomocystinemia     Hyperlipidemia     Other specified symptoms and signs involving the circulatory and respiratory systems 09/17/2020    Stage 3 chronic kidney disease          Past Surgical History: "   Procedure Laterality Date    APPENDECTOMY  1952    CARDIAC SURGERY      CORONARY ANGIOPLASTY WITH STENT PLACEMENT      EPIDURAL Left 05/25/2022    Procedure: LUMBAR/SACRAL TRANSFORAMINAL EPIDURAL - left L4 and Left S1;  Surgeon: Deepak Mckeon MD;  [place] [facility] [place]  Service: Pain Management;  Laterality: Left;    EXTRACORPOREAL SHOCKWAVE LITHOTRIPSY (ESWL), STENT INSERTION/REMOVAL Right 02/24/2017    Procedure: CYSTO RETROGRADE WITH STENT PLACEMENT;  Surgeon: Janes López MD;  [place]  [facility] [place] [facility] [place]  Service:     KIDNEY STONE SURGERY  2013    LUMBAR EPIDURAL INJECTION N/A 11/16/2022    Procedure: LUMBAR EPIDURAL STEROID INJECTION INTRALAMINAR L5-S1 (LEFT PARAMEDIAN APPROACH);  Surgeon: Carly Hairston MD;  Location: [facility];  Service: Pain Management;  Laterality: N/A;    MEDIAL BRANCH BLOCK Bilateral 06/23/2022    Procedure: Lumbar medial branch block bilateral L2-L5;  Surgeon: Deepak Mckeon MD;  Location: [facility];  Service: Pain Management;  Laterality: Bilateral;    MEDIAL BRANCH BLOCK Bilateral 07/12/2022    Procedure: lumbar medial branch block  bilateral L2-L5;  Surgeon: Deepak Mckeon MD;  Location: [facility];  Service: Pain Management;  Laterality: Bilateral;    OTHER SURGICAL HISTORY  2007    Cardiac stents    RADIOFREQUENCY ABLATION Bilateral 08/09/2022    Procedure: L2-L5 RADIOFREQUENCY ABLATION LUMBAR;  Surgeon: Deepak Mckeon MD;  Location: [facility];  Service: Pain Management;  Laterality: Bilateral;           Current Outpatient Medications:     allopurinol (ZYLOPRIM) 300 MG tablet, Take 1.5 tablets by mouth Daily., Disp: , Rfl:     aspirin 81 MG chewable tablet, Chew 1 tablet Daily., Disp: , Rfl:     folic acid (FOLVITE) 400 MCG tablet, Take 1 tablet by mouth Daily., Disp: , Rfl:     Lovaza 1 g capsule, Take 2 capsules by mouth 2 (Two) Times a Day. Name brand only, Disp: 360 capsule, Rfl: 1    Plavix 75 MG tablet, Brand name only, Disp: 90  tablet, Rfl: 3    Zetia 10 MG tablet, Take 1 tablet by mouth Daily. Name brand only, Disp: 90 tablet, Rfl: 3    levothyroxine (SYNTHROID, LEVOTHROID) 50 MCG tablet, Take 1 tablet by mouth Daily. (Patient not taking: Reported on 4/29/2025), Disp: , Rfl:     sennosides-docusate (PERICOLACE) 8.6-50 MG per tablet, Take 2 tablets by mouth 2 (Two) Times a Day As Needed for Constipation. (Patient not taking: Reported on 4/29/2025), Disp: , Rfl:       Family History   Problem Relation Age of Onset    Heart disease Mother     Stroke Father     Heart disease Other     Stroke Other          Social History     Socioeconomic History    Marital status: Single    Number of children: 0    Years of education: COLLEGE   Tobacco Use    Smoking status: Never    Smokeless tobacco: Never    Tobacco comments:     Patient does not smoke.   Vaping Use    Vaping status: Never Used   Substance and Sexual Activity    Alcohol use: No     Comment: occ caffeine use; Patient is non drinker.    Drug use: No     Comment: Drug Abuse: No drug abuse    Sexual activity: Defer         Allergies   Allergen Reactions    Statins Other (See Comments)     Muscle weakness in legs and arms one time only  Muscle weakness in legs and arms one time only  Other reaction(s): Other (See Comments)  Muscle weakness in legs and arms one time only    Muscle weakness in legs and arms one time only    Adhesive Tape Other (See Comments)     Severe burn one time when left on too long    Duloxetine Hcl Other (See Comments)     Cognitive changes    Wound Dressing Adhesive Other (See Comments)     Severe burn one time when left on too long         Pain Scale: 10/10 low back        ROS:  Review of Systems   Musculoskeletal:  Positive for gait problem.   Neurological:  Positive for dizziness and weakness. Negative for tremors, seizures, syncope, facial asymmetry, speech difficulty, light-headedness, numbness and headaches.   Psychiatric/Behavioral:  Positive for sleep  disturbance.        I have reviewed and agree with the above ROS complete by medical assistant.    Physical Exam:  Vitals:    04/29/25 1116   Pulse: 88   SpO2: 99%       Orthostatic BP:       Physical Exam  General: Elderly white male no acute distress  HEENT: Normocephalic no evidence of trauma  Neck: Supple.    Heart: Regular rate and rhythm  Extremities: Radial pulses strong and simultaneous.        Neurological Exam:   Mental Status: Awake, alert, oriented to person, place and time.  Conversant without evidence of an affective disorder, thought disorder, delusions or hallucinations.  Attention span and concentration are normal.  HCF: No aphasia, apraxia or dysarthria.  Recent and remote memory intact.  Knowledge of recent events intact.  CN: I:   II: Visual fields full without left inattention   III, IV, VI: Eye movements intact without nystagmus or ptosis.  Pupils equal round and reactive to light.   V,VII: Light touch and pinprick intact all 3 divisions of V.  Facial muscles symmetrical.   VIII: Hearing intact to finger rub   IX,X: Soft palate elevates symmetrically   XI: Sternomastoid and trapezius are strong.   XII: Tongue midline without atrophy or fasciculations    Motor: Minimally reduced bulk in the upper and lower extremities.  Increased tone left arm and leg.    Power testing: Left arm and leg mildly diffuse weakness but good strength in all muscles tested in the right arm and leg.    Reflexes: Upper extremities: Brisker on the left        Lower extremities: Brisker on the left        Toe signs:        Clonus:     Sensory: Light touch:        Pinprick:        Vibration:        Position:        Temperature:    Cerebellar: Finger-to-nose: Normal.  Very minimal postural tremor right hand.  No resting tremor.  Intermittent chin tremor.           Rapid movement: Normal           Heel-to-shin:    Gait and Station: Comes to stand pushing off the chair with some effort.  Patient ambulates hunched over using a  rollator.  Gait is antalgic and has some left hemiparetic characteristics.      Results:    Lab Results   Component Value Date    GLUCOSE 102 (H) 05/16/2024    BUN 35 (H) 05/16/2024    CREATININE 1.25 05/16/2024    EGFRIFNONA 47 (L) 11/16/2021    EGFRIFAFRI >60 11/29/2022    BCR 28.0 (H) 05/16/2024    CO2 25.5 05/16/2024    CALCIUM 10.2 05/16/2024    ALBUMIN 4.6 05/16/2024    LABIL2 1.7 08/29/2022    AST 21 09/04/2024    ALT 16 09/04/2024     Lab Results   Component Value Date    WBC 11.09 (H) 09/04/2024    HGB 14.0 09/04/2024    HCT 45.2 09/04/2024    .4 (H) 09/04/2024     09/04/2024     Lab Results   Component Value Date    RPR Non-Reactive 08/15/2018     Lab Results   Component Value Date    TSH 5.960 (H) 03/21/2024     Lab Results   Component Value Date    RIQOFUAT95 445 03/21/2024     Lab Results   Component Value Date    FOLATE >20.00 08/15/2018     Lab Results   Component Value Date    HGBA1C 5.7 04/10/2018     Lab Results   Component Value Date    COPPER 84 08/15/2018     Lab Results   Component Value Date    SEDRATE 1 08/15/2018         Assessment:    1.  Transverse myelitis with left-sided pyramidal tract findings pointing at the cervical spine.  Patient avoids activity and ambulation due to exacerbation of pain.  Likely worsening deconditioning due to lack of mobility.   2.  Chronic low back pain with left-sided hip pain-he has been evaluated extensively with no surgical findings identified.   Patient did not notice any change after physical therapy but was only able to complete 3 out of 6 due to pain.  3.  Intermittent chin tremor-patient denies any tremors but was intermittently seen on exam today.  Patient no longer taking hypothyroid medication, possibly contributing to tremors although I would expect it more with hyperthyroidism.  Since tremors not noticeable to patient we will continue to monitor over time.  4.  At risk for falls          Assessment and Plan   Diagnoses and all orders  for this visit:    1. Transverse myelitis (Primary)    2. Chronic left-sided low back pain without sciatica    3. Muscular deconditioning    4. Tremor    5. At risk for falls        Ideal targets for risk factor control would be Blood pressure < 130/80, B12 > 500, TSH in normal range and LDL < 70; HbA1c < 6.5 and smoking cessation if applicable. Call 911 for stroke symptoms.  Recommend 150 minutes of physical activity weekly.  Limit alcohol intake.  Recommend increasing activity safely and using an assistive device at all times to prevent falls  Follow-up in 6 months or sooner if needed      Time: Spent 50 minutes in total encounter time. This included time for chart review, obtaining history, performing pertinent physical examination, updating medical information, ordering tests/referrals, discussion of diagnosis, medical management, counseling, and encounter documentation.        CONSUELO Galindo          Much of this encounter note was dictated utilizing Dragon dictation which is an electronic transcription/translation of spoken language to printed text. The electronic translation of spoken language may permit erroneous, or at times, nonsensical words or phrases to be inadvertently transcribed; Although I have reviewed the note for such errors, some may still exist.    Portions of this assessment have been copied from previous documentation which has been thoroughly reviewed and updated as appropriate.    rate controlled   c/w Toprol XL 100mg PO daily  keep off AC due to anemia and recurrent GI bleeds

## 2025-04-29 ENCOUNTER — TRANSCRIBE ORDERS (OUTPATIENT)
Dept: ADMINISTRATIVE | Facility: HOSPITAL | Age: 80
End: 2025-04-29
Payer: MEDICARE

## 2025-04-29 ENCOUNTER — OFFICE VISIT (OUTPATIENT)
Dept: NEUROLOGY | Facility: CLINIC | Age: 80
End: 2025-04-29
Payer: MEDICARE

## 2025-04-29 ENCOUNTER — LAB (OUTPATIENT)
Dept: LAB | Facility: HOSPITAL | Age: 80
End: 2025-04-29
Payer: MEDICARE

## 2025-04-29 VITALS — HEART RATE: 88 BPM | OXYGEN SATURATION: 99 %

## 2025-04-29 DIAGNOSIS — G89.29 CHRONIC LEFT-SIDED LOW BACK PAIN WITHOUT SCIATICA: ICD-10-CM

## 2025-04-29 DIAGNOSIS — I12.9 HYPERTENSIVE NEPHROPATHY: ICD-10-CM

## 2025-04-29 DIAGNOSIS — N18.31 CHRONIC KIDNEY DISEASE (CKD) STAGE G3A/A1, MODERATELY DECREASED GLOMERULAR FILTRATION RATE (GFR) BETWEEN 45-59 ML/MIN/1.73 SQUARE METER AND ALBUMINURIA CREATININE RATIO LESS THAN 30 MG/G (CMS/H*: ICD-10-CM

## 2025-04-29 DIAGNOSIS — E55.9 AVITAMINOSIS D: ICD-10-CM

## 2025-04-29 DIAGNOSIS — N18.31 CHRONIC KIDNEY DISEASE (CKD) STAGE G3A/A1, MODERATELY DECREASED GLOMERULAR FILTRATION RATE (GFR) BETWEEN 45-59 ML/MIN/1.73 SQUARE METER AND ALBUMINURIA CREATININE RATIO LESS THAN 30 MG/G (CMS/H*: Primary | ICD-10-CM

## 2025-04-29 DIAGNOSIS — R25.1 TREMOR: ICD-10-CM

## 2025-04-29 DIAGNOSIS — Z91.81 AT RISK FOR FALLS: ICD-10-CM

## 2025-04-29 DIAGNOSIS — R29.898 MUSCULAR DECONDITIONING: ICD-10-CM

## 2025-04-29 DIAGNOSIS — M54.50 CHRONIC LEFT-SIDED LOW BACK PAIN WITHOUT SCIATICA: ICD-10-CM

## 2025-04-29 DIAGNOSIS — G37.3 TRANSVERSE MYELITIS: Primary | ICD-10-CM

## 2025-04-29 LAB
25(OH)D3 SERPL-MCNC: 30.2 NG/ML (ref 30–100)
ALBUMIN SERPL-MCNC: 4.6 G/DL (ref 3.5–5.2)
ANION GAP SERPL CALCULATED.3IONS-SCNC: 10.6 MMOL/L (ref 5–15)
BILIRUB UR QL STRIP: NEGATIVE
BUN SERPL-MCNC: 23 MG/DL (ref 8–23)
BUN/CREAT SERPL: 15.3 (ref 7–25)
CALCIUM SPEC-SCNC: 10.1 MG/DL (ref 8.6–10.5)
CHLORIDE SERPL-SCNC: 105 MMOL/L (ref 98–107)
CLARITY UR: CLEAR
CO2 SERPL-SCNC: 25.4 MMOL/L (ref 22–29)
COLOR UR: YELLOW
CREAT SERPL-MCNC: 1.5 MG/DL (ref 0.76–1.27)
CREAT UR-MCNC: 256.4 MG/DL
EGFRCR SERPLBLD CKD-EPI 2021: 46.8 ML/MIN/1.73
GLUCOSE SERPL-MCNC: 110 MG/DL (ref 65–99)
GLUCOSE UR STRIP-MCNC: NEGATIVE MG/DL
HGB UR QL STRIP.AUTO: NEGATIVE
KETONES UR QL STRIP: ABNORMAL
LEUKOCYTE ESTERASE UR QL STRIP.AUTO: NEGATIVE
NITRITE UR QL STRIP: NEGATIVE
PH UR STRIP.AUTO: 5.5 [PH] (ref 5–8)
PHOSPHATE SERPL-MCNC: 2.3 MG/DL (ref 2.5–4.5)
POTASSIUM SERPL-SCNC: 4.5 MMOL/L (ref 3.5–5.2)
PROT ?TM UR-MCNC: 19.1 MG/DL
PROT UR QL STRIP: NEGATIVE
PROT/CREAT UR: 74.5 MG/G CREA (ref 0–200)
SODIUM SERPL-SCNC: 141 MMOL/L (ref 136–145)
SP GR UR STRIP: 1.03 (ref 1–1.03)
URATE SERPL-MCNC: 3.7 MG/DL (ref 3.4–7)
UROBILINOGEN UR QL STRIP: ABNORMAL

## 2025-04-29 PROCEDURE — 82306 VITAMIN D 25 HYDROXY: CPT

## 2025-04-29 PROCEDURE — 84156 ASSAY OF PROTEIN URINE: CPT

## 2025-04-29 PROCEDURE — 81003 URINALYSIS AUTO W/O SCOPE: CPT

## 2025-04-29 PROCEDURE — 80069 RENAL FUNCTION PANEL: CPT

## 2025-04-29 PROCEDURE — 36415 COLL VENOUS BLD VENIPUNCTURE: CPT

## 2025-04-29 PROCEDURE — 82570 ASSAY OF URINE CREATININE: CPT

## 2025-04-29 PROCEDURE — 84550 ASSAY OF BLOOD/URIC ACID: CPT

## 2025-07-29 ENCOUNTER — TELEPHONE (OUTPATIENT)
Dept: CARDIOLOGY | Age: 80
End: 2025-07-29
Payer: MEDICARE

## (undated) DEVICE — SOL H2O INJ PL/BG 1000ML STRL

## (undated) DEVICE — GLV SURG TRIUMPH PF LTX 7.5 STRL

## (undated) DEVICE — CATH URETRL FLXITP POLLACK STD 5F 70CM

## (undated) DEVICE — EPIDURAL TRAY: Brand: MEDLINE INDUSTRIES, INC.

## (undated) DEVICE — PAD GRND E/S NT200IX RF/GEN W/CABL DISP

## (undated) DEVICE — Device

## (undated) DEVICE — NDL EPID TUOHY 18G 31/2IN

## (undated) DEVICE — GLV SURG TRIUMPH PF LTX 7 STRL

## (undated) DEVICE — NITINOL WIRE WITH HYDROPHILIC TIP: Brand: SENSOR

## (undated) DEVICE — NEEDLE, QUINCKE 22GX3.5": Brand: MEDLINE INDUSTRIES, INC.

## (undated) DEVICE — NDL SPINE 22G 31/2IN BLK

## (undated) DEVICE — NDL SPINE 22G 5IN BLK

## (undated) DEVICE — GLV SURG BIOGEL LTX PF 8

## (undated) DEVICE — SYR LUERLOK 20CC

## (undated) DEVICE — TOWEL,OR,DSP,ST,BLUE,STD,4/PK,20PK/CS: Brand: MEDLINE

## (undated) DEVICE — Device: Brand: PORTEX

## (undated) DEVICE — LOU CYSTO: Brand: MEDLINE INDUSTRIES, INC.